# Patient Record
Sex: MALE | Race: BLACK OR AFRICAN AMERICAN | NOT HISPANIC OR LATINO | Employment: UNEMPLOYED | URBAN - METROPOLITAN AREA
[De-identification: names, ages, dates, MRNs, and addresses within clinical notes are randomized per-mention and may not be internally consistent; named-entity substitution may affect disease eponyms.]

---

## 2023-06-13 ENCOUNTER — APPOINTMENT (OUTPATIENT)
Dept: LAB | Facility: LAB | Age: 58
End: 2023-06-13
Payer: COMMERCIAL

## 2023-06-13 LAB
ALANINE AMINOTRANSFERASE (SGPT) (U/L) IN SER/PLAS: 5 U/L (ref 10–52)
ALBUMIN (G/DL) IN SER/PLAS: 3.7 G/DL (ref 3.4–5)
ALKALINE PHOSPHATASE (U/L) IN SER/PLAS: 57 U/L (ref 33–120)
ANION GAP IN SER/PLAS: 19 MMOL/L (ref 10–20)
ASPARTATE AMINOTRANSFERASE (SGOT) (U/L) IN SER/PLAS: 10 U/L (ref 9–39)
BILIRUBIN TOTAL (MG/DL) IN SER/PLAS: 0.4 MG/DL (ref 0–1.2)
CALCIUM (MG/DL) IN SER/PLAS: 8.9 MG/DL (ref 8.6–10.6)
CARBON DIOXIDE, TOTAL (MMOL/L) IN SER/PLAS: 31 MMOL/L (ref 21–32)
CHLORIDE (MMOL/L) IN SER/PLAS: 94 MMOL/L (ref 98–107)
CREATININE (MG/DL) IN SER/PLAS: 8.78 MG/DL (ref 0.5–1.3)
ERYTHROCYTE DISTRIBUTION WIDTH (RATIO) BY AUTOMATED COUNT: 16.1 % (ref 11.5–14.5)
ERYTHROCYTE MEAN CORPUSCULAR HEMOGLOBIN CONCENTRATION (G/DL) BY AUTOMATED: 29.7 G/DL (ref 32–36)
ERYTHROCYTE MEAN CORPUSCULAR VOLUME (FL) BY AUTOMATED COUNT: 91 FL (ref 80–100)
ERYTHROCYTES (10*6/UL) IN BLOOD BY AUTOMATED COUNT: 3.13 X10E12/L (ref 4.5–5.9)
ESTIMATED AVERAGE GLUCOSE FOR HBA1C: 131 MG/DL
GFR MALE: 6 ML/MIN/1.73M2
GLUCOSE (MG/DL) IN SER/PLAS: 201 MG/DL (ref 74–99)
HEMATOCRIT (%) IN BLOOD BY AUTOMATED COUNT: 28.6 % (ref 41–52)
HEMOGLOBIN (G/DL) IN BLOOD: 8.5 G/DL (ref 13.5–17.5)
HEMOGLOBIN A1C/HEMOGLOBIN TOTAL IN BLOOD: 6.2 %
HEPATITIS B VIRUS CORE AB (PRESENCE) IN SER/PLAS BY IMM: NONREACTIVE
HEPATITIS B VIRUS SURFACE AB (MIU/ML) IN SERUM: >1000 MIU/ML
HEPATITIS B VIRUS SURFACE AG PRESENCE IN SERUM: NONREACTIVE
HEPATITIS C VIRUS AB PRESENCE IN SERUM: NONREACTIVE
HIV 1/ 2 AG/AB SCREEN: NONREACTIVE
LEUKOCYTES (10*3/UL) IN BLOOD BY AUTOMATED COUNT: 6.6 X10E9/L (ref 4.4–11.3)
NRBC (PER 100 WBCS) BY AUTOMATED COUNT: 0 /100 WBC (ref 0–0)
PLATELETS (10*3/UL) IN BLOOD AUTOMATED COUNT: 247 X10E9/L (ref 150–450)
POTASSIUM (MMOL/L) IN SER/PLAS: 4 MMOL/L (ref 3.5–5.3)
PROSTATE SPECIFIC AG (NG/ML) IN SER/PLAS: 0.4 NG/ML (ref 0–4)
PROTEIN TOTAL: 8.4 G/DL (ref 6.4–8.2)
SODIUM (MMOL/L) IN SER/PLAS: 140 MMOL/L (ref 136–145)
SYPHILIS TOTAL AB: NONREACTIVE
UREA NITROGEN (MG/DL) IN SER/PLAS: 47 MG/DL (ref 6–23)

## 2023-06-15 LAB
NIL(NEG) CONTROL SPOT COUNT: NORMAL
PANEL A SPOT COUNT: 1
PANEL B SPOT COUNT: 0
POS CONTROL SPOT COUNT: NORMAL
T-SPOT. TB INTERPRETATION: NEGATIVE

## 2023-06-16 LAB
AMPHETAMINE SCREEN BLOOD: NEGATIVE NG/ML
BARBITURATE SCREEN BLOOD: NEGATIVE NG/ML
BENZODIAZEPINES SCREEN BLOOD: NEGATIVE NG/ML
BUPRENORPHINE SCREEN BLOOD: NEGATIVE NG/ML
CANNABINOIDS SCREEN BLOOD: NEGATIVE NG/ML
COCAINE SCREEN BLOOD: NEGATIVE NG/ML
DRUG SCREEN COMMENT BLOOD: NORMAL
METHADONE SCREEN BLOOD: NEGATIVE NG/ML
METHAMPHETAMINE, BLOOD, SCREEN: NEGATIVE NG/ML
OPIATE SCREEN BLOOD: NEGATIVE NG/ML
OXYCODONE SCREEN BLOOD: NEGATIVE NG/ML
PHENCYCLIDINE SCREEN BLOOD: NEGATIVE NG/ML

## 2023-06-19 LAB
COTININE BLOOD QUANTITATIVE: <5 NG/ML
NICOTINE BLOOD QUANTITATIVE: <5 NG/ML

## 2023-10-13 PROBLEM — E87.20 METABOLIC ACIDOSIS, NORMAL ANION GAP (NAG): Status: ACTIVE | Noted: 2018-12-11

## 2023-10-13 PROBLEM — E55.9 VITAMIN D DEFICIENCY: Status: ACTIVE | Noted: 2017-03-14

## 2023-10-13 PROBLEM — R77.0 ABNORMALITY OF ALBUMIN: Status: ACTIVE | Noted: 2020-10-20

## 2023-10-13 PROBLEM — H25.812 COMBINED FORM OF AGE-RELATED CATARACT, LEFT EYE: Status: ACTIVE | Noted: 2022-11-28

## 2023-10-13 PROBLEM — E87.70 FLUID OVERLOAD, UNSPECIFIED: Status: ACTIVE | Noted: 2020-10-20

## 2023-10-13 PROBLEM — R06.02 SHORTNESS OF BREATH: Status: ACTIVE | Noted: 2020-10-20

## 2023-10-13 PROBLEM — I10 ESSENTIAL HYPERTENSION: Status: ACTIVE | Noted: 2017-01-03

## 2023-10-13 PROBLEM — E11.3512 PROLIFERATIVE DIABETIC RETINOPATHY OF LEFT EYE WITH MACULAR EDEMA ASSOCIATED WITH TYPE 2 DIABETES MELLITUS (MULTI): Status: ACTIVE | Noted: 2021-04-21

## 2023-10-13 PROBLEM — E11.9 TYPE 2 DIABETES MELLITUS (MULTI): Status: ACTIVE | Noted: 2020-10-20

## 2023-10-13 PROBLEM — D50.9 IRON DEFICIENCY ANEMIA: Status: ACTIVE | Noted: 2017-03-15

## 2023-10-13 PROBLEM — E11.21 DIABETIC NEPHROPATHY ASSOCIATED WITH TYPE 2 DIABETES MELLITUS (MULTI): Status: ACTIVE | Noted: 2017-01-03

## 2023-10-13 PROBLEM — Z99.2 ESRD (END STAGE RENAL DISEASE) ON DIALYSIS (MULTI): Status: ACTIVE | Noted: 2019-04-07

## 2023-10-13 PROBLEM — I50.30 (HFPEF) HEART FAILURE WITH PRESERVED EJECTION FRACTION (MULTI): Status: ACTIVE | Noted: 2023-10-13

## 2023-10-13 PROBLEM — N25.89 OTHER DISORDERS RESULTING FROM IMPAIRED RENAL TUBULAR FUNCTION: Status: ACTIVE | Noted: 2020-10-20

## 2023-10-13 PROBLEM — I50.22 CHRONIC SYSTOLIC HEART FAILURE (MULTI): Status: ACTIVE | Noted: 2018-12-11

## 2023-10-13 PROBLEM — D63.1 ANEMIA IN CHRONIC KIDNEY DISEASE: Status: ACTIVE | Noted: 2020-10-20

## 2023-10-13 PROBLEM — H33.41 RETINAL DETACHMENT, TRACTIONAL, RIGHT EYE: Status: ACTIVE | Noted: 2019-01-18

## 2023-10-13 PROBLEM — R60.0 BILATERAL LOWER EXTREMITY EDEMA: Status: ACTIVE | Noted: 2018-12-11

## 2023-10-13 PROBLEM — Z99.2 DEPENDENCE ON RENAL DIALYSIS (CMS-HCC): Status: ACTIVE | Noted: 2020-10-20

## 2023-10-13 PROBLEM — G62.9 NEUROPATHY: Status: ACTIVE | Noted: 2023-10-13

## 2023-10-13 PROBLEM — H35.62 RETINAL HEMORRHAGE OF LEFT EYE: Status: ACTIVE | Noted: 2017-01-19

## 2023-10-13 PROBLEM — N25.81 SECONDARY HYPERPARATHYROIDISM OF RENAL ORIGIN (MULTI): Status: ACTIVE | Noted: 2020-10-20

## 2023-10-13 PROBLEM — H25.11 NUCLEAR SCLEROSIS, RIGHT: Status: ACTIVE | Noted: 2020-07-23

## 2023-10-13 PROBLEM — M86.9 OSTEOMYELITIS OF FIFTH TOE OF RIGHT FOOT (MULTI): Status: ACTIVE | Noted: 2023-05-10

## 2023-10-13 PROBLEM — I27.20 PULMONARY HYPERTENSION (MULTI): Status: ACTIVE | Noted: 2019-10-17

## 2023-10-13 PROBLEM — I50.9 CONGESTIVE HEART FAILURE (MULTI): Status: ACTIVE | Noted: 2019-10-17

## 2023-10-13 PROBLEM — N18.6 ESRD (END STAGE RENAL DISEASE) ON DIALYSIS (MULTI): Status: ACTIVE | Noted: 2019-04-07

## 2023-10-13 PROBLEM — L97.514 ULCER OF RIGHT FOOT WITH NECROSIS OF BONE (MULTI): Status: ACTIVE | Noted: 2023-05-10

## 2023-10-13 PROBLEM — E78.5 DYSLIPIDEMIA (HIGH LDL; LOW HDL): Status: ACTIVE | Noted: 2017-03-14

## 2023-10-13 PROBLEM — E87.70 HYPERVOLEMIA: Status: ACTIVE | Noted: 2019-04-07

## 2023-10-13 PROBLEM — E44.0 MODERATE PROTEIN-CALORIE MALNUTRITION (MULTI): Status: ACTIVE | Noted: 2022-01-19

## 2023-10-13 PROBLEM — I12.9 HYPERTENSIVE NEPHROPATHY: Status: ACTIVE | Noted: 2017-01-03

## 2023-10-13 PROBLEM — N18.9 ANEMIA IN CHRONIC KIDNEY DISEASE: Status: ACTIVE | Noted: 2020-10-20

## 2023-10-13 PROBLEM — N18.5 CKD (CHRONIC KIDNEY DISEASE) STAGE 5, GFR LESS THAN 15 ML/MIN (MULTI): Status: ACTIVE | Noted: 2018-11-20

## 2023-10-13 RX ORDER — INSULIN GLARGINE 100 [IU]/ML
5 INJECTION, SOLUTION SUBCUTANEOUS EVERY EVENING
COMMUNITY

## 2023-10-13 RX ORDER — CALCIUM ACETATE 668 MG
2 TABLET ORAL 3 TIMES DAILY
COMMUNITY

## 2023-10-13 RX ORDER — METHOXY POLYETHYLENE GLYCOL-EPOETIN BETA 30 UG/.3ML
INJECTION, SOLUTION INTRAVENOUS
COMMUNITY
Start: 2022-02-11

## 2023-10-13 RX ORDER — CARVEDILOL 25 MG/1
1 TABLET ORAL 2 TIMES DAILY
COMMUNITY

## 2023-10-13 RX ORDER — CALCIUM ACETATE 667 MG/1
4 TABLET ORAL 3 TIMES DAILY
COMMUNITY
Start: 2023-02-17

## 2023-10-13 RX ORDER — CEPHALEXIN 500 MG/1
500 CAPSULE ORAL
COMMUNITY
Start: 2023-08-24

## 2023-10-13 RX ORDER — ACETAMINOPHEN 500 MG
TABLET ORAL
COMMUNITY

## 2023-10-13 RX ORDER — IBUPROFEN 200 MG
1 CAPSULE ORAL 2 TIMES DAILY
COMMUNITY
Start: 2023-01-31

## 2023-10-13 RX ORDER — B COMPLEX, C NO.20/FOLIC ACID 1 MG
1 CAPSULE ORAL DAILY
COMMUNITY
Start: 2022-11-11

## 2023-10-13 RX ORDER — ASPIRIN 81 MG/1
TABLET ORAL
COMMUNITY
Start: 2013-01-15

## 2023-10-13 RX ORDER — ATORVASTATIN CALCIUM 10 MG/1
10 TABLET, FILM COATED ORAL DAILY
COMMUNITY
Start: 2015-11-10

## 2023-10-13 RX ORDER — HYDRALAZINE HYDROCHLORIDE 100 MG/1
100 TABLET, FILM COATED ORAL 3 TIMES DAILY
COMMUNITY
Start: 2020-02-26

## 2023-10-13 RX ORDER — CARVEDILOL 12.5 MG/1
2 TABLET ORAL 2 TIMES DAILY
COMMUNITY
Start: 2019-04-04

## 2023-10-13 RX ORDER — FOLIC ACID/VIT B COMPLEX AND C 0.8 MG
TABLET ORAL
COMMUNITY
Start: 2023-06-13

## 2023-10-13 RX ORDER — INSULIN LISPRO 100 [IU]/ML
5 INJECTION, SOLUTION INTRAVENOUS; SUBCUTANEOUS 3 TIMES DAILY
COMMUNITY

## 2023-10-13 RX ORDER — SYRINGE,SAFETY WITH NEEDLE,1ML 25GX1"
SYRINGE (EA) MISCELLANEOUS
COMMUNITY
Start: 2016-04-28

## 2023-10-13 RX ORDER — LOSARTAN POTASSIUM 25 MG/1
25 TABLET ORAL DAILY
COMMUNITY
Start: 2016-07-18

## 2023-10-13 RX ORDER — PEN NEEDLE, DIABETIC 30 GX3/16"
NEEDLE, DISPOSABLE MISCELLANEOUS
COMMUNITY
Start: 2023-10-09 | End: 2024-10-06

## 2023-10-13 RX ORDER — ONDANSETRON 4 MG/1
1 TABLET, ORALLY DISINTEGRATING ORAL EVERY 8 HOURS PRN
COMMUNITY
Start: 2023-08-05

## 2023-10-24 ENCOUNTER — OFFICE VISIT (OUTPATIENT)
Dept: TRANSPLANT | Facility: HOSPITAL | Age: 58
End: 2023-10-24
Payer: COMMERCIAL

## 2023-10-24 VITALS
WEIGHT: 155.5 LBS | HEART RATE: 83 BPM | OXYGEN SATURATION: 99 % | HEIGHT: 69 IN | SYSTOLIC BLOOD PRESSURE: 154 MMHG | BODY MASS INDEX: 23.03 KG/M2 | TEMPERATURE: 97.4 F | DIASTOLIC BLOOD PRESSURE: 98 MMHG

## 2023-10-24 DIAGNOSIS — Z01.818 ENCOUNTER FOR PRE-TRANSPLANT EVALUATION FOR LIVER TRANSPLANT: Primary | ICD-10-CM

## 2023-10-24 PROCEDURE — 99213 OFFICE O/P EST LOW 20 MIN: CPT

## 2023-10-24 PROCEDURE — 4010F ACE/ARB THERAPY RXD/TAKEN: CPT | Performed by: STUDENT IN AN ORGANIZED HEALTH CARE EDUCATION/TRAINING PROGRAM

## 2023-10-24 PROCEDURE — 3077F SYST BP >= 140 MM HG: CPT | Performed by: STUDENT IN AN ORGANIZED HEALTH CARE EDUCATION/TRAINING PROGRAM

## 2023-10-24 PROCEDURE — 3044F HG A1C LEVEL LT 7.0%: CPT | Performed by: STUDENT IN AN ORGANIZED HEALTH CARE EDUCATION/TRAINING PROGRAM

## 2023-10-24 PROCEDURE — 3080F DIAST BP >= 90 MM HG: CPT | Performed by: STUDENT IN AN ORGANIZED HEALTH CARE EDUCATION/TRAINING PROGRAM

## 2023-10-24 ASSESSMENT — PAIN SCALES - GENERAL: PAINLEVEL: 0-NO PAIN

## 2023-10-24 NOTE — PROGRESS NOTES
Patient seen in clinic for foot wound.  Patient coming to get reactivated on list after foot wound has healed.  Medications and allergies reviewed.  Patient testing UTD.

## 2023-10-25 NOTE — PROGRESS NOTES
"Chief complaint: Kidney transplant evaluation     HPI:  Mr. Dow is a 59 y/o M with a hx of DMII and ESRD. He is currently status 7 for foot wound and osteomyelitis. He is being followed for his osteomyelitis by podiatrist Dr. Leone at [facility]. Initial recommendations for his osteomyelitis was BKA. He has completed several round of IV antibiotics and has since had complete closure of the skin wound. He is returning to clinic for possible activation for kidney transplant.    He last completed antibiotics approximately 2 weeks ago and has an appointment with podiatry at the end of this month. Last note by podiatry 9/19/23 recommending suppressive antibiotics.     Renal:  HD since 04/2019. T,T,S.  PRA 81%. Oliguric    Cardiac:  Last evaluated by cardiology 06/2023 - acceptable risk  Hx CHF (EF 30-35%) which has since recovered.   Stress test: 06/22/23: 1. No definite evidence of ischemia.  2. In the setting of apparent diaphragmatic attenuation, a small distal inferior wall infarct cannot be excluded. This finding appears to be new/worse in comparison the prior study.  3. Left ventricular dilatation is noted that is new in comparison the prior study.  4. Left ventricular ejection fraction was estimated to be 45% which is slightly decreased from prior study of 50%.  ECHO: 6/13/23:   1. Left ventricular systolic function is normal with a 55-60% estimated ejection fraction.  2. Spectral Doppler shows a pseudonormal pattern of left ventricular diastolic filling.  3. Mild to moderate mitral valve regurgitation.  4. Mild to moderately elevated right ventricular systolic pressure.     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 Right foot s/p partial ray of 4 and 5th. No open wounds  Vasc: +2 fem, +1 DP BLE    Assessment/Plan:  Mr. Dow is a 59 y/o M w/ hx ESRD 2/2 DMII being evaluated for reactivation on the kidney transplant list.     Allo other work up is up to date. Wound on right foot is healed, however last podiatry note was concerning for continued osteomyelitis. He is scheduled for podiatry visit later this month. Will request clearance from podiatry with possible interval imaging to document resolution of infection prior to listing.

## 2023-11-17 ENCOUNTER — COMMITTEE REVIEW (OUTPATIENT)
Dept: TRANSPLANT | Facility: HOSPITAL | Age: 58
End: 2023-11-17
Payer: COMMERCIAL

## 2023-11-17 NOTE — COMMITTEE REVIEW
Patient Discussion Note     Organ being evaluated for: Kidney    Transplant Coordinator: Miranda Rossi    Committee Review Members:  Serg ZARAGOZA, DEBORA, LD   Finance Gregg Garibay   Lab Xena Contreras, MT   Pharmacy Dior Lundberg, YannaD    Tami Senior, [facility]   Transplant Miranda Rossi, HANSEL, Sheldon Anton, RN, Juli Zuleta, RN, Rhoda Catalan, RN, Yvette Carrillo RN   Transplant Nephrology Herrera Vogt MD, Laura Amaya MD, Dion Hawk MD   Transplant Surgery Sophie Ortiz MD       Additional Discussion Notes and Findings: Patient dicussed at selection committee.  Reactivate patient to status 1.

## 2023-11-17 NOTE — LETTER
November 17, 2023    Anibal Dow  [address] [address]      Dear Mr. Dow:    Our multi-disciplinary transplant team completed a review of your medical records on 11/16/2023.  I am pleased to inform you that you will be re-activated on the [facility] ([facility]) waiting list for a Kidney transplant.    Our transplant program consists of surgeons and medical doctors who provide coverage 365 days a year, 24 hours a day.     If you have any questions or concerns regarding your insurance coverage or billing issues, a  is available to speak with you.     It is important to keep us updated of any major changes in your medical condition, contact information and health insurance coverage.     Please don't hesitate to contact us at Dept: [phone] with any questions or concerns. We look forward to working with you through this process.      Sincerely,      Miranda Rossi RN          The UNOS Toll-free Patient Services Line:  Your Resource for Organ Transplant Information    If you have a question regarding your own medical care, you always should call your transplant hospital first. However, for general organ transplant-related information, you should call the [facility] [employer] [facility] (UNOS) toll-free patient services line at [phone].  Anyone, including potential transplant candidates, candidates, recipients, family members, friends, living donors, and donor family members, can call this number to:    Talk about organ donation, living donation, the transplant process, the donation process, and transplant policies.  Get a free patient information kit with helpful booklets, waiting list and transplant information, and a list of all transplant hospitals.  Ask questions about the Organ Procurement and Transplantation Network (OPTN) web site (http://optn.transplant.hrsa.gov/), the UNOS Web site (http://unos.org/), or the UNOS web site for living donors  and transplant recipients. (http://www.transplantliving.org/).  Learn how [facility] and the OPTN can help you.  Talk about any concerns that you may have with a transplant hospital.    [facility] is a not-for-profit organization that provides the administrative services for the national OPTN under federal contract to the Health Resources and Services Administration (HRSA), an agency under the U.S. Department of Health and Human Services (HHS).    [facility] and the OPTN are responsible for:    Providing educational material for patients, the public, and professionals.  Raising awareness of the need for donated organs and tissue.  Writing organ transplant policy with help from transplant professionals, transplant patients, transplant candidates, donor families, living donors, and the public.  Coordinating organ procurement, matching, and placement.  Collecting information about every organ transplant and donation that occurs in the [place].    Remember, you should contact your transplant hospital directly if you have questions or concerns about your own medical care including medical records, work-up progress, and test results.    [facility] is not your transplant hospital, and staff at [facility] will not be able to transfer you to your transplant hospital, so keep your transplant hospital’s phone number handy.    However, while you research your transplant needs and learn as much as you can about transplantation and donation, we welcome your call to our toll-free patient services line at [phone].      [facility] PIL Final Rev 1-

## 2023-11-28 PROCEDURE — 86832 HLA CLASS I HIGH DEFIN QUAL: CPT | Mod: OUT | Performed by: SURGERY

## 2023-11-29 ENCOUNTER — LAB REQUISITION (OUTPATIENT)
Dept: LAB | Facility: CLINIC | Age: 58
End: 2023-11-29
Payer: COMMERCIAL

## 2023-11-29 DIAGNOSIS — N18.6 END STAGE RENAL DISEASE (MULTI): ICD-10-CM

## 2023-12-01 LAB
HLA RESULTS: NORMAL
HLA-A+B+C AB NFR SER: NORMAL %
HLA-DP+DQ+DR AB NFR SER: NORMAL %

## 2024-01-05 ENCOUNTER — DOCUMENTATION (OUTPATIENT)
Dept: ADMINISTRATIVE | Facility: OTHER | Age: 59
End: 2024-01-05
Payer: COMMERCIAL

## 2024-01-05 NOTE — PROGRESS NOTES
[employer] is primary coverage and listing auth is required. Patient should be made status 7. [facility] [employer] and dental clearance is required. They also require social work and labs to be within 6 months of listing request.

## 2024-02-14 ENCOUNTER — TELEPHONE (OUTPATIENT)
Dept: TRANSPLANT | Facility: HOSPITAL | Age: 59
End: 2024-02-14
Payer: COMMERCIAL

## 2024-02-14 NOTE — TELEPHONE ENCOUNTER
Patient called in today wanting to know what his status is on the transplant list.  I informed the patient that he is currently status 7.  I explained to him that he would need to obtain dental clearance for his insurance company. Insurance will also require social work and labs to be within 6 months of listing for transplant. Patient informed to call his insurance provider to see what dentists he can go to near him for the clearance.  Patient was given my direct line to call me back once dental clearance is obtained.  Patient understands the plan.

## 2024-04-05 ENCOUNTER — DOCUMENTATION (OUTPATIENT)
Dept: TRANSPLANT | Facility: HOSPITAL | Age: 59
End: 2024-04-05
Payer: COMMERCIAL

## 2024-04-05 NOTE — PROGRESS NOTES
[employer] is primary coverage and listing auth is required. Patient should be made status 7. [facility] [employer] and dental clearance is required. They also require social work and labs to be within 6 months of listing request.

## 2024-05-13 ENCOUNTER — DOCUMENTATION (OUTPATIENT)
Dept: TRANSPLANT | Facility: HOSPITAL | Age: 59
End: 2024-05-13
Payer: COMMERCIAL

## 2024-05-13 NOTE — PROGRESS NOTES
Lab Results   Component Value Date    HEPBSAB >1000.0 06/13/2023       Immunization History   Administered Date(s) Administered Comments    Hepatitis B vaccine, adult (RECOMBIVAX, ENGERIX) 04/24/2019      08/12/2019      09/09/2019      01/13/2020      07/15/2020      08/12/2020

## 2024-06-19 DIAGNOSIS — Z01.818 PRE-TRANSPLANT EVALUATION FOR KIDNEY TRANSPLANT: ICD-10-CM

## 2024-06-20 ENCOUNTER — TELEPHONE (OUTPATIENT)
Dept: TRANSPLANT | Facility: HOSPITAL | Age: 59
End: 2024-06-20
Payer: COMMERCIAL

## 2024-06-20 DIAGNOSIS — Z01.818 PRE-TRANSPLANT EVALUATION FOR KIDNEY TRANSPLANT: Primary | ICD-10-CM

## 2024-06-26 DIAGNOSIS — Z12.5 ENCOUNTER FOR SCREENING FOR MALIGNANT NEOPLASM OF PROSTATE: ICD-10-CM

## 2024-06-26 DIAGNOSIS — Z51.81 ENCOUNTER FOR THERAPEUTIC DRUG LEVEL MONITORING: ICD-10-CM

## 2024-06-26 DIAGNOSIS — N18.6 END STAGE RENAL DISEASE (MULTI): ICD-10-CM

## 2024-06-26 DIAGNOSIS — Z01.818 PRE-TRANSPLANT EVALUATION FOR KIDNEY TRANSPLANT: Primary | ICD-10-CM

## 2024-06-26 DIAGNOSIS — Z13.6 ENCOUNTER FOR SCREENING FOR CARDIOVASCULAR DISORDERS: ICD-10-CM

## 2024-06-27 ENCOUNTER — LAB (OUTPATIENT)
Dept: LAB | Facility: HOSPITAL | Age: 59
End: 2024-06-27
Payer: COMMERCIAL

## 2024-06-27 ENCOUNTER — SOCIAL WORK (OUTPATIENT)
Dept: TRANSPLANT | Facility: HOSPITAL | Age: 59
End: 2024-06-27
Payer: COMMERCIAL

## 2024-06-27 ENCOUNTER — LAB REQUISITION (OUTPATIENT)
Dept: LAB | Facility: CLINIC | Age: 59
End: 2024-06-27
Payer: COMMERCIAL

## 2024-06-27 ENCOUNTER — HOSPITAL ENCOUNTER (OUTPATIENT)
Dept: RADIOLOGY | Facility: HOSPITAL | Age: 59
Discharge: HOME | End: 2024-06-27
Payer: COMMERCIAL

## 2024-06-27 DIAGNOSIS — N18.6 END STAGE RENAL DISEASE (MULTI): ICD-10-CM

## 2024-06-27 DIAGNOSIS — Z01.818 PRE-TRANSPLANT EVALUATION FOR KIDNEY TRANSPLANT: ICD-10-CM

## 2024-06-27 DIAGNOSIS — Z12.5 ENCOUNTER FOR SCREENING FOR MALIGNANT NEOPLASM OF PROSTATE: ICD-10-CM

## 2024-06-27 DIAGNOSIS — Z51.81 ENCOUNTER FOR THERAPEUTIC DRUG LEVEL MONITORING: ICD-10-CM

## 2024-06-27 DIAGNOSIS — Z13.6 ENCOUNTER FOR SCREENING FOR CARDIOVASCULAR DISORDERS: ICD-10-CM

## 2024-06-27 LAB
ALBUMIN SERPL BCP-MCNC: 3.9 G/DL (ref 3.4–5)
ALP SERPL-CCNC: 105 U/L (ref 33–120)
ALT SERPL W P-5'-P-CCNC: <3 U/L (ref 10–52)
AMYLASE SERPL-CCNC: 54 U/L (ref 29–103)
AST SERPL W P-5'-P-CCNC: 14 U/L (ref 9–39)
BILIRUB DIRECT SERPL-MCNC: 0.1 MG/DL (ref 0–0.3)
BILIRUB SERPL-MCNC: 0.5 MG/DL (ref 0–1.2)
BUN SERPL-MCNC: 23 MG/DL (ref 6–23)
C PEPTIDE SERPL-MCNC: 2.8 NG/ML (ref 0.7–3.9)
CHOLEST SERPL-MCNC: 203 MG/DL (ref 0–199)
CHOLESTEROL/HDL RATIO: 5.2
CREAT SERPL-MCNC: 7.41 MG/DL (ref 0.5–1.3)
EGFRCR SERPLBLD CKD-EPI 2021: 8 ML/MIN/1.73M*2
ERYTHROCYTE [DISTWIDTH] IN BLOOD BY AUTOMATED COUNT: 16.1 % (ref 11.5–14.5)
FLOW AUTOCROSSMATCH: NORMAL
HBV CORE AB SER QL: NONREACTIVE
HBV SURFACE AB SER-ACNC: >1000 MIU/ML
HBV SURFACE AG SERPL QL IA: NONREACTIVE
HCT VFR BLD AUTO: 32.1 % (ref 41–52)
HCV AB SER QL: NONREACTIVE
HCYS SERPL-SCNC: 19.84 UMOL/L (ref 5–13.9)
HDLC SERPL-MCNC: 39.4 MG/DL
HGB BLD-MCNC: 9.7 G/DL (ref 13.5–17.5)
HIV 1+2 AB+HIV1 P24 AG SERPL QL IA: NONREACTIVE
HLA RESULTS: NORMAL
HLA-A+B+C AB NFR SER: NORMAL %
HLA-DP+DQ+DR AB NFR SER: NORMAL %
INR PPP: 1.2 (ref 0.9–1.1)
MCH RBC QN AUTO: 28.6 PG (ref 26–34)
MCHC RBC AUTO-ENTMCNC: 30.2 G/DL (ref 32–36)
MCV RBC AUTO: 95 FL (ref 80–100)
NON-HDL CHOLESTEROL: 164 MG/DL (ref 0–149)
NRBC BLD-RTO: 0 /100 WBCS (ref 0–0)
PHOSPHATE SERPL-MCNC: 3.2 MG/DL (ref 2.5–4.9)
PLATELET # BLD AUTO: 222 X10*3/UL (ref 150–450)
PROT SERPL-MCNC: 8.1 G/DL (ref 6.4–8.2)
PROTHROMBIN TIME: 13.8 SECONDS (ref 9.8–12.8)
RBC # BLD AUTO: 3.39 X10*6/UL (ref 4.5–5.9)
TREPONEMA PALLIDUM IGG+IGM AB [PRESENCE] IN SERUM OR PLASMA BY IMMUNOASSAY: NONREACTIVE
WBC # BLD AUTO: 5.4 X10*3/UL (ref 4.4–11.3)

## 2024-06-27 PROCEDURE — 80307 DRUG TEST PRSMV CHEM ANLYZR: CPT

## 2024-06-27 PROCEDURE — 86803 HEPATITIS C AB TEST: CPT

## 2024-06-27 PROCEDURE — 85027 COMPLETE CBC AUTOMATED: CPT

## 2024-06-27 PROCEDURE — 84154 ASSAY OF PSA FREE: CPT

## 2024-06-27 PROCEDURE — 86704 HEP B CORE ANTIBODY TOTAL: CPT

## 2024-06-27 PROCEDURE — 86481 TB AG RESPONSE T-CELL SUSP: CPT

## 2024-06-27 PROCEDURE — 82565 ASSAY OF CREATININE: CPT

## 2024-06-27 PROCEDURE — 87340 HEPATITIS B SURFACE AG IA: CPT

## 2024-06-27 PROCEDURE — 86780 TREPONEMA PALLIDUM: CPT

## 2024-06-27 PROCEDURE — 80076 HEPATIC FUNCTION PANEL: CPT

## 2024-06-27 PROCEDURE — 83090 ASSAY OF HOMOCYSTEINE: CPT

## 2024-06-27 PROCEDURE — 85610 PROTHROMBIN TIME: CPT

## 2024-06-27 PROCEDURE — 71046 X-RAY EXAM CHEST 2 VIEWS: CPT

## 2024-06-27 PROCEDURE — 82150 ASSAY OF AMYLASE: CPT

## 2024-06-27 PROCEDURE — 80323 ALKALOIDS NOS: CPT

## 2024-06-27 PROCEDURE — 84100 ASSAY OF PHOSPHORUS: CPT

## 2024-06-27 PROCEDURE — 87389 HIV-1 AG W/HIV-1&-2 AB AG IA: CPT

## 2024-06-27 PROCEDURE — 86832 HLA CLASS I HIGH DEFIN QUAL: CPT | Mod: OUT | Performed by: SURGERY

## 2024-06-27 PROCEDURE — 83718 ASSAY OF LIPOPROTEIN: CPT

## 2024-06-27 PROCEDURE — 36415 COLL VENOUS BLD VENIPUNCTURE: CPT

## 2024-06-27 PROCEDURE — 71046 X-RAY EXAM CHEST 2 VIEWS: CPT | Performed by: RADIOLOGY

## 2024-06-27 PROCEDURE — 86825 HLA X-MATH NON-CYTOTOXIC: CPT | Mod: OUT | Performed by: SURGERY

## 2024-06-27 PROCEDURE — 84520 ASSAY OF UREA NITROGEN: CPT

## 2024-06-27 PROCEDURE — 86706 HEP B SURFACE ANTIBODY: CPT

## 2024-06-27 PROCEDURE — 84681 ASSAY OF C-PEPTIDE: CPT

## 2024-06-27 ASSESSMENT — ANXIETY QUESTIONNAIRES
IF YOU CHECKED OFF ANY PROBLEMS ON THIS QUESTIONNAIRE, HOW DIFFICULT HAVE THESE PROBLEMS MADE IT FOR YOU TO DO YOUR WORK, TAKE CARE OF THINGS AT HOME, OR GET ALONG WITH OTHER PEOPLE: NOT DIFFICULT AT ALL
4. TROUBLE RELAXING: NOT AT ALL
6. BECOMING EASILY ANNOYED OR IRRITABLE: NOT AT ALL
GAD7 TOTAL SCORE: 2
1. FEELING NERVOUS, ANXIOUS, OR ON EDGE: NOT AT ALL
3. WORRYING TOO MUCH ABOUT DIFFERENT THINGS: SEVERAL DAYS
7. FEELING AFRAID AS IF SOMETHING AWFUL MIGHT HAPPEN: NOT AT ALL
5. BEING SO RESTLESS THAT IT IS HARD TO SIT STILL: NOT AT ALL
2. NOT BEING ABLE TO STOP OR CONTROL WORRYING: SEVERAL DAYS

## 2024-06-27 ASSESSMENT — PATIENT HEALTH QUESTIONNAIRE - PHQ9
8. MOVING OR SPEAKING SO SLOWLY THAT OTHER PEOPLE COULD HAVE NOTICED. OR THE OPPOSITE, BEING SO FIGETY OR RESTLESS THAT YOU HAVE BEEN MOVING AROUND A LOT MORE THAN USUAL: NOT AT ALL
3. TROUBLE FALLING OR STAYING ASLEEP OR SLEEPING TOO MUCH: NOT AT ALL
SUM OF ALL RESPONSES TO PHQ QUESTIONS 1-9: 0
SUM OF ALL RESPONSES TO PHQ9 QUESTIONS 1 & 2: 0
9. THOUGHTS THAT YOU WOULD BE BETTER OFF DEAD, OR OF HURTING YOURSELF: NOT AT ALL
7. TROUBLE CONCENTRATING ON THINGS, SUCH AS READING THE NEWSPAPER OR WATCHING TELEVISION: NOT AT ALL
4. FEELING TIRED OR HAVING LITTLE ENERGY: NOT AT ALL
5. POOR APPETITE OR OVEREATING: NOT AT ALL
1. LITTLE INTEREST OR PLEASURE IN DOING THINGS: NOT AT ALL
6. FEELING BAD ABOUT YOURSELF - OR THAT YOU ARE A FAILURE OR HAVE LET YOURSELF OR YOUR FAMILY DOWN: NOT AT ALL
2. FEELING DOWN, DEPRESSED OR HOPELESS: NOT AT ALL

## 2024-06-28 LAB
FLOW AUTOCROSSMATCH: NORMAL
HLA RESULTS: NORMAL

## 2024-06-29 LAB
AMPHETAMINES SERPL QL SCN: NEGATIVE NG/ML
ANNOTATION COMMENT IMP: NORMAL
BARBITURATES SERPL QL SCN: NEGATIVE NG/ML
BENZODIAZ SERPL QL SCN: NEGATIVE NG/ML
BUPRENORPHINE SERPL-MCNC: NEGATIVE NG/ML
CANNABINOIDS SERPL QL SCN: NEGATIVE NG/ML
COCAINE SERPL QL SCN: NEGATIVE NG/ML
METHADONE SERPL QL SCN: NEGATIVE NG/ML
METHAMPHET SERPL QL: NEGATIVE NG/ML
NIL(NEG) CONTROL SPOT COUNT: NORMAL
OPIATES SERPL QL SCN: NEGATIVE NG/ML
OXYCODONE SERPL QL: NEGATIVE NG/ML
PANEL A SPOT COUNT: 1
PANEL B SPOT COUNT: 0
PCP SERPL QL SCN: NEGATIVE NG/ML
POS CONTROL SPOT COUNT: NORMAL
PSA FREE MFR SERPL: 67 %
PSA FREE SERPL-MCNC: 0.4 NG/ML
PSA SERPL IA-MCNC: 0.6 NG/ML (ref 0–4)
T-SPOT. TB INTERPRETATION: NEGATIVE

## 2024-06-30 LAB
COTININE SERPL-MCNC: <5 NG/ML
NICOTINE SERPL-MCNC: <5 NG/ML

## 2024-07-01 LAB
HLA RESULTS: NORMAL
HLA-A+B+C AB NFR SER: NORMAL %
HLA-DP+DQ+DR AB NFR SER: NORMAL %

## 2024-07-09 ENCOUNTER — HOSPITAL ENCOUNTER (OUTPATIENT)
Dept: RADIOLOGY | Facility: CLINIC | Age: 59
Discharge: HOME | End: 2024-07-09
Payer: COMMERCIAL

## 2024-07-09 ENCOUNTER — HOSPITAL ENCOUNTER (OUTPATIENT)
Dept: CARDIOLOGY | Facility: CLINIC | Age: 59
Discharge: HOME | End: 2024-07-09
Payer: COMMERCIAL

## 2024-07-09 DIAGNOSIS — Z01.818 PRE-TRANSPLANT EVALUATION FOR KIDNEY TRANSPLANT: ICD-10-CM

## 2024-07-09 LAB
AORTIC VALVE PEAK VELOCITY: 1.28 M/S
AV PEAK GRADIENT: 6.6 MMHG
AVA (PEAK VEL): 2.82 CM2
EJECTION FRACTION APICAL 4 CHAMBER: 49.7
EJECTION FRACTION: 53 %
LEFT ATRIUM VOLUME AREA LENGTH INDEX BSA: 19.3 ML/M2
LEFT VENTRICLE INTERNAL DIMENSION DIASTOLE: 5.21 CM (ref 3.5–6)
LEFT VENTRICULAR OUTFLOW TRACT DIAMETER: 2.03 CM
MITRAL VALVE E/A RATIO: 0.63
MITRAL VALVE E/E' RATIO: 23.48
RIGHT VENTRICLE FREE WALL PEAK S': 8 CM/S
RIGHT VENTRICLE PEAK SYSTOLIC PRESSURE: 47.2 MMHG
TRICUSPID ANNULAR PLANE SYSTOLIC EXCURSION: 2.1 CM

## 2024-07-09 PROCEDURE — 74176 CT ABD & PELVIS W/O CONTRAST: CPT

## 2024-07-09 PROCEDURE — 93306 TTE W/DOPPLER COMPLETE: CPT

## 2024-07-09 PROCEDURE — 93306 TTE W/DOPPLER COMPLETE: CPT | Performed by: INTERNAL MEDICINE

## 2024-07-19 ENCOUNTER — DOCUMENTATION (OUTPATIENT)
Dept: TRANSPLANT | Facility: HOSPITAL | Age: 59
End: 2024-07-19
Payer: COMMERCIAL

## 2024-07-19 NOTE — PROGRESS NOTES
Appears pt still needs cardiac clearance before clinical is submitted to [facility] for extension of listing auth.

## 2024-09-03 ENCOUNTER — HOSPITAL ENCOUNTER (OUTPATIENT)
Dept: RADIOLOGY | Facility: HOSPITAL | Age: 59
Discharge: HOME | End: 2024-09-03
Payer: COMMERCIAL

## 2024-09-03 VITALS — HEIGHT: 69 IN | BODY MASS INDEX: 22.86 KG/M2 | WEIGHT: 154.32 LBS

## 2024-09-03 DIAGNOSIS — Z01.818 PRE-TRANSPLANT EVALUATION FOR KIDNEY TRANSPLANT: ICD-10-CM

## 2024-09-03 PROCEDURE — A9575 INJ GADOTERATE MEGLUMI 0.1ML: HCPCS | Performed by: STUDENT IN AN ORGANIZED HEALTH CARE EDUCATION/TRAINING PROGRAM

## 2024-09-03 PROCEDURE — 75563 CARD MRI W/STRESS IMG & DYE: CPT

## 2024-09-03 PROCEDURE — 2550000001 HC RX 255 CONTRASTS: Performed by: STUDENT IN AN ORGANIZED HEALTH CARE EDUCATION/TRAINING PROGRAM

## 2024-09-03 RX ORDER — GADOTERATE MEGLUMINE 376.9 MG/ML
28 INJECTION INTRAVENOUS
Status: COMPLETED | OUTPATIENT
Start: 2024-09-03 | End: 2024-09-03

## 2024-09-06 ENCOUNTER — HOSPITAL ENCOUNTER (OUTPATIENT)
Dept: RADIOLOGY | Facility: HOSPITAL | Age: 59
Discharge: HOME | End: 2024-09-06
Payer: COMMERCIAL

## 2024-09-06 DIAGNOSIS — Z01.818 PRE-TRANSPLANT EVALUATION FOR KIDNEY TRANSPLANT: ICD-10-CM

## 2024-09-06 PROCEDURE — 75571 CT HRT W/O DYE W/CA TEST: CPT

## 2024-09-10 ENCOUNTER — APPOINTMENT (OUTPATIENT)
Dept: CARDIOLOGY | Facility: HOSPITAL | Age: 59
End: 2024-09-10
Payer: COMMERCIAL

## 2024-09-17 ENCOUNTER — CONSULT (OUTPATIENT)
Dept: CARDIOLOGY | Facility: CLINIC | Age: 59
End: 2024-09-17
Payer: COMMERCIAL

## 2024-09-17 VITALS
HEART RATE: 90 BPM | DIASTOLIC BLOOD PRESSURE: 77 MMHG | BODY MASS INDEX: 23.55 KG/M2 | SYSTOLIC BLOOD PRESSURE: 145 MMHG | HEIGHT: 70 IN | WEIGHT: 164.5 LBS | OXYGEN SATURATION: 93 %

## 2024-09-17 DIAGNOSIS — Z01.818 PRE-TRANSPLANT EVALUATION FOR KIDNEY TRANSPLANT: Primary | ICD-10-CM

## 2024-09-17 PROCEDURE — 93005 ELECTROCARDIOGRAM TRACING: CPT | Performed by: INTERNAL MEDICINE

## 2024-09-17 PROCEDURE — 99214 OFFICE O/P EST MOD 30 MIN: CPT | Performed by: INTERNAL MEDICINE

## 2024-09-17 PROCEDURE — 99214 OFFICE O/P EST MOD 30 MIN: CPT | Mod: 25 | Performed by: INTERNAL MEDICINE

## 2024-09-17 PROCEDURE — 93010 ELECTROCARDIOGRAM REPORT: CPT | Performed by: INTERNAL MEDICINE

## 2024-09-17 RX ORDER — B,C/FOLIC/ZINC/SELENOMETH/D3/E 0.8-12.5MG
1 TABLET ORAL
COMMUNITY
Start: 2024-08-09

## 2024-09-17 RX ORDER — ATORVASTATIN CALCIUM 40 MG/1
40 TABLET, FILM COATED ORAL DAILY
Qty: 30 TABLET | Refills: 3 | Status: SHIPPED | OUTPATIENT
Start: 2024-09-17 | End: 2025-09-17

## 2024-09-17 ASSESSMENT — ENCOUNTER SYMPTOMS
OCCASIONAL FEELINGS OF UNSTEADINESS: 0
LOSS OF SENSATION IN FEET: 0
DEPRESSION: 0

## 2024-09-17 ASSESSMENT — COLUMBIA-SUICIDE SEVERITY RATING SCALE - C-SSRS
1. IN THE PAST MONTH, HAVE YOU WISHED YOU WERE DEAD OR WISHED YOU COULD GO TO SLEEP AND NOT WAKE UP?: NO
6. HAVE YOU EVER DONE ANYTHING, STARTED TO DO ANYTHING, OR PREPARED TO DO ANYTHING TO END YOUR LIFE?: NO
2. HAVE YOU ACTUALLY HAD ANY THOUGHTS OF KILLING YOURSELF?: NO

## 2024-09-17 ASSESSMENT — PAIN SCALES - GENERAL: PAINLEVEL: 0-NO PAIN

## 2024-09-17 NOTE — PROGRESS NOTES
Primary Care Physician: Desiree Quiros, APRN-CNP  Date of Visit: 09/17/2024  3:00 PM EDT  [place] [facility] [place] [address]     Chief Complaint:   No chief complaint on file.       HPI / Summary:   Anibal Dow is a 59 y.o. male presents for followup.   Neuropathy hypertension ESRD, pulmonary hypertension, history of right foot osteomyelitis with partial amputation.  Per last cardiology note dating to June 2023 history of reduced EF to 35% since recovered previous invasive evaluation showing mild nonobstructive CAD.    June 2023 nuclear stress test normal scintigraphy with some attenuation artifact echo around that time showing normal EF    Specialty Problems          Cardiology Problems    Essential hypertension    Dyslipidemia (high LDL; low HDL)    Chronic systolic heart failure (Multi)    Congestive heart failure (Multi)     Formatting of this note might be different from the original. Added automatically from request for surgery [id number]         (HFpEF) heart failure with preserved ejection fraction (Multi)        Past Medical History:   Diagnosis Date    Personal history of other diseases of urinary system 05/21/2020    History of chronic kidney disease          Past Surgical History:   Procedure Laterality Date    CT ANGIO CORONARY ART WITH HEARTFLOW IF SCORE >30%  1/20/2020    CT HEART CORONARY ANGIOGRAM 1/20/[place] [facility] [place] [place]    OTHER SURGICAL HISTORY  05/21/2020    Toe amputation          Social History:         Allergies:  No Known Allergies    Outpatient Medications:  Current Outpatient Medications   Medication Instructions    aspirin 81 mg EC tablet TAKE 1 TABLET BY MOUTH DAILY TO PREVENT HEART ATTACK AND STROKE<BR>    atorvastatin (LIPITOR) 10 mg, oral, Daily, (PLEASE REORDER 2 BUSINESS DAYS IN ADVANCE)<BR>    B complex-vitamin C-folic acid (Nephro-Zenia) 0.8 mg tablet oral, Daily RT, as directed    blood sugar diagnostic (Blood Glucose Test) strip 1 strip, 2 times daily    blood sugar  "diagnostic strip 1 each, subcutaneous, 3 times daily    calcium acetate (Phoslo) 667 mg tablet 4 tablets, oral, 3 times daily    calcium acetate 668 mg (169 mg calcium) tablet 2 tablets, oral, 3 times daily    carvedilol (Coreg) 12.5 mg tablet 2 tablets, oral, 2 times daily    carvedilol (Coreg) 25 mg tablet 1 tablet, oral, 2 times daily    cephalexin (KEFLEX) 500 mg, oral, Daily RT, Start taking on September 2.    cholecalciferol (Vitamin D3) 50 mcg (2,000 unit) capsule Vitamin D CAPS   Refills: 0       Active    collagenase 250 unit/gram ointment Apply thin layer to affected area daily<BR>    epoetin beta, methoxy peg (Mircera) 30 mcg/0.3 mL syringe Titrated and administered at outpatient dialysis unit.    hydrALAZINE (APRESOLINE) 100 mg, oral, 3 times daily    insulin glargine (LANTUS) 5 Units, subcutaneous, Every evening    insulin lispro (HUMALOG) 5 Units, subcutaneous, 3 times daily, Before meals.    insulin NPH, Isophane, (HumuLIN N,NovoLIN N) 100 unit/mL injection Inject 5 units every morning & 12 units every evening    insulin syringe-needle U-100 31G X 5/16\" 1 mL syringe  Use as directed by physician<BR>    losartan (COZAAR) 25 mg, oral, Daily    ondansetron ODT (Zofran-ODT) 4 mg disintegrating tablet 1 tablet, oral, Every 8 hours PRN    pen needle, diabetic (Pen Needle) 31 gauge x 3/16\" needle USE ONE TIME DAILY<BR>    sodium chlor/hypochlorous acid (VASHE WOUND THERAPY IR) 5 mL, Topical, Daily RT    sodium hypochlorite (Dakin's Full-Strength) 0.5 % external solution Use Dakins solution to cleanse the wound with sterile gauze then rinse with sterile saline and cover as instructed.<BR><BR>    Triphrocaps 1 mg capsule 1 capsule, oral, Daily    ULTRA FINE LANCETS MISC Use to test blood sugar up to 3 times daily<BR>       ROS     Physical Exam:  There were no vitals filed for this visit.  Wt Readings from Last 5 Encounters:   09/03/24 70 kg (154 lb 5.2 oz)   10/24/23 70.5 kg (155 lb 8 oz)   06/27/23 71.8 kg " "(158 lb 6 oz)   06/13/23 72.6 kg (160 lb)   06/14/22 74.4 kg (164 lb)     There is no height or weight on file to calculate BMI.     Ill-appearing male no acute distress.  Mildly elevated JVP.  Systolic murmur S4.  Clear lungs.  Soft abdomen.  No dependent edema diminished pedal pulses  Last Labs:  CMP:  Recent Labs     06/27/24  0854 08/05/23  1833 06/13/23  1118 05/26/22  1243 06/29/21  1119 06/06/21  0129   NA  --  136 140 134* 137 139   K  --  4.3 4.0 4.4 5.8* 4.8   CL  --  88* 94* 90* 95* 97*   CO2  --  28 31 32 30 27   ANIONGAP  --  24* 19 16 18 20   BUN 23 66* 47* 46* 80* 30*   CREATININE 7.41* 13.74* 8.78* 9.85* 12.78* 7.32*   EGFR 8*  --   --   --   --   --    GLUCOSE  --  337* 201* 295* 210* 259*     Recent Labs     06/27/24  0854 08/05/23  1833 06/13/23  1118 05/26/22  1243 06/29/21  1119   ALBUMIN 3.9 4.2 3.7 4.3 4.3   ALKPHOS 105 85 57 75 76   ALT <3* 7* 5* 7* 6*   AST 14 13 10 12 13   BILITOT 0.5 0.6 0.4 0.6 0.6   LIPASE  --  167*  --   --   --      CBC:  Recent Labs     06/27/24  0854 08/05/23  1832 06/13/23  1118 05/26/22  1243 06/06/21  0129   WBC 5.4 7.1 6.6 6.6 5.4   HGB 9.7* 13.9 8.5* 11.9* 11.1*   HCT 32.1* 45.5 28.6* 38.8* 35.6*    247 247 169 147*   MCV 95 87 91 93 95     COAG:   Recent Labs     06/27/24  0854 06/06/21  0129 06/11/19  0834   INR 1.2* 1.1 1.2*     HEME/ENDO:  Recent Labs     07/08/24  1539 01/05/24  1530 06/13/23  1118 05/16/23  1420   HGBA1C 6.8* 8.0* 6.2* 6.7*      CARDIAC: No results for input(s): \"LDH\", \"CKMB\", \"TROPHS\", \"BNP\" in the last 21250 hours.    No lab exists for component: \"CK\", \"CKMBP\"  Recent Labs     06/27/24  0854   CHOL 203*   HDL 39.4       Last Cardiology Tests:  ECG:      Echo:  Echo Results:  Transthoracic Echo (TTE) Complete 07/09/2024    [facility], [address] [place] [address]  Tel [phone] and Fax [phone]    TRANSTHORACIC ECHOCARDIOGRAM REPORT      Patient Name:      MATHEUS " BRIE Mariscal Physician:    [id number] Gilmar Nogueira MD  Study Date:        7/9/2024             Ordering Provider:    [id number] MARSHA CASTILLO  MRN/PID:           [id number]             Fellow:  Accession#:        [id number]         Nurse:  Date of Birth/Age: 1965 / 59 years Sonographer:          Laina Adler RDCS  Gender:            M                    Additional Staff:  Height:            175.26 cm            Admit Date:  Weight:            70.31 kg             Admission Status:     Outpatient  BSA / BMI:         1.85 m2 / 22.89      Encounter#:           [id number]  kg/m2  Blood Pressure:    154/98 mmHg          Department Location:  [facility]  Echo Lab    Study Type:    TRANSTHORACIC ECHO (TTE) COMPLETE  Diagnosis/ICD: Encounter for other preprocedural examination-Z01.818  Indication:    Pre kidney transplant eval  CPT Code:      Echo Complete w Full Doppler-83339    Patient History:  Pertinent History: CHF, HTN and CAD. PHTN, ESRD.    Study Detail: The following Echo studies were performed: 2D, M-Mode, Doppler and  color flow.      PHYSICIAN INTERPRETATION:  Left Ventricle: The left ventricular systolic function is low normal, with a visually estimated ejection fraction of 50-55%. There is global hypokinesis of the left ventricle with minor regional variations. The left ventricular cavity size is normal. Spectral Doppler shows a pseudonormal pattern of left ventricular diastolic filling.  Left Atrium: The left atrium is normal in size.  Right Ventricle: The right ventricle is normal in size. There is normal right ventricular global systolic function.  Right Atrium: The right atrium is normal in size.  Aortic Valve: The aortic valve is trileaflet. There is no evidence of aortic valve regurgitation. The peak instantaneous gradient of the aortic valve is 6.6 mmHg.  Mitral Valve: The mitral valve is mildly thickened. There is tethering of the anterior mitral leaflet. There is mild to moderate mitral valve  regurgitation.  Tricuspid Valve: The tricuspid valve is structurally normal. There is mild tricuspid regurgitation. The Doppler estimated RVSP is mild to moderately elevated at 47.2 mmHg.  Pulmonic Valve: The pulmonic valve is structurally normal. There is physiologic pulmonic valve regurgitation.  Pericardium: There is a trivial pericardial effusion.  Aorta: The aortic root is normal.  Systemic Veins: The inferior vena cava appears to be of normal size. There is IVC inspiratory collapse greater than 50%.  In comparison to the previous echocardiogram(s): Compared with study dated 6/13/2023, no significant change.      CONCLUSIONS:  1. The left ventricular systolic function is low normal, with a visually estimated ejection fraction of 50-55%.  2. There is global hypokinesis of the left ventricle with minor regional variations.  3. Spectral Doppler shows a pseudonormal pattern of left ventricular diastolic filling.  4. There is normal right ventricular global systolic function.  5. Mild to moderate mitral valve regurgitation.  6. Mild to moderately elevated right ventricular systolic pressure.    QUANTITATIVE DATA SUMMARY:  2D MEASUREMENTS:  Normal Ranges:  Ao Root d:     2.80 cm   (2.0-3.7cm)  LAs:           4.57 cm   (2.7-4.0cm)  RVIDd:         2.27 cm   (0.9-3.6cm)  IVSd:          0.86 cm   (0.6-1.1cm)  LVPWd:         0.81 cm   (0.6-1.1cm)  LVIDd:         5.21 cm   (3.9-5.9cm)  LVIDs:         4.05 cm  LV Mass Index: 82.6 g/m2  LV % FS        22.2 %    LA VOLUME:  Normal Ranges:  LA Vol A4C:       43.2 ml    (22+/-6mL/m2)  LA Vol A2C:       29.3 ml  LA Vol BP:        35.8 ml  LA Vol Index A4C: 23.3ml/m2  LA Vol Index A2C: 15.8 ml/m2  LA Vol Index BP:  19.3 ml/m2  LA Volume Index:  19.2 ml/m2  LA Vol A4C:       41.6 ml  LA Vol A2C:       28.0 ml    RA VOLUME BY A/L METHOD:  Normal Ranges:  RA Area A4C: 11.7 cm2    AORTA MEASUREMENTS:  Normal Ranges:  Asc Ao, d: 3.20 cm (2.1-3.4cm)    LV SYSTOLIC FUNCTION BY 2D  PLANIMETRY (MOD):  Normal Ranges:  EF-A4C View:    50 % (>=55%)  EF-A2C View:    52 %  EF-Biplane:     49 %  EF-Visual:      53 %  LV EF Reported: 53 %    LV DIASTOLIC FUNCTION:  Normal Ranges:  MV Peak E: 0.94 m/s    (0.7-1.2 m/s)  MV Peak A: 1.49 m/s    (0.42-0.7 m/s)  E/A Ratio: 0.63        (1.0-2.2)  MV A Dur:  101.50 msec  a'         0.09 m/s    MITRAL VALVE:  Normal Ranges:  MV DT: 209 msec (150-240msec)    MITRAL INSUFFICIENCY:  Normal Ranges:  MR VTI:     232.20 cm  MR Vmax:    648.83 cm/s  MR Volume:  11.32 ml  MR Flow Rt: 31.63 ml/s  MR EROA:    0.05 cm2    AORTIC VALVE:  Normal Ranges:  AoV Vmax:      1.28 m/s (<=1.7m/s)  AoV Peak P.6 mmHg (<20mmHg)  LVOT Max Pj:  1.12 m/s (<=1.1m/s)  LVOT VTI:      22.60 cm  LVOT Diameter: 2.03 cm  (1.8-2.4cm)  AoV Area,Vmax: 2.82 cm2 (2.5-4.5cm2)      RIGHT VENTRICLE:  RV Basal 3.50 cm  RV Mid   2.30 cm  RV Major 7.6 cm  TAPSE:   21.0 mm  RV s'    0.08 m/s    TRICUSPID VALVE/RVSP:  Normal Ranges:  Peak TR Velocity: 3.32 m/s  RV Syst Pressure: 47.2 mmHg (< 30mmHg)    PULMONIC VALVE:  Normal Ranges:  PV Max Pj: 0.7 m/s  (0.6-0.9m/s)  PV Max P.9 mmHg    AORTA:  Asc Ao Diam 3.24 cm      [id number] Gilmar Nogueira MD  Electronically signed on 2024 at 10:19:38 AM        ** Final **       Cath:      Stress Test:  Stress Results:  No results found for this or any previous visit from the past 365 days.         Cardiac Imaging:  CT cardiac scoring wo IV contrast  Narrative: Interpreted By:  Hitesh Whitman,   STUDY:  CT CARDIAC SCORING WO IV CONTRAST;  2024 1:02 pm      INDICATION:  Signs/Symptoms:Pre kidney transplant eval.      ,Z01.818 Encounter for other preprocedural examination      COMPARISON:  CT dated 2024      ACCESSION NUMBER(S):  [id number]      ORDERING CLINICIAN:  MARSHA CASTILLO      TECHNIQUE:  Using prospective ECG gating, CT scan of the coronary arteries was  performed without intravenous contrast. Coronary calcium scoring  was  performed  "according to the method of Agatston.      FINDINGS:  The score and distribution of calcium in the coronary arteries is as  follows:      LM 0,  .93,  LCx 0,  RCA 20.43,      Total 136.36      The visualized mid/lower ascending thoracic aorta measures 3.3 cm in  diameter. The heart is overall normal in size, however there is a  suggestion of left atrial enlargement. No pericardial effusion is  present. Scattered hyperdensities within the esophageal lumen may  represent ingested material. Small hiatal hernia.      No gross evidence of mediastinal or hilar lymphadenopathy or masses  is identified. The visualized segments of the lungs are normally  expanded. Scattered calcified granulomas.      The visualized subdiaphragmatic structures appear intact.      Impression: 1. Coronary artery calcium score of 136.36*.      *Coronary artery calcium scoring may be helpful in predicting the  risk for future coronary heart disease events.  According to the  American College of Cardiology Foundation Clinical Expert Consensus  Task Force, such testing provides important prognostic information in  patients with more than one coronary heart disease risk factor. The  coronary artery calcium score correlates with the annual risk of a  non-fatal myocardial infarction or coronary heart disease death.      Coronary artery score            Annual Risk      0-99                             0.4%  100-399                        1.3%  >400                            2.4%      These three \"breakpoints\" correspond to lower, intermediate and high  risk states for future coronary events.  Such information should be  used, along with appropriate clinical judgment, to make decisions  regarding the intensity of risk factor management strategies to treat  blood lipids and to modify other non-lipid coronary risk factors.      Reference: Andres P et al. Circulation.  2007; 115:402-426      MACRO:  None      Signed by: Hitesh Whitman 9/6/2024 " 2:47 PM  Dictation workstation:   [id number]        Assessment/Plan     59-year-old male on renal replacement therapy for about 5 years ESRD probably on the basis of diabetes who presents in continuing cardiac care as part of his renal transplant evaluation he is active on the list.  Stress CMR was noted to show no evidence of ischemia EF of about 40%.  He has no concerning symptoms of angina arrhythmia or heart failure.  No decline in functional capacity.  I would estimate his cardiovascular risk for transplant is acceptable.  He however is high future risk for ASCVD I think high intensity statin should be reinitiated for some reason this is labs.  I suggested a prescription for atorvastatin 40 mg sent to his pharmacy with a repeat lipid profile to be obtained in 2 months and an office follow-up in 6 months thank you for allowing me to participate in his care      Orders:  No orders of the defined types were placed in this encounter.     Followup Appts:  No future appointments.        ____________________________________________________________  Ridge Noonan MD    Senior Attending Physician  [facility]  [facility]

## 2024-09-20 LAB
ATRIAL RATE: 90 BPM
P AXIS: 65 DEGREES
P OFFSET: 195 MS
P ONSET: 140 MS
PR INTERVAL: 160 MS
Q ONSET: 220 MS
QRS COUNT: 15 BEATS
QRS DURATION: 94 MS
QT INTERVAL: 406 MS
QTC CALCULATION(BAZETT): 496 MS
QTC FREDERICIA: 464 MS
R AXIS: -14 DEGREES
T AXIS: 9 DEGREES
T OFFSET: 423 MS
VENTRICULAR RATE: 90 BPM

## 2024-09-26 ENCOUNTER — TELEPHONE (OUTPATIENT)
Dept: TRANSPLANT | Facility: HOSPITAL | Age: 59
End: 2024-09-26
Payer: COMMERCIAL

## 2024-09-26 NOTE — TELEPHONE ENCOUNTER
Spoke with patient regarding his evaluation testing. Testing looks to be finished although he had a wound vac left foot and needs follow up in two weeks by podiatrist. In the meantime, an appointment was made for [facility] for a visit with transplant surgeon.

## 2024-09-30 ENCOUNTER — OFFICE VISIT (OUTPATIENT)
Dept: TRANSPLANT | Facility: HOSPITAL | Age: 59
End: 2024-09-30
Payer: COMMERCIAL

## 2024-09-30 ENCOUNTER — DOCUMENTATION (OUTPATIENT)
Dept: TRANSPLANT | Facility: HOSPITAL | Age: 59
End: 2024-09-30
Payer: COMMERCIAL

## 2024-09-30 ENCOUNTER — DOCUMENTATION (OUTPATIENT)
Dept: TRANSPLANT | Facility: HOSPITAL | Age: 59
End: 2024-09-30

## 2024-09-30 VITALS
DIASTOLIC BLOOD PRESSURE: 85 MMHG | BODY MASS INDEX: 23.41 KG/M2 | TEMPERATURE: 96.3 F | SYSTOLIC BLOOD PRESSURE: 137 MMHG | HEART RATE: 90 BPM | WEIGHT: 162 LBS | OXYGEN SATURATION: 96 %

## 2024-09-30 DIAGNOSIS — Z01.818 PRE-TRANSPLANT EVALUATION FOR KIDNEY TRANSPLANT: Primary | ICD-10-CM

## 2024-09-30 DIAGNOSIS — N18.6 ESRD (END STAGE RENAL DISEASE) (MULTI): Primary | ICD-10-CM

## 2024-09-30 PROCEDURE — 99215 OFFICE O/P EST HI 40 MIN: CPT | Mod: 24 | Performed by: SURGERY

## 2024-09-30 ASSESSMENT — PAIN SCALES - GENERAL: PAINLEVEL: 0-NO PAIN

## 2024-09-30 NOTE — H&P (VIEW-ONLY)
Chief Complaint: Patient presents for kidney transplant wait list update    History of Present Illness:  Anibal Dow  is a 59 y.o. male presents with ESRD from DM II. Blood type O; no living donor. Status 7 since 2019.    Hemodialysis:  Monday, Wednesday, Friday.    ROS: oliguric, no urinary retention/hematuria/recurrent UTIs/nephrolithiasis.   Disease Etiology:  diabetic nephropathy and hypertensive nephropathy.   Disease Complications: dyslipidemia and hypertension.   PVD:  YES  Prior Malignancy:  NO     Past Medical History:  DM II  HTN    Past Surgical History:  Left foot ray amp  LUE AV graft    Review of Systems:  Cardiac: Denies chest pain, palpitations  : Normal urine output. Denies history of gross hematuria, nephrolithiasis, urinary retention, or recurrent UTIs.  Vascular: Denies personal or familial history of DVT/PE. No active claudication or non-healing LE wounds; prior left food wound healed.  Functional Status: Can walk up 1 flight of stairs    Transfusions: not sure   Pregnancy:  Not applicable  Prior transplant: Not applicable    Family History:  Mother: DM II  Other: prostate cancer    Social History:  Marital Status:   Tobacco: never smoker  EtOH: rare  Illicits: none    Physical Exam:  Gen: A+OX3; NAD  HEENT: PERRL, sclera anicteric, MMM  Cardiac: RRR  Chest: Normal inspiratory effort  Abdomen: S/NT/ND.  Ext: No LE edema  Vascular: 2+ palpable femoral pulses  Psychiatric: Normal mood, affect    Assessment/Plan:  - The patient is a very good candidate for kidney transplantation.  - MRI abdomen renal protocol to evaluate bilateral cysts  - Will d/w Dr. Noonan options to evaluate elevated RVSP (RHC vs more aggressive HD and repeat ECHO)    Transplant Education:    I had a discussion with this patient regarding 1 year graft and patient survival statistics following renal transplantation for both living and  donor allograft recipients. This data included [facility] data compared to National data readily available for review on https://www.SRTR.org. The patient also had attended the kidney transplant education class provided by the transplant institute.     The difference between allograft function was discussed comparing living donor, KDPI 0-85%, and >85% kidneys.     Further discussion included:  -The transplant selection committee process.  -The need for lifelong immunosuppressive therapy, and the side effects of these medications including the risk of infections, cancer, and lymphoma.  -The wait list time approximately is 5 years or more for  donor transplants and the statistical superiority of a living donor.     -Using identified donors with risk criteria for transmission of infection  -The possibility of utilizing  donors with known HCV antibody and/or SOFÍA positivity and post-transplant treatment/surveillance protocol  -Potential transmission of infectious disease from any  donors, as well as living donors.   -The possibility of transmission of tumors and infections via the transplanted organ.  -The inability to completely test for all potential harmful tumors or infectious agents.  -The possibility of listing at multiple locations.     Surgical complications including need for reoperation(s) including but not limited to:  -Bleeding.  -Repair of leaks.  -Control of infection.  -Blood clots in the transplant vessels.  -Possible kidney transplant removal.     The medical complications including but not limited to:  -Death.  -Cardiac.  -Pulmonary.  -Infectious.  -Neurologic.  -Other Complications.     We also discussed how the kidney transplant could function:  -Non-function and possible kidney transplant removal within the first 3 to 6 months.  -Delayed graft function (dialysis needed after transplant).  -The potential of recurrence of kidney disease leading to kidney transplant graft loss.    Time Attestation:  I spent 60 minutes with the  patient, over 50 minutes in counseling and education as outlined above.    Yamile Quick MD, PHD, MPH, FACS  Chief of Transplant and Hepatobiliary Surgery

## 2024-09-30 NOTE — PROGRESS NOTES
Chief Complaint: Patient presents for kidney transplant wait list update    History of Present Illness:  Anibal Dow  is a 59 y.o. male presents with ESRD from DM II. Blood type O; no living donor. Status 7 since 2019.    Hemodialysis:  Monday, Wednesday, Friday.    ROS: oliguric, no urinary retention/hematuria/recurrent UTIs/nephrolithiasis.   Disease Etiology:  diabetic nephropathy and hypertensive nephropathy.   Disease Complications: dyslipidemia and hypertension.   PVD:  YES  Prior Malignancy:  NO     Past Medical History:  DM II  HTN    Past Surgical History:  Left foot ray amp  LUE AV graft    Review of Systems:  Cardiac: Denies chest pain, palpitations  : Normal urine output. Denies history of gross hematuria, nephrolithiasis, urinary retention, or recurrent UTIs.  Vascular: Denies personal or familial history of DVT/PE. No active claudication or non-healing LE wounds; prior left food wound healed.  Functional Status: Can walk up 1 flight of stairs    Transfusions: not sure   Pregnancy:  Not applicable  Prior transplant: Not applicable    Family History:  Mother: DM II  Other: prostate cancer    Social History:  Marital Status:   Tobacco: never smoker  EtOH: rare  Illicits: none    Physical Exam:  Gen: A+OX3; NAD  HEENT: PERRL, sclera anicteric, MMM  Cardiac: RRR  Chest: Normal inspiratory effort  Abdomen: S/NT/ND.  Ext: No LE edema  Vascular: 2+ palpable femoral pulses  Psychiatric: Normal mood, affect    Assessment/Plan:  - The patient is a very good candidate for kidney transplantation.  - MRI abdomen renal protocol to evaluate bilateral cysts  - Will d/w Dr. Noonan options to evaluate elevated RVSP (RHC vs more aggressive HD and repeat ECHO)    Transplant Education:    I had a discussion with this patient regarding 1 year graft and patient survival statistics following renal transplantation for both living and  donor allograft recipients. This data included [facility] data compared to National data readily available for review on https://www.SRTR.org. The patient also had attended the kidney transplant education class provided by the transplant institute.     The difference between allograft function was discussed comparing living donor, KDPI 0-85%, and >85% kidneys.     Further discussion included:  -The transplant selection committee process.  -The need for lifelong immunosuppressive therapy, and the side effects of these medications including the risk of infections, cancer, and lymphoma.  -The wait list time approximately is 5 years or more for  donor transplants and the statistical superiority of a living donor.     -Using identified donors with risk criteria for transmission of infection  -The possibility of utilizing  donors with known HCV antibody and/or SOFÍA positivity and post-transplant treatment/surveillance protocol  -Potential transmission of infectious disease from any  donors, as well as living donors.   -The possibility of transmission of tumors and infections via the transplanted organ.  -The inability to completely test for all potential harmful tumors or infectious agents.  -The possibility of listing at multiple locations.     Surgical complications including need for reoperation(s) including but not limited to:  -Bleeding.  -Repair of leaks.  -Control of infection.  -Blood clots in the transplant vessels.  -Possible kidney transplant removal.     The medical complications including but not limited to:  -Death.  -Cardiac.  -Pulmonary.  -Infectious.  -Neurologic.  -Other Complications.     We also discussed how the kidney transplant could function:  -Non-function and possible kidney transplant removal within the first 3 to 6 months.  -Delayed graft function (dialysis needed after transplant).  -The potential of recurrence of kidney disease leading to kidney transplant graft loss.    Time Attestation:  I spent 60 minutes with the  patient, over 50 minutes in counseling and education as outlined above.    Yamile Quick MD, PHD, MPH, FACS  Chief of Transplant and Hepatobiliary Surgery

## 2024-09-30 NOTE — PROGRESS NOTES
Kidney Patient Summary    Date of appointment: 2024    Name: Anibal Dow    : 1965    MRN: [id number]    Diagnosis: Diabetes Mellitus - Type II    ABO: O     Phase: Waitlist  Status: Inactive    Center Waitlist Date: 4/10/2019     Last Seen:   Referring Nephrologist:   Amina Iniguez     HD Unit: [facility]   Dialysis Start: 4/10/2019  Access:       Days:      PCP: LIN Madsen-CNP       Endocrinologist:     BMI Readings from Last 1 Encounters:   24 23.77 kg/m²     Blood Transfusion:    Medical History/Hospitalizations:   Past Medical History:   Diagnosis Date    Personal history of other diseases of urinary system 2020    History of chronic kidney disease       Surgical History:   Past Surgical History:   Procedure Laterality Date    CT ANGIO CORONARY ART WITH HEARTFLOW IF SCORE >30%  2020    CT HEART CORONARY ANGIOGRAM 2020 [facility]CILL[facility] LEGACY    OTHER SURGICAL HISTORY  2020    Toe amputation     Donors:     Staff:  Nephrologist: Jose Luis    Surgeon: Suresh    : Pablo     Testing Date:     Serologies:    CMV:     No results found for requested labs within last 365 days.  EBV Panel:  LETICIA-HARRIS VCA IGG:   No results found for requested labs within last 365 days.   LETICIA-HARRIS VCA IGM: No results found for requested labs within last 365 days.   EBV EARLY ANTIGEN ANTIBODY, IGG: No results found for requested labs within last 365 days.   EPSTIEN-BARR NUCLEAR ANTIGEN AB: No results found for requested labs within last 365 days.   Tox:    AMPHETAMINE SCREEN BLOOD: Negative   METHAMPHETAMINE, BLOOD, SCREEN: Negative   BENZODIAZEPINES SCREEN BLOOD: Negative   BUPRENORPHINE SCREEN BLOOD: Negative   CANNABINOIDS SCREEN BLOOD: Negative   COCAINE SCREEN BLOOD: Negative   METHADONE SCREEN BLOOD: Negative   OXYCODONE SCREEN BLOOD: Negative   PHENCYCLIDINE SCREEN BLOOD: Negative   OPIATE SCREEN BLOOD: Negative   DRUG SCREEN  COMMENT BLOOD: See Note   BARBITURATE SCREEN BLOOD: Negative   Cotinine: <5; <5  HBV ag:   Nonreactive  HBV ab:   >1,000.0  HBV c:     Nonreactive  HCV:        Nonreactive  HIV:    Nonreactive  RPR:    Nonreactive  TSpot:   Negative   VZV:   VARICELLA ZOSTER IGG: No results found for requested labs within last 365 days.   VARICELLA ZOSTER IGG INDEX: No results found for requested labs within last 365 days.   A1C:       HEMOGLOBIN A1C/HEMOGLOBIN TOTAL IN BLOOD: 6.8   ESTIMATED AVERAGE GLUCOSE FOR HBA1C: 148   CPep:  2.8  PSA:    0.6  Other:     Test/Consult Impression Next Scheduled Date   CXR XR chest 2 views    Result Date: 6/27/2024  1.  No evidence of acute cardiopulmonary process.       MACRO: None   Signed by: Yvrose Avalos 6/27/2024 10:37 AM Dictation workstation:   [id number]       EKG ECG 12 lead (Clinic Performed) 9/17/2024    Narrative  Normal sinus rhythm  Possible Left atrial enlargement  Prolonged QT  Abnormal ECG  When compared with ECG of 27-JUN-2023 13:43,  Questionable change in QRS axis  Confirmed by Ridge Noonan ([id number]) on 9/20/2024 4:06:35 PM      Echo 6/19/24  CONCLUSIONS:   1. The left ventricular systolic function is low normal, with a visually estimated ejection fraction of 50-55%.   2. There is global hypokinesis of the left ventricle with minor regional variations.   3. Spectral Doppler shows a pseudonormal pattern of left ventricular diastolic filling.   4. There is normal right ventricular global systolic function.   5. Mild to moderate mitral valve regurgitation.   6. Mild to moderately elevated right ventricular systolic pressure.       CT Cardiac Score CT cardiac scoring wo IV contrast    Result Date: 9/6/2024  1. Coronary artery calcium score of 136.36*.   *Coronary artery calcium scoring may be helpful in predicting the risk for future coronary heart disease events.  According to the American College of Cardiology Foundation Clinical Expert Consensus Task Force, such testing  "provides important prognostic information in patients with more than one coronary heart disease risk factor. The coronary artery calcium score correlates with the annual risk of a non-fatal myocardial infarction or coronary heart disease death.   Coronary artery score            Annual Risk   0-99                             0.4% 100-399                        1.3% >400                            2.4%   These three \"breakpoints\" correspond to lower, intermediate and high risk states for future coronary events.  Such information should be used, along with appropriate clinical judgment, to make decisions regarding the intensity of risk factor management strategies to treat blood lipids and to modify other non-lipid coronary risk factors.   Reference: Beverly P et al. Circulation.  2007; 115:402-426   MACRO: None   Signed by: Hitesh Whitman 9/6/2024 2:47 PM Dictation workstation:   [id number]       NM Stress Test Nuclear Stress Test    Result Date: 6/22/2023  1. No definite evidence of ischemia. 2. In the setting of apparent diaphragmatic attenuation, a small distal inferior wall infarct cannot be excluded. This finding appears to be new/worse in comparison the prior study. 3. Left ventricular dilatation is noted that is new in comparison the prior study. 4. Left ventricular ejection fraction was estimated to be 45% which is slightly decreased from prior study of 50%.   I personally reviewed the images/study and I agree with the findings as stated. This study was interpreted at [facility], [place], [place].       Cardiac MRI MR cardiac w and wo IV contrast w regadenoson stress for MORPH FUNCT and valve DZ    Result Date: 9/3/2024  1. Left ventricle is dilated (LVEDVi = 101 mL/m2) with mildly to moderately reduced systolic function (LVEF = 40%). Quantitative values above. 2. No evidence of stress-induced ischemia. 3. There are no findings to suggest prior ischemic damage or an " infiltrative process. 4. Concentric left ventricular remodeling with asymmetric hypertrophy involving the basal septum measuring up to 1.4 cm. 5. Trace aortic regurgitation (regurgitant fraction = 8%) and mitral regurgitation (regurgitant fraction = 11%).     MACRO: None   Signed by: Hitesh Whitman 9/3/2024 2:04 PM Dictation workstation:   [id number]       Left Heart Catheterization No results found.     Cardiology last visit impression  9/17/24  Assessment/Plan      59-year-old male on renal replacement therapy for about 5 years ESRD probably on the basis of diabetes who presents in continuing cardiac care as part of his renal transplant evaluation he is active on the list.  Stress CMR was noted to show no evidence of ischemia EF of about 40%.  He has no concerning symptoms of angina arrhythmia or heart failure.  No decline in functional capacity.  I would estimate his cardiovascular risk for transplant is acceptable.  He however is high future risk for ASCVD I think high intensity statin should be reinitiated for some reason this is labs.  I suggested a prescription for atorvastatin 40 mg sent to his pharmacy with a repeat lipid profile to be obtained in 2 months and an office follow-up in 6 months thank you for allowing me to participate in his care    Dr. Ridge Noonan    Pulmonary Function Test No results found for this or any previous visit.    CT Abd/Pelvis CT abdomen pelvis wo IV contrast    Result Date: 7/10/2024  1.  Examination performed in anticipation of upcoming renal transplant. Atrophic kidneys compatible with the patient's history of renal disease. Developing peripheral renal cysts detected not particularly worrisome but not well evaluated on CT without contrast. It may be reasonable to evaluate with renal ultrasound to ensure simple characteristics. No hydronephrosis. 2. Hepatic steatosis 3. Constipation. 4. Mildly enlarged prostate gland. Robust vascular calcification including the small vessels      MACRO: None   Signed by: Dmitriy Goodman 7/10/2024 6:57 PM Dictation workstation:   [id number]       Colonoscopy  12/6/19  2 polyps (largest 4 mm), internal hemorrhoids .  A. Ascending colon polyp - tubular adenoma  B. Rectal polyp - Hyperplastic polyp   Repeat in 5 years  Dec 2024   Other:

## 2024-09-30 NOTE — PROGRESS NOTES
"Order for MRI of the abdomen with and w/o contrast placed.  Patient asked screening questions - answered \"No\" to all questions   "

## 2024-09-30 NOTE — PROGRESS NOTES
Patient attended appointment on 9/30/24 with Dr. De La O. Patient was alone in clinic, but wife was on the phone.  Medications and allergies reviewed with the patient. Patient ambulates independently.  Screening completed. Patient is on hemo dialysis MWF.   Listing consent updated.  The patient consented for DCD KDPI, Hep B and HCV. Patient denies any recent falls or blood transfusions within this past year.   Patient will need an MRI of the abdomen to assess cyst on native kidneys.  Dr. De La O reached out to Dr. Noonan to see if patient will need to complete a right heart cath   LISTING EDUCATION    Patient educated regarding the following prior to placement on the transplant waiting list:   The patient?s medical condition, prognosis, and treatment plan.   The expectations and patient responsibilities while on the waiting list, including:   Keeping the transplant center informed of any changes in contact information or insurance coverage   Notifying the transplant center of any changes in medical status   Required testing and/or re-evaluation appointments while awaiting transplant   An overview of the surgical procedure, including potential risks and alternatives.   Information regarding what to expect during the inpatient admission and recovery period.   A discussion regarding organ offers and types of potential donors, including potential risks that may be associated with specific types of donors that could affect the success of the transplant or the health of the patient.  The right to refuse transplantation.     Patient was given the opportunity to have questions answered. Patient was provided a copy of the informed consent for transplant listing.    Education provided by:  Transplant Coordinator: Yvette Carrillo RN   Transplant Physician: smiley Quick     Signed listing informed consent received? YES  Patient agrees to be listed for the following:  KDPI > 85% [X]  Donors After Circulatory Death (DCD)  [X]  Donors with a Positive Core Antibody for Hepatitis B [X]  Donors with Hepatitis C Virus to recipients with hepatitis C []  Donors with Hepatitis C Virus to Negative hepatitis C recipients [X]    Patient will be discussed at an upcoming selection committee to determine eligibility to be placed on the [facility] waiting list.

## 2024-10-01 ENCOUNTER — TELEPHONE (OUTPATIENT)
Dept: CARDIOLOGY | Facility: CLINIC | Age: 59
End: 2024-10-01
Payer: COMMERCIAL

## 2024-10-01 DIAGNOSIS — Z01.818 PRE-TRANSPLANT EVALUATION FOR CHRONIC KIDNEY DISEASE: Primary | ICD-10-CM

## 2024-10-01 DIAGNOSIS — R93.1 ABNORMAL FINDINGS ON DIAGNOSTIC IMAGING OF HEART AND CORONARY CIRCULATION: ICD-10-CM

## 2024-10-02 PROBLEM — R93.1 ABNORMAL FINDINGS ON DIAGNOSTIC IMAGING OF HEART AND CORONARY CIRCULATION: Status: ACTIVE | Noted: 2024-10-01

## 2024-10-02 PROBLEM — Z01.818 PRE-TRANSPLANT EVALUATION FOR CHRONIC KIDNEY DISEASE: Status: ACTIVE | Noted: 2024-10-01

## 2024-10-15 ENCOUNTER — DOCUMENTATION (OUTPATIENT)
Dept: TRANSPLANT | Facility: HOSPITAL | Age: 59
End: 2024-10-15
Payer: COMMERCIAL

## 2024-10-15 NOTE — PROGRESS NOTES
Patient will need to complete right heart cath (10/21) and MRI (10/24), then present to committee.

## 2024-10-16 ENCOUNTER — TELEPHONE (OUTPATIENT)
Dept: CARDIOLOGY | Facility: CLINIC | Age: 59
End: 2024-10-16
Payer: COMMERCIAL

## 2024-10-16 NOTE — TELEPHONE ENCOUNTER
Patient for RHC on Monday, Oct 21. Meds reviewed. TCT patient and instructed to take half long acting insulin dose the evening before procedure and no insulin on morning of procedure. Otherwise, can take cardiac meds with small sip of water the day of procedure. States understanding and agreement

## 2024-10-21 ENCOUNTER — HOSPITAL ENCOUNTER (OUTPATIENT)
Facility: HOSPITAL | Age: 59
Setting detail: OUTPATIENT SURGERY
Discharge: HOME | End: 2024-10-21
Attending: HOSPITALIST | Admitting: HOSPITALIST
Payer: COMMERCIAL

## 2024-10-21 VITALS
HEIGHT: 69 IN | BODY MASS INDEX: 23.7 KG/M2 | WEIGHT: 160 LBS | HEART RATE: 87 BPM | RESPIRATION RATE: 16 BRPM | OXYGEN SATURATION: 95 % | SYSTOLIC BLOOD PRESSURE: 218 MMHG | DIASTOLIC BLOOD PRESSURE: 118 MMHG

## 2024-10-21 DIAGNOSIS — Z99.2 DEPENDENCE ON RENAL DIALYSIS (CMS-HCC): ICD-10-CM

## 2024-10-21 DIAGNOSIS — Z01.818 PRE-TRANSPLANT EVALUATION FOR CHRONIC KIDNEY DISEASE: Primary | ICD-10-CM

## 2024-10-21 DIAGNOSIS — R93.1 ABNORMAL FINDINGS ON DIAGNOSTIC IMAGING OF HEART AND CORONARY CIRCULATION: ICD-10-CM

## 2024-10-21 LAB
ANION GAP SERPL CALC-SCNC: 23 MMOL/L (ref 10–20)
APTT PPP: 33 SECONDS (ref 27–38)
BUN SERPL-MCNC: 91 MG/DL (ref 6–23)
CALCIUM SERPL-MCNC: 9.5 MG/DL (ref 8.6–10.6)
CHLORIDE SERPL-SCNC: 89 MMOL/L (ref 98–107)
CO2 SERPL-SCNC: 33 MMOL/L (ref 21–32)
CREAT SERPL-MCNC: 13.88 MG/DL (ref 0.5–1.3)
EGFRCR SERPLBLD CKD-EPI 2021: 4 ML/MIN/1.73M*2
ERYTHROCYTE [DISTWIDTH] IN BLOOD BY AUTOMATED COUNT: 14.8 % (ref 11.5–14.5)
GLUCOSE SERPL-MCNC: 259 MG/DL (ref 74–99)
HCT VFR BLD AUTO: 39 % (ref 41–52)
HGB BLD-MCNC: 11.8 G/DL (ref 13.5–17.5)
INR PPP: 1.1 (ref 0.9–1.1)
MCH RBC QN AUTO: 28.3 PG (ref 26–34)
MCHC RBC AUTO-ENTMCNC: 30.3 G/DL (ref 32–36)
MCV RBC AUTO: 94 FL (ref 80–100)
NRBC BLD-RTO: 0 /100 WBCS (ref 0–0)
PLATELET # BLD AUTO: 210 X10*3/UL (ref 150–450)
POTASSIUM SERPL-SCNC: 5.9 MMOL/L (ref 3.5–5.3)
PROTHROMBIN TIME: 12 SECONDS (ref 9.8–12.8)
RBC # BLD AUTO: 4.17 X10*6/UL (ref 4.5–5.9)
SODIUM SERPL-SCNC: 139 MMOL/L (ref 136–145)
WBC # BLD AUTO: 6.3 X10*3/UL (ref 4.4–11.3)

## 2024-10-21 PROCEDURE — 99153 MOD SED SAME PHYS/QHP EA: CPT | Performed by: HOSPITALIST

## 2024-10-21 PROCEDURE — C1894 INTRO/SHEATH, NON-LASER: HCPCS | Performed by: HOSPITALIST

## 2024-10-21 PROCEDURE — 2720000007 HC OR 272 NO HCPCS: Performed by: HOSPITALIST

## 2024-10-21 PROCEDURE — 7100000010 HC PHASE TWO TIME - EACH INCREMENTAL 1 MINUTE: Performed by: HOSPITALIST

## 2024-10-21 PROCEDURE — 99153 MOD SED SAME PHYS/QHP EA: CPT

## 2024-10-21 PROCEDURE — 93451 RIGHT HEART CATH: CPT | Performed by: HOSPITALIST

## 2024-10-21 PROCEDURE — 7100000009 HC PHASE TWO TIME - INITIAL BASE CHARGE: Performed by: HOSPITALIST

## 2024-10-21 PROCEDURE — 99152 MOD SED SAME PHYS/QHP 5/>YRS: CPT

## 2024-10-21 PROCEDURE — C1887 CATHETER, GUIDING: HCPCS | Performed by: HOSPITALIST

## 2024-10-21 PROCEDURE — 2500000004 HC RX 250 GENERAL PHARMACY W/ HCPCS (ALT 636 FOR OP/ED): Performed by: HOSPITALIST

## 2024-10-21 PROCEDURE — 76937 US GUIDE VASCULAR ACCESS: CPT | Performed by: HOSPITALIST

## 2024-10-21 PROCEDURE — 36415 COLL VENOUS BLD VENIPUNCTURE: CPT | Performed by: NURSE PRACTITIONER

## 2024-10-21 PROCEDURE — 82374 ASSAY BLOOD CARBON DIOXIDE: CPT | Performed by: NURSE PRACTITIONER

## 2024-10-21 PROCEDURE — 85027 COMPLETE CBC AUTOMATED: CPT | Performed by: NURSE PRACTITIONER

## 2024-10-21 PROCEDURE — 85610 PROTHROMBIN TIME: CPT | Performed by: NURSE PRACTITIONER

## 2024-10-21 PROCEDURE — 99152 MOD SED SAME PHYS/QHP 5/>YRS: CPT | Performed by: HOSPITALIST

## 2024-10-21 RX ORDER — MIDAZOLAM HYDROCHLORIDE 1 MG/ML
INJECTION, SOLUTION INTRAMUSCULAR; INTRAVENOUS AS NEEDED
Status: DISCONTINUED | OUTPATIENT
Start: 2024-10-21 | End: 2024-10-21 | Stop reason: HOSPADM

## 2024-10-21 RX ORDER — FENTANYL CITRATE 50 UG/ML
INJECTION, SOLUTION INTRAMUSCULAR; INTRAVENOUS AS NEEDED
Status: DISCONTINUED | OUTPATIENT
Start: 2024-10-21 | End: 2024-10-21 | Stop reason: HOSPADM

## 2024-10-21 RX ORDER — LIDOCAINE HYDROCHLORIDE 10 MG/ML
INJECTION, SOLUTION EPIDURAL; INFILTRATION; INTRACAUDAL; PERINEURAL AS NEEDED
Status: DISCONTINUED | OUTPATIENT
Start: 2024-10-21 | End: 2024-10-21 | Stop reason: HOSPADM

## 2024-10-21 ASSESSMENT — COLUMBIA-SUICIDE SEVERITY RATING SCALE - C-SSRS
6. HAVE YOU EVER DONE ANYTHING, STARTED TO DO ANYTHING, OR PREPARED TO DO ANYTHING TO END YOUR LIFE?: NO
1. IN THE PAST MONTH, HAVE YOU WISHED YOU WERE DEAD OR WISHED YOU COULD GO TO SLEEP AND NOT WAKE UP?: NO
2. HAVE YOU ACTUALLY HAD ANY THOUGHTS OF KILLING YOURSELF?: NO

## 2024-10-21 NOTE — Clinical Note
Sheath was inserted in the right basilic vein. Exchanged with PIV that was in place prior to arrival

## 2024-10-21 NOTE — DISCHARGE INSTRUCTIONS
CARDIAC CATHETERIZATION DISCHARGE INSTRUCTIONS (procedure done on 10/21/24)     FOR SUDDEN AND SEVERE CHEST PAIN, SHORTNESS OF BREATH, EXCESSIVE BLEEDING, SIGNS OF STROKE, OR CHANGES IN MENTAL STATUS YOU SHOULD CALL 911 IMMEDIATELY.     If your provider has prescribed aspirin and/or clopidogrel (Plavix), or prasugrel (Effient), or ticagrelor (Brilinta), DO NOT STOP THESE MEDICATIONS for any reason without talking to your cardiologist first. If any of these were prescribed, you must take them every day without missing a single dose. If you are getting low on these medications, contact your provider immediately for a refill.     FOR NEXT 24 HOURS  - Upon discharge, you should return home and rest for the remainder of the day and evening. You do not have to stay on bed rest but should not be very active.  It is recommended a responsible adult be with you for the first 24 hours after the procedure.    - No driving for 24 hours after procedure. Please arrange for someone to drive you home from the hospital today.     - Do not drive, operate machinery, or use power tools for 24 hours after your procedure.     - Do not make any legal decisions for 24 hours after your procedure.     - Do not drink alcoholic beverages for 24 hours after your procedure.    WOUND CARE   *FOR FEMORAL (LEG) ACCESS*  ·      Avoid heavy lifting (over 10 pounds) for 7 days, squatting or excessive bending for 2 days, and strenuous exercise for 7 days.  ·      No submerged bathing, swimming, or hot tubs for the next 7 days, or until fully healed.  ·      Avoid sexual activity for 3-4 days until any groin discomfort has ceased.     *FOR RADIAL (WRIST) ACCESS*  ·      No lifting more than 5 pounds or excessive use of the wrist for 24 hours - for example, treat your wrist as if it is sprained.  ·      Do not engage in vigorous activities (tennis, golf, bowling, weights) for at least 48 hours after the procedure.  ·      Do not submerge the wrist  for 7 days after the procedure.  ·      You should expect mild tingling in your hand and tenderness at the puncture site for up to 3 days.    - The transparent dressing should be removed from the site 24 hours after the procedure.  Wash the site gently with soap and water. Rinse well and pat dry. Keep the area clean and dry. You may apply a Band-Aid to the site. Avoid lotions, ointments, or powders until fully healed.     - You may shower the day after your procedure.      - It is normal to notice a small bruise around the puncture site and/or a small grape sized or smaller lump. Any large bruising or large lump warrants a call to the office.     - If bleeding should occur, lay down and apply pressure to the affected area for 10 minutes.  If the bleeding stops notify your physician.  If there is a large amount of bleeding or spurting of blood CALL 911 immediately.  DO NOT drive yourself to the hospital.    - You may experience some tenderness, bruising or minimal inflammation.  If you have any concerns, you may contact the Cath Lab or if any of these symptoms become excessive, contact your cardiologist or go to the emergency room.     OTHER INSTRUCTIONS  - You may take acetaminophen (Tylenol) as directed for discomfort.  If pain is not relieved with acetaminophen (Tylenol), contact your doctor.    - If you notice or experience any of the following, you should notify your doctor or seek medical attention  Chest pain or discomfort  Change in mental status or weakness in extremities.  Dizziness, light headedness, or feeling faint.  Change in the site where the procedure was performed, such as bleeding or an increased area of bruising or swelling.  Tingling, numbness, pain, or coolness in the leg/arm beyond the site where the procedure was performed.  Signs of infection (i.e. shaking chills, temperature > 100 degrees Fahrenheit, warmth, redness) in the leg/arm area where the procedure was performed.  Changes in  urination   Bloody or black stools  Vomiting blood  Severe nose bleeds  Any excessive bleeding    - If you DO NOT have an appointment with your cardiologist within 2-4 weeks following your procedure, please contact their office.

## 2024-10-21 NOTE — INTERVAL H&P NOTE
H&P reviewed. The patient was examined and there are no changes to the H&P. Patient for elective RHC today for kidney transplant eval. Denies ever any CP, SOB, dizziness, lightheadedness, orthopnea. HD 3x week via LUE AVGraft +/+.

## 2024-10-21 NOTE — PRE-SEDATION DOCUMENTATION
Sedation Plan    ASA 3     Mallampati class: III.    Risks, benefits, and alternatives discussed with patient and spouse.

## 2024-10-21 NOTE — POST-PROCEDURE NOTE
Physician Transition of Care Summary  Invasive Cardiovascular Lab    Procedure Date: 10/21/2024  Attending:    Lb Anthony - Primary  Resident/Fellow/Other Assistant: Surgeons and Role:     * David Gamboa MD - Fellow    Indications:   Pre-op Diagnosis      * Pre-transplant evaluation for chronic kidney disease [Z01.818]     * Abnormal findings on diagnostic imaging of heart and coronary circulation [R93.1]    Post-procedure diagnosis:   Post-op Diagnosis     * Pre-transplant evaluation for chronic kidney disease [Z01.818]     * Abnormal findings on diagnostic imaging of heart and coronary circulation [R93.1]    Procedure(s):   Right Heart Cath  48827 - AR RIGHT HEART CATH O2 SATURATION & CARDIAC OUTPUT    Procedure Findings:   -Precapillary pulmonary hypertension [PA 55/22 with a mean PA of 38 mmHg, PCWP 12 mmHg, PVR 3.25 Woods units] with mildly elevated right-sided pressures [RA 5, RVEDP 9 mmHg], and high assumed True cardiac output at 8 L/min, and cardiac index at 4.2 L/min/m².  -Post 2 minutes of left AV fistula occlusion, PA sat went down from 79% to 75%, and cardiac output and index went down to 6.6 L/min and 3.5 L/min/m², respectively.  -There was no stepup between SVC and PA sats.    Access of the Procedure:   5 [place]inese right brachial vein, closed with manual pressure.    Complications:   None.    Stents/Implants:   None.    Anticoagulation/Antiplatelet Plan:   None.    Estimated Blood Loss:   5 ml.    Anesthesia: Moderate Sedation Anesthesia Staff: No anesthesia staff entered.    Any Specimen(s) Removed:   Order Name Source Comment Collection Info Order Time   COAGULATION SCREEN Blood, Venous  Collected By: Lorenzo Alvarado RN 10/21/2024  8:42 AM     Release result to [employer]   Immediate        BASIC METABOLIC PANEL Blood, Venous  Collected By: Lorenzo Alvarado RN 10/21/2024  8:42 AM     Release result to [employer]   Immediate        CBC Blood, Venous  Collected By: Lorenzo Alvarado RN 10/21/2024  8:42 AM     Release  result to MyChart   Immediate            Disposition:   Further management as per renal transplant committee and Dr. Noonan.      Electronically signed by: Maegan Anthony MD, 10/21/2024 11:23 AM

## 2024-10-24 ENCOUNTER — HOSPITAL ENCOUNTER (OUTPATIENT)
Dept: RADIOLOGY | Facility: HOSPITAL | Age: 59
Discharge: HOME | End: 2024-10-24
Payer: COMMERCIAL

## 2024-10-24 DIAGNOSIS — Z01.818 PRE-TRANSPLANT EVALUATION FOR KIDNEY TRANSPLANT: ICD-10-CM

## 2024-10-24 PROCEDURE — A9575 INJ GADOTERATE MEGLUMI 0.1ML: HCPCS | Performed by: SURGERY

## 2024-10-24 PROCEDURE — 2550000001 HC RX 255 CONTRASTS: Performed by: SURGERY

## 2024-10-24 PROCEDURE — 74183 MRI ABD W/O CNTR FLWD CNTR: CPT

## 2024-10-24 RX ORDER — GADOTERATE MEGLUMINE 376.9 MG/ML
15 INJECTION INTRAVENOUS
Status: COMPLETED | OUTPATIENT
Start: 2024-10-24 | End: 2024-10-24

## 2024-11-07 ENCOUNTER — DOCUMENTATION (OUTPATIENT)
Dept: TRANSPLANT | Facility: HOSPITAL | Age: 59
End: 2024-11-07
Payer: COMMERCIAL

## 2024-11-07 NOTE — PROGRESS NOTES
Spoke to pt today.    Stated his wound is healed and I got verbal confirmation from the podiatrist as well.    Asked her to place her findings in a note.    Let Raymond Paloma know I could present him at committee next week.

## 2024-11-07 NOTE — PROGRESS NOTES
On chart review, patient noted to have a full-thickness ulcer of the left foot.    LM for patient requesting a call back so we can discuss his waitlist status.

## 2024-11-11 ENCOUNTER — APPOINTMENT (OUTPATIENT)
Dept: RADIOLOGY | Facility: HOSPITAL | Age: 59
End: 2024-11-11
Payer: COMMERCIAL

## 2024-11-22 ENCOUNTER — TELEPHONE (OUTPATIENT)
Dept: TRANSPLANT | Facility: HOSPITAL | Age: 59
End: 2024-11-22
Payer: COMMERCIAL

## 2024-11-22 DIAGNOSIS — Z51.81 ENCOUNTER FOR THERAPEUTIC DRUG LEVEL MONITORING: ICD-10-CM

## 2024-11-22 DIAGNOSIS — Z01.818 PRE-TRANSPLANT EVALUATION FOR KIDNEY TRANSPLANT: ICD-10-CM

## 2024-11-22 NOTE — TELEPHONE ENCOUNTER
Talked to pt - let him know he was approved to be active on the list; he just needs to go to the lab on Monday for an HLA.  Noted he needs an updated drug screen for insurance so I added that as well.

## 2024-11-25 ENCOUNTER — LAB (OUTPATIENT)
Dept: LAB | Facility: LAB | Age: 59
End: 2024-11-25
Payer: COMMERCIAL

## 2024-11-25 DIAGNOSIS — Z01.818 PRE-TRANSPLANT EVALUATION FOR KIDNEY TRANSPLANT: ICD-10-CM

## 2024-11-25 DIAGNOSIS — Z51.81 ENCOUNTER FOR THERAPEUTIC DRUG LEVEL MONITORING: ICD-10-CM

## 2024-11-25 PROCEDURE — 36415 COLL VENOUS BLD VENIPUNCTURE: CPT

## 2024-11-25 PROCEDURE — 86833 HLA CLASS II HIGH DEFIN QUAL: CPT

## 2024-11-25 PROCEDURE — 86832 HLA CLASS I HIGH DEFIN QUAL: CPT

## 2024-11-25 PROCEDURE — 80307 DRUG TEST PRSMV CHEM ANLYZR: CPT

## 2024-11-27 ENCOUNTER — COMMITTEE REVIEW (OUTPATIENT)
Dept: TRANSPLANT | Facility: HOSPITAL | Age: 59
End: 2024-11-27
Payer: COMMERCIAL

## 2024-11-27 ENCOUNTER — DOCUMENTATION (OUTPATIENT)
Dept: TRANSPLANT | Facility: HOSPITAL | Age: 59
End: 2024-11-27
Payer: COMMERCIAL

## 2024-11-27 LAB
AMPHETAMINES SERPL QL SCN: NEGATIVE NG/ML
ANNOTATION COMMENT IMP: NORMAL
BARBITURATES SERPL QL SCN: NEGATIVE NG/ML
BENZODIAZ SERPL QL SCN: NEGATIVE NG/ML
BUPRENORPHINE SERPL-MCNC: NEGATIVE NG/ML
CANNABINOIDS SERPL QL SCN: NEGATIVE NG/ML
COCAINE SERPL QL SCN: NEGATIVE NG/ML
METHADONE SERPL QL SCN: NEGATIVE NG/ML
METHAMPHET SERPL QL: NEGATIVE NG/ML
OPIATES SERPL QL SCN: NEGATIVE NG/ML
OXYCODONE SERPL QL: NEGATIVE NG/ML
PCP SERPL QL SCN: NEGATIVE NG/ML

## 2024-11-27 NOTE — COMMITTEE REVIEW
Evaluation Date:     Committee Review Date: 11/21/2024   Organ being evaluated for: Kidney     Transplant Phase:  Waitlist   Transplant Status: Inactive     Referring Physician:     Transplant Physician:       Primary Diagnosis: Diabetes Mellitus - Type II     Committee Members:   Gregg Austin; Tamera Villanueva Peggy   Pharmacy Dior Lundberg   Psychiatry Bharti Jo   Psychology Sandro, Bharti    [facility]   Transplant Charlotte Reddy; Ender Musa; Yvette Carrillo; Juli Zuleta; Rupinder Keller; Landy Sanchez; Rhoda Catalan   Transplant Nephrology Dion Hawk; Deyanira Sheppard; Laura Amaya   Transplant Surgery Vanesa Atkinson; Yamile Forte; Javed Prince       Committee Review Decision:    The candidate's evaluation was presented and discussed at the Transplant Multidisciplinary Selection Conference. After review of the candidate's diagnosis and the evaluations of the multidisciplinary team members, the committee made the following recommendations:     List the patient status 1 - ACTIVE    Resolution:  Activate to status 1.  Patient needs listing auth.  Already has dental clearance.  Get a tox screen and an updated HLA sample.       Lab Results   Component Value Date    HEPBSAB >1,000.0 (H) 06/27/2024       Immunization History   Administered Date(s) Administered Comments    Hepatitis B vaccine, adult *Check Product/Dose* 04/24/2019      08/12/2019      09/09/2019      01/13/2020      07/15/2020      08/12/2020

## 2024-11-27 NOTE — LETTER
November 27, 2024    Anibal Dow  [address] [address]      Dear Mr. Dow:    Our multi-disciplinary transplant team completed a review of your medical records on 11/21/2024.  I am pleased to inform you that you will be re-activated on the [employer] (UNOS) waiting list for a Kidney transplant.    Our transplant program consists of surgeons and medical doctors who provide coverage 365 days a year, 24 hours a day.     If you have any questions or concerns regarding your insurance coverage or billing issues, a  is available to speak with you.     It is important to keep us updated of any major changes in your medical condition, contact information and health insurance coverage.     Please don't hesitate to contact us at Dept: [phone] with any questions or concerns. We look forward to working with you through this process.      Sincerely,      Juli Zuleta RN          The UNOS Toll-free Patient Services Line:  Your Resource for Organ Transplant Information    If you have a question regarding your own medical care, you always should call your transplant hospital first. However, for general organ transplant-related information, you should call the [employer] Organ [employer]aring (UNOS) toll-free patient services line at [phone].  Anyone, including potential transplant candidates, candidates, recipients, family members, friends, living donors, and donor family members, can call this number to:    Talk about organ donation, living donation, the transplant process, the donation process, and transplant policies.  Get a free patient information kit with helpful booklets, waiting list and transplant information, and a list of all transplant hospitals.  Ask questions about the Organ Procurement and Transplantation Network (OPTN) web site (http://optn.transplant.hrsa.gov/), the UNOS Web site (http://unos.org/), or the UNOS web site for living  donors and transplant recipients. (http://www.transplantliving.org/).  Learn how [facility] and the OPTN can help you.  Talk about any concerns that you may have with a transplant hospital.    [employer] is a not-for-profit organization that provides the administrative services for the national OPTN under federal contract to the Health Resources and Services Administration (HRSA), an agency under the U.S. Department of Health and Human Services (HHS).    [facility] and the OPTN are responsible for:    Providing educational material for patients, the public, and professionals.  Raising awareness of the need for donated organs and tissue.  Writing organ transplant policy with help from transplant professionals, transplant patients, transplant candidates, donor families, living donors, and the public.  Coordinating organ procurement, matching, and placement.  Collecting information about every organ transplant and donation that occurs in the [place].    Remember, you should contact your transplant hospital directly if you have questions or concerns about your own medical care including medical records, work-up progress, and test results.    [facility] is not your transplant hospital, and staff at [facility] will not be able to transfer you to your transplant hospital, so keep your transplant hospital’s phone number handy.    However, while you research your transplant needs and learn as much as you can about transplantation and donation, we welcome your call to our toll-free patient services line at [phone].      [facility] PIL Final Rev 1-

## 2024-11-29 LAB
HLA RESULTS: NORMAL
HLA-A+B+C AB NFR SER: NORMAL %
HLA-DP+DQ+DR AB NFR SER: NORMAL %

## 2024-12-02 ENCOUNTER — TELEPHONE (OUTPATIENT)
Dept: TRANSPLANT | Facility: HOSPITAL | Age: 59
End: 2024-12-02
Payer: COMMERCIAL

## 2024-12-02 NOTE — TELEPHONE ENCOUNTER
Called pt to let him know he was active on the transplant list.    Updated in [facility]ET and [facility].

## 2024-12-12 ENCOUNTER — DOCUMENTATION (OUTPATIENT)
Dept: TRANSPLANT | Facility: HOSPITAL | Age: 59
End: 2024-12-12
Payer: COMMERCIAL

## 2024-12-12 NOTE — PROGRESS NOTES
Fax sent to Miley Whitman re:  needing updated labs - CMV, VZV, and EBV - that these always stay positive once they initially result positive.

## 2024-12-17 PROCEDURE — 80505 PATH CLIN CONSLTJ HIGH 41-60: CPT | Performed by: PATHOLOGY

## 2024-12-24 ENCOUNTER — LAB REQUISITION (OUTPATIENT)
Dept: LAB | Facility: CLINIC | Age: 59
End: 2024-12-24
Payer: COMMERCIAL

## 2024-12-24 DIAGNOSIS — N18.6 END STAGE RENAL DISEASE (MULTI): ICD-10-CM

## 2024-12-24 LAB
DIAGNOSIS-VIRTUAL CROSSMATCH: NORMAL
DIAGNOSIS-VIRTUAL CROSSMATCH: NORMAL
PATH REVIEW-VIRTUAL CROSSMATCH: NORMAL
PATH REVIEW-VIRTUAL CROSSMATCH: NORMAL
PATHOLOGIST ID-VIRTUAL CROSSMATCH: NORMAL
PATHOLOGIST ID-VIRTUAL CROSSMATCH: NORMAL

## 2024-12-27 PROCEDURE — 80505 PATH CLIN CONSLTJ HIGH 41-60: CPT | Performed by: PATHOLOGY

## 2024-12-30 ENCOUNTER — TELEPHONE (OUTPATIENT)
Facility: HOSPITAL | Age: 59
End: 2024-12-30
Payer: COMMERCIAL

## 2024-12-31 ENCOUNTER — LAB REQUISITION (OUTPATIENT)
Dept: LAB | Facility: CLINIC | Age: 59
End: 2024-12-31
Payer: COMMERCIAL

## 2024-12-31 DIAGNOSIS — N18.6 END STAGE RENAL DISEASE (MULTI): ICD-10-CM

## 2024-12-31 LAB
DIAGNOSIS-VIRTUAL CROSSMATCH: NORMAL
PATH REVIEW-VIRTUAL CROSSMATCH: NORMAL
PATHOLOGIST ID-VIRTUAL CROSSMATCH: NORMAL

## 2025-01-02 ENCOUNTER — LAB (OUTPATIENT)
Dept: LAB | Facility: LAB | Age: 60
End: 2025-01-02
Payer: COMMERCIAL

## 2025-01-02 DIAGNOSIS — Z01.818 PRE-TRANSPLANT EVALUATION FOR CHRONIC KIDNEY DISEASE: ICD-10-CM

## 2025-01-02 DIAGNOSIS — Z01.818 PRE-TRANSPLANT EVALUATION FOR KIDNEY TRANSPLANT: ICD-10-CM

## 2025-01-02 LAB
ANION GAP SERPL CALC-SCNC: 19 MMOL/L (ref 10–20)
BUN SERPL-MCNC: 74 MG/DL (ref 6–23)
CALCIUM SERPL-MCNC: 10.5 MG/DL (ref 8.6–10.6)
CHLORIDE SERPL-SCNC: 89 MMOL/L (ref 98–107)
CHOLEST SERPL-MCNC: 169 MG/DL (ref 0–199)
CHOLESTEROL/HDL RATIO: 4.2
CO2 SERPL-SCNC: 35 MMOL/L (ref 21–32)
CREAT SERPL-MCNC: 12.73 MG/DL (ref 0.5–1.3)
EGFRCR SERPLBLD CKD-EPI 2021: 4 ML/MIN/1.73M*2
ERYTHROCYTE [DISTWIDTH] IN BLOOD BY AUTOMATED COUNT: 12.5 % (ref 11.5–14.5)
GLUCOSE SERPL-MCNC: 182 MG/DL (ref 74–99)
HCT VFR BLD AUTO: 39.4 % (ref 41–52)
HDLC SERPL-MCNC: 40 MG/DL
HGB BLD-MCNC: 12.5 G/DL (ref 13.5–17.5)
LDLC SERPL CALC-MCNC: 104 MG/DL
MCH RBC QN AUTO: 28.9 PG (ref 26–34)
MCHC RBC AUTO-ENTMCNC: 31.7 G/DL (ref 32–36)
MCV RBC AUTO: 91 FL (ref 80–100)
NON HDL CHOLESTEROL: 129 MG/DL (ref 0–149)
NRBC BLD-RTO: 0 /100 WBCS (ref 0–0)
PLATELET # BLD AUTO: 198 X10*3/UL (ref 150–450)
POTASSIUM SERPL-SCNC: 5.2 MMOL/L (ref 3.5–5.3)
RBC # BLD AUTO: 4.33 X10*6/UL (ref 4.5–5.9)
SODIUM SERPL-SCNC: 138 MMOL/L (ref 136–145)
TRIGL SERPL-MCNC: 123 MG/DL (ref 0–149)
VLDL: 25 MG/DL (ref 0–40)
WBC # BLD AUTO: 6.6 X10*3/UL (ref 4.4–11.3)

## 2025-01-02 PROCEDURE — 80061 LIPID PANEL: CPT

## 2025-01-02 PROCEDURE — 80048 BASIC METABOLIC PNL TOTAL CA: CPT

## 2025-01-02 PROCEDURE — 85027 COMPLETE CBC AUTOMATED: CPT

## 2025-01-05 PROCEDURE — 81382 HLA II TYPING 1 LOC HR: CPT | Mod: OUT,59 | Performed by: SURGERY

## 2025-01-06 ENCOUNTER — APPOINTMENT (OUTPATIENT)
Dept: RADIOLOGY | Facility: HOSPITAL | Age: 60
End: 2025-01-06
Payer: MEDICARE

## 2025-01-06 ENCOUNTER — ANESTHESIA (OUTPATIENT)
Dept: OPERATING ROOM | Facility: HOSPITAL | Age: 60
End: 2025-01-06
Payer: MEDICARE

## 2025-01-06 ENCOUNTER — TELEPHONE (OUTPATIENT)
Dept: TRANSPLANT | Facility: HOSPITAL | Age: 60
End: 2025-01-06
Payer: MEDICARE

## 2025-01-06 ENCOUNTER — HOSPITAL ENCOUNTER (INPATIENT)
Facility: HOSPITAL | Age: 60
LOS: 5 days | Discharge: HOME | End: 2025-01-11
Attending: TRANSPLANT SURGERY | Admitting: TRANSPLANT SURGERY
Payer: MEDICARE

## 2025-01-06 ENCOUNTER — DOCUMENTATION (OUTPATIENT)
Dept: TRANSPLANT | Facility: HOSPITAL | Age: 60
End: 2025-01-06

## 2025-01-06 ENCOUNTER — TELEPHONE (OUTPATIENT)
Dept: CARDIOLOGY | Facility: CLINIC | Age: 60
End: 2025-01-06
Payer: MEDICARE

## 2025-01-06 ENCOUNTER — DOCUMENTATION (OUTPATIENT)
Dept: TRANSPLANT | Facility: HOSPITAL | Age: 60
End: 2025-01-06
Payer: MEDICARE

## 2025-01-06 ENCOUNTER — ANESTHESIA EVENT (OUTPATIENT)
Dept: OPERATING ROOM | Facility: HOSPITAL | Age: 60
End: 2025-01-06
Payer: MEDICARE

## 2025-01-06 ENCOUNTER — PREP FOR PROCEDURE (OUTPATIENT)
Dept: TRANSPLANT | Facility: HOSPITAL | Age: 60
End: 2025-01-06
Payer: MEDICARE

## 2025-01-06 DIAGNOSIS — N18.6 ESRD (END STAGE RENAL DISEASE) (MULTI): Primary | ICD-10-CM

## 2025-01-06 DIAGNOSIS — Z94.0 KIDNEY REPLACED BY TRANSPLANT (HHS-HCC): ICD-10-CM

## 2025-01-06 DIAGNOSIS — N18.9 CKD (CHRONIC KIDNEY DISEASE): ICD-10-CM

## 2025-01-06 DIAGNOSIS — Z79.4 TYPE 2 DIABETES MELLITUS WITH HYPERGLYCEMIA, WITH LONG-TERM CURRENT USE OF INSULIN: Primary | ICD-10-CM

## 2025-01-06 DIAGNOSIS — N18.6 ESRD (END STAGE RENAL DISEASE) (MULTI): ICD-10-CM

## 2025-01-06 DIAGNOSIS — E11.65 TYPE 2 DIABETES MELLITUS WITH HYPERGLYCEMIA, WITH LONG-TERM CURRENT USE OF INSULIN: Primary | ICD-10-CM

## 2025-01-06 LAB
ABO GROUP (TYPE) IN BLOOD: NORMAL
ALBUMIN SERPL BCP-MCNC: 3.5 G/DL (ref 3.4–5)
ALBUMIN SERPL BCP-MCNC: 4.3 G/DL (ref 3.4–5)
ALP SERPL-CCNC: 71 U/L (ref 33–120)
ALT SERPL W P-5'-P-CCNC: 9 U/L (ref 10–52)
ANION GAP BLDA CALCULATED.4IONS-SCNC: 13 MMO/L (ref 10–25)
ANION GAP BLDA CALCULATED.4IONS-SCNC: 14 MMO/L (ref 10–25)
ANION GAP BLDV CALCULATED.4IONS-SCNC: 12 MMOL/L (ref 10–25)
ANION GAP SERPL CALC-SCNC: 20 MMOL/L (ref 10–20)
ANION GAP SERPL CALC-SCNC: 21 MMOL/L (ref 10–20)
ANTIBODY SCREEN: NORMAL
APTT PPP: 33 SECONDS (ref 27–38)
AST SERPL W P-5'-P-CCNC: 16 U/L (ref 9–39)
BASE EXCESS BLDA CALC-SCNC: 0.5 MMOL/L (ref -2–3)
BASE EXCESS BLDA CALC-SCNC: 4.8 MMOL/L (ref -2–3)
BASE EXCESS BLDV CALC-SCNC: 8.7 MMOL/L (ref -2–3)
BILIRUB DIRECT SERPL-MCNC: 0.1 MG/DL (ref 0–0.3)
BILIRUB SERPL-MCNC: 0.5 MG/DL (ref 0–1.2)
BODY TEMPERATURE: 37 DEGREES CELSIUS
BUN SERPL-MCNC: 83 MG/DL (ref 6–23)
BUN SERPL-MCNC: 86 MG/DL (ref 6–23)
CA-I BLDA-SCNC: 1.04 MMOL/L (ref 1.1–1.33)
CA-I BLDA-SCNC: 1.2 MMOL/L (ref 1.1–1.33)
CA-I BLDV-SCNC: 1.19 MMOL/L (ref 1.1–1.33)
CALCIUM SERPL-MCNC: 10.5 MG/DL (ref 8.6–10.6)
CALCIUM SERPL-MCNC: 10.5 MG/DL (ref 8.6–10.6)
CHLORIDE BLDA-SCNC: 97 MMOL/L (ref 98–107)
CHLORIDE BLDA-SCNC: 98 MMOL/L (ref 98–107)
CHLORIDE BLDV-SCNC: 93 MMOL/L (ref 98–107)
CHLORIDE SERPL-SCNC: 89 MMOL/L (ref 98–107)
CHLORIDE SERPL-SCNC: 91 MMOL/L (ref 98–107)
CMV IGG AVIDITY SERPL IA-RTO: REACTIVE %
CO2 SERPL-SCNC: 27 MMOL/L (ref 21–32)
CO2 SERPL-SCNC: 32 MMOL/L (ref 21–32)
COHGB MFR BLDA: 0 %
CREAT SERPL-MCNC: 13.96 MG/DL (ref 0.5–1.3)
CREAT SERPL-MCNC: 14.35 MG/DL (ref 0.5–1.3)
DO-HGB MFR BLDA: 2.6 % (ref 0–5)
EBV NA AB SER QL: POSITIVE
EGFRCR SERPLBLD CKD-EPI 2021: 4 ML/MIN/1.73M*2
EGFRCR SERPLBLD CKD-EPI 2021: 4 ML/MIN/1.73M*2
ERYTHROCYTE [DISTWIDTH] IN BLOOD BY AUTOMATED COUNT: 13 % (ref 11.5–14.5)
ERYTHROCYTE [DISTWIDTH] IN BLOOD BY AUTOMATED COUNT: 13 % (ref 11.5–14.5)
GLUCOSE BLD MANUAL STRIP-MCNC: 156 MG/DL (ref 74–99)
GLUCOSE BLD MANUAL STRIP-MCNC: 174 MG/DL (ref 74–99)
GLUCOSE BLD MANUAL STRIP-MCNC: 183 MG/DL (ref 74–99)
GLUCOSE BLD MANUAL STRIP-MCNC: 187 MG/DL (ref 74–99)
GLUCOSE BLD MANUAL STRIP-MCNC: 192 MG/DL (ref 74–99)
GLUCOSE BLD MANUAL STRIP-MCNC: 316 MG/DL (ref 74–99)
GLUCOSE BLDA-MCNC: 138 MG/DL (ref 74–99)
GLUCOSE BLDA-MCNC: 168 MG/DL (ref 74–99)
GLUCOSE BLDV-MCNC: 210 MG/DL (ref 74–99)
GLUCOSE SERPL-MCNC: 186 MG/DL (ref 74–99)
GLUCOSE SERPL-MCNC: 201 MG/DL (ref 74–99)
HBV CORE AB SER QL: NONREACTIVE
HBV SURFACE AB SER-ACNC: >1000 MIU/ML
HBV SURFACE AG SERPL QL IA: NONREACTIVE
HCO3 BLDA-SCNC: 26.5 MMOL/L (ref 22–26)
HCO3 BLDA-SCNC: 27.4 MMOL/L (ref 22–26)
HCO3 BLDV-SCNC: 34.5 MMOL/L (ref 22–26)
HCT VFR BLD AUTO: 30.6 % (ref 41–52)
HCT VFR BLD AUTO: 36.2 % (ref 41–52)
HCT VFR BLD EST: 27 % (ref 41–52)
HCT VFR BLD EST: 29 % (ref 41–52)
HCT VFR BLD EST: 36 % (ref 41–52)
HCV AB SER QL: NONREACTIVE
HGB BLD-MCNC: 12.2 G/DL (ref 13.5–17.5)
HGB BLD-MCNC: 9.8 G/DL (ref 13.5–17.5)
HGB BLDA-MCNC: 8.9 G/DL (ref 13.5–17.5)
HGB BLDA-MCNC: 8.9 G/DL (ref 13.5–17.5)
HGB BLDA-MCNC: 9.7 G/DL (ref 13.5–17.5)
HGB BLDV-MCNC: 12 G/DL (ref 13.5–17.5)
HIV 1+2 AB+HIV1 P24 AG SERPL QL IA: NONREACTIVE
INHALED O2 CONCENTRATION: 21 %
INHALED O2 CONCENTRATION: 35 %
INHALED O2 CONCENTRATION: 50 %
INR PPP: 1.1 (ref 0.9–1.1)
LACTATE BLDA-SCNC: 1.1 MMOL/L (ref 0.4–2)
LACTATE BLDA-SCNC: 2.8 MMOL/L (ref 0.4–2)
LACTATE BLDV-SCNC: 1.1 MMOL/L (ref 0.4–2)
LACTATE BLDV-SCNC: 2.8 MMOL/L (ref 0.4–2)
MCH RBC QN AUTO: 28.3 PG (ref 26–34)
MCH RBC QN AUTO: 29.2 PG (ref 26–34)
MCHC RBC AUTO-ENTMCNC: 32 G/DL (ref 32–36)
MCHC RBC AUTO-ENTMCNC: 33.7 G/DL (ref 32–36)
MCV RBC AUTO: 87 FL (ref 80–100)
MCV RBC AUTO: 88 FL (ref 80–100)
METHGB MFR BLDA: 0 % (ref 0–1.5)
NRBC BLD-RTO: 0 /100 WBCS (ref 0–0)
NRBC BLD-RTO: 0 /100 WBCS (ref 0–0)
OXYHGB MFR BLDA: 96 % (ref 94–98)
OXYHGB MFR BLDA: 97.4 % (ref 94–98)
OXYHGB MFR BLDA: 97.4 % (ref 94–98)
OXYHGB MFR BLDV: 67.9 % (ref 45–75)
PCO2 BLDA: 32 MM HG (ref 38–42)
PCO2 BLDA: 48 MM HG (ref 38–42)
PCO2 BLDV: 52 MM HG (ref 41–51)
PH BLDA: 7.35 PH (ref 7.38–7.42)
PH BLDA: 7.54 PH (ref 7.38–7.42)
PH BLDV: 7.43 PH (ref 7.33–7.43)
PHOSPHATE SERPL-MCNC: 5.1 MG/DL (ref 2.5–4.9)
PHOSPHATE SERPL-MCNC: 6 MG/DL (ref 2.5–4.9)
PLATELET # BLD AUTO: 128 X10*3/UL (ref 150–450)
PLATELET # BLD AUTO: 182 X10*3/UL (ref 150–450)
PO2 BLDA: 101 MM HG (ref 85–95)
PO2 BLDA: 181 MM HG (ref 85–95)
PO2 BLDV: 41 MM HG (ref 35–45)
POTASSIUM BLDA-SCNC: 4.4 MMOL/L (ref 3.5–5.3)
POTASSIUM BLDA-SCNC: 5.2 MMOL/L (ref 3.5–5.3)
POTASSIUM BLDV-SCNC: 5.5 MMOL/L (ref 3.5–5.3)
POTASSIUM SERPL-SCNC: 5.5 MMOL/L (ref 3.5–5.3)
POTASSIUM SERPL-SCNC: 6.1 MMOL/L (ref 3.5–5.3)
PROT SERPL-MCNC: 7.7 G/DL (ref 6.4–8.2)
PROTHROMBIN TIME: 12.7 SECONDS (ref 9.8–12.8)
RBC # BLD AUTO: 3.46 X10*6/UL (ref 4.5–5.9)
RBC # BLD AUTO: 4.18 X10*6/UL (ref 4.5–5.9)
RH FACTOR (ANTIGEN D): NORMAL
SAO2 % BLDA: 96 % (ref 94–100)
SAO2 % BLDA: 97 % (ref 94–100)
SAO2 % BLDV: 69 % (ref 45–75)
SODIUM BLDA-SCNC: 133 MMOL/L (ref 136–145)
SODIUM BLDA-SCNC: 133 MMOL/L (ref 136–145)
SODIUM BLDV-SCNC: 134 MMOL/L (ref 136–145)
SODIUM SERPL-SCNC: 132 MMOL/L (ref 136–145)
SODIUM SERPL-SCNC: 136 MMOL/L (ref 136–145)
WBC # BLD AUTO: 4.7 X10*3/UL (ref 4.4–11.3)
WBC # BLD AUTO: 7.6 X10*3/UL (ref 4.4–11.3)

## 2025-01-06 PROCEDURE — 99223 1ST HOSP IP/OBS HIGH 75: CPT | Performed by: STUDENT IN AN ORGANIZED HEALTH CARE EDUCATION/TRAINING PROGRAM

## 2025-01-06 PROCEDURE — 82435 ASSAY OF BLOOD CHLORIDE: CPT | Performed by: STUDENT IN AN ORGANIZED HEALTH CARE EDUCATION/TRAINING PROGRAM

## 2025-01-06 PROCEDURE — 87522 HEPATITIS C REVRS TRNSCRPJ: CPT

## 2025-01-06 PROCEDURE — 36620 INSERTION CATHETER ARTERY: CPT | Performed by: STUDENT IN AN ORGANIZED HEALTH CARE EDUCATION/TRAINING PROGRAM

## 2025-01-06 PROCEDURE — 2500000005 HC RX 250 GENERAL PHARMACY W/O HCPCS: Performed by: STUDENT IN AN ORGANIZED HEALTH CARE EDUCATION/TRAINING PROGRAM

## 2025-01-06 PROCEDURE — P9045 ALBUMIN (HUMAN), 5%, 250 ML: HCPCS | Mod: JZ,TB | Performed by: ANESTHESIOLOGIST ASSISTANT

## 2025-01-06 PROCEDURE — 2780000003 HC OR 278 NO HCPCS: Performed by: TRANSPLANT SURGERY

## 2025-01-06 PROCEDURE — 37799 UNLISTED PX VASCULAR SURGERY: CPT | Performed by: STUDENT IN AN ORGANIZED HEALTH CARE EDUCATION/TRAINING PROGRAM

## 2025-01-06 PROCEDURE — 1200000002 HC GENERAL ROOM WITH TELEMETRY DAILY

## 2025-01-06 PROCEDURE — 2500000005 HC RX 250 GENERAL PHARMACY W/O HCPCS: Performed by: ANESTHESIOLOGIST ASSISTANT

## 2025-01-06 PROCEDURE — 76776 US EXAM K TRANSPL W/DOPPLER: CPT | Performed by: RADIOLOGY

## 2025-01-06 PROCEDURE — 82947 ASSAY GLUCOSE BLOOD QUANT: CPT

## 2025-01-06 PROCEDURE — 50360 RNL ALTRNSPLJ W/O RCP NFRCT: CPT | Performed by: TRANSPLANT SURGERY

## 2025-01-06 PROCEDURE — 2500000004 HC RX 250 GENERAL PHARMACY W/ HCPCS (ALT 636 FOR OP/ED): Performed by: ANESTHESIOLOGIST ASSISTANT

## 2025-01-06 PROCEDURE — 2720000007 HC OR 272 NO HCPCS: Performed by: TRANSPLANT SURGERY

## 2025-01-06 PROCEDURE — 86825 HLA X-MATH NON-CYTOTOXIC: CPT | Mod: OUT | Performed by: SURGERY

## 2025-01-06 PROCEDURE — 85027 COMPLETE CBC AUTOMATED: CPT | Performed by: ANESTHESIOLOGIST ASSISTANT

## 2025-01-06 PROCEDURE — 71045 X-RAY EXAM CHEST 1 VIEW: CPT

## 2025-01-06 PROCEDURE — 3600000004 HC OR TIME - INITIAL BASE CHARGE - PROCEDURE LEVEL FOUR: Performed by: TRANSPLANT SURGERY

## 2025-01-06 PROCEDURE — 71045 X-RAY EXAM CHEST 1 VIEW: CPT | Performed by: RADIOLOGY

## 2025-01-06 PROCEDURE — 86706 HEP B SURFACE ANTIBODY: CPT

## 2025-01-06 PROCEDURE — 2500000001 HC RX 250 WO HCPCS SELF ADMINISTERED DRUGS (ALT 637 FOR MEDICARE OP): Performed by: STUDENT IN AN ORGANIZED HEALTH CARE EDUCATION/TRAINING PROGRAM

## 2025-01-06 PROCEDURE — 82248 BILIRUBIN DIRECT: CPT

## 2025-01-06 PROCEDURE — C2617 STENT, NON-COR, TEM W/O DEL: HCPCS | Performed by: TRANSPLANT SURGERY

## 2025-01-06 PROCEDURE — 86664 EPSTEIN-BARR NUCLEAR ANTIGEN: CPT

## 2025-01-06 PROCEDURE — A50360 PR TRANSPLANTATION OF KIDNEY: Performed by: ANESTHESIOLOGIST ASSISTANT

## 2025-01-06 PROCEDURE — 2500000005 HC RX 250 GENERAL PHARMACY W/O HCPCS: Performed by: TRANSPLANT SURGERY

## 2025-01-06 PROCEDURE — 83605 ASSAY OF LACTIC ACID: CPT | Performed by: STUDENT IN AN ORGANIZED HEALTH CARE EDUCATION/TRAINING PROGRAM

## 2025-01-06 PROCEDURE — 2500000001 HC RX 250 WO HCPCS SELF ADMINISTERED DRUGS (ALT 637 FOR MEDICARE OP)

## 2025-01-06 PROCEDURE — 2500000004 HC RX 250 GENERAL PHARMACY W/ HCPCS (ALT 636 FOR OP/ED): Performed by: STUDENT IN AN ORGANIZED HEALTH CARE EDUCATION/TRAINING PROGRAM

## 2025-01-06 PROCEDURE — 76776 US EXAM K TRANSPL W/DOPPLER: CPT

## 2025-01-06 PROCEDURE — 3600000009 HC OR TIME - EACH INCREMENTAL 1 MINUTE - PROCEDURE LEVEL FOUR: Performed by: TRANSPLANT SURGERY

## 2025-01-06 PROCEDURE — 3700000002 HC GENERAL ANESTHESIA TIME - EACH INCREMENTAL 1 MINUTE: Performed by: TRANSPLANT SURGERY

## 2025-01-06 PROCEDURE — 2500000002 HC RX 250 W HCPCS SELF ADMINISTERED DRUGS (ALT 637 FOR MEDICARE OP, ALT 636 FOR OP/ED): Performed by: STUDENT IN AN ORGANIZED HEALTH CARE EDUCATION/TRAINING PROGRAM

## 2025-01-06 PROCEDURE — 87389 HIV-1 AG W/HIV-1&-2 AB AG IA: CPT

## 2025-01-06 PROCEDURE — 99222 1ST HOSP IP/OBS MODERATE 55: CPT | Performed by: TRANSPLANT SURGERY

## 2025-01-06 PROCEDURE — 80505 PATH CLIN CONSLTJ HIGH 41-60: CPT | Performed by: TRANSPLANT SURGERY

## 2025-01-06 PROCEDURE — 86704 HEP B CORE ANTIBODY TOTAL: CPT

## 2025-01-06 PROCEDURE — 86901 BLOOD TYPING SEROLOGIC RH(D): CPT

## 2025-01-06 PROCEDURE — 87340 HEPATITIS B SURFACE AG IA: CPT

## 2025-01-06 PROCEDURE — 7100000002 HC RECOVERY ROOM TIME - EACH INCREMENTAL 1 MINUTE: Performed by: TRANSPLANT SURGERY

## 2025-01-06 PROCEDURE — 82435 ASSAY OF BLOOD CHLORIDE: CPT | Performed by: ANESTHESIOLOGIST ASSISTANT

## 2025-01-06 PROCEDURE — 84100 ASSAY OF PHOSPHORUS: CPT

## 2025-01-06 PROCEDURE — 82435 ASSAY OF BLOOD CHLORIDE: CPT

## 2025-01-06 PROCEDURE — 86826 HLA X-MATCH NONCYTOTOXC ADDL: CPT | Mod: OUT | Performed by: SURGERY

## 2025-01-06 PROCEDURE — 86803 HEPATITIS C AB TEST: CPT

## 2025-01-06 PROCEDURE — 85027 COMPLETE CBC AUTOMATED: CPT

## 2025-01-06 PROCEDURE — 3700000001 HC GENERAL ANESTHESIA TIME - INITIAL BASE CHARGE: Performed by: TRANSPLANT SURGERY

## 2025-01-06 PROCEDURE — 36415 COLL VENOUS BLD VENIPUNCTURE: CPT

## 2025-01-06 PROCEDURE — 85730 THROMBOPLASTIN TIME PARTIAL: CPT

## 2025-01-06 PROCEDURE — 2500000004 HC RX 250 GENERAL PHARMACY W/ HCPCS (ALT 636 FOR OP/ED): Mod: TB

## 2025-01-06 PROCEDURE — 82330 ASSAY OF CALCIUM: CPT

## 2025-01-06 PROCEDURE — 0TY00Z0 TRANSPLANTATION OF RIGHT KIDNEY, ALLOGENEIC, OPEN APPROACH: ICD-10-PCS | Performed by: TRANSPLANT SURGERY

## 2025-01-06 PROCEDURE — C1757 CATH, THROMBECTOMY/EMBOLECT: HCPCS | Performed by: TRANSPLANT SURGERY

## 2025-01-06 PROCEDURE — 82375 ASSAY CARBOXYHB QUANT: CPT

## 2025-01-06 PROCEDURE — 2500000004 HC RX 250 GENERAL PHARMACY W/ HCPCS (ALT 636 FOR OP/ED)

## 2025-01-06 PROCEDURE — A50360 PR TRANSPLANTATION OF KIDNEY: Performed by: STUDENT IN AN ORGANIZED HEALTH CARE EDUCATION/TRAINING PROGRAM

## 2025-01-06 PROCEDURE — 2500000004 HC RX 250 GENERAL PHARMACY W/ HCPCS (ALT 636 FOR OP/ED): Performed by: TRANSPLANT SURGERY

## 2025-01-06 PROCEDURE — 7100000001 HC RECOVERY ROOM TIME - INITIAL BASE CHARGE: Performed by: TRANSPLANT SURGERY

## 2025-01-06 PROCEDURE — 8120000002 HC CADAVER DONOR - KIDNEY: Performed by: TRANSPLANT SURGERY

## 2025-01-06 PROCEDURE — 86644 CMV ANTIBODY: CPT

## 2025-01-06 DEVICE — STENT, POLARIS ULTRA 5FR X 12CM, W/O WIRE: Type: IMPLANTABLE DEVICE | Site: URETER | Status: FUNCTIONAL

## 2025-01-06 DEVICE — PATCH, TACHOSIL, 9.5CM X 4.8CM, ABSORBABLE FIBRIN SEALANT: Type: IMPLANTABLE DEVICE | Site: ABDOMEN | Status: FUNCTIONAL

## 2025-01-06 RX ORDER — HYDROMORPHONE HYDROCHLORIDE 1 MG/ML
INJECTION, SOLUTION INTRAMUSCULAR; INTRAVENOUS; SUBCUTANEOUS AS NEEDED
Status: DISCONTINUED | OUTPATIENT
Start: 2025-01-06 | End: 2025-01-06

## 2025-01-06 RX ORDER — PREDNISONE 10 MG/1
20 TABLET ORAL DAILY
Status: DISCONTINUED | OUTPATIENT
Start: 2025-01-10 | End: 2025-01-11 | Stop reason: HOSPADM

## 2025-01-06 RX ORDER — ROCURONIUM BROMIDE 10 MG/ML
INJECTION, SOLUTION INTRAVENOUS AS NEEDED
Status: DISCONTINUED | OUTPATIENT
Start: 2025-01-06 | End: 2025-01-06

## 2025-01-06 RX ORDER — DIPHENHYDRAMINE HYDROCHLORIDE 50 MG/ML
INJECTION INTRAMUSCULAR; INTRAVENOUS AS NEEDED
Status: DISCONTINUED | OUTPATIENT
Start: 2025-01-06 | End: 2025-01-06

## 2025-01-06 RX ORDER — SODIUM CHLORIDE 0.9 G/100ML
INJECTION, SOLUTION IRRIGATION AS NEEDED
Status: DISCONTINUED | OUTPATIENT
Start: 2025-01-06 | End: 2025-01-06 | Stop reason: HOSPADM

## 2025-01-06 RX ORDER — CEFAZOLIN 1 G/1
INJECTION, POWDER, FOR SOLUTION INTRAVENOUS AS NEEDED
Status: DISCONTINUED | OUTPATIENT
Start: 2025-01-06 | End: 2025-01-06

## 2025-01-06 RX ORDER — FUROSEMIDE 10 MG/ML
80 INJECTION INTRAMUSCULAR; INTRAVENOUS EVERY 8 HOURS SCHEDULED
Status: DISCONTINUED | OUTPATIENT
Start: 2025-01-06 | End: 2025-01-11 | Stop reason: HOSPADM

## 2025-01-06 RX ORDER — CALCIUM CHLORIDE INJECTION 100 MG/ML
INJECTION, SOLUTION INTRAVENOUS AS NEEDED
Status: DISCONTINUED | OUTPATIENT
Start: 2025-01-06 | End: 2025-01-06

## 2025-01-06 RX ORDER — LIDOCAINE HYDROCHLORIDE 10 MG/ML
0.1 INJECTION, SOLUTION INFILTRATION; PERINEURAL ONCE
Status: DISCONTINUED | OUTPATIENT
Start: 2025-01-06 | End: 2025-01-06 | Stop reason: HOSPADM

## 2025-01-06 RX ORDER — CARVEDILOL 12.5 MG/1
12.5 TABLET ORAL 2 TIMES DAILY
Status: DISCONTINUED | OUTPATIENT
Start: 2025-01-06 | End: 2025-01-11 | Stop reason: HOSPADM

## 2025-01-06 RX ORDER — ALBUTEROL SULFATE 0.83 MG/ML
2.5 SOLUTION RESPIRATORY (INHALATION) ONCE
Status: COMPLETED | OUTPATIENT
Start: 2025-01-06 | End: 2025-01-06

## 2025-01-06 RX ORDER — CEFAZOLIN SODIUM 2 G/100ML
2 INJECTION, SOLUTION INTRAVENOUS EVERY 8 HOURS
Status: COMPLETED | OUTPATIENT
Start: 2025-01-06 | End: 2025-01-07

## 2025-01-06 RX ORDER — CARVEDILOL 25 MG/1
25 TABLET ORAL 2 TIMES DAILY
Status: DISCONTINUED | OUTPATIENT
Start: 2025-01-06 | End: 2025-01-06

## 2025-01-06 RX ORDER — LABETALOL HYDROCHLORIDE 5 MG/ML
20 INJECTION, SOLUTION INTRAVENOUS ONCE
Status: COMPLETED | OUTPATIENT
Start: 2025-01-06 | End: 2025-01-06

## 2025-01-06 RX ORDER — DEXTROSE 50 % IN WATER (D50W) INTRAVENOUS SYRINGE
25 ONCE
Status: COMPLETED | OUTPATIENT
Start: 2025-01-06 | End: 2025-01-06

## 2025-01-06 RX ORDER — MIDAZOLAM HYDROCHLORIDE 1 MG/ML
INJECTION INTRAMUSCULAR; INTRAVENOUS AS NEEDED
Status: DISCONTINUED | OUTPATIENT
Start: 2025-01-06 | End: 2025-01-06

## 2025-01-06 RX ORDER — HYDRALAZINE HYDROCHLORIDE 25 MG/1
100 TABLET, FILM COATED ORAL 3 TIMES DAILY
Status: DISCONTINUED | OUTPATIENT
Start: 2025-01-06 | End: 2025-01-06

## 2025-01-06 RX ORDER — DEXTROSE 50 % IN WATER (D50W) INTRAVENOUS SYRINGE
25
Status: DISCONTINUED | OUTPATIENT
Start: 2025-01-06 | End: 2025-01-09

## 2025-01-06 RX ORDER — FUROSEMIDE 10 MG/ML
INJECTION INTRAMUSCULAR; INTRAVENOUS AS NEEDED
Status: DISCONTINUED | OUTPATIENT
Start: 2025-01-06 | End: 2025-01-06

## 2025-01-06 RX ORDER — DEXTROSE 50 % IN WATER (D50W) INTRAVENOUS SYRINGE
12.5
Status: DISCONTINUED | OUTPATIENT
Start: 2025-01-06 | End: 2025-01-09

## 2025-01-06 RX ORDER — HYDROMORPHONE HYDROCHLORIDE 0.2 MG/ML
0.2 INJECTION INTRAMUSCULAR; INTRAVENOUS; SUBCUTANEOUS
Status: DISCONTINUED | OUTPATIENT
Start: 2025-01-06 | End: 2025-01-09

## 2025-01-06 RX ORDER — SENNOSIDES 8.6 MG/1
1 TABLET ORAL 2 TIMES DAILY
Status: DISCONTINUED | OUTPATIENT
Start: 2025-01-06 | End: 2025-01-11 | Stop reason: HOSPADM

## 2025-01-06 RX ORDER — CLOTRIMAZOLE 10 MG/1
10 LOZENGE ORAL
Status: DISCONTINUED | OUTPATIENT
Start: 2025-01-06 | End: 2025-01-11 | Stop reason: HOSPADM

## 2025-01-06 RX ORDER — NORETHINDRONE AND ETHINYL ESTRADIOL 0.5-0.035
KIT ORAL AS NEEDED
Status: DISCONTINUED | OUTPATIENT
Start: 2025-01-06 | End: 2025-01-06

## 2025-01-06 RX ORDER — INSULIN LISPRO 100 [IU]/ML
0-5 INJECTION, SOLUTION INTRAVENOUS; SUBCUTANEOUS
Status: DISCONTINUED | OUTPATIENT
Start: 2025-01-06 | End: 2025-01-07

## 2025-01-06 RX ORDER — ONDANSETRON HYDROCHLORIDE 2 MG/ML
INJECTION, SOLUTION INTRAVENOUS AS NEEDED
Status: DISCONTINUED | OUTPATIENT
Start: 2025-01-06 | End: 2025-01-06

## 2025-01-06 RX ORDER — LOSARTAN POTASSIUM 25 MG/1
25 TABLET ORAL DAILY
Status: DISCONTINUED | OUTPATIENT
Start: 2025-01-06 | End: 2025-01-06

## 2025-01-06 RX ORDER — HYDROMORPHONE HYDROCHLORIDE 0.2 MG/ML
0.2 INJECTION INTRAMUSCULAR; INTRAVENOUS; SUBCUTANEOUS EVERY 5 MIN PRN
Status: DISCONTINUED | OUTPATIENT
Start: 2025-01-06 | End: 2025-01-06 | Stop reason: HOSPADM

## 2025-01-06 RX ORDER — PHENYLEPHRINE HCL IN 0.9% NACL 0.4MG/10ML
SYRINGE (ML) INTRAVENOUS AS NEEDED
Status: DISCONTINUED | OUTPATIENT
Start: 2025-01-06 | End: 2025-01-06

## 2025-01-06 RX ORDER — NALOXONE HYDROCHLORIDE 0.4 MG/ML
0.2 INJECTION, SOLUTION INTRAMUSCULAR; INTRAVENOUS; SUBCUTANEOUS EVERY 5 MIN PRN
Status: DISCONTINUED | OUTPATIENT
Start: 2025-01-06 | End: 2025-01-11 | Stop reason: HOSPADM

## 2025-01-06 RX ORDER — GABAPENTIN 300 MG/1
300 CAPSULE ORAL ONCE
Status: COMPLETED | OUTPATIENT
Start: 2025-01-06 | End: 2025-01-06

## 2025-01-06 RX ORDER — INSULIN GLARGINE 100 [IU]/ML
5 INJECTION, SOLUTION SUBCUTANEOUS EVERY EVENING
Status: DISCONTINUED | OUTPATIENT
Start: 2025-01-06 | End: 2025-01-07

## 2025-01-06 RX ORDER — OXYCODONE HYDROCHLORIDE 5 MG/1
5 TABLET ORAL EVERY 4 HOURS PRN
Status: DISCONTINUED | OUTPATIENT
Start: 2025-01-06 | End: 2025-01-09

## 2025-01-06 RX ORDER — ONDANSETRON 4 MG/1
4 TABLET, ORALLY DISINTEGRATING ORAL EVERY 8 HOURS PRN
Status: DISCONTINUED | OUTPATIENT
Start: 2025-01-06 | End: 2025-01-11 | Stop reason: HOSPADM

## 2025-01-06 RX ORDER — SULFAMETHOXAZOLE AND TRIMETHOPRIM 400; 80 MG/1; MG/1
1 TABLET ORAL DAILY
Status: DISCONTINUED | OUTPATIENT
Start: 2025-01-07 | End: 2025-01-11 | Stop reason: HOSPADM

## 2025-01-06 RX ORDER — ALBUMIN HUMAN 50 G/1000ML
SOLUTION INTRAVENOUS AS NEEDED
Status: DISCONTINUED | OUTPATIENT
Start: 2025-01-06 | End: 2025-01-06

## 2025-01-06 RX ORDER — MANNITOL 20 G/100ML
INJECTION, SOLUTION INTRAVENOUS AS NEEDED
Status: DISCONTINUED | OUTPATIENT
Start: 2025-01-06 | End: 2025-01-06

## 2025-01-06 RX ORDER — OXYCODONE HYDROCHLORIDE 5 MG/1
5 TABLET ORAL EVERY 4 HOURS PRN
Status: DISCONTINUED | OUTPATIENT
Start: 2025-01-06 | End: 2025-01-06 | Stop reason: HOSPADM

## 2025-01-06 RX ORDER — OXYCODONE HYDROCHLORIDE 5 MG/1
10 TABLET ORAL EVERY 4 HOURS PRN
Status: DISCONTINUED | OUTPATIENT
Start: 2025-01-06 | End: 2025-01-09

## 2025-01-06 RX ORDER — SODIUM CHLORIDE 450 MG/100ML
60 INJECTION, SOLUTION INTRAVENOUS CONTINUOUS
Status: DISCONTINUED | OUTPATIENT
Start: 2025-01-06 | End: 2025-01-07

## 2025-01-06 RX ORDER — MYCOPHENOLATE MOFETIL 250 MG/1
1000 CAPSULE ORAL
Status: DISCONTINUED | OUTPATIENT
Start: 2025-01-06 | End: 2025-01-11 | Stop reason: HOSPADM

## 2025-01-06 RX ORDER — FENTANYL CITRATE 50 UG/ML
INJECTION, SOLUTION INTRAMUSCULAR; INTRAVENOUS AS NEEDED
Status: DISCONTINUED | OUTPATIENT
Start: 2025-01-06 | End: 2025-01-06

## 2025-01-06 RX ORDER — VALGANCICLOVIR 450 MG/1
450 TABLET, FILM COATED ORAL
Status: DISCONTINUED | OUTPATIENT
Start: 2025-01-07 | End: 2025-01-11 | Stop reason: HOSPADM

## 2025-01-06 RX ORDER — PROPOFOL 10 MG/ML
INJECTION, EMULSION INTRAVENOUS AS NEEDED
Status: DISCONTINUED | OUTPATIENT
Start: 2025-01-06 | End: 2025-01-06

## 2025-01-06 RX ORDER — SODIUM CHLORIDE, SODIUM GLUCONATE, SODIUM ACETATE, POTASSIUM CHLORIDE AND MAGNESIUM CHLORIDE 30; 37; 368; 526; 502 MG/100ML; MG/100ML; MG/100ML; MG/100ML; MG/100ML
INJECTION, SOLUTION INTRAVENOUS CONTINUOUS PRN
Status: DISCONTINUED | OUTPATIENT
Start: 2025-01-06 | End: 2025-01-06

## 2025-01-06 RX ORDER — ONDANSETRON HYDROCHLORIDE 2 MG/ML
4 INJECTION, SOLUTION INTRAVENOUS EVERY 8 HOURS PRN
Status: DISCONTINUED | OUTPATIENT
Start: 2025-01-06 | End: 2025-01-11 | Stop reason: HOSPADM

## 2025-01-06 RX ORDER — ACETAMINOPHEN 325 MG/1
975 TABLET ORAL ONCE
Status: COMPLETED | OUTPATIENT
Start: 2025-01-06 | End: 2025-01-06

## 2025-01-06 RX ORDER — ATORVASTATIN CALCIUM 40 MG/1
40 TABLET, FILM COATED ORAL DAILY
Status: DISCONTINUED | OUTPATIENT
Start: 2025-01-06 | End: 2025-01-11 | Stop reason: HOSPADM

## 2025-01-06 RX ORDER — METHYLPREDNISOLONE SODIUM SUCCINATE 500 MG/8ML
INJECTION INTRAMUSCULAR; INTRAVENOUS AS NEEDED
Status: DISCONTINUED | OUTPATIENT
Start: 2025-01-06 | End: 2025-01-06

## 2025-01-06 RX ORDER — SODIUM CHLORIDE 9 MG/ML
999 INJECTION, SOLUTION INTRAVENOUS CONTINUOUS
Status: DISCONTINUED | OUTPATIENT
Start: 2025-01-06 | End: 2025-01-07

## 2025-01-06 RX ORDER — ACETAMINOPHEN 325 MG/1
650 TABLET ORAL EVERY 6 HOURS
Status: DISCONTINUED | OUTPATIENT
Start: 2025-01-06 | End: 2025-01-11 | Stop reason: HOSPADM

## 2025-01-06 RX ORDER — DOCUSATE SODIUM 100 MG/1
100 CAPSULE, LIQUID FILLED ORAL 2 TIMES DAILY
Status: DISCONTINUED | OUTPATIENT
Start: 2025-01-06 | End: 2025-01-11 | Stop reason: HOSPADM

## 2025-01-06 RX ORDER — CALCIUM GLUCONATE 20 MG/ML
2 INJECTION, SOLUTION INTRAVENOUS ONCE
Status: COMPLETED | OUTPATIENT
Start: 2025-01-06 | End: 2025-01-06

## 2025-01-06 RX ORDER — TACROLIMUS 1 MG/1
2 CAPSULE ORAL
Status: DISCONTINUED | OUTPATIENT
Start: 2025-01-06 | End: 2025-01-09

## 2025-01-06 RX ORDER — LIDOCAINE HCL/PF 100 MG/5ML
SYRINGE (ML) INTRAVENOUS AS NEEDED
Status: DISCONTINUED | OUTPATIENT
Start: 2025-01-06 | End: 2025-01-06

## 2025-01-06 RX ORDER — DEXTROSE MONOHYDRATE 100 MG/ML
50 INJECTION, SOLUTION INTRAVENOUS CONTINUOUS
Status: DISCONTINUED | OUTPATIENT
Start: 2025-01-06 | End: 2025-01-06

## 2025-01-06 RX ADMIN — Medication 4 L/MIN: at 19:08

## 2025-01-06 RX ADMIN — CEFAZOLIN 2 G: 1 INJECTION, POWDER, FOR SOLUTION INTRAMUSCULAR; INTRAVENOUS at 08:26

## 2025-01-06 RX ADMIN — EPHEDRINE SULFATE 5 MG: 50 INJECTION, SOLUTION INTRAVENOUS at 10:21

## 2025-01-06 RX ADMIN — FUROSEMIDE 200 MG: 10 INJECTION, SOLUTION INTRAVENOUS at 10:07

## 2025-01-06 RX ADMIN — SODIUM CHLORIDE 150 ML/HR: 9 INJECTION, SOLUTION INTRAVENOUS at 12:01

## 2025-01-06 RX ADMIN — Medication 120 MCG: at 09:31

## 2025-01-06 RX ADMIN — ALBUMIN HUMAN 250 ML: 0.05 INJECTION, SOLUTION INTRAVENOUS at 09:45

## 2025-01-06 RX ADMIN — FENTANYL CITRATE 50 MCG: 50 INJECTION, SOLUTION INTRAMUSCULAR; INTRAVENOUS at 10:16

## 2025-01-06 RX ADMIN — INSULIN HUMAN 10 UNITS: 100 INJECTION, SOLUTION PARENTERAL at 12:09

## 2025-01-06 RX ADMIN — MIDAZOLAM HYDROCHLORIDE 1 MG: 1 INJECTION, SOLUTION INTRAMUSCULAR; INTRAVENOUS at 08:09

## 2025-01-06 RX ADMIN — CALCIUM CHLORIDE 0.5 G: 100 INJECTION INTRAVENOUS; INTRAVENTRICULAR at 09:24

## 2025-01-06 RX ADMIN — ROCURONIUM BROMIDE 20 MG: 10 INJECTION INTRAVENOUS at 09:16

## 2025-01-06 RX ADMIN — FUROSEMIDE 80 MG: 10 INJECTION, SOLUTION INTRAVENOUS at 21:10

## 2025-01-06 RX ADMIN — CARVEDILOL 25 MG: 25 TABLET, FILM COATED ORAL at 05:07

## 2025-01-06 RX ADMIN — SODIUM CHLORIDE 60 ML/HR: 4.5 INJECTION, SOLUTION INTRAVENOUS at 11:30

## 2025-01-06 RX ADMIN — CALCIUM GLUCONATE 2 G: 20 INJECTION, SOLUTION INTRAVENOUS at 12:14

## 2025-01-06 RX ADMIN — SODIUM CHLORIDE, SODIUM GLUCONATE, SODIUM ACETATE, POTASSIUM CHLORIDE AND MAGNESIUM CHLORIDE: 526; 502; 368; 37; 30 INJECTION, SOLUTION INTRAVENOUS at 08:09

## 2025-01-06 RX ADMIN — GABAPENTIN 300 MG: 300 CAPSULE ORAL at 03:23

## 2025-01-06 RX ADMIN — SODIUM CHLORIDE, SODIUM GLUCONATE, SODIUM ACETATE, POTASSIUM CHLORIDE AND MAGNESIUM CHLORIDE: 526; 502; 368; 37; 30 INJECTION, SOLUTION INTRAVENOUS at 08:56

## 2025-01-06 RX ADMIN — Medication 4 L/MIN: at 16:41

## 2025-01-06 RX ADMIN — LABETALOL HYDROCHLORIDE 20 MG: 5 INJECTION, SOLUTION INTRAVENOUS at 03:23

## 2025-01-06 RX ADMIN — HEPARIN SODIUM 125 MG: 1000 INJECTION INTRAVENOUS; SUBCUTANEOUS at 09:12

## 2025-01-06 RX ADMIN — ACETAMINOPHEN 650 MG: 325 TABLET ORAL at 17:54

## 2025-01-06 RX ADMIN — ONDANSETRON 4 MG: 2 INJECTION INTRAMUSCULAR; INTRAVENOUS at 10:52

## 2025-01-06 RX ADMIN — DEXTROSE MONOHYDRATE 25 G: 25 INJECTION, SOLUTION INTRAVENOUS at 12:11

## 2025-01-06 RX ADMIN — ROCURONIUM BROMIDE 20 MG: 10 INJECTION INTRAVENOUS at 09:46

## 2025-01-06 RX ADMIN — ROCURONIUM BROMIDE 10 MG: 10 INJECTION INTRAVENOUS at 10:19

## 2025-01-06 RX ADMIN — ACETAMINOPHEN 975 MG: 325 TABLET ORAL at 03:22

## 2025-01-06 RX ADMIN — ALBUTEROL SULFATE 2.5 MG: 2.5 SOLUTION RESPIRATORY (INHALATION) at 12:09

## 2025-01-06 RX ADMIN — MANNITOL 25 G: 20 INJECTION, SOLUTION INTRAVENOUS at 10:07

## 2025-01-06 RX ADMIN — CARVEDILOL 12.5 MG: 12.5 TABLET, FILM COATED ORAL at 21:10

## 2025-01-06 RX ADMIN — INSULIN LISPRO 1 UNITS: 100 INJECTION, SOLUTION INTRAVENOUS; SUBCUTANEOUS at 18:13

## 2025-01-06 RX ADMIN — Medication 80 MCG: at 09:45

## 2025-01-06 RX ADMIN — MYCOPHENOLATE MOFETIL 1000 MG: 250 CAPSULE ORAL at 17:54

## 2025-01-06 RX ADMIN — METHYLPREDNISOLONE SODIUM SUCCINATE 500 MG: 500 INJECTION, POWDER, FOR SOLUTION INTRAMUSCULAR; INTRAVENOUS at 08:30

## 2025-01-06 RX ADMIN — CEFAZOLIN SODIUM 2 G: 2 INJECTION, SOLUTION INTRAVENOUS at 17:54

## 2025-01-06 RX ADMIN — PROPOFOL 120 MG: 10 INJECTION, EMULSION INTRAVENOUS at 08:09

## 2025-01-06 RX ADMIN — ROCURONIUM BROMIDE 100 MG: 10 INJECTION INTRAVENOUS at 08:09

## 2025-01-06 RX ADMIN — HYDROMORPHONE HYDROCHLORIDE 0.2 MG: 1 INJECTION, SOLUTION INTRAMUSCULAR; INTRAVENOUS; SUBCUTANEOUS at 11:11

## 2025-01-06 RX ADMIN — SUGAMMADEX 200 MG: 100 INJECTION, SOLUTION INTRAVENOUS at 11:16

## 2025-01-06 RX ADMIN — DOCUSATE SODIUM 100 MG: 100 CAPSULE, LIQUID FILLED ORAL at 21:10

## 2025-01-06 RX ADMIN — HYDRALAZINE HYDROCHLORIDE 100 MG: 25 TABLET ORAL at 05:07

## 2025-01-06 RX ADMIN — LIDOCAINE HYDROCHLORIDE 100 MG: 20 INJECTION INTRAVENOUS at 08:09

## 2025-01-06 RX ADMIN — Medication 6 L/MIN: at 11:49

## 2025-01-06 RX ADMIN — EPHEDRINE SULFATE 10 MG: 50 INJECTION, SOLUTION INTRAVENOUS at 09:43

## 2025-01-06 RX ADMIN — FENTANYL CITRATE 50 MCG: 50 INJECTION, SOLUTION INTRAMUSCULAR; INTRAVENOUS at 08:09

## 2025-01-06 RX ADMIN — EPHEDRINE SULFATE 5 MG: 50 INJECTION, SOLUTION INTRAVENOUS at 10:39

## 2025-01-06 RX ADMIN — Medication 80 MCG: at 09:39

## 2025-01-06 RX ADMIN — TACROLIMUS 2 MG: 1 CAPSULE ORAL at 17:54

## 2025-01-06 RX ADMIN — CLOTRIMAZOLE 10 MG: 10 LOZENGE ORAL at 17:54

## 2025-01-06 RX ADMIN — SENNOSIDES 8.6 MG: 8.6 TABLET, FILM COATED ORAL at 21:10

## 2025-01-06 RX ADMIN — DIPHENHYDRAMINE HYDROCHLORIDE 50 MG: 50 INJECTION INTRAMUSCULAR; INTRAVENOUS at 08:25

## 2025-01-06 RX ADMIN — Medication 120 MCG: at 08:36

## 2025-01-06 RX ADMIN — HYDROMORPHONE HYDROCHLORIDE 0.4 MG: 1 INJECTION, SOLUTION INTRAMUSCULAR; INTRAVENOUS; SUBCUTANEOUS at 10:54

## 2025-01-06 RX ADMIN — FUROSEMIDE 80 MG: 10 INJECTION, SOLUTION INTRAVENOUS at 13:59

## 2025-01-06 RX ADMIN — Medication 40 MCG: at 08:09

## 2025-01-06 SDOH — SOCIAL STABILITY: SOCIAL INSECURITY: HAS ANYONE EVER THREATENED TO HURT YOUR FAMILY OR YOUR PETS?: NO

## 2025-01-06 SDOH — SOCIAL STABILITY: SOCIAL INSECURITY: HAVE YOU HAD ANY THOUGHTS OF HARMING ANYONE ELSE?: NO

## 2025-01-06 SDOH — SOCIAL STABILITY: SOCIAL INSECURITY: WERE YOU ABLE TO COMPLETE ALL THE BEHAVIORAL HEALTH SCREENINGS?: YES

## 2025-01-06 SDOH — HEALTH STABILITY: MENTAL HEALTH: CURRENT SMOKER: 0

## 2025-01-06 SDOH — SOCIAL STABILITY: SOCIAL INSECURITY: DO YOU FEEL ANYONE HAS EXPLOITED OR TAKEN ADVANTAGE OF YOU FINANCIALLY OR OF YOUR PERSONAL PROPERTY?: NO

## 2025-01-06 SDOH — SOCIAL STABILITY: SOCIAL INSECURITY: ARE THERE ANY APPARENT SIGNS OF INJURIES/BEHAVIORS THAT COULD BE RELATED TO ABUSE/NEGLECT?: NO

## 2025-01-06 SDOH — SOCIAL STABILITY: SOCIAL INSECURITY: HAVE YOU HAD THOUGHTS OF HARMING ANYONE ELSE?: NO

## 2025-01-06 SDOH — SOCIAL STABILITY: SOCIAL INSECURITY: DO YOU FEEL UNSAFE GOING BACK TO THE PLACE WHERE YOU ARE LIVING?: NO

## 2025-01-06 SDOH — SOCIAL STABILITY: SOCIAL INSECURITY: ARE YOU OR HAVE YOU BEEN THREATENED OR ABUSED PHYSICALLY, EMOTIONALLY, OR SEXUALLY BY ANYONE?: NO

## 2025-01-06 SDOH — SOCIAL STABILITY: SOCIAL INSECURITY: DOES ANYONE TRY TO KEEP YOU FROM HAVING/CONTACTING OTHER FRIENDS OR DOING THINGS OUTSIDE YOUR HOME?: NO

## 2025-01-06 SDOH — SOCIAL STABILITY: SOCIAL INSECURITY: ABUSE: ADULT

## 2025-01-06 ASSESSMENT — COGNITIVE AND FUNCTIONAL STATUS - GENERAL
DAILY ACTIVITIY SCORE: 24
MOBILITY SCORE: 24
MOBILITY SCORE: 24
PATIENT BASELINE BEDBOUND: NO
DAILY ACTIVITIY SCORE: 24

## 2025-01-06 ASSESSMENT — LIFESTYLE VARIABLES
HOW OFTEN DO YOU HAVE A DRINK CONTAINING ALCOHOL: NEVER
HOW MANY STANDARD DRINKS CONTAINING ALCOHOL DO YOU HAVE ON A TYPICAL DAY: PATIENT DOES NOT DRINK
AUDIT-C TOTAL SCORE: 0
HOW OFTEN DO YOU HAVE 6 OR MORE DRINKS ON ONE OCCASION: NEVER
SKIP TO QUESTIONS 9-10: 1
AUDIT-C TOTAL SCORE: 0

## 2025-01-06 ASSESSMENT — ACTIVITIES OF DAILY LIVING (ADL)
ADEQUATE_TO_COMPLETE_ADL: YES
HEARING - LEFT EAR: FUNCTIONAL
LACK_OF_TRANSPORTATION: NO
WALKS IN HOME: INDEPENDENT
JUDGMENT_ADEQUATE_SAFELY_COMPLETE_DAILY_ACTIVITIES: YES
HEARING - RIGHT EAR: FUNCTIONAL
TOILETING: INDEPENDENT
GROOMING: INDEPENDENT
BATHING: INDEPENDENT
PATIENT'S MEMORY ADEQUATE TO SAFELY COMPLETE DAILY ACTIVITIES?: YES
DRESSING YOURSELF: INDEPENDENT
LACK_OF_TRANSPORTATION: NO
FEEDING YOURSELF: INDEPENDENT

## 2025-01-06 ASSESSMENT — PAIN SCALES - GENERAL
PAINLEVEL_OUTOF10: 0 - NO PAIN
PAINLEVEL_OUTOF10: 4
PAINLEVEL_OUTOF10: 0 - NO PAIN

## 2025-01-06 ASSESSMENT — PAIN - FUNCTIONAL ASSESSMENT
PAIN_FUNCTIONAL_ASSESSMENT: UNABLE TO SELF-REPORT
PAIN_FUNCTIONAL_ASSESSMENT: 0-10
PAIN_FUNCTIONAL_ASSESSMENT: 0-10
PAIN_FUNCTIONAL_ASSESSMENT: UNABLE TO SELF-REPORT
PAIN_FUNCTIONAL_ASSESSMENT: 0-10
PAIN_FUNCTIONAL_ASSESSMENT: UNABLE TO SELF-REPORT
PAIN_FUNCTIONAL_ASSESSMENT: UNABLE TO SELF-REPORT
PAIN_FUNCTIONAL_ASSESSMENT: 0-10
PAIN_FUNCTIONAL_ASSESSMENT: UNABLE TO SELF-REPORT
PAIN_FUNCTIONAL_ASSESSMENT: 0-10
PAIN_FUNCTIONAL_ASSESSMENT: 0-10
PAIN_FUNCTIONAL_ASSESSMENT: UNABLE TO SELF-REPORT
PAIN_FUNCTIONAL_ASSESSMENT: 0-10

## 2025-01-06 ASSESSMENT — COLUMBIA-SUICIDE SEVERITY RATING SCALE - C-SSRS
1. IN THE PAST MONTH, HAVE YOU WISHED YOU WERE DEAD OR WISHED YOU COULD GO TO SLEEP AND NOT WAKE UP?: NO
2. HAVE YOU ACTUALLY HAD ANY THOUGHTS OF KILLING YOURSELF?: NO
6. HAVE YOU EVER DONE ANYTHING, STARTED TO DO ANYTHING, OR PREPARED TO DO ANYTHING TO END YOUR LIFE?: NO

## 2025-01-06 ASSESSMENT — PATIENT HEALTH QUESTIONNAIRE - PHQ9
1. LITTLE INTEREST OR PLEASURE IN DOING THINGS: NOT AT ALL
2. FEELING DOWN, DEPRESSED OR HOPELESS: NOT AT ALL
SUM OF ALL RESPONSES TO PHQ9 QUESTIONS 1 & 2: 0

## 2025-01-06 NOTE — ANESTHESIA POSTPROCEDURE EVALUATION
Patient: Anibal Dow    Procedure Summary       Date: 01/06/25 Room / Location: [facility] OR 15 / [facility] OR    Anesthesia Start: 0758 Anesthesia Stop:     Procedure: TRANSPLANT, KIDNEY (Abdomen) Diagnosis:       ESRD (end stage renal disease) (Multi)      (ESRD (end stage renal disease) (Multi) [N18.6])    Surgeons: Javed Prince MD Responsible Provider: Annalisa Herzog MD    Anesthesia Type: general ASA Status: 4            Anesthesia Type: general    Vitals Value Taken Time   /68 01/06/25 1129   Temp 36.3 01/06/25 1129   Pulse 83 01/06/25 1129   Resp 16 01/06/25 1129   SpO2 95 01/06/25 1129       Anesthesia Post Evaluation    Patient location during evaluation: PACU  Patient participation: complete - patient participated  Level of consciousness: awake and alert  Pain management: adequate  Airway patency: patent  Cardiovascular status: acceptable  Respiratory status: acceptable and face mask  Hydration status: acceptable  Postoperative Nausea and Vomiting: none    32 F nasal trumpet inserted atraumatically right nare    No notable events documented.

## 2025-01-06 NOTE — H&P
Transplant Surgery History and Physical    Subjective   Chief Complaint/Reason for Admission: kidney transplant    HPI:  Anibal Dow is a 59 y.o. male with PMH of HLD, pulmonary hypertension, ESRD 2/2 HTN and DM2, on on dialysis MWF through L arm AVF. He presents today in good health for possible kidney transplant.    He was last dialyzed on Friday where they removed 3L.  His dry weight is 76 kg. At baseline he only makes about 2 tsp of urine 2-3x a day  He had no prior abdominal surgeries - he had previously only had R toe amp for osteomyelitis and cataracts.    He denies signs and symptoms of recent illness including fever, chills, congestion, cough, SOB, N/V, abdominal pain, diarrhea. He denies sick contacts.    Upon admission BP was 200/100, per pt he normally is in systolics 160-190      PMH:  Past Medical History:   Diagnosis Date    Diabetes mellitus (Multi)     ESRD (end stage renal disease) (Multi)     Hypertension     Personal history of other diseases of urinary system 05/21/2020    History of chronic kidney disease     PSH:  Past Surgical History:   Procedure Laterality Date    CARDIAC CATHETERIZATION N/A 10/21/2024    Procedure: Right Heart Cath;  Surgeon: Maegan Anthony MD;  Location: [place] [address] Cardiac Cath Lab;  Service: Cardiovascular;  Laterality: N/A;    CT ANGIO CORONARY ART WITH HEARTFLOW IF SCORE >30%  01/20/2020    CT HEART CORONARY ANGIOGRAM 1/20/2020 [facility]CILL[facility]GAC[facility]    EYE SURGERY      OTHER SURGICAL HISTORY  05/21/2020    Toe amputation     Soc Hx:  Social History     Socioeconomic History    Marital status:      Spouse name: Not on file    Number of children: Not on file    Years of education: Not on file    Highest education level: Not on file   Occupational History    Not on file   Tobacco Use    Smoking status: Never    Smokeless tobacco: Never   Substance and Sexual Activity    Alcohol use: Not Currently    Drug use: Never    Sexual activity: Not on file   Other  Topics Concern    Not on file   Social History Narrative    Not on file     Social Drivers of Health     Financial Resource Strain: High Risk (6/9/2024)    Received from [facility]    Overall Financial Resource Strain (CARDIA)     Difficulty of Paying Living Expenses: Hard   Food Insecurity: Food Insecurity Present (6/9/2024)    Received from [facility]    Hunger Vital Sign     Worried About Running Out of Food in the Last Year: Often true     Ran Out of Food in the Last Year: Often true   Transportation Needs: No Transportation Needs (6/9/2024)    Received from [facility]    PRAPARE - Transportation     Lack of Transportation (Medical): No     Lack of Transportation (Non-Medical): No   Physical Activity: Insufficiently Active (6/9/2024)    Received from [facility]    Exercise Vital Sign     Days of Exercise per Week: 4 days     Minutes of Exercise per Session: 20 min   Stress: Stress Concern Present (6/9/2024)    Received from [facility]    [employer] [place] [employer] - Occupational Stress Questionnaire     Feeling of Stress : To some extent   Social Connections: Moderately Integrated (6/9/2024)    Received from [facility]    Social Connection and Isolation Panel [NHANES]     Frequency of Communication with Friends and Family: Once a week     Frequency of Social Gatherings with Friends and Family: Once a week     Attends Yazdanism Services: More than 4 times per year     Active Member of Clubs or Organizations: Yes     Attends Club or Organization Meetings: More than 4 times per year     Marital Status:    Intimate Partner Violence: Not At Risk (6/9/2024)    Received from [facility]    Humiliation, Afraid, Rape, and Kick questionnaire     Fear of Current or Ex-Partner: No     Emotionally Abused: No     Physically Abused: No     Sexually Abused: No   Housing Stability: High Risk (6/9/2024)     [facility]    Housing Stability Vital Sign     Unable to Pay for Housing in the Last Year: Yes     Number of Times Moved in the Last Year: Not on file     Homeless in the Last Year: No     Fam Hx:  No family history on file.   Allergies:  No Known Allergies  Current Medications:  No current facility-administered medications on file prior to encounter.     Current Outpatient Medications on File Prior to Encounter   Medication Sig Dispense Refill    atorvastatin (Lipitor) 40 mg tablet Take 1 tablet (40 mg) by mouth once daily. 30 tablet 3    calcium acetate (Phoslo) 667 mg tablet Take 4 tablets (2,668 mg) by mouth 3 times a day.      carvedilol (Coreg) 25 mg tablet Take 1 tablet (25 mg) by mouth 2 times a day.      hydrALAZINE (Apresoline) 100 mg tablet Take 1 tablet (100 mg) by mouth 3 times a day.      insulin glargine (Lantus) 100 unit/mL injection Inject 5 Units under the skin once daily in the evening.      losartan (Cozaar) 25 mg tablet Take 1 tablet (25 mg) by mouth once daily.      RenaPlex-D 800 mcg-12.5 mg -2,000 unit tablet Take 1 tablet by mouth early in the morning..      blood sugar diagnostic (Blood Glucose Test) strip 1 strip twice a day.      blood sugar diagnostic strip Inject 1 each under the skin 3 times a day.      insulin syringe-needle U-100 31G X 5/16\" 1 mL syringe Use as directed by physician      ULTRA FINE LANCETS MISC Use to test blood sugar up to 3 times daily      [DISCONTINUED] B complex-vitamin C-folic acid (Nephro-Zenia) 0.8 mg tablet Take by mouth once daily. as directed (Patient not taking: Reported on 10/21/2024)      [DISCONTINUED] calcium acetate 668 mg (169 mg calcium) tablet Take 2 tablets by mouth 3 times a day.      [DISCONTINUED] cephalexin (Keflex) 500 mg capsule Take 1 capsule (500 mg) by mouth once daily. Start taking on September 2. (Patient not taking: Reported on 10/21/2024)      [DISCONTINUED] insulin lispro (HumaLOG) 100 unit/mL injection " Inject 5 Units under the skin 3 times a day. Before meals.      [DISCONTINUED] insulin NPH, Isophane, (HumuLIN N,NovoLIN N) 100 unit/mL injection Inject 5 units every morning & 12 units every evening      [DISCONTINUED] ondansetron ODT (Zofran-ODT) 4 mg disintegrating tablet Take 1 tablet (4 mg) by mouth every 8 hours if needed for nausea. (Patient not taking: Reported on 10/21/2024)      [DISCONTINUED] sodium chlor/hypochlorous acid (VASHE WOUND THERAPY IR) Apply 5 mL topically once daily.      [DISCONTINUED] Triphrocaps 1 mg capsule Take 1 capsule by mouth once daily.           Objective   Vitals:  There were no vitals taken for this visit.    Physical Exam:  GEN: No acute distress. Alert, awake and conversive.  HEENT: Sclera anicteric. Moist mucous membranes.  RESP: Breathing non-labored, equal chest rise. On RA.  CV: Regular rate, hypertensive to   GI: Abdomen soft, nondistended, nontender.   : Deferred.  MSK: No gross deformities. Moves all extremities spontaneously.  NEURO: Alert and oriented x3. No focal deficits.  PSYCH: Appropriate mood and affect.  SKIN: No rashes or lesions.    Labs within past 24h:  No results found for this or any previous visit (from the past 24 hours).    Imaging within past 24h:  No results found.    TTE 2024 shows EF 50-55% and global hypokinesis of LV.    No pertinent imaging to review.     ASSESSMENT  Anibal Dow is a 59 y.o. male with PMH of HLD, pulmonary hypertension, ESRD 2/2 HTN and DM2, on on dialysis MWF through L arm AVF. He presents today in good health for possible kidney transplant.    PLAN:  - Admit to transplant surgery  - Tentative plan for 7 am OR time   - Pt last dialyzed on Friday, may potentially need dialysis today prior to surgery, pending labs   - Case request in, [facility] OR aware   - Preop labs ordered  - Thymo induction  - Home medications   - Will continue morning coreg and hydralazine, holding losartan   - Holding insulin while  NPO    Patient's exam, labs, and findings discussed with Dr. Prince, who agrees with plan as above.    Rebekah Sullivan MD  PGY-1 General Surgery  Transplant Surgery [id number]

## 2025-01-06 NOTE — ANESTHESIA PROCEDURE NOTES
Airway  Date/Time: 1/6/2025 8:13 AM  Urgency: elective    Airway not difficult    Staffing  Performed: attending   Authorized by: Annalisa Herzog MD    Performed by: ALISIA Ramirez  Patient location during procedure: OR    Indications and Patient Condition  Indications for airway management: anesthesia  Spontaneous Ventilation: absent  Sedation level: deep  Preoxygenated: yes  Patient position: sniffing  MILS not maintained throughout  Mask difficulty assessment: 2 - vent by mask + OA or adjuvant +/- NMBA  Planned trial extubation    Final Airway Details  Final airway type: endotracheal airway      Successful airway: ETT  Cuffed: yes   Successful intubation technique: direct laryngoscopy  Facilitating devices/methods: intubating stylet  Endotracheal tube insertion site: oral  Blade: Joe  Blade size: #4  ETT size (mm): 7.5  Cormack-Lehane Classification: grade IIb - view of arytenoids or posterior of glottis only  Placement verified by: capnometry   Measured from: gums  ETT to gums (cm): 22  Number of attempts at approach: 2  Ventilation between attempts: none  Number of other approaches attempted: 0    Additional Comments  Lips and teeth in preanesthetic condition. Silk tape. 90 oral airway

## 2025-01-06 NOTE — CARE PLAN
The clinical goals for the shift include pt will be comfortable, safe and HDS throughout shift      Problem: Diabetes  Goal: Achieve decreasing blood glucose levels by end of shift  Outcome: Progressing  Goal: Increase stability of blood glucose readings by end of shift  Outcome: Progressing  Goal: Decrease in ketones present in urine by end of shift  Outcome: Progressing  Goal: Maintain electrolyte levels within acceptable range throughout shift  Outcome: Progressing  Goal: Maintain glucose levels >70mg/dl to <250mg/dl throughout shift  Outcome: Progressing  Goal: No changes in neurological exam by end of shift  Outcome: Progressing  Goal: Learn about and adhere to nutrition recommendations by end of shift  Outcome: Progressing  Goal: Vital signs within normal range for age by end of shift  Outcome: Progressing  Goal: Increase self care and/or family involovement by end of shift  Outcome: Progressing  Goal: Receive DSME education by end of shift  Outcome: Progressing     Problem: Pain - Adult  Goal: Verbalizes/displays adequate comfort level or baseline comfort level  Outcome: Progressing     Problem: Safety - Adult  Goal: Free from fall injury  Outcome: Progressing     Problem: Discharge Planning  Goal: Discharge to home or other facility with appropriate resources  Outcome: Progressing     Problem: Chronic Conditions and Co-morbidities  Goal: Patient's chronic conditions and co-morbidity symptoms are monitored and maintained or improved  Outcome: Progressing

## 2025-01-06 NOTE — PROGRESS NOTES
"Pharmacy Medication History Review    Anibal Dow is a 59 y.o. male admitted for ESRD (end stage renal disease) (Multi). Pharmacy reviewed the patient's [id number] medications and allergies for accuracy.    Medications ADDED:  Tums  Medications CHANGED:  Losartan- Pt takes on dialysis days   Medications REMOVED/NO LONGER TAKING:   RenaPlex      The list below reflects the updated PTA list.   Prior to Admission Medications   Prescriptions Last Dose Informant   RenaPlex-D 800 mcg-12.5 mg -2,000 unit tablet Not Taking Spouse/Significant Other   Sig: Take 1 tablet by mouth early in the morning..   Patient not taking: Reported on 2025   ULTRA FINE LANCETS MISC  Spouse/Significant Other   Sig: Use to test blood sugar up to 3 times daily   atorvastatin (Lipitor) 40 mg tablet 2025 Spouse/Significant Other   Sig: Take 1 tablet (40 mg) by mouth once daily.   blood sugar diagnostic (Blood Glucose Test) strip  Spouse/Significant Other   Si strip twice a day.   blood sugar diagnostic strip  Spouse/Significant Other   Sig: Inject 1 each under the skin 3 times a day.   calcium acetate (Phoslo) 667 mg tablet 2025 Morning Spouse/Significant Other   Sig: Take 4 tablets (2,668 mg) by mouth 3 times a day.   calcium carbonate (TUMS ORAL) Past Week Spouse/Significant Other   Sig: Take 1 tablet by mouth once daily as needed.   carvedilol (Coreg) 25 mg tablet 2025 Spouse/Significant Other   Sig: Take 1 tablet (25 mg) by mouth 2 times a day.   hydrALAZINE (Apresoline) 100 mg tablet 2025 Morning Spouse/Significant Other   Sig: Take 1 tablet (100 mg) by mouth 3 times a day.   insulin glargine (Lantus) 100 unit/mL injection 2025 Morning Spouse/Significant Other   Sig: Inject 5 Units under the skin once daily in the evening.   insulin syringe-needle U-100 31G X \" 1 mL syringe  Spouse/Significant Other   Sig: Use as directed by physician   losartan (Cozaar) 25 mg tablet 2025 Spouse/Significant " "Other   Sig: Take 1 tablet (25 mg) by mouth once daily. Takes on dialysis days (Mondays, Wednesdays, Fridays)      Facility-Administered Medications: None        The list below reflects the updated allergy list. Please review each documented allergy for additional clarification and justification.  Allergies  Reviewed by Breanna Phoenix on 1/6/2025   No Known Allergies         Patient accepts M2B at discharge.     [facility]   [facility]  [facility] history  Patient interview Unable to provide any details  Spouse  Chart Review     Additional Comments:  Pts wife was at bedside, she was able to tell me the meds he is currently taking at home.       YOLANDE WINKLER [place]  Pharmacy Technician  01/06/25     Secure Chat preferred   If no response call [phone] or [employer] \"Med Rec\"   "

## 2025-01-06 NOTE — CONSULTS
Reason For Consult  Potential DDKT    History Of Present Illness  Anibal Dow is a 59 y.o. male presenting for DDKT.  He has ESRD attributed to diabetes and hypertension on HD MWF, primary pulmonary hypertension and anemia.  His last dialysis was on Friday.  Dry weight 76 kg. Typical UF 3 L.  He has been on dialysis for aobut 5 years.  Residual urine output is minimal.  No recent illness or infection.   In late Dec he was seen for right leg wound which was not infected - following with podiatry.     Past Medical History  He has a past medical history of Diabetes mellitus (Multi), ESRD (end stage renal disease) (Multi), Hypertension, and Personal history of other diseases of urinary system (05/21/2020).    Surgical History  He has a past surgical history that includes Other surgical history (05/21/2020); CT angio coronary art with heartflow if score >30% (01/20/2020); Cardiac catheterization (N/A, 10/21/2024); and Eye surgery.     Social History  He reports that he has never smoked. He has never used smokeless tobacco. He reports that he does not currently use alcohol. He reports that he does not use drugs.    Family History  No family history on file.     Allergies  Patient has no known allergies.    Review of Systems  Review of  14 systems was performed system by system. See HPI. Otherwise, the symptoms were negative.     Physical Exam  Vital signs - reviewed.   General Appearance - NAD, Good speech, oriented and alert  HEENT - Supple. Not pale. No jaundice.   CVS - RRR. Normal S1/S2. No murmur, click , rub or gallop  Lungs- clear to auscultation bilaterally  Abdomen - soft , not tender, no guarding, no rigidity. No hepatosplenomegaly. Normal bowel sounds. No masses and ascites.  Musculoskeletal /Extremities - no edema. Full ROM. No joint tenderness. Right foot s/p 4th toe amputation. Left foot s/p 3rd-5th toe amputation.  Neuro/Psych - appropriate mood and affect. Motor power V/V all extremities. CN I -XII  "were grossly intact.  Skin - No visible rash  Access - LUE AVg +thrill    Last Recorded Vitals  Blood pressure 166/81, pulse 82, temperature 36.6 °C (97.9 °F), temperature source Temporal, resp. rate 16, height 1.753 m (5' 9\"), weight 77 kg (169 lb 13.8 oz), SpO2 94%.    Relevant Results  Results for orders placed or performed during the hospital encounter of 01/06/25 (from the past 24 hours)   Blood Gas Venous Full Panel   Result Value Ref Range    POCT pH, Venous 7.43 7.33 - 7.43 pH    POCT pCO2, Venous 52 (H) 41 - 51 mm Hg    POCT pO2, Venous 41 35 - 45 mm Hg    POCT SO2, Venous 69 45 - 75 %    POCT Oxy Hemoglobin, Venous 67.9 45.0 - 75.0 %    POCT Hematocrit Calculated, Venous 36.0 (L) 41.0 - 52.0 %    POCT Sodium, Venous 134 (L) 136 - 145 mmol/L    POCT Potassium, Venous 5.5 (H) 3.5 - 5.3 mmol/L    POCT Chloride, Venous 93 (L) 98 - 107 mmol/L    POCT Ionized Calicum, Venous 1.19 1.10 - 1.33 mmol/L    POCT Glucose, Venous 210 (H) 74 - 99 mg/dL    POCT Lactate, Venous 1.1 0.4 - 2.0 mmol/L    POCT Base Excess, Venous 8.7 (H) -2.0 - 3.0 mmol/L    POCT HCO3 Calculated, Venous 34.5 (H) 22.0 - 26.0 mmol/L    POCT Hemoglobin, Venous 12.0 (L) 13.5 - 17.5 g/dL    POCT Anion Gap, Venous 12.0 10.0 - 25.0 mmol/L    Patient Temperature 37.0 degrees Celsius    FiO2 21 %   Raudel-Barr Virus Nuclear Antigen Antibody, IgG   Result Value Ref Range    EBV Nuclear Antigen Antibody, IgG Positive (A) Negative   Coagulation Screen   Result Value Ref Range    Protime 12.7 9.8 - 12.8 seconds    INR 1.1 0.9 - 1.1    aPTT 33 27 - 38 seconds   Cytomegalovirus IgG   Result Value Ref Range    Cytomegalovirus IgG Reactive (A) Nonreactive   Hepatic Function Panel   Result Value Ref Range    Albumin 4.3 3.4 - 5.0 g/dL    Bilirubin, Total 0.5 0.0 - 1.2 mg/dL    Bilirubin, Direct 0.1 0.0 - 0.3 mg/dL    Alkaline Phosphatase 71 33 - 120 U/L    ALT 9 (L) 10 - 52 U/L    AST 16 9 - 39 U/L    Total Protein 7.7 6.4 - 8.2 g/dL   Hepatitis C Antibody "   Result Value Ref Range    Hepatitis C AB Nonreactive Nonreactive   Hepatitis B Core Antibody, Total   Result Value Ref Range    Hepatitis B Core AB- Total Nonreactive Nonreactive   Hepatitis B Surface Antibody   Result Value Ref Range    Hepatitis B Surface AB >1,000.0 (H) <10.0 mIU/mL   Hepatitis B Surface Antigen   Result Value Ref Range    Hepatitis B Surface AG Nonreactive Nonreactive   HIV 1/2 Antigen/Antibody Screen with Reflex to Confirmation   Result Value Ref Range    HIV 1/2 Antigen/Antibody Screen with Reflex to Confirmation Nonreactive Nonreactive   Phosphorus   Result Value Ref Range    Phosphorus 6.0 (H) 2.5 - 4.9 mg/dL   Basic Metabolic Panel   Result Value Ref Range    Glucose 201 (H) 74 - 99 mg/dL    Sodium 136 136 - 145 mmol/L    Potassium 5.5 (H) 3.5 - 5.3 mmol/L    Chloride 89 (L) 98 - 107 mmol/L    Bicarbonate 32 21 - 32 mmol/L    Anion Gap 21 (H) 10 - 20 mmol/L    Urea Nitrogen 83 (H) 6 - 23 mg/dL    Creatinine 14.35 (H) 0.50 - 1.30 mg/dL    eGFR 4 (L) >60 mL/min/1.73m*2    Calcium 10.5 8.6 - 10.6 mg/dL   Type And Screen   Result Value Ref Range    ABO TYPE O     Rh TYPE POS     ANTIBODY SCREEN NEG    CBC   Result Value Ref Range    WBC 7.6 4.4 - 11.3 x10*3/uL    nRBC 0.0 0.0 - 0.0 /100 WBCs    RBC 4.18 (L) 4.50 - 5.90 x10*6/uL    Hemoglobin 12.2 (L) 13.5 - 17.5 g/dL    Hematocrit 36.2 (L) 41.0 - 52.0 %    MCV 87 80 - 100 fL    MCH 29.2 26.0 - 34.0 pg    MCHC 33.7 32.0 - 36.0 g/dL    RDW 13.0 11.5 - 14.5 %    Platelets 182 150 - 450 x10*3/uL   POCT GLUCOSE   Result Value Ref Range    POCT Glucose 192 (H) 74 - 99 mg/dL   POCT GLUCOSE   Result Value Ref Range    POCT Glucose 183 (H) 74 - 99 mg/dL   Blood Gas Arterial Full Panel Unsolicited   Result Value Ref Range    POCT pH, Arterial 7.54 (H) 7.38 - 7.42 pH    POCT pCO2, Arterial 32 (L) 38 - 42 mm Hg    POCT pO2, Arterial 181 (H) 85 - 95 mm Hg    POCT SO2, Arterial 97 94 - 100 %    POCT Oxy Hemoglobin, Arterial 97.4 94.0 - 98.0 %    POCT  Hematocrit Calculated, Arterial 27.0 (L) 41.0 - 52.0 %    POCT Sodium, Arterial 133 (L) 136 - 145 mmol/L    POCT Potassium, Arterial 5.2 3.5 - 5.3 mmol/L    POCT Chloride, Arterial 98 98 - 107 mmol/L    POCT Ionized Calcium, Arterial 1.04 (L) 1.10 - 1.33 mmol/L    POCT Glucose, Arterial 168 (H) 74 - 99 mg/dL    POCT Lactate, Arterial 1.1 0.4 - 2.0 mmol/L    POCT Base Excess, Arterial 4.8 (H) -2.0 - 3.0 mmol/L    POCT HCO3 Calculated, Arterial 27.4 (H) 22.0 - 26.0 mmol/L    POCT Hemoglobin, Arterial 8.9 (L) 13.5 - 17.5 g/dL    POCT Anion Gap, Arterial 13 10 - 25 mmo/L    Patient Temperature 37.0 degrees Celsius    FiO2 50 %   Coox Panel, Arterial Unsolicited   Result Value Ref Range    POCT Hemoglobin, Arterial 8.9 (L) 13.5 - 17.5 g/dL    POCT Oxy Hemoglobin, Arterial 97.4 94.0 - 98.0 %    POCT Carboxyhemoglobin, Arterial 0.0 %    POCT Methemoglobin, Arterial 0.0 0.0 - 1.5 %    POCT Deoxy Hemoglobin, Arterial 2.6 0.0 - 5.0 %   CBC   Result Value Ref Range    WBC 4.7 4.4 - 11.3 x10*3/uL    nRBC 0.0 0.0 - 0.0 /100 WBCs    RBC 3.46 (L) 4.50 - 5.90 x10*6/uL    Hemoglobin 9.8 (L) 13.5 - 17.5 g/dL    Hematocrit 30.6 (L) 41.0 - 52.0 %    MCV 88 80 - 100 fL    MCH 28.3 26.0 - 34.0 pg    MCHC 32.0 32.0 - 36.0 g/dL    RDW 13.0 11.5 - 14.5 %    Platelets 128 (L) 150 - 450 x10*3/uL   Renal Function Panel   Result Value Ref Range    Glucose 186 (H) 74 - 99 mg/dL    Sodium 132 (L) 136 - 145 mmol/L    Potassium 6.1 (HH) 3.5 - 5.3 mmol/L    Chloride 91 (L) 98 - 107 mmol/L    Bicarbonate 27 21 - 32 mmol/L    Anion Gap 20 10 - 20 mmol/L    Urea Nitrogen 86 (H) 6 - 23 mg/dL    Creatinine 13.96 (H) 0.50 - 1.30 mg/dL    eGFR 4 (L) >60 mL/min/1.73m*2    Calcium 10.5 8.6 - 10.6 mg/dL    Phosphorus 5.1 (H) 2.5 - 4.9 mg/dL    Albumin 3.5 3.4 - 5.0 g/dL   POCT GLUCOSE   Result Value Ref Range    POCT Glucose 187 (H) 74 - 99 mg/dL     Cath 10/21/24  Right Heart Catheterization:  Cardiac output was calculated via the True method. -Precapillary  pulmonary hypertension [PA 55/22 with a mean PA of 38 mmHg, PCWP 12 mmHg, PVR 3.25 Woods units] with mildly elevated right_sided pressures [RA 5, RVEDP 9 mmHg], and high assumed True cardiac output at 8 L/min, and cardiac index at 4.2 L/min/mï¿½.  -Post 2 minutes of left AV fistula occlusion, PA sat went down from 79% to 75%, and cardiac output and index went down to 6.6 L/min and 3.5 L/min/mï¿½, respectively.  -There was no stepup between SVC and PA sats.        __________________________________________________________________________________  CONCLUSIONS:   1. Precapillary pulmonary hypertension [PA 55/22 with a mean PA of 38 mmHg, PCWP 12 mmHg, PVR 3.25 Woods units] with mildly elevated right_sided pressures [RA 5, RVEDP 9 mmHg], and high normal assumed True cardiac output at 8 L/min, and cardiac index at 4.2 L/min/mï¿½.   2. Post 2 minutes of left AV fistula occlusion, PA sat went down from 79% to 75%, and cardiac output and index went down to 6.6 L/min and 3.5 L/min/mï¿½, respectively. There was a strong AVF thrill post release of manual pressure.   3. There was no stepup between SVC and PA sats.    Echo 7/9/24  CONCLUSIONS:   1. The left ventricular systolic function is low normal, with a visually estimated ejection fraction of 50-55%.   2. There is global hypokinesis of the left ventricle with minor regional variations.   3. Spectral Doppler shows a pseudonormal pattern of left ventricular diastolic filling.   4. There is normal right ventricular global systolic function.   5. Mild to moderate mitral valve regurgitation.   6. Mild to moderately elevated right ventricular systolic pressure.    Nuclear stress test 6/22/23  IMPRESSION:  1. No definite evidence of ischemia.  2. In the setting of apparent diaphragmatic attenuation, a small distal inferior wall infarct cannot be excluded. This finding appears  to be new/worse in comparison the prior study.  3. Left ventricular dilatation is noted that is new in  comparison the prior study.  4. Left ventricular ejection fraction was estimated to be 45% which is slightly decreased from prior study of 50%.       Assessment/Plan     This is a 58 yo M with ESRD due to long-standing diabetes and hypertension, primary hypertension admitted for potential DDKT.    Kidney Info: DCD, KDPI 83%, CIT 14 hrs  SGF/DGF expected.  CMV D-/R+, EBV D+/R+, Toxo D-, HBV D-/R immune, HCV D-/R-  Virtual XM neg, cPRA 90%  Induction: ATG 4.5 mg/kg  Prophylaxis: clotrimazole x 3 mo, TMP-SMX x 6 mo, Valcyte x 3 mo  Ok to proceed with DDKT.  Mild primary pulmonary hypertension.  Monitor for dialysis needs post-op.      I spent 60 minutes in the professional and overall care of this patient.      Deyanira Sheppard MD

## 2025-01-06 NOTE — SIGNIFICANT EVENT
S:    POD 0 from DDKT  Patient still sleeping but easily arousable and answers questions. Denies significant pain, chest pain, shortness of breath.    O:   Vital signs are stable in PACU. Requiring nasal cannula  Visit Vitals  /69 (BP Location: Right arm, Patient Position: Lying)   Pulse 88   Temp 36.6 °C (97.9 °F) (Temporal)   Resp 14      UOP 75-200cc/hr in PACU  Receiving 1:1 repletions    Constitutional: no acute distress  Skin: warm and dry overall   HEENT: Atraumatic, no scleral icterus  Cardiac: RR on tele, normotensive  Pulmonary: Unlabored respirations on NC  Abdomen: Non distended, appropriately tender, surgical dressing with shadowing, drain with thin sanguinous drainage  GI: Aragon with pink urine    A/P:  Labs reviewed, acceptable for floor  Potassium responded to treatment, no indication for dialysis at this time  Advance to CLD when more awake  Continue IVF at this time  Starting IS this evening - tac 2/2, MMF 1000, solumedrol taper continues tomorrow  Ppx ordered    Vincent Dillon MD  General Surgery PGY-3  Transplant Surgery [id number]

## 2025-01-06 NOTE — TELEPHONE ENCOUNTER
UNOS ID: [id number]  MATCH ID: [id number]  ORGAN: RIGHT KIDNEY  KDPI (if applicable): 83%  KNOWN RISK STATUS: NO  LOCAL VS IMPORT: IMPORT  HCV (if applicable): NEGATIVE  HepBcAb (if applicable): NEGATIVE    Confirm the following:  Any COVID symptoms (nausea, vomiting, diarrhea, fever, chills, cough, sore throat, headache): YES-DESCRIBE/NO: No  Any contact with anyone positive for or suspected to have COVID: YES-DESCRIBE/NO: No  Any recent hospitalizations: YES-DESCRIBE/NO: No  Any recent illnesses: YES-DESCRIBE/NO: No  Any recent injuries (cracked teeth, broken bones, wounds that won’t heal): YES-DESCRIBE/NO: No  Any antibiotics: YES-DESCRIBE/NO: No  Any blood thinners: YES-DESCRIBE/NO: No  Any blood transfusions: YES-DESCRIBE/NO: No  When was last dialysis/type: YES/NA/NO: Yes FRIDAY, DUE IN AM 1/6/24    The last time they ate or drank anything: NPO 0000, LAST ATE AT 1900  Ask the surgeon whether or not the patient should be made NPO at this time.  It is not necessary for the patient to bring anything with them other than a current medication list and insurance information  Determine ETA to hospital: 2 hour.  Patient aware of the possibility of a dry-run.  YES

## 2025-01-06 NOTE — ANESTHESIA PROCEDURE NOTES
Arterial Line:    Date/Time: 1/6/2025 8:21 AM    Staffing  Performed: ALISAI   Authorized by: Annalisa Herzog MD    Performed by: ALISIA Ramirez    An arterial line was placed. Procedure performed using surface landmarks.in the OR for the following indication(s): continuous blood pressure monitoring.    A 20 gauge (size) (length), Angiocath (type) catheter was placed into the Right radial artery, secured by tape and and tegaderm,   Seldinger technique not used.  Events:  patient tolerated procedure well with no complications.      Additional notes:  Sterile. One attempt. Good blood return and waveform.

## 2025-01-06 NOTE — PROGRESS NOTES
Traditional Medicare part A/B active. No part D indicated. [facility] Medicare termed 12/31/2024.

## 2025-01-06 NOTE — TELEPHONE ENCOUNTER
Phoned patient and no answer.  Left voicemail with contact number for cardiology nursing office ([phone]) and requested patient return call.

## 2025-01-06 NOTE — TELEPHONE ENCOUNTER
----- Message from Ridge Noonan sent at 1/6/2025  1:18 PM EST -----  Let him know lipids look okay continue current meds

## 2025-01-06 NOTE — ANESTHESIA PROCEDURE NOTES
Peripheral IV  Date/Time: 1/6/2025 8:25 AM      Placement  Needle size: 18 G  Laterality: right  Location: hand  Site prep: alcohol  Technique: anatomical landmarks  Attempts: 1         9.13

## 2025-01-06 NOTE — ANESTHESIA PREPROCEDURE EVALUATION
Patient: Anibal Dow    Procedure Information       Date/Time: 01/06/25 0700    Procedure: TRANSPLANT, KIDNEY - 0730 start, will bump my hernia case    Location: [facility] OR 15 / [facility] OR    Surgeons: Javed Prince MD            Relevant Problems   Cardiac   (+) Congestive heart failure   (+) Essential hypertension      Pulmonary   (+) Pulmonary hypertension (Multi)      /Renal   (+) ESRD (end stage renal disease) (Multi)   (+) ESRD (end stage renal disease) on dialysis (Multi)      Endocrine   (+) Diabetic nephropathy associated with type 2 diabetes mellitus (Multi)   (+) Nonproliferative diabetic retinopathy with macular edema associated with type 2 diabetes mellitus   (+) 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 associated with type 2 diabetes mellitus   (+) Secondary hyperparathyroidism of renal origin (Multi)   (+) Type 2 diabetes mellitus      Hematology   (+) Anemia   (+) Anemia in chronic kidney disease   (+) Iron deficiency anemia      ID   (+) Osteomyelitis of fifth toe of right foot (Multi)       Clinical information reviewed:   Tobacco  Allergies  Meds   Med Hx  Surg Hx   Fam Hx  Soc Hx        NPO Detail:  No data recorded     Physical Exam    Airway  Mallampati: II  TM distance: >3 FB  Neck ROM: full     Cardiovascular   Rhythm: regular  Rate: normal     Dental - normal exam     Pulmonary   Breath sounds clear to auscultation     Abdominal      Other findings: Macular rash along trunk and neck with excoriations. Per patient present for 2 weeks.          Anesthesia Plan    History of general anesthesia?: yes  History of complications of general anesthesia?: no    ASA 4     general   (Discussed arterial line, possible central line. Discussed risks of prolonged intubation and ICU admission. )  The patient is not a current smoker.  Patient was not previously instructed to abstain from smoking on day of procedure.  Patient did not smoke on day of  procedure.    intravenous induction   Postoperative administration of opioids is intended.  Trial extubation is planned.  Anesthetic plan and risks discussed with patient.  Use of blood products discussed with patient who consented to blood products.

## 2025-01-06 NOTE — BRIEF OP NOTE
Date: 2025  OR Location: [facility] OR    Name: Anibal Dow, : 1965, Age: 59 y.o., MRN: [id number], Sex: male    Diagnosis  Pre-op Diagnosis      * ESRD (end stage renal disease) (Multi) [N18.6] Post-op Diagnosis     * ESRD (end stage renal disease) (Multi) [N18.6]     Procedures  TRANSPLANT, KIDNEY  41953 - MS RENAL ALTRNSPLJ IMPLTJ GRF W/O RCP NEPHRECTOMY      Surgeons      * Javed Prince - Primary    Resident/Fellow/Other Assistant:  Surgeons and Role:     * Abisai Joiner MD - Resident - Assisting    Staff:   Circulator: Tam Espinosa Person: Soheila    Anesthesia Staff: Anesthesiologist: Annalisa Herzog MD  C-AA: ALISIA Ramirez; ALISIA Flores    Procedure Summary  Anesthesia: General  ASA: IV  Estimated Blood Loss: 250mL  Intra-op Medications: * Intraprocedure medication information is unavailable because the case start and end events have not been set *           Anesthesia Record               Intraprocedure I/O Totals          Intake    electrolyte-A 2000.00 mL    anti-thymocyte globulin rabbit (Thymoglobulin) 125 mg, hydrocortisone sodium succinate (PF) (Solu-CORTEF) 20 mg, heparin 1,000 Units in sodium chloride 0.9% 500 mL .40 mL    Total Intake 2526.4 mL       Output    Urine 100 mL    Est. Blood Loss 250 mL    Total Output 350 mL       Net    Net Volume 2176.4 mL          Specimen: No specimens collected               Findings: normal donor and recipient anatomy. Right kidney anastomosed to right EIA and EIV. Small bladder    Organ arrival 0725; in room 0758; ABO verification 0801; huddle 0804; induction 0809; time out 0841; incision 0842; donor cross clamp  1841; out of ice 0931; reperfusion 1002    Complications:  None; patient tolerated the procedure well.     Disposition: PACU - hemodynamically stable.  Condition: stable  Specimens Collected: No specimens collected  Attending Attestation:     Javed Prince  Phone Number: [phone]

## 2025-01-06 NOTE — PROGRESS NOTES
Pharmacist Post-Transplant Note    The Clinical Transplant Pharmacist is aware that Anibal Dow  has been admitted and has undergone kidney transplantation. A transplant pharmacist will be rounding with the multidisciplinary transplant team and making recommendations on his medication regimen.     The patient's medications have been reviewed in conjunction with the multidisciplinary transplant team. The pharmacist is in agreement with the plan of care for medications at this time.    Patient and donor factors were screened to determine the appropriate post-transplant protocol and current immunosuppression plan includes:    Induction Regimen:  Antithymocyte globulin     Maintenance Regimen:  Standard maintenance immunosuppression with tacrolimus, mycophenolate, and a steroid taper.     Maintenance immunosuppression and anti-infective prophylaxis will begin according to protocol. Home medications will begin when the patient is clinically stable.     Of note, CMV D-/R+ so patient will require 3 months of CMV prophylaxis with valganciclovir per protocol.    Gely Johnson, PharmD, BCTXP  Clinical Pharmacy Specialist - Solid Organ Transplant

## 2025-01-07 ENCOUNTER — SPECIALTY PHARMACY (OUTPATIENT)
Dept: PHARMACY | Facility: CLINIC | Age: 60
End: 2025-01-07

## 2025-01-07 ENCOUNTER — DOCUMENTATION (OUTPATIENT)
Dept: TRANSPLANT | Facility: HOSPITAL | Age: 60
End: 2025-01-07
Payer: MEDICARE

## 2025-01-07 PROBLEM — Z79.4 TYPE 2 DIABETES MELLITUS WITH HYPERGLYCEMIA, WITH LONG-TERM CURRENT USE OF INSULIN: Status: ACTIVE | Noted: 2025-01-07

## 2025-01-07 PROBLEM — E11.65 TYPE 2 DIABETES MELLITUS WITH HYPERGLYCEMIA, WITH LONG-TERM CURRENT USE OF INSULIN: Status: ACTIVE | Noted: 2025-01-07

## 2025-01-07 PROBLEM — Z94.0 KIDNEY REPLACED BY TRANSPLANT (HHS-HCC): Status: ACTIVE | Noted: 2025-01-07

## 2025-01-07 LAB
ALBUMIN SERPL BCP-MCNC: 3.3 G/DL (ref 3.4–5)
ALBUMIN SERPL BCP-MCNC: 3.6 G/DL (ref 3.4–5)
ANION GAP SERPL CALC-SCNC: 20 MMOL/L (ref 10–20)
ANION GAP SERPL CALC-SCNC: 24 MMOL/L (ref 10–20)
BASOPHILS # BLD AUTO: 0.01 X10*3/UL (ref 0–0.1)
BASOPHILS NFR BLD AUTO: 0.1 %
BUN SERPL-MCNC: 83 MG/DL (ref 6–23)
BUN SERPL-MCNC: 86 MG/DL (ref 6–23)
CALCIUM SERPL-MCNC: 8.3 MG/DL (ref 8.6–10.6)
CALCIUM SERPL-MCNC: 8.4 MG/DL (ref 8.6–10.6)
CHLORIDE SERPL-SCNC: 92 MMOL/L (ref 98–107)
CHLORIDE SERPL-SCNC: 96 MMOL/L (ref 98–107)
CO2 SERPL-SCNC: 20 MMOL/L (ref 21–32)
CO2 SERPL-SCNC: 26 MMOL/L (ref 21–32)
CREAT SERPL-MCNC: 11.81 MG/DL (ref 0.5–1.3)
CREAT SERPL-MCNC: 11.89 MG/DL (ref 0.5–1.3)
EGFRCR SERPLBLD CKD-EPI 2021: 4 ML/MIN/1.73M*2
EGFRCR SERPLBLD CKD-EPI 2021: 4 ML/MIN/1.73M*2
EOSINOPHIL # BLD AUTO: 0 X10*3/UL (ref 0–0.7)
EOSINOPHIL NFR BLD AUTO: 0 %
ERYTHROCYTE [DISTWIDTH] IN BLOOD BY AUTOMATED COUNT: 13.5 % (ref 11.5–14.5)
GLUCOSE BLD MANUAL STRIP-MCNC: 238 MG/DL (ref 74–99)
GLUCOSE BLD MANUAL STRIP-MCNC: 247 MG/DL (ref 74–99)
GLUCOSE BLD MANUAL STRIP-MCNC: 283 MG/DL (ref 74–99)
GLUCOSE BLD MANUAL STRIP-MCNC: 292 MG/DL (ref 74–99)
GLUCOSE SERPL-MCNC: 204 MG/DL (ref 74–99)
GLUCOSE SERPL-MCNC: 281 MG/DL (ref 74–99)
HCT VFR BLD AUTO: 28 % (ref 41–52)
HCV RNA SERPL NAA+PROBE-ACNC: NOT DETECTED K[IU]/ML
HCV RNA SERPL NAA+PROBE-LOG IU: NORMAL {LOG_IU}/ML
HGB BLD-MCNC: 8.4 G/DL (ref 13.5–17.5)
IMM GRANULOCYTES # BLD AUTO: 0.11 X10*3/UL (ref 0–0.7)
IMM GRANULOCYTES NFR BLD AUTO: 1 % (ref 0–0.9)
LYMPHOCYTES # BLD AUTO: 0.06 X10*3/UL (ref 1.2–4.8)
LYMPHOCYTES NFR BLD AUTO: 0.6 %
MAGNESIUM SERPL-MCNC: 2.22 MG/DL (ref 1.6–2.4)
MCH RBC QN AUTO: 28.1 PG (ref 26–34)
MCHC RBC AUTO-ENTMCNC: 30 G/DL (ref 32–36)
MCV RBC AUTO: 94 FL (ref 80–100)
MONOCYTES # BLD AUTO: 0.41 X10*3/UL (ref 0.1–1)
MONOCYTES NFR BLD AUTO: 3.8 %
NEUTROPHILS # BLD AUTO: 10.23 X10*3/UL (ref 1.2–7.7)
NEUTROPHILS NFR BLD AUTO: 94.5 %
NRBC BLD-RTO: 0 /100 WBCS (ref 0–0)
PHOSPHATE SERPL-MCNC: 5.6 MG/DL (ref 2.5–4.9)
PHOSPHATE SERPL-MCNC: 6.8 MG/DL (ref 2.5–4.9)
PLATELET # BLD AUTO: 84 X10*3/UL (ref 150–450)
POTASSIUM SERPL-SCNC: 5 MMOL/L (ref 3.5–5.3)
POTASSIUM SERPL-SCNC: 5.6 MMOL/L (ref 3.5–5.3)
RBC # BLD AUTO: 2.99 X10*6/UL (ref 4.5–5.9)
SODIUM SERPL-SCNC: 133 MMOL/L (ref 136–145)
SODIUM SERPL-SCNC: 134 MMOL/L (ref 136–145)
WBC # BLD AUTO: 10.8 X10*3/UL (ref 4.4–11.3)

## 2025-01-07 PROCEDURE — 36415 COLL VENOUS BLD VENIPUNCTURE: CPT | Performed by: STUDENT IN AN ORGANIZED HEALTH CARE EDUCATION/TRAINING PROGRAM

## 2025-01-07 PROCEDURE — 2500000005 HC RX 250 GENERAL PHARMACY W/O HCPCS: Performed by: STUDENT IN AN ORGANIZED HEALTH CARE EDUCATION/TRAINING PROGRAM

## 2025-01-07 PROCEDURE — 99233 SBSQ HOSP IP/OBS HIGH 50: CPT | Performed by: STUDENT IN AN ORGANIZED HEALTH CARE EDUCATION/TRAINING PROGRAM

## 2025-01-07 PROCEDURE — 99232 SBSQ HOSP IP/OBS MODERATE 35: CPT | Performed by: TRANSPLANT SURGERY

## 2025-01-07 PROCEDURE — 80069 RENAL FUNCTION PANEL: CPT | Performed by: STUDENT IN AN ORGANIZED HEALTH CARE EDUCATION/TRAINING PROGRAM

## 2025-01-07 PROCEDURE — 99223 1ST HOSP IP/OBS HIGH 75: CPT | Performed by: NURSE PRACTITIONER

## 2025-01-07 PROCEDURE — 2500000002 HC RX 250 W HCPCS SELF ADMINISTERED DRUGS (ALT 637 FOR MEDICARE OP, ALT 636 FOR OP/ED): Performed by: STUDENT IN AN ORGANIZED HEALTH CARE EDUCATION/TRAINING PROGRAM

## 2025-01-07 PROCEDURE — 5A1D70Z PERFORMANCE OF URINARY FILTRATION, INTERMITTENT, LESS THAN 6 HOURS PER DAY: ICD-10-PCS | Performed by: TRANSPLANT SURGERY

## 2025-01-07 PROCEDURE — 2500000001 HC RX 250 WO HCPCS SELF ADMINISTERED DRUGS (ALT 637 FOR MEDICARE OP): Performed by: STUDENT IN AN ORGANIZED HEALTH CARE EDUCATION/TRAINING PROGRAM

## 2025-01-07 PROCEDURE — 83735 ASSAY OF MAGNESIUM: CPT | Performed by: STUDENT IN AN ORGANIZED HEALTH CARE EDUCATION/TRAINING PROGRAM

## 2025-01-07 PROCEDURE — 2500000002 HC RX 250 W HCPCS SELF ADMINISTERED DRUGS (ALT 637 FOR MEDICARE OP, ALT 636 FOR OP/ED)

## 2025-01-07 PROCEDURE — 84100 ASSAY OF PHOSPHORUS: CPT | Performed by: STUDENT IN AN ORGANIZED HEALTH CARE EDUCATION/TRAINING PROGRAM

## 2025-01-07 PROCEDURE — 1200000002 HC GENERAL ROOM WITH TELEMETRY DAILY

## 2025-01-07 PROCEDURE — 82947 ASSAY GLUCOSE BLOOD QUANT: CPT

## 2025-01-07 PROCEDURE — 97161 PT EVAL LOW COMPLEX 20 MIN: CPT | Mod: GP | Performed by: PHYSICAL THERAPIST

## 2025-01-07 PROCEDURE — 2500000004 HC RX 250 GENERAL PHARMACY W/ HCPCS (ALT 636 FOR OP/ED): Performed by: STUDENT IN AN ORGANIZED HEALTH CARE EDUCATION/TRAINING PROGRAM

## 2025-01-07 PROCEDURE — 97165 OT EVAL LOW COMPLEX 30 MIN: CPT | Mod: GO

## 2025-01-07 PROCEDURE — 85025 COMPLETE CBC W/AUTO DIFF WBC: CPT | Performed by: STUDENT IN AN ORGANIZED HEALTH CARE EDUCATION/TRAINING PROGRAM

## 2025-01-07 RX ORDER — CLOTRIMAZOLE 10 MG/1
10 LOZENGE ORAL
Qty: 100 TABLET | Refills: 0 | Status: SHIPPED | OUTPATIENT
Start: 2025-01-07 | End: 2025-01-14 | Stop reason: SDUPTHER

## 2025-01-07 RX ORDER — SULFAMETHOXAZOLE AND TRIMETHOPRIM 400; 80 MG/1; MG/1
1 TABLET ORAL DAILY
Qty: 30 TABLET | Refills: 0 | Status: SHIPPED | OUTPATIENT
Start: 2025-01-08 | End: 2025-01-14 | Stop reason: SDUPTHER

## 2025-01-07 RX ORDER — DEXTROSE 50 % IN WATER (D50W) INTRAVENOUS SYRINGE
25 ONCE
Status: DISCONTINUED | OUTPATIENT
Start: 2025-01-07 | End: 2025-01-07

## 2025-01-07 RX ORDER — INSULIN LISPRO 100 [IU]/ML
0-10 INJECTION, SOLUTION INTRAVENOUS; SUBCUTANEOUS
Status: DISCONTINUED | OUTPATIENT
Start: 2025-01-07 | End: 2025-01-11 | Stop reason: HOSPADM

## 2025-01-07 RX ORDER — TACROLIMUS 0.5 MG/1
0.5 CAPSULE ORAL 2 TIMES DAILY
Qty: 60 CAPSULE | Refills: 0 | Status: SHIPPED | OUTPATIENT
Start: 2025-01-07 | End: 2025-01-14 | Stop reason: SDUPTHER

## 2025-01-07 RX ORDER — CARVEDILOL 12.5 MG/1
12.5 TABLET ORAL 2 TIMES DAILY
Qty: 60 TABLET | Refills: 0 | Status: SHIPPED | OUTPATIENT
Start: 2025-01-07 | End: 2025-01-14 | Stop reason: SDUPTHER

## 2025-01-07 RX ORDER — DOCUSATE SODIUM 100 MG/1
100 CAPSULE, LIQUID FILLED ORAL 2 TIMES DAILY
Qty: 60 CAPSULE | Refills: 0 | Status: SHIPPED | OUTPATIENT
Start: 2025-01-07 | End: 2025-02-04 | Stop reason: ALTCHOICE

## 2025-01-07 RX ORDER — PANTOPRAZOLE SODIUM 40 MG/1
40 TABLET, DELAYED RELEASE ORAL
Qty: 60 TABLET | Refills: 0 | Status: SHIPPED | OUTPATIENT
Start: 2025-01-07 | End: 2025-01-14 | Stop reason: SDUPTHER

## 2025-01-07 RX ORDER — TRAMADOL HYDROCHLORIDE 50 MG/1
50 TABLET ORAL EVERY 12 HOURS PRN
Qty: 14 TABLET | Refills: 0 | Status: SHIPPED | OUTPATIENT
Start: 2025-01-07 | End: 2025-01-17

## 2025-01-07 RX ORDER — DEXTROSE 50 % IN WATER (D50W) INTRAVENOUS SYRINGE
25
Status: DISCONTINUED | OUTPATIENT
Start: 2025-01-07 | End: 2025-01-07 | Stop reason: SDUPTHER

## 2025-01-07 RX ORDER — ACETAMINOPHEN 325 MG/1
650 TABLET ORAL EVERY 6 HOURS
Qty: 240 TABLET | Refills: 0 | Status: SHIPPED | OUTPATIENT
Start: 2025-01-07 | End: 2025-02-09

## 2025-01-07 RX ORDER — PANTOPRAZOLE SODIUM 40 MG/1
40 TABLET, DELAYED RELEASE ORAL
Status: DISCONTINUED | OUTPATIENT
Start: 2025-01-07 | End: 2025-01-11 | Stop reason: HOSPADM

## 2025-01-07 RX ORDER — TACROLIMUS 1 MG/1
2 CAPSULE ORAL
Qty: 120 CAPSULE | Refills: 0 | Status: SHIPPED | OUTPATIENT
Start: 2025-01-07 | End: 2025-01-14 | Stop reason: ALTCHOICE

## 2025-01-07 RX ORDER — PREDNISONE 5 MG/1
20 TABLET ORAL DAILY
Qty: 40 TABLET | Refills: 0 | Status: SHIPPED | OUTPATIENT
Start: 2025-01-10 | End: 2025-01-14 | Stop reason: SDUPTHER

## 2025-01-07 RX ORDER — DEXTROSE 50 % IN WATER (D50W) INTRAVENOUS SYRINGE
12.5
Status: DISCONTINUED | OUTPATIENT
Start: 2025-01-07 | End: 2025-01-07

## 2025-01-07 RX ORDER — MYCOPHENOLATE MOFETIL 250 MG/1
1000 CAPSULE ORAL
Qty: 240 CAPSULE | Refills: 0 | Status: SHIPPED | OUTPATIENT
Start: 2025-01-07 | End: 2025-01-14 | Stop reason: SDUPTHER

## 2025-01-07 RX ORDER — VALGANCICLOVIR 450 MG/1
450 TABLET, FILM COATED ORAL
Qty: 15 TABLET | Refills: 0 | Status: SHIPPED | OUTPATIENT
Start: 2025-01-09 | End: 2025-01-14 | Stop reason: SDUPTHER

## 2025-01-07 RX ADMIN — OXYCODONE 10 MG: 5 TABLET ORAL at 00:38

## 2025-01-07 RX ADMIN — ACETAMINOPHEN 650 MG: 325 TABLET ORAL at 14:29

## 2025-01-07 RX ADMIN — TACROLIMUS 2 MG: 1 CAPSULE ORAL at 06:09

## 2025-01-07 RX ADMIN — CLOTRIMAZOLE 10 MG: 10 LOZENGE ORAL at 08:55

## 2025-01-07 RX ADMIN — CLOTRIMAZOLE 10 MG: 10 LOZENGE ORAL at 18:05

## 2025-01-07 RX ADMIN — METHYLPREDNISOLONE SODIUM SUCCINATE 250 MG: 1 INJECTION INTRAMUSCULAR; INTRAVENOUS at 08:56

## 2025-01-07 RX ADMIN — INSULIN LISPRO 3 UNITS: 100 INJECTION, SOLUTION INTRAVENOUS; SUBCUTANEOUS at 10:02

## 2025-01-07 RX ADMIN — CARVEDILOL 12.5 MG: 12.5 TABLET, FILM COATED ORAL at 09:15

## 2025-01-07 RX ADMIN — SULFAMETHOXAZOLE AND TRIMETHOPRIM 1 TABLET: 400; 80 TABLET ORAL at 08:55

## 2025-01-07 RX ADMIN — SODIUM CHLORIDE 60 ML/HR: 4.5 INJECTION, SOLUTION INTRAVENOUS at 06:08

## 2025-01-07 RX ADMIN — FUROSEMIDE 80 MG: 10 INJECTION, SOLUTION INTRAVENOUS at 06:09

## 2025-01-07 RX ADMIN — PANTOPRAZOLE SODIUM 40 MG: 40 TABLET, DELAYED RELEASE ORAL at 18:05

## 2025-01-07 RX ADMIN — CARVEDILOL 12.5 MG: 12.5 TABLET, FILM COATED ORAL at 20:32

## 2025-01-07 RX ADMIN — ATORVASTATIN CALCIUM 40 MG: 40 TABLET, FILM COATED ORAL at 08:55

## 2025-01-07 RX ADMIN — DOCUSATE SODIUM 100 MG: 100 CAPSULE, LIQUID FILLED ORAL at 20:32

## 2025-01-07 RX ADMIN — INSULIN HUMAN 10 UNITS: 100 INJECTION, SUSPENSION SUBCUTANEOUS at 10:01

## 2025-01-07 RX ADMIN — MYCOPHENOLATE MOFETIL 1000 MG: 250 CAPSULE ORAL at 06:09

## 2025-01-07 RX ADMIN — VALGANCICLOVIR 450 MG: 450 TABLET, FILM COATED ORAL at 08:55

## 2025-01-07 RX ADMIN — TACROLIMUS 2 MG: 1 CAPSULE ORAL at 18:05

## 2025-01-07 RX ADMIN — Medication 2 L/MIN: at 08:14

## 2025-01-07 RX ADMIN — MYCOPHENOLATE MOFETIL 1000 MG: 250 CAPSULE ORAL at 18:05

## 2025-01-07 RX ADMIN — FUROSEMIDE 80 MG: 10 INJECTION, SOLUTION INTRAVENOUS at 14:29

## 2025-01-07 RX ADMIN — SENNOSIDES 8.6 MG: 8.6 TABLET, FILM COATED ORAL at 20:32

## 2025-01-07 RX ADMIN — SENNOSIDES 8.6 MG: 8.6 TABLET, FILM COATED ORAL at 08:55

## 2025-01-07 RX ADMIN — ACETAMINOPHEN 650 MG: 325 TABLET ORAL at 00:32

## 2025-01-07 RX ADMIN — CEFAZOLIN SODIUM 2 G: 2 INJECTION, SOLUTION INTRAVENOUS at 00:33

## 2025-01-07 RX ADMIN — CLOTRIMAZOLE 10 MG: 10 LOZENGE ORAL at 14:29

## 2025-01-07 RX ADMIN — FUROSEMIDE 80 MG: 10 INJECTION, SOLUTION INTRAVENOUS at 22:58

## 2025-01-07 RX ADMIN — Medication 1 L/MIN: at 20:35

## 2025-01-07 RX ADMIN — ACETAMINOPHEN 650 MG: 325 TABLET ORAL at 20:32

## 2025-01-07 RX ADMIN — INSULIN LISPRO 6 UNITS: 100 INJECTION, SOLUTION INTRAVENOUS; SUBCUTANEOUS at 12:59

## 2025-01-07 RX ADMIN — CEFAZOLIN SODIUM 2 G: 2 INJECTION, SOLUTION INTRAVENOUS at 08:56

## 2025-01-07 RX ADMIN — INSULIN LISPRO 4 UNITS: 100 INJECTION, SOLUTION INTRAVENOUS; SUBCUTANEOUS at 18:05

## 2025-01-07 RX ADMIN — DOCUSATE SODIUM 100 MG: 100 CAPSULE, LIQUID FILLED ORAL at 09:15

## 2025-01-07 RX ADMIN — ACETAMINOPHEN 650 MG: 325 TABLET ORAL at 06:09

## 2025-01-07 ASSESSMENT — COGNITIVE AND FUNCTIONAL STATUS - GENERAL
MOVING FROM LYING ON BACK TO SITTING ON SIDE OF FLAT BED WITH BEDRAILS: A LITTLE
CLIMB 3 TO 5 STEPS WITH RAILING: A LITTLE
MOVING FROM LYING ON BACK TO SITTING ON SIDE OF FLAT BED WITH BEDRAILS: A LITTLE
DAILY ACTIVITIY SCORE: 22
DRESSING REGULAR LOWER BODY CLOTHING: A LITTLE
MOVING FROM LYING ON BACK TO SITTING ON SIDE OF FLAT BED WITH BEDRAILS: A LITTLE
DRESSING REGULAR LOWER BODY CLOTHING: A LITTLE
MOBILITY SCORE: 19
CLIMB 3 TO 5 STEPS WITH RAILING: A LITTLE
HELP NEEDED FOR BATHING: A LITTLE
MOVING TO AND FROM BED TO CHAIR: A LITTLE
WALKING IN HOSPITAL ROOM: A LITTLE
MOBILITY SCORE: 18
WALKING IN HOSPITAL ROOM: A LITTLE
TURNING FROM BACK TO SIDE WHILE IN FLAT BAD: A LITTLE
DAILY ACTIVITIY SCORE: 22
DAILY ACTIVITIY SCORE: 20
CLIMB 3 TO 5 STEPS WITH RAILING: A LITTLE
DRESSING REGULAR UPPER BODY CLOTHING: A LITTLE
MOVING TO AND FROM BED TO CHAIR: A LITTLE
DAILY ACTIVITIY SCORE: 22
MOBILITY SCORE: 19
MOBILITY SCORE: 19
TURNING FROM BACK TO SIDE WHILE IN FLAT BAD: A LITTLE
HELP NEEDED FOR BATHING: A LITTLE
DAILY ACTIVITIY SCORE: 22
HELP NEEDED FOR BATHING: A LITTLE
DRESSING REGULAR LOWER BODY CLOTHING: A LITTLE
TURNING FROM BACK TO SIDE WHILE IN FLAT BAD: A LITTLE
MOVING TO AND FROM BED TO CHAIR: A LITTLE
DRESSING REGULAR LOWER BODY CLOTHING: A LITTLE
DRESSING REGULAR LOWER BODY CLOTHING: A LITTLE
MOVING TO AND FROM BED TO CHAIR: A LITTLE
MOBILITY SCORE: 19
MOVING FROM LYING ON BACK TO SITTING ON SIDE OF FLAT BED WITH BEDRAILS: A LITTLE
WALKING IN HOSPITAL ROOM: A LITTLE
MOVING FROM LYING ON BACK TO SITTING ON SIDE OF FLAT BED WITH BEDRAILS: A LITTLE
HELP NEEDED FOR BATHING: A LITTLE
CLIMB 3 TO 5 STEPS WITH RAILING: A LITTLE
WALKING IN HOSPITAL ROOM: A LITTLE
HELP NEEDED FOR BATHING: A LITTLE
TURNING FROM BACK TO SIDE WHILE IN FLAT BAD: A LITTLE
CLIMB 3 TO 5 STEPS WITH RAILING: A LITTLE
WALKING IN HOSPITAL ROOM: A LITTLE
TOILETING: A LITTLE
MOVING TO AND FROM BED TO CHAIR: A LITTLE
TURNING FROM BACK TO SIDE WHILE IN FLAT BAD: A LITTLE
STANDING UP FROM CHAIR USING ARMS: A LITTLE

## 2025-01-07 ASSESSMENT — ENCOUNTER SYMPTOMS
DYSURIA: 0
FREQUENCY: 0
DIARRHEA: 0
POLYDIPSIA: 0
APPETITE CHANGE: 1
CHEST TIGHTNESS: 0
NUMBNESS: 0
EYES NEGATIVE: 1
SPEECH DIFFICULTY: 0
COUGH: 0
POLYPHAGIA: 0
DIAPHORESIS: 0
AGITATION: 0
SORE THROAT: 0
FATIGUE: 1
SHORTNESS OF BREATH: 0
NAUSEA: 0
CONFUSION: 0
UNEXPECTED WEIGHT CHANGE: 0
HEADACHES: 0
JOINT SWELLING: 0
ABDOMINAL PAIN: 0
ACTIVITY CHANGE: 1

## 2025-01-07 ASSESSMENT — PAIN DESCRIPTION - LOCATION
LOCATION: ABDOMEN

## 2025-01-07 ASSESSMENT — PAIN SCALES - GENERAL
PAINLEVEL_OUTOF10: 3
PAINLEVEL_OUTOF10: 7
PAINLEVEL_OUTOF10: 7
PAINLEVEL_OUTOF10: 2
PAINLEVEL_OUTOF10: 0 - NO PAIN
PAINLEVEL_OUTOF10: 7
PAINLEVEL_OUTOF10: 2

## 2025-01-07 ASSESSMENT — PAIN DESCRIPTION - ORIENTATION: ORIENTATION: RIGHT;LOWER

## 2025-01-07 ASSESSMENT — ACTIVITIES OF DAILY LIVING (ADL)
ADL_ASSISTANCE: INDEPENDENT
ADL_ASSISTANCE: INDEPENDENT

## 2025-01-07 ASSESSMENT — PAIN - FUNCTIONAL ASSESSMENT
PAIN_FUNCTIONAL_ASSESSMENT: 0-10
PAIN_FUNCTIONAL_ASSESSMENT: 0-10

## 2025-01-07 NOTE — CARE PLAN
Problem: Diabetes  Goal: Achieve decreasing blood glucose levels by end of shift  Outcome: Progressing  Goal: Increase stability of blood glucose readings by end of shift  Outcome: Progressing  Goal: Decrease in ketones present in urine by end of shift  Outcome: Progressing  Goal: Maintain electrolyte levels within acceptable range throughout shift  Outcome: Progressing  Goal: Maintain glucose levels >70mg/dl to <250mg/dl throughout shift  Outcome: Progressing  Goal: No changes in neurological exam by end of shift  Outcome: Progressing  Goal: Learn about and adhere to nutrition recommendations by end of shift  Outcome: Progressing  Goal: Vital signs within normal range for age by end of shift  Outcome: Progressing  Goal: Increase self care and/or family involovement by end of shift  Outcome: Progressing  Goal: Receive DSME education by end of shift  Outcome: Progressing     Problem: Pain - Adult  Goal: Verbalizes/displays adequate comfort level or baseline comfort level  Outcome: Progressing     Problem: Safety - Adult  Goal: Free from fall injury  Outcome: Progressing     Problem: Discharge Planning  Goal: Discharge to home or other facility with appropriate resources  Outcome: Progressing     Problem: Chronic Conditions and Co-morbidities  Goal: Patient's chronic conditions and co-morbidity symptoms are monitored and maintained or improved  Outcome: Progressing     Problem: Skin  Goal: Decreased wound size/increased tissue granulation at next dressing change  Outcome: Progressing  Goal: Participates in plan/prevention/treatment measures  Outcome: Progressing  Goal: Prevent/manage excess moisture  Outcome: Progressing  Goal: Prevent/minimize sheer/friction injuries  Outcome: Progressing  Goal: Promote/optimize nutrition  Outcome: Progressing  Goal: Promote skin healing  Outcome: Progressing     Problem: Fall/Injury  Goal: Not fall by end of shift  Outcome: Progressing  Goal: Be free from injury by end of the  shift  Outcome: Progressing  Goal: Verbalize understanding of personal risk factors for fall in the hospital  Outcome: Progressing  Goal: Verbalize understanding of risk factor reduction measures to prevent injury from fall in the home  Outcome: Progressing  Goal: Use assistive devices by end of the shift  Outcome: Progressing  Goal: Pace activities to prevent fatigue by end of the shift  Outcome: Progressing     Problem: Pain  Goal: Takes deep breaths with improved pain control throughout the shift  Outcome: Progressing  Goal: Turns in bed with improved pain control throughout the shift  Outcome: Progressing  Goal: Walks with improved pain control throughout the shift  Outcome: Progressing  Goal: Performs ADL's with improved pain control throughout shift  Outcome: Progressing  Goal: Participates in PT with improved pain control throughout the shift  Outcome: Progressing  Goal: Free from opioid side effects throughout the shift  Outcome: Progressing  Goal: Free from acute confusion related to pain meds throughout the shift  Outcome: Progressing   The patient's goals for the shift include  adequate pain control.    The clinical goals for the shift include pt will have urine output >30ml/hr through the shift.

## 2025-01-07 NOTE — PROGRESS NOTES
Physical Therapy    Physical Therapy Evaluation    Patient Name: Anibal Dow  MRN: [id number]  Department: [facility]  Room: [address] [place] [address]  Today's Date: 1/7/2025   Time Calculation  Start Time: 1201  Stop Time: 1223  Time Calculation (min): 22 min    Assessment/Plan   PT Assessment  PT Assessment Results: Decreased strength, Decreased endurance, Impaired balance, Decreased mobility, Pain  Rehab Prognosis: Good  Barriers to Discharge Home: No anticipated barriers  End of Session Communication: Bedside nurse  End of Session Patient Position: Up in chair, Alarm off, not on at start of session  IP OR SWING BED PT PLAN  Inpatient or Swing Bed: Inpatient  PT Plan  Treatment/Interventions: Bed mobility, Transfer training, Gait training, Stair training, Balance training, Strengthening, Endurance training, Home exercise program, Therapeutic activity, Therapeutic exercise  PT Plan: Ongoing PT  PT Frequency: 4 times per week  PT Discharge Recommendations: Low intensity level of continued care  PT Recommended Transfer Status: Assist x1, Assistive device  PT - OK to Discharge: Yes    Subjective   General Visit Information:  General  Reason for Referral: DDKT 1/6/2024  Past Medical History Relevant to Rehab: PMH of HLD, pulmonary hypertension, ESRD 2/2 HTN and DM2, on on dialysis MWF through L arm AVF.  Family/Caregiver Present: Yes (spouse and other familiy members present)  Co-Treatment: OT  Co-Treatment Reason: Cotx with Ot for pt's safety with mobility  Prior to Session Communication: Bedside nurse  Patient Position Received: Up in chair, Alarm off, not on at start of session  Preferred Learning Style: verbal  General Comment: Pt supine in bed upon arrival and willing to participate in PT session  Home Living:  Home Living  Type of Home: House  Lives With: Spouse  Home Adaptive Equipment: Walker rolling or standard  Home Layout: One level, Able to live on main level with bedroom/bathroom  Home Access: Stairs to enter with  rails  Entrance Stairs-Number of Steps: 4  Bathroom Shower/Tub: Tub/shower unit  Prior Level of Function:  Prior Function Per Pt/Caregiver Report  Level of [place]: Independent with ADLs and functional transfers  Receives Help From: Family  ADL Assistance: Independent  Ambulatory Assistance: Independent  Vocational: Retired  Prior Function Comments: +drives, denies falls  Precautions:  Precautions  Medical Precautions: Abdominal precautions, Fall precautions  Post-Surgical Precautions: Abdominal surgery precautions     Vital Signs (Past 2hrs)         BP:106/62, HR: 78 bpm and SpO2 :92%        Objective   Pain:  Pain Assessment  Pain Assessment: 0-10  0-10 (Numeric) Pain Score: 2  Pain Type: Surgical pain  Pain Location: Abdomen  Pain Interventions: Repositioned  Cognition:  Cognition  Overall Cognitive Status: Within Functional Limits  Orientation Level: Oriented X4    General Assessments:        Sensation  Light Touch: No apparent deficits    Strength  Strength Comments: BLE's 4/5 based on moiblity      Static Sitting Balance  Static Sitting-Level of Assistance: Distant supervision  Dynamic Sitting Balance  Dynamic Sitting-Level of Assistance: Close supervision    Static Standing Balance  Static Standing-Level of Assistance: Contact guard (RW)  Dynamic Standing Balance  Dynamic Standing-Level of Assistance: Contact guard (RW)  Functional Assessments:  Bed Mobility  Bed Mobility: No (seated in chair pre and post session)    Transfers  Transfer: Yes  Transfer 1  Transfer From 1: Sit to, Stand to  Transfer to 1: Sit, Stand  Technique 1: Sit to stand, Stand to sit  Transfer Device 1: Walker  Transfer Level of Assistance 1: Contact guard, Minimal verbal cues  Trials/Comments 1: cues for safe hand placement    Ambulation/Gait Training  Ambulation/Gait Training Performed: Yes  Ambulation/Gait Training 1  Surface 1: Level tile  Device 1: Rolling walker  Assistance 1: Contact guard, Minimal verbal cues  Quality of Gait  1: Decreased step length  Comments/Distance (ft) 1: ~150 ft (cues for walker management and taking rest breaks as needed, no c/o fatigue)    Stairs  Stairs: No      Outcome Measures:  [facility] Basic Mobility  Turning from your back to your side while in a flat bed without using bedrails: A little  Moving from lying on your back to sitting on the side of a flat bed without using bedrails: A little  Moving to and from bed to chair (including a wheelchair): A little  Standing up from a chair using your arms (e.g. wheelchair or bedside chair): A little  To walk in hospital room: A little  Climbing 3-5 steps with railing: A little  Basic Mobility - Total Score: 18    Encounter Problems       Encounter Problems (Active)       Mobility       Pt will be Kenneth ambulation >200 ft with LRAD (Progressing)       Start:  01/07/25    Expected End:  01/21/25            Pt will be Kenneth with ascend/descend 4 steps with 1 handrail (Progressing)       Start:  01/07/25    Expected End:  01/21/25               PT Transfers       Pt will be Kenneth with sit to stand and bed to chair transfers with LRAD (Progressing)       Start:  01/07/25    Expected End:  01/21/25            Pt will be Kenneth with bed mobility (Progressing)       Start:  01/07/25    Expected End:  01/21/25               Pain - Adult              Education Documentation  Precautions, taught by Lea Pinto PT at 1/7/2025  1:55 PM.  Learner: Patient  Readiness: Acceptance  Method: Explanation  Response: Verbalizes Understanding, Needs Reinforcement    Mobility Training, taught by Lea Pinto PT at 1/7/2025  1:55 PM.  Learner: Patient  Readiness: Acceptance  Method: Explanation  Response: Verbalizes Understanding, Needs Reinforcement    Education Comments  No comments found.

## 2025-01-07 NOTE — CONSULTS
Inpatient consult to Endocrinology  Consult performed by: Rocio Duong, APRN-CNP  Consult ordered by: Javed Prince MD      Reason For Consult  s/p kidney transplant with h/o type 2 diabetes     History Of Present Illness  Anibal Dow is a 59 y.o. male with PMH of HLD, pulmonary hypertension, ESRD 2/2 HTN and DM2, on on dialysis MWF through L arm AVF. He is now s/p kidney transplant 1/6/25.    Diabetes History  Type of diabetes: Type 2  Year diagnosed or age: at least 15 years  Hospitalizations for DKA or HHS: no  Complications: retinopathy, peripheral neuropathy, nephropathy, and hyperglycemia  Seen by PCP or Endocrinology: PCP  Frequency of glucose checks: via shad 2  Glucose review: 14 day average 175, glucoses in the 150's fasting, rising to the 200's during the day  Frequency of Hypoglycemia: occasional  Hypoglycemia unawareness: no  Severe hypoglycemia requiring assistance from others:  no    Home Medications  Basal: glargine 5 units  CGM: shad 2    Results from Most Recent A1C  Hemoglobin A1C   Date/Time Value Ref Range Status   07/08/2024 03:39 PM 6.8 (H) 4.0 - 5.6 % Final      Diabetes Problem List Entries with Dates  Problem List:  2021-04: Proliferative diabetic retinopathy of left eye with macular   edema associated with type 2 diabetes mellitus  2020-10: Type 2 diabetes mellitus  2017-01: Diabetic nephropathy associated with type 2 diabetes   mellitus (Multi)  2010-05: Nonproliferative diabetic retinopathy with macular edema   associated with type 2 diabetes mellitus    Past Medical History  He has a past medical history of Diabetes mellitus (Multi), ESRD (end stage renal disease) (Multi), Hypertension, and Personal history of other diseases of urinary system (05/21/2020).    Surgical History  He has a past surgical history that includes Other surgical history (05/21/2020); CT angio coronary art with heartflow if score >30% (01/20/2020); Cardiac catheterization (N/A, 10/21/2024); and Eye  surgery.     Social History  He reports that he has never smoked. He has never used smokeless tobacco. He reports that he does not currently use alcohol. He reports that he does not use drugs.    Family History  No family history on file.     Allergies  Patient has no known allergies.    Review of Systems   Constitutional:  Positive for activity change, appetite change and fatigue. Negative for diaphoresis and unexpected weight change.   HENT: Negative.  Negative for sore throat.    Eyes: Negative.    Respiratory:  Negative for cough, chest tightness and shortness of breath.    Cardiovascular:  Negative for chest pain.   Gastrointestinal:  Negative for abdominal pain, diarrhea and nausea.   Endocrine: Negative for cold intolerance, heat intolerance, polydipsia, polyphagia and polyuria.   Genitourinary:  Negative for dysuria and frequency.   Musculoskeletal:  Negative for gait problem and joint swelling.   Neurological:  Negative for speech difficulty, numbness and headaches.   Psychiatric/Behavioral:  Negative for agitation, behavioral problems and confusion.       Physical Exam  Constitutional:       General: He is not in acute distress.     Appearance: Normal appearance.   HENT:      Nose: Nose normal.      Mouth/Throat:      Mouth: Mucous membranes are moist.      Pharynx: Oropharynx is clear.   Cardiovascular:      Rate and Rhythm: Normal rate and regular rhythm.   Pulmonary:      Effort: Pulmonary effort is normal. No respiratory distress.   Abdominal:      General: There is no distension.      Palpations: Abdomen is soft.   Musculoskeletal:         General: Normal range of motion.   Skin:     General: Skin is warm and dry.   Neurological:      Mental Status: He is alert and oriented to person, place, and time.   Psychiatric:         Mood and Affect: Mood normal.         Behavior: Behavior normal.     ROS, PMH, FH/SH, surgical history and allergies have been reviewed.    Last Recorded Vitals  Blood pressure  "156/86, pulse 85, temperature 36.1 °C (97 °F), temperature source Temporal, resp. rate 17, height 1.753 m (5' 9\"), weight 80.4 kg (177 lb 4 oz), SpO2 96%.    Relevant Results  Results from last 7 days   Lab Units 01/07/25  0511 01/06/25  1648 01/06/25  1408 01/06/25  1300 01/06/25  1136 01/06/25  1048 01/06/25  0638 01/06/25  0255 01/06/25  0241 01/02/25  1212   POCT GLUCOSE mg/dL  --  156* 174* 316* 187*  --  183*   < >  --   --    GLUCOSE mg/dL 204*  --   --   --   --  186*  --   --  201* 182*    < > = values in this interval not displayed.   Lab Review  Lab Results   Component Value Date    BILITOT 0.5 01/06/2025    CALCIUM 8.3 (L) 01/07/2025    CO2 20 (L) 01/07/2025    CL 96 (L) 01/07/2025    CREATININE 11.89 (H) 01/07/2025    GLUCOSE 204 (H) 01/07/2025    ALKPHOS 71 01/06/2025    K 5.6 (H) 01/07/2025    PROT 7.7 01/06/2025     (L) 01/07/2025    AST 16 01/06/2025    ALT 9 (L) 01/06/2025    BUN 83 (H) 01/07/2025    ANIONGAP 24 (H) 01/07/2025    MG 2.22 01/07/2025    PHOS 5.6 (H) 01/07/2025    ALBUMIN 3.3 (L) 01/07/2025    AMYLASE 54 06/27/2024    LIPASE 167 (H) 08/05/2023    GFRMALE 4 (A) 08/05/2023     Lab Results   Component Value Date    TRIG 123 01/02/2025    CHOL 169 01/02/2025    LDLCALC 104 (H) 01/02/2025    HDL 40.0 01/02/2025     Lab Results   Component Value Date    HGBA1C 6.8 (H) 07/08/2024    HGBA1C 8.0 (H) 01/05/2024    HGBA1C 6.2 (A) 06/13/2023     The 10-year ASCVD risk score (Pascual MONAE, et al., 2019) is: 10.7%    Values used to calculate the score:      Age: 59 years      Sex: Male      Is Non- : Yes      Diabetic: Yes      Tobacco smoker: No      Systolic Blood Pressure: 106 mmHg      Is BP treated: No      HDL Cholesterol: 40 mg/dL      Total Cholesterol: 169 mg/dL  Scheduled medications  acetaminophen, 650 mg, oral, q6h  atorvastatin, 40 mg, oral, Daily  carvedilol, 12.5 mg, oral, BID  ceFAZolin, 2 g, intravenous, q8h  clotrimazole, 10 mg, oral, TID after " meals  docusate sodium, 100 mg, oral, BID  furosemide, 80 mg, intravenous, q8h JOEY  insulin glargine, 5 Units, subcutaneous, q PM  insulin lispro, 0-5 Units, subcutaneous, TID AC  [START ON 1/8/2025] methylPREDNISolone sodium succinate (PF), 125 mg, intravenous, Once  [START ON 1/9/2025] methylPREDNISolone sodium succinate (PF), 60 mg, intravenous, Once  methylPREDNISolone sodium succinate (PF), 250 mg, intravenous, Once  mycophenolate, 1,000 mg, oral, q12h JOEY  oxygen, , inhalation, Continuous - Inhalation  [START ON 1/10/2025] predniSONE, 20 mg, oral, Daily  sennosides, 1 tablet, oral, BID  sodium zirconium cyclosilicate, 10 g, oral, Once  sulfamethoxazole-trimethoprim, 1 tablet, oral, Daily  tacrolimus, 2 mg, oral, q12h JOEY  valGANciclovir, 450 mg, oral, q48h    Continuous medications  sodium chloride, 60 mL/hr, Last Rate: 60 mL/hr (01/07/25 0608)  sodium chloride 0.9%, 999 mL/hr, Last Rate: 75 mL/hr (01/06/25 1600)    PRN medications  PRN medications: dextrose, dextrose, glucagon, glucagon, HYDROmorphone, naloxone, ondansetron ODT **OR** ondansetron, oxyCODONE, oxyCODONE     Assessment/Plan   Assessment & Plan  Kidney replaced by transplant ([facility])    CKD (chronic kidney disease)    ESRD (end stage renal disease) (Multi)    Type 2 diabetes mellitus with hyperglycemia, with long-term current use of insulin    PLAN  Steroids:  steroid regimen:   methylpred 500mg intra-op  methylpred 250 mg x1d  methylpred 125mg x1d  methylpred 60mg x1d  pred 20mg ongoing    Nutrition: Clear liquid - advanced to 60 gram carb consistent afternoon of 1/7    - glargine 5 units ordered but it was not given - recommend discontinuing dose    - Start NPH 10 units dosed QAM with steroid       If NPO: continue as long as steroid is given  - increase lispro corrective scale #2 with meals, adjust to scale to Q4 if persistently hyperglycemic   = 0u  151-200 = 2u  201-250 = 4u  251-300 = 6u  301-350 = 8u  351-400 = 10u    Start 2 units  of lispro with meals once diet is advanced; hold if NPO or eating less than 50% of his meal    -Accuchecks (not BMP) TIDAC and QHS- kindly ensure QHS Accucheck is drawn; it is often missed   - Goal -180  -Hypoglycemia protocol  -Will continue to follow and titrate insulin accordingly    Discharge planning:   [] patient may expect to discharge home on glargine and lispro, final doses TBD by titration  []will provide Anisa 3 sample prior to discharge   []will enroll pt in  pharmacy platinum plan program  []follow up with Nerissa SAM in PTDM clinic    I spent 80 minutes in the professional and overall care of this patient.    Rocio Duong, LIN-CNP

## 2025-01-07 NOTE — HOSPITAL COURSE
Pt is a 58 y/o male with a hx of ESRD secondary to HTN and DM2 whom received a  donor kidney transplant on 25 by Dr. Prince with a KDPI of 83% and PRA of 90%.  HCV -/- and donor has not met risk factors. EBV +/+. Right donor kidney transplanted to patient right pelvis. Dry weight is 76 kg (discharge weight is 80.6 kg ).  Pt received a total of 4.5 mg/kg total of thymoglobulin induction therapy in conjunction with 500mg IV solumedrol.  Pt was initiated on Tacrolimus & Cellcept immunosuppression in conjunction with IV solumedrol taper.  Pt was started on valcyte (CMV D - / R + ) and bactrim for CMV & PCP prophylaxis per protocol. He was started on clotrimazole as antifungal coverage per protocol. Operative course was uncomplicated.  Hospital course was uncomplicated. Aragon catheter  was removed on POD #5 and the patient is voiding spontaneously.  Pt did not require dialysis and electrolytes remain stable. Dialysis access via LUE AVF and was patent on last use. BP & glucose under good control.     Pt is tolerating a diabetic diet, maintaining adequate hydration with oral intake, ambulating independently and having BMs. Immunosuppression was managed by the transplant team. Patient is in stable condition for discharge home with renal telehealth today. RX delivered to bedside prior to discharge. The patient will f/u in the transplant institute and have labs drawn in the walk-in clinic.

## 2025-01-07 NOTE — PROGRESS NOTES
Occupational Therapy    Evaluation    Patient Name: Anibal Dow  MRN: [id number]  Today's Date: 1/7/2025  Time Calculation  Start Time: 1202  Stop Time: 1223  Time Calculation (min): 21 min    Assessment  IP OT Assessment  OT Assessment: pt required CGA with functional transfers and ambulation using a wheeled walker. Anticipate min assist with lower body dressing due to current abdominal precautions.  Prognosis: Good  Barriers to Discharge Home: No anticipated barriers  Evaluation/Treatment Tolerance: Patient tolerated treatment well  Medical Staff Made Aware: Yes  End of Session Communication: Bedside nurse  End of Session Patient Position: Up in chair, Alarm off, not on at start of session  Plan:  Treatment Interventions: ADL retraining, Functional transfer training, Endurance training  OT Frequency: 3 times per week  OT Discharge Recommendations: Low intensity level of continued care  OT Recommended Transfer Status:  (CGA)  OT - OK to Discharge: Yes    Subjective   Current Problem:  1. CKD (chronic kidney disease)        2. ESRD (end stage renal disease) (Multi)        3. Kidney replaced by transplant ([facility])  acetaminophen (Tylenol) 325 mg tablet    traMADol (Ultram) 50 mg tablet    sulfamethoxazole-trimethoprim (Bactrim) 400-80 mg tablet    clotrimazole (Mycelex) 10 mg davin    valGANciclovir (Valcyte) 450 mg tablet    carvedilol (Coreg) 12.5 mg tablet    sodium zirconium cyclosilicate (Lokelma) 10 gram packet    docusate sodium (Colace) 100 mg capsule    pantoprazole (ProtoNix) 40 mg EC tablet    predniSONE (Deltasone) 5 mg tablet    mycophenolate (Cellcept) 250 mg capsule    tacrolimus (Prograf) 1 mg capsule    tacrolimus (Prograf) 0.5 mg capsule        General:  Reason for Referral: DDKT 1/6/2024  Past Medical History Relevant to Rehab: PMH of HLD, pulmonary hypertension, ESRD 2/2 HTN and DM2, on on dialysis MWF through L arm AVF.  Co-Treatment: PT  Co-Treatment Reason: co-session for pt's safety with  mobility  Prior to Session Communication: Bedside nurse  Patient Position Received: Up in chair, Alarm off, not on at start of session  Family/Caregiver Present: Yes   Precautions:  Medical Precautions: Abdominal precautions, Fall precautions  Post-Surgical Precautions: Abdominal surgery precautions    Pain:  Pain Assessment  Pain Assessment: 0-10  0-10 (Numeric) Pain Score: 2  Pain Type: Surgical pain  Pain Location: Abdomen  Pain Interventions: Repositioned        Objective   Cognition:  Overall Cognitive Status: Within Functional Limits  Orientation Level: Oriented X4           Home Living:  Type of Home: House  Lives With: Spouse  Home Adaptive Equipment: Walker rolling or standard  Home Layout: One level, Able to live on main level with bedroom/bathroom  Home Access: Stairs to enter with rails  Entrance Stairs-Number of Steps: 4  Bathroom Shower/Tub: Tub/shower unit   Prior Function:  Level of [place]: Independent with ADLs and functional transfers  Receives Help From: Family  ADL Assistance: Independent  Homemaking Assistance: Independent  Ambulatory Assistance: Independent  Vocational: Retired  Prior Function Comments: +drives, denies falls  IADL History:     ADL:  Eating Assistance: Independent  Grooming Assistance: Stand by  Grooming Deficit:  (anticipate)  UE Dressing Assistance: Minimal  UE Dressing Deficit: Setup (donning and doffing a gown in seated position)  LE Dressing Assistance: Minimal  LE Dressing Deficit:  (anticipate. Pt was educated on adaptive ways to complete lower body ADLs while ahdering to abd precautions.)  Activity Tolerance:  Endurance: Endurance does not limit participation in activity  Balance:  Dynamic Standing Balance  Dynamic Standing-Balance Support: Bilateral upper extremity supported  Dynamic Standing-Level of Assistance: Contact guard  Dynamic Standing-Balance:  (using a wheeled walker)  Bed Mobility/Transfers: Bed Mobility  Bed Mobility: No   and Transfers  Transfer:  Yes  Transfer 1  Transfer From 1: Chair with arms to  Transfer to 1: Stand  Technique 1: Sit to stand, Stand to sit  Transfer Device 1: Walker  Transfer Level of Assistance 1: Contact guard, Minimal verbal cues       Vision:     and Vision - Complex Assessment  Ocular Range of Motion: Within Functional Limits  Sensation:  Light Touch: No apparent deficits       Perception:  Inattention/Neglect: Appears intact  Coordination:  Movements are Fluid and Coordinated: Yes   Hand Function:  Hand Function  Gross Grasp: Functional  Coordination: Functional  Extremities: RUE   RUE : Within Functional Limits, LUE   LUE: Within Functional Limits,      Outcome Measures: [facility] Daily Activity  Putting on and taking off regular lower body clothing: A little  Bathing (including washing, rinsing, drying): A little  Putting on and taking off regular upper body clothing: A little  Toileting, which includes using toilet, bedpan or urinal: A little  Taking care of personal grooming such as brushing teeth: None  Eating Meals: None  Daily Activity - Total Score: 20         ,     OT Adult Other Outcome Measures  4AT: negative    Education Documentation  Handouts, taught by Beth Bassett OT at 1/7/2025  3:59 PM.  Learner: Patient  Readiness: Acceptance  Method: Explanation  Response: Verbalizes Understanding    Body Mechanics, taught by Beth Bassett OT at 1/7/2025  3:59 PM.  Learner: Patient  Readiness: Acceptance  Method: Explanation  Response: Verbalizes Understanding    Precautions, taught by Beth Bassett OT at 1/7/2025  3:59 PM.  Learner: Patient  Readiness: Acceptance  Method: Explanation  Response: Verbalizes Understanding    Home Exercise Program, taught by Beth Bassett OT at 1/7/2025  3:59 PM.  Learner: Patient  Readiness: Acceptance  Method: Explanation  Response: Verbalizes Understanding    ADL Training, taught by Beth Bassett OT at 1/7/2025  3:59 PM.  Learner: Patient  Readiness: Acceptance  Method:  Explanation  Response: Verbalizes Understanding    Education Comments  No comments found.        Goals:   Encounter Problems       Encounter Problems (Active)       ADLs       Patient will perform UB and LB bathing  with modified independent level of assistance and grab bars. (Progressing)       Start:  01/07/25    Expected End:  01/28/25            Patient with complete lower body dressing with modified independent level of assistance donning and doffing all LE clothes  with PRN adaptive equipment while edge of bed  (Progressing)       Start:  01/07/25    Expected End:  01/28/25            Patient will complete toileting including hygiene clothing management/hygiene with modified independent level of assistance and grab bars. (Progressing)       Start:  01/07/25    Expected End:  01/28/25               BALANCE       Pt will maintain dynamic standing balance during ADL task with modified independent level of assistance in order to demonstrate decreased risk of falling and improved postural control. (Progressing)       Start:  01/07/25    Expected End:  01/28/25               MOBILITY       Patient will perform Functional mobility min Household distances/Community Distances with modified independent level of assistance and least restrictive device in order to improve safety and functional mobility. (Progressing)       Start:  01/07/25    Expected End:  01/28/25               TRANSFERS       Patient will perform bed mobility modified independent level of assistance and bed rails in order to improve safety and independence with mobility (Progressing)       Start:  01/07/25    Expected End:  01/28/25            Patient will complete sit to stand transfer with modified independent level of assistance and least restrictive device in order to improve safety and prepare for out of bed mobility. (Progressing)       Start:  01/07/25    Expected End:  01/28/25 01/07/25 at 4:00 PM   Beth Bassett  OTR/OTD  Rehab Office: [phone]

## 2025-01-07 NOTE — PROGRESS NOTES
I spoke to his spouse and they had no idea [employer] termed. I believe they spoke to socialwork regarding part D coverage. I discussed [facility] Medicaid with them. I sent an email with Medicare contact info and [facility] Medicaid information.

## 2025-01-07 NOTE — PROGRESS NOTES
"Anibal Dow is a 59 y.o. male on day 1 of admission presenting with ESRD (end stage renal disease) (Multi).  S/p DDKT 1/6/25    Subjective   POD1 DDKT. No acute event. Pain is well controlled. Good UOP with diuretics. No N/V.       Objective   Vital signs - reviewed.   General Appearance - NAD, Good speech, oriented and alert  HEENT - Supple. Not pale. No jaundice.   CVS - RRR. Normal S1/S2. No murmur, click , rub or gallop  Lungs- clear to auscultation bilaterally  Abdomen - soft , not tender, no guarding, no rigidity. No hepatosplenomegaly. Normal bowel sounds. No masses and ascites. S/P Kidney transplant. Incision C/D/I  Extremities - trace edema.   Neuro/Psych - appropriate mood and affect. Motor power V/V all extremities. CN I -XII were grossly intact.  Skin - No visible rash  Access - LUE AVG +thrill      Last Recorded Vitals  Blood pressure 156/86, pulse 85, temperature 36.1 °C (97 °F), temperature source Temporal, resp. rate 17, height 1.753 m (5' 9\"), weight 80.4 kg (177 lb 4 oz), SpO2 96%.  Intake/Output last 3 Shifts:  I/O last 3 completed shifts:  In: 6781.4 (84.3 mL/kg) [I.V.:3955 (49.2 mL/kg); IV Piggyback:2826.4]  Out: 3430 (42.7 mL/kg) [Urine:2960 (1 mL/kg/hr); Drains:220; Blood:250]  Weight: 80.4 kg     Relevant Results  Results for orders placed or performed during the hospital encounter of 01/06/25 (from the past 96 hours)   Blood Gas Venous Full Panel   Result Value Ref Range    POCT pH, Venous 7.43 7.33 - 7.43 pH    POCT pCO2, Venous 52 (H) 41 - 51 mm Hg    POCT pO2, Venous 41 35 - 45 mm Hg    POCT SO2, Venous 69 45 - 75 %    POCT Oxy Hemoglobin, Venous 67.9 45.0 - 75.0 %    POCT Hematocrit Calculated, Venous 36.0 (L) 41.0 - 52.0 %    POCT Sodium, Venous 134 (L) 136 - 145 mmol/L    POCT Potassium, Venous 5.5 (H) 3.5 - 5.3 mmol/L    POCT Chloride, Venous 93 (L) 98 - 107 mmol/L    POCT Ionized Calicum, Venous 1.19 1.10 - 1.33 mmol/L    POCT Glucose, Venous 210 (H) 74 - 99 mg/dL    POCT " Lactate, Venous 1.1 0.4 - 2.0 mmol/L    POCT Base Excess, Venous 8.7 (H) -2.0 - 3.0 mmol/L    POCT HCO3 Calculated, Venous 34.5 (H) 22.0 - 26.0 mmol/L    POCT Hemoglobin, Venous 12.0 (L) 13.5 - 17.5 g/dL    POCT Anion Gap, Venous 12.0 10.0 - 25.0 mmol/L    Patient Temperature 37.0 degrees Celsius    FiO2 21 %   Raudel-Barr Virus Nuclear Antigen Antibody, IgG   Result Value Ref Range    EBV Nuclear Antigen Antibody, IgG Positive (A) Negative   Coagulation Screen   Result Value Ref Range    Protime 12.7 9.8 - 12.8 seconds    INR 1.1 0.9 - 1.1    aPTT 33 27 - 38 seconds   Cytomegalovirus IgG   Result Value Ref Range    Cytomegalovirus IgG Reactive (A) Nonreactive   Hepatic Function Panel   Result Value Ref Range    Albumin 4.3 3.4 - 5.0 g/dL    Bilirubin, Total 0.5 0.0 - 1.2 mg/dL    Bilirubin, Direct 0.1 0.0 - 0.3 mg/dL    Alkaline Phosphatase 71 33 - 120 U/L    ALT 9 (L) 10 - 52 U/L    AST 16 9 - 39 U/L    Total Protein 7.7 6.4 - 8.2 g/dL   Hepatitis C Antibody   Result Value Ref Range    Hepatitis C AB Nonreactive Nonreactive   Hepatitis B Core Antibody, Total   Result Value Ref Range    Hepatitis B Core AB- Total Nonreactive Nonreactive   Hepatitis B Surface Antibody   Result Value Ref Range    Hepatitis B Surface AB >1,000.0 (H) <10.0 mIU/mL   Hepatitis B Surface Antigen   Result Value Ref Range    Hepatitis B Surface AG Nonreactive Nonreactive   HIV 1/2 Antigen/Antibody Screen with Reflex to Confirmation   Result Value Ref Range    HIV 1/2 Antigen/Antibody Screen with Reflex to Confirmation Nonreactive Nonreactive   Phosphorus   Result Value Ref Range    Phosphorus 6.0 (H) 2.5 - 4.9 mg/dL   Basic Metabolic Panel   Result Value Ref Range    Glucose 201 (H) 74 - 99 mg/dL    Sodium 136 136 - 145 mmol/L    Potassium 5.5 (H) 3.5 - 5.3 mmol/L    Chloride 89 (L) 98 - 107 mmol/L    Bicarbonate 32 21 - 32 mmol/L    Anion Gap 21 (H) 10 - 20 mmol/L    Urea Nitrogen 83 (H) 6 - 23 mg/dL    Creatinine 14.35 (H) 0.50 - 1.30  mg/dL    eGFR 4 (L) >60 mL/min/1.73m*2    Calcium 10.5 8.6 - 10.6 mg/dL   Type And Screen   Result Value Ref Range    ABO TYPE O     Rh TYPE POS     ANTIBODY SCREEN NEG    CBC   Result Value Ref Range    WBC 7.6 4.4 - 11.3 x10*3/uL    nRBC 0.0 0.0 - 0.0 /100 WBCs    RBC 4.18 (L) 4.50 - 5.90 x10*6/uL    Hemoglobin 12.2 (L) 13.5 - 17.5 g/dL    Hematocrit 36.2 (L) 41.0 - 52.0 %    MCV 87 80 - 100 fL    MCH 29.2 26.0 - 34.0 pg    MCHC 33.7 32.0 - 36.0 g/dL    RDW 13.0 11.5 - 14.5 %    Platelets 182 150 - 450 x10*3/uL   POCT GLUCOSE   Result Value Ref Range    POCT Glucose 192 (H) 74 - 99 mg/dL   POCT GLUCOSE   Result Value Ref Range    POCT Glucose 183 (H) 74 - 99 mg/dL   Blood Gas Arterial Full Panel Unsolicited   Result Value Ref Range    POCT pH, Arterial 7.54 (H) 7.38 - 7.42 pH    POCT pCO2, Arterial 32 (L) 38 - 42 mm Hg    POCT pO2, Arterial 181 (H) 85 - 95 mm Hg    POCT SO2, Arterial 97 94 - 100 %    POCT Oxy Hemoglobin, Arterial 97.4 94.0 - 98.0 %    POCT Hematocrit Calculated, Arterial 27.0 (L) 41.0 - 52.0 %    POCT Sodium, Arterial 133 (L) 136 - 145 mmol/L    POCT Potassium, Arterial 5.2 3.5 - 5.3 mmol/L    POCT Chloride, Arterial 98 98 - 107 mmol/L    POCT Ionized Calcium, Arterial 1.04 (L) 1.10 - 1.33 mmol/L    POCT Glucose, Arterial 168 (H) 74 - 99 mg/dL    POCT Lactate, Arterial 1.1 0.4 - 2.0 mmol/L    POCT Base Excess, Arterial 4.8 (H) -2.0 - 3.0 mmol/L    POCT HCO3 Calculated, Arterial 27.4 (H) 22.0 - 26.0 mmol/L    POCT Hemoglobin, Arterial 8.9 (L) 13.5 - 17.5 g/dL    POCT Anion Gap, Arterial 13 10 - 25 mmo/L    Patient Temperature 37.0 degrees Celsius    FiO2 50 %   Coox Panel, Arterial Unsolicited   Result Value Ref Range    POCT Hemoglobin, Arterial 8.9 (L) 13.5 - 17.5 g/dL    POCT Oxy Hemoglobin, Arterial 97.4 94.0 - 98.0 %    POCT Carboxyhemoglobin, Arterial 0.0 %    POCT Methemoglobin, Arterial 0.0 0.0 - 1.5 %    POCT Deoxy Hemoglobin, Arterial 2.6 0.0 - 5.0 %   CBC   Result Value Ref Range    WBC  4.7 4.4 - 11.3 x10*3/uL    nRBC 0.0 0.0 - 0.0 /100 WBCs    RBC 3.46 (L) 4.50 - 5.90 x10*6/uL    Hemoglobin 9.8 (L) 13.5 - 17.5 g/dL    Hematocrit 30.6 (L) 41.0 - 52.0 %    MCV 88 80 - 100 fL    MCH 28.3 26.0 - 34.0 pg    MCHC 32.0 32.0 - 36.0 g/dL    RDW 13.0 11.5 - 14.5 %    Platelets 128 (L) 150 - 450 x10*3/uL   Renal Function Panel   Result Value Ref Range    Glucose 186 (H) 74 - 99 mg/dL    Sodium 132 (L) 136 - 145 mmol/L    Potassium 6.1 (HH) 3.5 - 5.3 mmol/L    Chloride 91 (L) 98 - 107 mmol/L    Bicarbonate 27 21 - 32 mmol/L    Anion Gap 20 10 - 20 mmol/L    Urea Nitrogen 86 (H) 6 - 23 mg/dL    Creatinine 13.96 (H) 0.50 - 1.30 mg/dL    eGFR 4 (L) >60 mL/min/1.73m*2    Calcium 10.5 8.6 - 10.6 mg/dL    Phosphorus 5.1 (H) 2.5 - 4.9 mg/dL    Albumin 3.5 3.4 - 5.0 g/dL   POCT GLUCOSE   Result Value Ref Range    POCT Glucose 187 (H) 74 - 99 mg/dL   POCT GLUCOSE   Result Value Ref Range    POCT Glucose 316 (H) 74 - 99 mg/dL   POCT GLUCOSE   Result Value Ref Range    POCT Glucose 174 (H) 74 - 99 mg/dL   Blood Gas Arterial Full Panel   Result Value Ref Range    POCT pH, Arterial 7.35 (L) 7.38 - 7.42 pH    POCT pCO2, Arterial 48 (H) 38 - 42 mm Hg    POCT pO2, Arterial 101 (H) 85 - 95 mm Hg    POCT SO2, Arterial 96 94 - 100 %    POCT Oxy Hemoglobin, Arterial 96.0 94.0 - 98.0 %    POCT Hematocrit Calculated, Arterial 29.0 (L) 41.0 - 52.0 %    POCT Sodium, Arterial 133 (L) 136 - 145 mmol/L    POCT Potassium, Arterial 4.4 3.5 - 5.3 mmol/L    POCT Chloride, Arterial 97 (L) 98 - 107 mmol/L    POCT Ionized Calcium, Arterial 1.20 1.10 - 1.33 mmol/L    POCT Glucose, Arterial 138 (H) 74 - 99 mg/dL    POCT Lactate, Arterial 2.8 (H) 0.4 - 2.0 mmol/L    POCT Base Excess, Arterial 0.5 -2.0 - 3.0 mmol/L    POCT HCO3 Calculated, Arterial 26.5 (H) 22.0 - 26.0 mmol/L    POCT Hemoglobin, Arterial 9.7 (L) 13.5 - 17.5 g/dL    POCT Anion Gap, Arterial 14 10 - 25 mmo/L    Patient Temperature 37.0 degrees Celsius    FiO2 35 %   Blood Gas  Lactic Acid, Venous   Result Value Ref Range    POCT Lactate, Venous 2.8 (H) 0.4 - 2.0 mmol/L   POCT GLUCOSE   Result Value Ref Range    POCT Glucose 156 (H) 74 - 99 mg/dL   Magnesium   Result Value Ref Range    Magnesium 2.22 1.60 - 2.40 mg/dL   Renal Function Panel   Result Value Ref Range    Glucose 204 (H) 74 - 99 mg/dL    Sodium 134 (L) 136 - 145 mmol/L    Potassium 5.6 (H) 3.5 - 5.3 mmol/L    Chloride 96 (L) 98 - 107 mmol/L    Bicarbonate 20 (L) 21 - 32 mmol/L    Anion Gap 24 (H) 10 - 20 mmol/L    Urea Nitrogen 83 (H) 6 - 23 mg/dL    Creatinine 11.89 (H) 0.50 - 1.30 mg/dL    eGFR 4 (L) >60 mL/min/1.73m*2    Calcium 8.3 (L) 8.6 - 10.6 mg/dL    Phosphorus 5.6 (H) 2.5 - 4.9 mg/dL    Albumin 3.3 (L) 3.4 - 5.0 g/dL   CBC and Auto Differential   Result Value Ref Range    WBC 10.8 4.4 - 11.3 x10*3/uL    nRBC 0.0 0.0 - 0.0 /100 WBCs    RBC 2.99 (L) 4.50 - 5.90 x10*6/uL    Hemoglobin 8.4 (L) 13.5 - 17.5 g/dL    Hematocrit 28.0 (L) 41.0 - 52.0 %    MCV 94 80 - 100 fL    MCH 28.1 26.0 - 34.0 pg    MCHC 30.0 (L) 32.0 - 36.0 g/dL    RDW 13.5 11.5 - 14.5 %    Platelets 84 (L) 150 - 450 x10*3/uL    Neutrophils % 94.5 40.0 - 80.0 %    Immature Granulocytes %, Automated 1.0 (H) 0.0 - 0.9 %    Lymphocytes % 0.6 13.0 - 44.0 %    Monocytes % 3.8 2.0 - 10.0 %    Eosinophils % 0.0 0.0 - 6.0 %    Basophils % 0.1 0.0 - 2.0 %    Neutrophils Absolute 10.23 (H) 1.20 - 7.70 x10*3/uL    Immature Granulocytes Absolute, Automated 0.11 0.00 - 0.70 x10*3/uL    Lymphocytes Absolute 0.06 (L) 1.20 - 4.80 x10*3/uL    Monocytes Absolute 0.41 0.10 - 1.00 x10*3/uL    Eosinophils Absolute 0.00 0.00 - 0.70 x10*3/uL    Basophils Absolute 0.01 0.00 - 0.10 x10*3/uL       This patient has a urinary catheter   Reason for the urinary catheter remaining today? perioperative use for selected surgical procedures    Assessment/Plan   Assessment & Plan  ESRD (end stage renal disease) (Multi)    CKD (chronic kidney disease)    Kidney replaced by transplant  ([facility])    This is a 60 yo M with ESRD due to long-standing diabetes and hypertension, primary hypertension admitted for potential DDKT.  DDKT 1/6/25  Immunologic Risk: cPRA 90%, XM neg  Kidney Info: DCD, KDPI 83%, CIT 15 hrs, WIT 31 min  CMV D-/R+, EBV D+/R+, Toxo D-, HBV D-/R immune, HCV D-/R-      Allograft function  No evidence of clearance but UOP ok  Lasix 80 mg IV q8h  POD0 US ok    Immunosuppression  Induction ATG 4.5 mg/kg - #2 today  TAC 2 mg BID  MMF 1000 mg BID  Steroid taper    Prophylaxis:  PPI  clotrimazole 10 mg TID x 3 mo  TMP-SMX x 6 mo  Valcyte x 3 mo, renally dosed    Blood pressure/Volume - mild volume overload  Diuresis as above  Coreg 12.5 mg BID    Type 2 diabetes with steroid induced hyperglycemia  Basal bolus insulin per endo  Lipitor 40 mg daily    Secondary hyperparathyroidism  cCa ok, mild hyperPhos as expected given the degree of kidney function      Deyanira Sheppard MD

## 2025-01-07 NOTE — PROGRESS NOTES
01/07/25 1106   Discharge Planning   Living Arrangements Spouse/significant other   Support Systems Spouse/significant other   Assistance Needed None   Type of Residence Private residence   Home or Post Acute Services None   Expected Discharge Disposition Home   Does the patient need discharge transport arranged? No   Financial Resource Strain   How hard is it for you to pay for the very basics like food, housing, medical care, and heating? Not hard   Housing Stability   In the last 12 months, was there a time when you were not able to pay the mortgage or rent on time? N   Transportation Needs   In the past 12 months, has lack of transportation kept you from medical appointments or from getting medications? no       DC Planning:  Went in and met with the pt, confirmed demographics.     Transitional Care Coordination Progress Note:  Patient discussed during interdisciplinary rounds.     Plan per Medical/Surgical team:    Home Care choice for home going needs, [facility]  Pt Needs: TBD.      Payer: Medicare    Status: INP    Discharge disposition: TBD    Potential Barriers: None    ADOD:  1/10    This TCC will continue to follow for home going needs and safe DC plan.      Brigitte Pleitez. HANSEL. TCC.

## 2025-01-07 NOTE — CARE PLAN
The patient's goals for the shift include      The clinical goals for the shift include pt will be comfortable, safe and HDS throughout shift      Problem: Diabetes  Goal: Achieve decreasing blood glucose levels by end of shift  Outcome: Progressing  Goal: Increase stability of blood glucose readings by end of shift  Outcome: Progressing  Goal: Decrease in ketones present in urine by end of shift  Outcome: Progressing  Goal: Maintain electrolyte levels within acceptable range throughout shift  Outcome: Progressing  Goal: Maintain glucose levels >70mg/dl to <250mg/dl throughout shift  Outcome: Progressing  Goal: No changes in neurological exam by end of shift  Outcome: Progressing  Goal: Learn about and adhere to nutrition recommendations by end of shift  Outcome: Progressing  Goal: Vital signs within normal range for age by end of shift  Outcome: Progressing  Goal: Increase self care and/or family involovement by end of shift  Outcome: Progressing  Goal: Receive DSME education by end of shift  Outcome: Progressing     Problem: Pain - Adult  Goal: Verbalizes/displays adequate comfort level or baseline comfort level  Outcome: Progressing     Problem: Safety - Adult  Goal: Free from fall injury  Outcome: Progressing     Problem: Discharge Planning  Goal: Discharge to home or other facility with appropriate resources  Outcome: Progressing     Problem: Chronic Conditions and Co-morbidities  Goal: Patient's chronic conditions and co-morbidity symptoms are monitored and maintained or improved  Outcome: Progressing

## 2025-01-07 NOTE — SIGNIFICANT EVENT
01/07/25 0941   Patient Interaction   Organ Kidney   Type of Interaction Morning rounds   Interdisciplinary Rounds   Attendance Surgeon;Physician;JAIR;Coordinator;Pharmacist   Topics Discussed Diet;Medications;Blood test results;Imaging/test results

## 2025-01-07 NOTE — PROGRESS NOTES
"Anibal Dow is a 59 y.o. male on day 1 of admission presenting with Kidney replaced by transplant ([facility]).    Subjective   No acute events       Objective   Vitals:    01/07/25 0900   BP: 106/69   Pulse: 80   Resp: 16   Temp: 35.9 °C (96.6 °F)   SpO2: 97%       Physical Exam  Constitutional:       Appearance: Normal appearance.   Eyes:      Pupils: Pupils are equal, round, and reactive to light.   Cardiovascular:      Rate and Rhythm: Normal rate.   Pulmonary:      Effort: Pulmonary effort is normal. No respiratory distress.   Abdominal:      General: There is no distension.      Palpations: Abdomen is soft.      Comments: Incision clean, dry, intact   Musculoskeletal:         General: Normal range of motion.      Right lower leg: No edema.      Left lower leg: No edema.   Skin:     General: Skin is warm and dry.   Neurological:      General: No focal deficit present.      Mental Status: He is alert and oriented to person, place, and time.   Psychiatric:         Mood and Affect: Mood normal.         Behavior: Behavior normal.         Last Recorded Vitals  Blood pressure 106/69, pulse 80, temperature 35.9 °C (96.6 °F), temperature source Temporal, resp. rate 16, height 1.753 m (5' 9\"), weight 80.4 kg (177 lb 4 oz), SpO2 97%.  Intake/Output last 3 Shifts:  I/O last 3 completed shifts:  In: 6781.4 (84.3 mL/kg) [I.V.:3955 (49.2 mL/kg); IV Piggyback:2826.4]  Out: 3430 (42.7 mL/kg) [Urine:2960 (1 mL/kg/hr); Drains:220; Blood:250]  Weight: 80.4 kg     Relevant Results  Lab Results   Component Value Date    WBC 10.8 01/07/2025    HGB 8.4 (L) 01/07/2025    HCT 28.0 (L) 01/07/2025    MCV 94 01/07/2025    PLT 84 (L) 01/07/2025     Lab Results   Component Value Date    GLUCOSE 204 (H) 01/07/2025    CALCIUM 8.3 (L) 01/07/2025     (L) 01/07/2025    K 5.6 (H) 01/07/2025    CO2 20 (L) 01/07/2025    CL 96 (L) 01/07/2025    BUN 83 (H) 01/07/2025    CREATININE 11.89 (H) 01/07/2025       acetaminophen, 650 mg, oral, " q6h  atorvastatin, 40 mg, oral, Daily  carvedilol, 12.5 mg, oral, BID  clotrimazole, 10 mg, oral, TID after meals  docusate sodium, 100 mg, oral, BID  furosemide, 80 mg, intravenous, q8h JOEY  insulin lispro, 0-5 Units, subcutaneous, TID AC  insulin NPH (Isophane), 10 Units, subcutaneous, Daily  [START ON 1/8/2025] methylPREDNISolone sodium succinate (PF), 125 mg, intravenous, Once  [START ON 1/9/2025] methylPREDNISolone sodium succinate (PF), 60 mg, intravenous, Once  methylPREDNISolone sodium succinate (PF), 250 mg, intravenous, Once  mycophenolate, 1,000 mg, oral, q12h JOEY  oxygen, , inhalation, Continuous - Inhalation  [START ON 1/10/2025] predniSONE, 20 mg, oral, Daily  sennosides, 1 tablet, oral, BID  sodium zirconium cyclosilicate, 10 g, oral, Once  sulfamethoxazole-trimethoprim, 1 tablet, oral, Daily  tacrolimus, 2 mg, oral, q12h JOEY  valGANciclovir, 450 mg, oral, q48h          Assessment/Plan   Assessment & Plan    DDKT 1/6/2024    Kidney allograft function   UOP 2900   Good function, no DGF, creatinine slightly down    Immunosuppression   Thymo 2nd dose today to complete 3mg/kg   Tac 2/2   MMF 1000/1000   Pred taper    DM   Endocrine    I spent 35 minutes in the professional and overall care of this patient.      Javed Prince MD

## 2025-01-07 NOTE — OP NOTE
TRANSPLANT, KIDNEY Operative Note     Date: 2025  OR Location: [facility] OR    Name: Anibal Dow, : 1965, Age: 59 y.o., MRN: [id number], Sex: male    Diagnosis  Pre-op Diagnosis      * ESRD (end stage renal disease) (Multi) [N18.6] Post-op Diagnosis     * ESRD (end stage renal disease) (Multi) [N18.6]     Procedures  TRANSPLANT, KIDNEY  90932 - PA RENAL ALTRNSPLJ IMPLTJ GRF W/O RCP NEPHRECTOMY    Bench preparation of kidney allograft  Creation of cava extension graft  Kidney transplant from  donor (right kidney into right pelvis)  Insertion of ureteral stent    Surgeons      * Javed Prince - Primary    Resident/Fellow/Other Assistant:  Surgeons and Role:     * Abisai Joiner MD - Resident - Assisting    Staff:   Circulator: Tam Espinosa Person: Soheila    Anesthesia Staff: Anesthesiologist: Annalisa Herzog MD  C-AA: ALISIA Ramirez; ALISIA Flores    Procedure Summary  Anesthesia: General  ASA: IV  Estimated Blood Loss: 250 mL  Intra-op Medications: * Intraprocedure medication information is unavailable because the case start and end events have not been set *           Anesthesia Record               Intraprocedure I/O Totals          Intake    electrolyte-A 2000.00 mL    anti-thymocyte globulin rabbit (Thymoglobulin) 125 mg, hydrocortisone sodium succinate (PF) (Solu-CORTEF) 20 mg, heparin 1,000 Units in sodium chloride 0.9% 500 mL .40 mL    Total Intake 2526.4 mL       Output    Urine 100 mL    Est. Blood Loss 250 mL    Total Output 350 mL       Net    Net Volume 2176.4 mL          Specimen: No specimens collected              Drains and/or Catheters:   Closed/Suction Drain 1 RLQ Bulb 10 Fr. (Active)   Site Description Leaking at site 25 1900   Dressing Status New drainage 25 1900   Drainage Appearance Bloody 25 190   Status To bulb suction 25 190   Output (mL) 30 mL 25 0000       Urethral Catheter Straight-tip;Non-latex 16 Fr.  (Active)   Site Assessment Clean 01/07/25 0008   Collection Container Standard drainage bag 01/06/25 1900   Securement Method Securing device (Describe) 01/06/25 1900   Reason for Continuing Urinary Catheterization accurate hourly measurement of urine volume in a critically ill patient that cannot be assessed by other volumes and urine collection strategies 01/06/25 1900   Output (mL) 150 mL 01/07/25 0600       Tourniquet Times:         Implants:  Implants       Type Name Action Serial No.      Stent STENT, POLARIS ULTRA 5FR X 12CM, W/O WIRE - [id number] Implanted      Other Cardiac Implant PATCH, TACHOSIL, 9.5CM X 4.8CM, ABSORBABLE FIBRIN SEALANT - [id number] Implanted               Findings:   Final pump flow 177, resistance 0.1    Indications: Anibal Dow is an 59 y.o. male who is having surgery for kidney transplant    The patient was seen in the preoperative area. The risks, benefits, complications, treatment options, non-operative alternatives, expected recovery and outcomes were discussed with the patient. The possibilities of reaction to medication, pulmonary aspiration, injury to surrounding structures, bleeding, recurrent infection, the need for additional procedures, failure to diagnose a condition, and creating a complication requiring transfusion or operation were discussed with the patient. The patient concurred with the proposed plan, giving informed consent.  The site of surgery was properly noted/marked if necessary per policy. The patient has been actively warmed in preoperative area. Preoperative antibiotics have been ordered and given within 1 hours of incision. Venous thrombosis prophylaxis have been ordered including bilateral sequential compression devices    Procedure Details:       The organ was removed from storage using sterile technique and brought up into a basin with ice. The organ was the RIGHT kidney. UNOS donor ID: [id number]. The ureter had been tagged and protected. The renal  vein was identified with cava attached, and all branches and tributaries tied off. The artery had been procured with a full Carrel patch. The artery was skeletonized. The periarterial tissue was carefully dissected and tied. The aortic patch was then trimmed to size in preparation for implantation. Following this, the remainder of the perinephric fat were tied-off and removed. Cava extension graft was created using TA vascular stapler x 2 in standard fashion. Once this was done, the organ was replaced in a basin full of slushed ice in preparation for implantation.     The patient was brought to the operating room, placed in a supine position, a huddle was performed. Sequential compression devices were placed. At this point, ABO verification was performed confirming correct organ and compatibility with patient awake. After this, general endotracheal anesthesia was induced.  The patient was given IV antibiotics and a 3 way Aragon catheter. Appropriate lines were placed by Anesthesia service.  The abdomen was shaved, prepped, and draped in the usual sterile fashion. Methylprednisolone and Thymoglobulin_ were administered. A Armando incision was made in the __RLQ and carried down to the subcutaneous tissue and the abdominal wall fascia. The Retroperitoneal space was entered. The peritoneum was peeled medially. The epigastric vessels were double ligated and divided using silk tie and surgical clips. The external iliac artery and vein were exposed. The bookwalter retractor was placed and the lymphatics overlying the vessels were serially ligated and divided.     We applied atraumatic vascular clamps on the iliac vein and the donor kidney was brought to the operative field. We made an appropriate size venotomy and the donor  renal vein was anastomosed to the recipient RIGHT External Iliac vein in an end-to-side fashion with running 5-0 prolene suture. An arteriotomy was made and the donor renal artery was anastomosed to the  recipient RIGHT External iliac artery in an end to side fashion with running 6-0 prolene suture. The patient was simultaneously loaded with IV mannitol, Lasix and volume. The clamps were removed and kidney allograft reperfused. Anastomosis time was 31 mins. The total Cold ischemic time was 15 hours and 21 minutes. The kidney had an excellent reperfusion and was Pink and had Firm turgor. Hemostasis was meticulously achieved.    The bladder was identified by clamping the medrano and filled with irrigation fluids. The donor ureter was shortened to the appropriate length and spatulated. Ureteroneocystostomy was created using 5-0 PDS in running fashion over double J ureteral stent. Lich-Gregoir was performed to prevent reflux using 4-0 PDS. The kidney was making urine.    Hemostasis was checked, the anastomoses inspected, and the kidney placed in the iliac fossa. After placement, the vessel lay was inspected and found to be acceptable. The kidney position was Extra-peritoneal. The field was irrigated thoroughly. The retractor was removed and the abdominal wall fascia re-approximated with running #1 Prolene suture in 2 layers. Subcutaneous tissues were irrigated, and approximated with 3-0 Vicryl stitches.  The skin was closed with staples. All counts were correct. I was presented in the entire case.        Complications:  None; patient tolerated the procedure well.    Disposition: PACU - hemodynamically stable.  Condition: stable       Attending Attestation: I was present and scrubbed for the entire procedure.    Javed Prince  Phone Number: [phone]

## 2025-01-08 LAB
ALBUMIN SERPL BCP-MCNC: 3.6 G/DL (ref 3.4–5)
ANION GAP SERPL CALC-SCNC: 21 MMOL/L (ref 10–20)
BASOPHILS # BLD AUTO: 0 X10*3/UL (ref 0–0.1)
BASOPHILS NFR BLD AUTO: 0 %
BUN SERPL-MCNC: 92 MG/DL (ref 6–23)
CALCIUM SERPL-MCNC: 8.3 MG/DL (ref 8.6–10.6)
CHLORIDE SERPL-SCNC: 90 MMOL/L (ref 98–107)
CO2 SERPL-SCNC: 25 MMOL/L (ref 21–32)
CREAT SERPL-MCNC: 11.47 MG/DL (ref 0.5–1.3)
EGFRCR SERPLBLD CKD-EPI 2021: 5 ML/MIN/1.73M*2
EOSINOPHIL # BLD AUTO: 0 X10*3/UL (ref 0–0.7)
EOSINOPHIL NFR BLD AUTO: 0 %
ERYTHROCYTE [DISTWIDTH] IN BLOOD BY AUTOMATED COUNT: 12.8 % (ref 11.5–14.5)
GLUCOSE BLD MANUAL STRIP-MCNC: 177 MG/DL (ref 74–99)
GLUCOSE BLD MANUAL STRIP-MCNC: 261 MG/DL (ref 74–99)
GLUCOSE BLD MANUAL STRIP-MCNC: 303 MG/DL (ref 74–99)
GLUCOSE BLD MANUAL STRIP-MCNC: 330 MG/DL (ref 74–99)
GLUCOSE SERPL-MCNC: 359 MG/DL (ref 74–99)
HCT VFR BLD AUTO: 28.2 % (ref 41–52)
HGB BLD-MCNC: 9.2 G/DL (ref 13.5–17.5)
IMM GRANULOCYTES # BLD AUTO: 0.08 X10*3/UL (ref 0–0.7)
IMM GRANULOCYTES NFR BLD AUTO: 0.7 % (ref 0–0.9)
LYMPHOCYTES # BLD AUTO: 0.11 X10*3/UL (ref 1.2–4.8)
LYMPHOCYTES NFR BLD AUTO: 1 %
MAGNESIUM SERPL-MCNC: 2.2 MG/DL (ref 1.6–2.4)
MCH RBC QN AUTO: 28.8 PG (ref 26–34)
MCHC RBC AUTO-ENTMCNC: 32.6 G/DL (ref 32–36)
MCV RBC AUTO: 88 FL (ref 80–100)
MONOCYTES # BLD AUTO: 0.41 X10*3/UL (ref 0.1–1)
MONOCYTES NFR BLD AUTO: 3.6 %
NEUTROPHILS # BLD AUTO: 10.78 X10*3/UL (ref 1.2–7.7)
NEUTROPHILS NFR BLD AUTO: 94.7 %
NRBC BLD-RTO: 0 /100 WBCS (ref 0–0)
PHOSPHATE SERPL-MCNC: 8.4 MG/DL (ref 2.5–4.9)
PLATELET # BLD AUTO: 121 X10*3/UL (ref 150–450)
POTASSIUM SERPL-SCNC: 4.3 MMOL/L (ref 3.5–5.3)
RBC # BLD AUTO: 3.19 X10*6/UL (ref 4.5–5.9)
SODIUM SERPL-SCNC: 132 MMOL/L (ref 136–145)
TACROLIMUS BLD-MCNC: 26.8 NG/ML
WBC # BLD AUTO: 11.4 X10*3/UL (ref 4.4–11.3)

## 2025-01-08 PROCEDURE — 99233 SBSQ HOSP IP/OBS HIGH 50: CPT | Performed by: STUDENT IN AN ORGANIZED HEALTH CARE EDUCATION/TRAINING PROGRAM

## 2025-01-08 PROCEDURE — 85025 COMPLETE CBC W/AUTO DIFF WBC: CPT | Performed by: STUDENT IN AN ORGANIZED HEALTH CARE EDUCATION/TRAINING PROGRAM

## 2025-01-08 PROCEDURE — 97116 GAIT TRAINING THERAPY: CPT | Mod: GP | Performed by: PHYSICAL THERAPIST

## 2025-01-08 PROCEDURE — 2500000002 HC RX 250 W HCPCS SELF ADMINISTERED DRUGS (ALT 637 FOR MEDICARE OP, ALT 636 FOR OP/ED): Performed by: NURSE PRACTITIONER

## 2025-01-08 PROCEDURE — 80197 ASSAY OF TACROLIMUS: CPT | Performed by: STUDENT IN AN ORGANIZED HEALTH CARE EDUCATION/TRAINING PROGRAM

## 2025-01-08 PROCEDURE — 2500000001 HC RX 250 WO HCPCS SELF ADMINISTERED DRUGS (ALT 637 FOR MEDICARE OP): Performed by: STUDENT IN AN ORGANIZED HEALTH CARE EDUCATION/TRAINING PROGRAM

## 2025-01-08 PROCEDURE — 2500000001 HC RX 250 WO HCPCS SELF ADMINISTERED DRUGS (ALT 637 FOR MEDICARE OP)

## 2025-01-08 PROCEDURE — 83735 ASSAY OF MAGNESIUM: CPT | Performed by: STUDENT IN AN ORGANIZED HEALTH CARE EDUCATION/TRAINING PROGRAM

## 2025-01-08 PROCEDURE — 2500000005 HC RX 250 GENERAL PHARMACY W/O HCPCS: Performed by: STUDENT IN AN ORGANIZED HEALTH CARE EDUCATION/TRAINING PROGRAM

## 2025-01-08 PROCEDURE — 2500000002 HC RX 250 W HCPCS SELF ADMINISTERED DRUGS (ALT 637 FOR MEDICARE OP, ALT 636 FOR OP/ED)

## 2025-01-08 PROCEDURE — 80069 RENAL FUNCTION PANEL: CPT | Performed by: STUDENT IN AN ORGANIZED HEALTH CARE EDUCATION/TRAINING PROGRAM

## 2025-01-08 PROCEDURE — 99233 SBSQ HOSP IP/OBS HIGH 50: CPT | Performed by: NURSE PRACTITIONER

## 2025-01-08 PROCEDURE — 99232 SBSQ HOSP IP/OBS MODERATE 35: CPT | Performed by: TRANSPLANT SURGERY

## 2025-01-08 PROCEDURE — 2500000002 HC RX 250 W HCPCS SELF ADMINISTERED DRUGS (ALT 637 FOR MEDICARE OP, ALT 636 FOR OP/ED): Performed by: STUDENT IN AN ORGANIZED HEALTH CARE EDUCATION/TRAINING PROGRAM

## 2025-01-08 PROCEDURE — 2500000004 HC RX 250 GENERAL PHARMACY W/ HCPCS (ALT 636 FOR OP/ED): Mod: JZ,TB

## 2025-01-08 PROCEDURE — 82947 ASSAY GLUCOSE BLOOD QUANT: CPT

## 2025-01-08 PROCEDURE — 2500000004 HC RX 250 GENERAL PHARMACY W/ HCPCS (ALT 636 FOR OP/ED): Performed by: STUDENT IN AN ORGANIZED HEALTH CARE EDUCATION/TRAINING PROGRAM

## 2025-01-08 PROCEDURE — 1200000002 HC GENERAL ROOM WITH TELEMETRY DAILY

## 2025-01-08 PROCEDURE — 36415 COLL VENOUS BLD VENIPUNCTURE: CPT | Performed by: STUDENT IN AN ORGANIZED HEALTH CARE EDUCATION/TRAINING PROGRAM

## 2025-01-08 RX ORDER — ACETAMINOPHEN 325 MG/1
650 TABLET ORAL ONCE
Status: COMPLETED | OUTPATIENT
Start: 2025-01-08 | End: 2025-01-08

## 2025-01-08 RX ORDER — DIPHENHYDRAMINE HCL 25 MG
50 CAPSULE ORAL ONCE
Status: COMPLETED | OUTPATIENT
Start: 2025-01-08 | End: 2025-01-08

## 2025-01-08 RX ORDER — INSULIN LISPRO 100 [IU]/ML
5 INJECTION, SOLUTION INTRAVENOUS; SUBCUTANEOUS
Status: DISCONTINUED | OUTPATIENT
Start: 2025-01-08 | End: 2025-01-10

## 2025-01-08 RX ORDER — HEPARIN SODIUM 5000 [USP'U]/ML
5000 INJECTION, SOLUTION INTRAVENOUS; SUBCUTANEOUS EVERY 8 HOURS
Status: DISCONTINUED | OUTPATIENT
Start: 2025-01-08 | End: 2025-01-11 | Stop reason: HOSPADM

## 2025-01-08 RX ORDER — INSULIN LISPRO 100 [IU]/ML
2 INJECTION, SOLUTION INTRAVENOUS; SUBCUTANEOUS
Status: DISCONTINUED | OUTPATIENT
Start: 2025-01-08 | End: 2025-01-08

## 2025-01-08 RX ADMIN — CLOTRIMAZOLE 10 MG: 10 LOZENGE ORAL at 12:09

## 2025-01-08 RX ADMIN — ACETAMINOPHEN 650 MG: 325 TABLET ORAL at 23:32

## 2025-01-08 RX ADMIN — INSULIN LISPRO 8 UNITS: 100 INJECTION, SOLUTION INTRAVENOUS; SUBCUTANEOUS at 09:07

## 2025-01-08 RX ADMIN — HEPARIN SODIUM 5000 UNITS: 5000 INJECTION, SOLUTION INTRAVENOUS; SUBCUTANEOUS at 14:35

## 2025-01-08 RX ADMIN — INSULIN HUMAN 10 UNITS: 100 INJECTION, SUSPENSION SUBCUTANEOUS at 20:51

## 2025-01-08 RX ADMIN — PANTOPRAZOLE SODIUM 40 MG: 40 TABLET, DELAYED RELEASE ORAL at 06:22

## 2025-01-08 RX ADMIN — FUROSEMIDE 80 MG: 10 INJECTION, SOLUTION INTRAVENOUS at 14:35

## 2025-01-08 RX ADMIN — ACETAMINOPHEN 650 MG: 325 TABLET ORAL at 17:55

## 2025-01-08 RX ADMIN — ACETAMINOPHEN 650 MG: 325 TABLET ORAL at 01:50

## 2025-01-08 RX ADMIN — FUROSEMIDE 80 MG: 10 INJECTION, SOLUTION INTRAVENOUS at 06:21

## 2025-01-08 RX ADMIN — CARVEDILOL 12.5 MG: 12.5 TABLET, FILM COATED ORAL at 08:31

## 2025-01-08 RX ADMIN — FUROSEMIDE 80 MG: 10 INJECTION, SOLUTION INTRAVENOUS at 21:32

## 2025-01-08 RX ADMIN — INSULIN LISPRO 6 UNITS: 100 INJECTION, SOLUTION INTRAVENOUS; SUBCUTANEOUS at 17:24

## 2025-01-08 RX ADMIN — METHYLPREDNISOLONE SODIUM SUCCINATE 125 MG: 125 INJECTION, POWDER, FOR SOLUTION INTRAMUSCULAR; INTRAVENOUS at 08:31

## 2025-01-08 RX ADMIN — INSULIN LISPRO 8 UNITS: 100 INJECTION, SOLUTION INTRAVENOUS; SUBCUTANEOUS at 12:01

## 2025-01-08 RX ADMIN — MYCOPHENOLATE MOFETIL 1000 MG: 250 CAPSULE ORAL at 17:55

## 2025-01-08 RX ADMIN — INSULIN LISPRO 2 UNITS: 100 INJECTION, SOLUTION INTRAVENOUS; SUBCUTANEOUS at 08:32

## 2025-01-08 RX ADMIN — MYCOPHENOLATE MOFETIL 1000 MG: 250 CAPSULE ORAL at 06:22

## 2025-01-08 RX ADMIN — INSULIN HUMAN 10 UNITS: 100 INJECTION, SUSPENSION SUBCUTANEOUS at 08:43

## 2025-01-08 RX ADMIN — INSULIN LISPRO 2 UNITS: 100 INJECTION, SOLUTION INTRAVENOUS; SUBCUTANEOUS at 12:00

## 2025-01-08 RX ADMIN — ACETAMINOPHEN 650 MG: 325 TABLET ORAL at 12:09

## 2025-01-08 RX ADMIN — SULFAMETHOXAZOLE AND TRIMETHOPRIM 1 TABLET: 400; 80 TABLET ORAL at 08:31

## 2025-01-08 RX ADMIN — CLOTRIMAZOLE 10 MG: 10 LOZENGE ORAL at 17:55

## 2025-01-08 RX ADMIN — ATORVASTATIN CALCIUM 40 MG: 40 TABLET, FILM COATED ORAL at 08:31

## 2025-01-08 RX ADMIN — HEPARIN SODIUM 125 MG: 1000 INJECTION INTRAVENOUS; SUBCUTANEOUS at 12:34

## 2025-01-08 RX ADMIN — DIPHENHYDRAMINE HYDROCHLORIDE 50 MG: 25 CAPSULE ORAL at 12:09

## 2025-01-08 RX ADMIN — Medication 2 L/MIN: at 20:36

## 2025-01-08 RX ADMIN — DOCUSATE SODIUM 100 MG: 100 CAPSULE, LIQUID FILLED ORAL at 20:52

## 2025-01-08 RX ADMIN — INSULIN LISPRO 5 UNITS: 100 INJECTION, SOLUTION INTRAVENOUS; SUBCUTANEOUS at 17:24

## 2025-01-08 RX ADMIN — SODIUM ZIRCONIUM CYCLOSILICATE 10 G: 10 POWDER, FOR SUSPENSION ORAL at 08:31

## 2025-01-08 RX ADMIN — CLOTRIMAZOLE 10 MG: 10 LOZENGE ORAL at 08:31

## 2025-01-08 RX ADMIN — DOCUSATE SODIUM 100 MG: 100 CAPSULE, LIQUID FILLED ORAL at 08:32

## 2025-01-08 RX ADMIN — TACROLIMUS 2 MG: 1 CAPSULE ORAL at 06:22

## 2025-01-08 RX ADMIN — Medication 1 L/MIN: at 07:36

## 2025-01-08 RX ADMIN — SENNOSIDES 8.6 MG: 8.6 TABLET, FILM COATED ORAL at 20:52

## 2025-01-08 RX ADMIN — SENNOSIDES 8.6 MG: 8.6 TABLET, FILM COATED ORAL at 08:31

## 2025-01-08 RX ADMIN — PANTOPRAZOLE SODIUM 40 MG: 40 TABLET, DELAYED RELEASE ORAL at 17:24

## 2025-01-08 RX ADMIN — HEPARIN SODIUM 5000 UNITS: 5000 INJECTION, SOLUTION INTRAVENOUS; SUBCUTANEOUS at 20:52

## 2025-01-08 RX ADMIN — CARVEDILOL 12.5 MG: 12.5 TABLET, FILM COATED ORAL at 20:52

## 2025-01-08 ASSESSMENT — ENCOUNTER SYMPTOMS
SHORTNESS OF BREATH: 0
NUMBNESS: 0
AGITATION: 0
APPETITE CHANGE: 0
POLYPHAGIA: 0
DYSURIA: 0
CHEST TIGHTNESS: 0
FATIGUE: 1
EYES NEGATIVE: 1
DIARRHEA: 0
COUGH: 0
CONFUSION: 0
SPEECH DIFFICULTY: 0
UNEXPECTED WEIGHT CHANGE: 0
ABDOMINAL PAIN: 0
ACTIVITY CHANGE: 0
NAUSEA: 0
POLYDIPSIA: 0
JOINT SWELLING: 0
SORE THROAT: 0
HEADACHES: 0
DIAPHORESIS: 0
FREQUENCY: 0

## 2025-01-08 ASSESSMENT — PAIN SCALES - GENERAL
PAINLEVEL_OUTOF10: 0 - NO PAIN

## 2025-01-08 ASSESSMENT — PAIN - FUNCTIONAL ASSESSMENT
PAIN_FUNCTIONAL_ASSESSMENT: 0-10
PAIN_FUNCTIONAL_ASSESSMENT: 0-10

## 2025-01-08 ASSESSMENT — COGNITIVE AND FUNCTIONAL STATUS - GENERAL
CLIMB 3 TO 5 STEPS WITH RAILING: A LITTLE
MOVING TO AND FROM BED TO CHAIR: A LITTLE
WALKING IN HOSPITAL ROOM: A LITTLE
MOVING TO AND FROM BED TO CHAIR: A LITTLE
TURNING FROM BACK TO SIDE WHILE IN FLAT BAD: A LITTLE
CLIMB 3 TO 5 STEPS WITH RAILING: A LITTLE
MOVING FROM LYING ON BACK TO SITTING ON SIDE OF FLAT BED WITH BEDRAILS: A LITTLE
DRESSING REGULAR LOWER BODY CLOTHING: A LITTLE
DAILY ACTIVITIY SCORE: 22
TURNING FROM BACK TO SIDE WHILE IN FLAT BAD: A LITTLE
MOBILITY SCORE: 19
HELP NEEDED FOR BATHING: A LITTLE
MOBILITY SCORE: 18
WALKING IN HOSPITAL ROOM: A LITTLE
MOVING FROM LYING ON BACK TO SITTING ON SIDE OF FLAT BED WITH BEDRAILS: A LITTLE
STANDING UP FROM CHAIR USING ARMS: A LITTLE

## 2025-01-08 NOTE — NURSING NOTE
Report from Sending RN:    Report From: Mercedes  Recent Surgery of Procedure: Yes, Kidney transplant  Baseline Level of Consciousness (LOC): A+Ox4  Oxygen Use: No  Type: RA  Diabetic: Yes  Last BP Med Given Day of Dialysis: 0830  Last Pain Med Given: yesterday  Lab Tests to be Obtained with Dialysis: No  Blood Transfusion to be Given During Dialysis: No  Available IV Access: Yes  Medications to be Administered During Dialysis: No  Continuous IV Infusion Running: No  Restraints on Currently or in the Last 24 Hours: No  Hand-Off Communication: Am meds given, will give insulin before he comes to dialysis  Dialysis Catheter Dressing: n/a  Last Dressing Change: n/a

## 2025-01-08 NOTE — PROGRESS NOTES
Physical Therapy    Physical Therapy Treatment    Patient Name: Anibal Dow  MRN: [id number]  Department: [place] [address]  Room: [facility]  Today's Date: 1/8/2025  Time Calculation  Start Time: 0910  Stop Time: 0927  Time Calculation (min): 17 min         Assessment/Plan   PT Assessment  PT Assessment Results: Decreased strength, Decreased endurance, Impaired balance, Decreased mobility, Pain  Rehab Prognosis: Good  Barriers to Discharge Home: No anticipated barriers  Evaluation/Treatment Tolerance: Patient tolerated treatment well  Medical Staff Made Aware: Yes  End of Session Communication: Bedside nurse  Assessment Comment: Pt demonstrated progress in overall activity tolerance. He continues to demonstrate impaired dynamic balance and strength and would benefit from continued skilled physical therapy to improve overall functional mobility.  End of Session Patient Position: Up in chair, Alarm off, not on at start of session  PT Plan  Inpatient/Swing Bed or Outpatient: Inpatient  PT Plan  Treatment/Interventions: Bed mobility, Transfer training, Stair training, Gait training, Balance training, Neuromuscular re-education, Strengthening, Therapeutic exercise, Endurance training, Range of motion, Therapeutic activity, Home exercise program, Postural re-education, Positioning  PT Plan: Ongoing PT  PT Frequency: 4 times per week  PT Discharge Recommendations: Low intensity level of continued care  PT Recommended Transfer Status: Assist x1  PT - OK to Discharge: Yes (Once medically cleared)      General Visit Information:   PT  Visit  PT Received On: 01/08/25  Response to Previous Treatment: Patient with no complaints from previous session.  General  Reason for Referral: DDKT 1/6/2024  Past Medical History Relevant to Rehab: PMH of HLD, pulmonary hypertension, ESRD 2/2 HTN and DM2, on on dialysis MWF through [facility].  Family/Caregiver Present: Yes  Caregiver Feedback: wife present and agreeable to therapy  Prior to  Session Communication: Bedside nurse  Patient Position Received: Up in chair, Alarm off, not on at start of session  Preferred Learning Style: verbal  General Comment: Pt found in bathroom with wife present    Subjective   Precautions:  Precautions  Medical Precautions: Abdominal precautions, Fall precautions  Post-Surgical Precautions: Abdominal surgery precautions              Objective   Pain:  Pain Assessment  Pain Assessment: 0-10  0-10 (Numeric) Pain Score: 0 - No pain  Cognition:  Cognition  Overall Cognitive Status: Within Functional Limits  Coordination:     Postural Control:  Static Sitting Balance  Static Sitting-Level of Assistance: Distant supervision  Dynamic Sitting Balance  Dynamic Sitting-Level of Assistance: Close supervision  Static Standing Balance  Static Standing-Level of Assistance: Contact guard  Dynamic Standing Balance  Dynamic Standing-Level of Assistance: Contact guard    Activity Tolerance:  Activity Tolerance  Endurance: Endurance does not limit participation in activity  Treatments:  Ambulation/Gait Training  Ambulation/Gait Training Performed: Yes  Ambulation/Gait Training 1  Surface 1: Level tile  Device 1: Rolling walker  Assistance 1: Contact guard  Quality of Gait 1: Decreased step length  Comments/Distance (ft) 1: 350 feet  Transfers  Transfer: Yes  Transfer 1  Transfer From 1: Sit to, Stand to  Transfer to 1: Stand, Sit  Technique 1: Sit to stand, Stand to sit  Transfer Device 1: Walker  Transfer Level of Assistance 1: Contact guard    Outcome Measures:  [facility] Basic Mobility  Turning from your back to your side while in a flat bed without using bedrails: A little  Moving from lying on your back to sitting on the side of a flat bed without using bedrails: A little  Moving to and from bed to chair (including a wheelchair): A little  Standing up from a chair using your arms (e.g. wheelchair or bedside chair): A little  To walk in hospital room: A little  Climbing 3-5 steps with  martita: A chantelle  Basic Mobility - Total Score: 18    Education Documentation  Precautions, taught by Laurie Sheikh, PT at 1/8/2025 10:57 AM.  Learner: Patient  Readiness: Acceptance  Method: Explanation  Response: Verbalizes Understanding    Mobility Training, taught by Laurie Sheikh, PT at 1/8/2025 10:57 AM.  Learner: Patient  Readiness: Acceptance  Method: Explanation  Response: Verbalizes Understanding    Education Comments  No comments found.        OP EDUCATION:       Encounter Problems       Encounter Problems (Active)       Mobility       Pt will be Kenneth ambulation >200 ft with LRAD (Progressing)       Start:  01/07/25    Expected End:  01/21/25            Pt will be Kenneth with ascend/descend 4 steps with 1 handrail (Progressing)       Start:  01/07/25    Expected End:  01/21/25               PT Transfers       Pt will be Kenneth with sit to stand and bed to chair transfers with LRAD (Progressing)       Start:  01/07/25    Expected End:  01/21/25            Pt will be Kenneth with bed mobility (Progressing)       Start:  01/07/25    Expected End:  01/21/25               Pain - Adult

## 2025-01-08 NOTE — SIGNIFICANT EVENT
01/08/25 1108   Patient Interaction   Organ Kidney   Type of Interaction Morning rounds   Interdisciplinary Rounds   Attendance Surgeon;Physician;JAIR;Pharmacist;Dietitian   Topics Discussed Medications;Discharge preparation;Blood test results     Transplant Surgery Multidisciplinary Team Note    Anibal Dow is a 59 y.o. male   POD#2 from a kidney transplant from a DCD donor. His post operative complications: None    24 Hour Events  1. No acute events, remains on pathway    Last Recorded Vitals  Visit Vitals  /88 (BP Location: Right arm, Patient Position: Lying)   Pulse 89   Temp 36.2 °C (97.2 °F) (Temporal)   Resp 17      Intake/Output last 3 Shifts:    Intake/Output Summary (Last 24 hours) at 1/8/2025 1108  Last data filed at 1/8/2025 0855  Gross per 24 hour   Intake 240 ml   Output 2835 ml   Net -2595 ml      Vitals:    01/08/25 0600   Weight: 81.1 kg (178 lb 11.2 oz)        Assessment/Plan   Principal Problem:    Management after organ transplant  Active Problems:  Patient Active Problem List   Diagnosis    Abnormality of albumin    Fluid overload, unspecified    Hypervolemia    Anemia in chronic kidney disease    Anemia    Bilateral lower extremity edema    Chronic systolic heart failure    CKD (chronic kidney disease) stage 5, GFR less than 15 ml/min (Multi)    CKD stage G3b/A3, GFR 30-44 and albumin creatinine ratio >300 mg/g (Multi)    Combined form of age-related cataract, left eye    Dependence on renal dialysis (CMS-HCC)    Diabetic nephropathy associated with type 2 diabetes mellitus (Multi)    Dyslipidemia (high LDL; low HDL)    ESRD (end stage renal disease) on dialysis (Multi)    Essential hypertension    (HFpEF) heart failure with preserved ejection fraction    Congestive heart failure    History of panretinal photocoagulation    Hypertensive nephropathy    Iron deficiency anemia    Metabolic acidosis, normal anion gap (NAG)    Moderate protein-calorie malnutrition (Multi)    Neuropathy     Noncompliance with medication regimen    Nonproliferative diabetic retinopathy with macular edema associated with type 2 diabetes mellitus    Nuclear sclerosis, right    Osteomyelitis of fifth toe of right foot (Multi)    Other disorders resulting from impaired renal tubular function    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 associated with type 2 diabetes mellitus    Pulmonary hypertension (Multi)    Retinal detachment, tractional, right eye    Retinal hemorrhage of left eye    S/P amputation of lesser toe (Multi)    Secondary hyperparathyroidism of renal origin (Multi)    Shortness of breath    Type 2 diabetes mellitus    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 Current dose 2 mg BID         MMF 1000 mg po BID       Solumedrol taper  DVT prophylaxis SCDS and subcutaneous heparin 5000 TID  PT/OT  Diet: Diabetic 60gm, Potassium restricted 2gm  Anticipated discharge 1/11    Kadie Rogel, APRN-CNP

## 2025-01-08 NOTE — PROGRESS NOTES
Anibal Dow is a 59 y.o. male on day 2 of admission presenting with Kidney replaced by transplant ([facility]).    Subjective   Patient seen at the bedside. He is feeling well. Has a good appetite. Discussed increasing insulin doses. Patient agreeable.      I have reviewed histories, allergies and medications have been reviewed and there are no changes     Objective   Review of Systems   Constitutional:  Positive for fatigue. Negative for activity change, appetite change, diaphoresis and unexpected weight change.   HENT: Negative.  Negative for sore throat.    Eyes: Negative.    Respiratory:  Negative for cough, chest tightness and shortness of breath.    Cardiovascular:  Negative for chest pain.   Gastrointestinal:  Negative for abdominal pain, diarrhea and nausea.   Endocrine: Negative for cold intolerance, heat intolerance, polydipsia, polyphagia and polyuria.   Genitourinary:  Negative for dysuria and frequency.   Musculoskeletal:  Negative for gait problem and joint swelling.   Neurological:  Negative for speech difficulty, numbness and headaches.   Psychiatric/Behavioral:  Negative for agitation, behavioral problems and confusion.      Physical Exam  Constitutional:       General: He is not in acute distress.     Appearance: Normal appearance.   HENT:      Nose: Nose normal.      Mouth/Throat:      Mouth: Mucous membranes are moist.      Pharynx: Oropharynx is clear.   Cardiovascular:      Rate and Rhythm: Normal rate and regular rhythm.   Pulmonary:      Effort: Pulmonary effort is normal. No respiratory distress.   Abdominal:      General: There is no distension.      Palpations: Abdomen is soft.   Musculoskeletal:         General: Normal range of motion.   Skin:     General: Skin is warm and dry.   Neurological:      Mental Status: He is alert and oriented to person, place, and time.   Psychiatric:         Mood and Affect: Mood normal.         Behavior: Behavior normal.       Last Recorded Vitals  Blood  "pressure 156/83, pulse 90, temperature 36 °C (96.8 °F), temperature source Temporal, resp. rate 17, height 1.753 m (5' 9\"), weight 81.1 kg (178 lb 11.2 oz), SpO2 95%.  Intake/Output last 3 Shifts:  I/O last 3 completed shifts:  In: 4970 (61.3 mL/kg) [P.O.:960; I.V.:3710 (45.8 mL/kg); IV Piggyback:300]  Out: 5120 (63.2 mL/kg) [Urine:4975 (1.7 mL/kg/hr); Drains:145]  Weight: 81.1 kg     Relevant Results  Results from last 7 days   Lab Units 01/08/25  1154 01/08/25  0902 01/08/25  0541 01/07/25  2204 01/07/25  1739 01/07/25  1142 01/07/25  1120 01/07/25  0928 01/07/25  0511 01/06/25  1136 01/06/25  1048 01/06/25  0255 01/06/25  0241   POCT GLUCOSE mg/dL 303* 330*  --  292* 238* 283*  --    < >  --    < >  --    < >  --    GLUCOSE mg/dL  --   --  359*  --   --   --  281*  --  204*  --  186*  --  201*    < > = values in this interval not displayed.   Lab Review  Lab Results   Component Value Date    BILITOT 0.5 01/06/2025    CALCIUM 8.3 (L) 01/08/2025    CO2 25 01/08/2025    CL 90 (L) 01/08/2025    CREATININE 11.47 (H) 01/08/2025    GLUCOSE 359 (H) 01/08/2025    ALKPHOS 71 01/06/2025    K 4.3 01/08/2025    PROT 7.7 01/06/2025     (L) 01/08/2025    AST 16 01/06/2025    ALT 9 (L) 01/06/2025    BUN 92 (HH) 01/08/2025    ANIONGAP 21 (H) 01/08/2025    MG 2.20 01/08/2025    PHOS 8.4 (H) 01/08/2025    ALBUMIN 3.6 01/08/2025    AMYLASE 54 06/27/2024    LIPASE 167 (H) 08/05/2023    GFRMALE 4 (A) 08/05/2023     Lab Results   Component Value Date    TRIG 123 01/02/2025    CHOL 169 01/02/2025    LDLCALC 104 (H) 01/02/2025    HDL 40.0 01/02/2025     Lab Results   Component Value Date    HGBA1C 6.8 (H) 07/08/2024    HGBA1C 8.0 (H) 01/05/2024    HGBA1C 6.2 (A) 06/13/2023     The 10-year ASCVD risk score (Pascual MONAE, et al., 2019) is: 20.4%    Values used to calculate the score:      Age: 59 years      Sex: Male      Is Non- : Yes      Diabetic: Yes      Tobacco smoker: No      Systolic Blood Pressure: 156 " mmHg      Is BP treated: No      HDL Cholesterol: 40 mg/dL      Total Cholesterol: 169 mg/dL  Scheduled medications  acetaminophen, 650 mg, oral, q6h  antithymocyte globulin (rabbit), 1.5 mg/kg, intravenous, Once  atorvastatin, 40 mg, oral, Daily  carvedilol, 12.5 mg, oral, BID  clotrimazole, 10 mg, oral, TID after meals  docusate sodium, 100 mg, oral, BID  furosemide, 80 mg, intravenous, q8h JOEY  heparin (porcine), 5,000 Units, subcutaneous, q8h  insulin lispro, 0-10 Units, subcutaneous, TID AC  insulin lispro, 5 Units, subcutaneous, TID AC  insulin NPH (Isophane), 10 Units, subcutaneous, Nightly  [START ON 1/9/2025] insulin NPH (Isophane), 15 Units, subcutaneous, Daily  [START ON 1/9/2025] methylPREDNISolone sodium succinate (PF), 60 mg, intravenous, Once  mycophenolate, 1,000 mg, oral, q12h JOEY  oxygen, , inhalation, Continuous - Inhalation  pantoprazole, 40 mg, oral, BID AC  [START ON 1/10/2025] predniSONE, 20 mg, oral, Daily  sennosides, 1 tablet, oral, BID  sulfamethoxazole-trimethoprim, 1 tablet, oral, Daily  [Held by provider] tacrolimus, 2 mg, oral, q12h JOEY  valGANciclovir, 450 mg, oral, q48h    Continuous medications     PRN medications  PRN medications: dextrose, dextrose, glucagon, glucagon, HYDROmorphone, naloxone, ondansetron ODT **OR** ondansetron, oxyCODONE, oxyCODONE    Assessment/Plan   Assessment & Plan  Kidney replaced by transplant ([facility])    CKD (chronic kidney disease)    ESRD (end stage renal disease) ([facility])    Type 2 diabetes mellitus with hyperglycemia, with long-term current use of insulin    Anibal Dow is a 59 y.o. male with PMH of HLD, pulmonary hypertension, ESRD 2/2 HTN and DM2, on on dialysis MWF through [facility]. He is now s/p kidney transplant 1/6/25.     Diabetes History  Type of diabetes: Type 2  Year diagnosed or age: at least 15 years  Hospitalizations for DKA or HHS: no  Complications: retinopathy, peripheral neuropathy, nephropathy, and hyperglycemia  Seen by PCP or  Endocrinology: PCP  Frequency of glucose checks: via anisa 2  Glucose review: 14 day average 175, glucoses in the 150's fasting, rising to the 200's during the day  Frequency of Hypoglycemia: occasional  Hypoglycemia unawareness: no  Severe hypoglycemia requiring assistance from others:  no     Home Medications  Basal: glargine 5 units  CGM: anisa 2    PLAN  Steroids:  steroid regimen:   methylpred 500mg intra-op  methylpred 250 mg x1d  methylpred 125mg x1d  methylpred 60mg x1d  pred 20mg ongoing     Nutrition:  60 gram carb consistent      - Increase NPH to 15 units dosed QAM with steroid       If NPO: continue as long as steroid is given  - continue lispro corrective scale #2 with meals, adjust to scale to Q4 if persistently hyperglycemic   = 0u  151-200 = 2u  201-250 = 4u  251-300 = 6u  301-350 = 8u  351-400 = 10u     - increase lispro to 5 units with meals; hold if NPO or eating less than 50% of his meal     -Accuchecks (not BMP) TIDAC and QHS- kindly ensure QHS Accucheck is drawn; it is often missed   - Goal -180  -Hypoglycemia protocol  -Will continue to follow and titrate insulin accordingly     Discharge planning:   [] patient may expect to discharge home on glargine and lispro, final doses TBD by titration  []will provide Anisa 3 sample prior to discharge   []will enroll pt in  pharmacy platinum plan program  []follow up with Nerissa SAM in PTDM clinic      I spent 50 minutes in the professional and overall care of this patient.    Rocio Duong, APRN-CNP

## 2025-01-08 NOTE — PROGRESS NOTES
"Anibal Dow is a 59 y.o. male on day 2 of admission presenting with Kidney replaced by transplant ([facility]).    Subjective   No acute events       Objective   Vitals:    01/08/25 1100   BP: 156/83   Pulse: 90   Resp: 17   Temp: 36 °C (96.8 °F)   SpO2: 95%       Physical Exam  Constitutional:       Appearance: Normal appearance.   Eyes:      Pupils: Pupils are equal, round, and reactive to light.   Cardiovascular:      Rate and Rhythm: Normal rate.   Pulmonary:      Effort: Pulmonary effort is normal. No respiratory distress.   Abdominal:      General: There is no distension.      Palpations: Abdomen is soft.      Comments: Incision clean, dry, intact   Musculoskeletal:         General: Normal range of motion.      Right lower leg: No edema.      Left lower leg: No edema.   Skin:     General: Skin is warm and dry.   Neurological:      General: No focal deficit present.      Mental Status: He is alert and oriented to person, place, and time.   Psychiatric:         Mood and Affect: Mood normal.         Behavior: Behavior normal.         Last Recorded Vitals  Blood pressure 156/83, pulse 90, temperature 36 °C (96.8 °F), temperature source Temporal, resp. rate 17, height 1.753 m (5' 9\"), weight 81.1 kg (178 lb 11.2 oz), SpO2 95%.  Intake/Output last 3 Shifts:  I/O last 3 completed shifts:  In: 4970 (61.3 mL/kg) [P.O.:960; I.V.:3710 (45.8 mL/kg); IV Piggyback:300]  Out: 5120 (63.2 mL/kg) [Urine:4975 (1.7 mL/kg/hr); Drains:145]  Weight: 81.1 kg     Relevant Results  Lab Results   Component Value Date    WBC 11.4 (H) 01/08/2025    HGB 9.2 (L) 01/08/2025    HCT 28.2 (L) 01/08/2025    MCV 88 01/08/2025     (L) 01/08/2025     Lab Results   Component Value Date    GLUCOSE 359 (H) 01/08/2025    CALCIUM 8.3 (L) 01/08/2025     (L) 01/08/2025    K 4.3 01/08/2025    CO2 25 01/08/2025    CL 90 (L) 01/08/2025    BUN 92 (HH) 01/08/2025    CREATININE 11.47 (H) 01/08/2025       acetaminophen, 650 mg, oral, " q6h  antithymocyte globulin (rabbit), 1.5 mg/kg, intravenous, Once  atorvastatin, 40 mg, oral, Daily  carvedilol, 12.5 mg, oral, BID  clotrimazole, 10 mg, oral, TID after meals  docusate sodium, 100 mg, oral, BID  furosemide, 80 mg, intravenous, q8h JOEY  insulin lispro, 0-10 Units, subcutaneous, TID AC  insulin lispro, 2 Units, subcutaneous, TID AC  insulin NPH (Isophane), 10 Units, subcutaneous, Daily  [START ON 1/9/2025] methylPREDNISolone sodium succinate (PF), 60 mg, intravenous, Once  mycophenolate, 1,000 mg, oral, q12h JOEY  oxygen, , inhalation, Continuous - Inhalation  pantoprazole, 40 mg, oral, BID AC  [START ON 1/10/2025] predniSONE, 20 mg, oral, Daily  sennosides, 1 tablet, oral, BID  sulfamethoxazole-trimethoprim, 1 tablet, oral, Daily  tacrolimus, 2 mg, oral, q12h JOEY  valGANciclovir, 450 mg, oral, q48h          Assessment/Plan   Assessment & Plan    DDKT 1/6/2024    Kidney allograft function   UOP 3000   Good function, no DGF, creatinine same    Immunosuppression   Thymo completed 3mg/kg   Tac 2/2, hold tac check level tomorrow    Level 26 after 2mg x 3, seem it was drawn at the right time   MMF 1000/1000   Pred taper    DM   Endocrine    I spent 35 minutes in the professional and overall care of this patient.      Javed Prince MD

## 2025-01-08 NOTE — CARE PLAN
Problem: Pain - Adult  Goal: Verbalizes/displays adequate comfort level or baseline comfort level  1/7/2025 1911 by Dionne David RN  Outcome: Progressing  1/7/2025 1910 by Dionne David RN  Outcome: Progressing     Problem: Safety - Adult  Goal: Free from fall injury  1/7/2025 1911 by Dionne David RN  Outcome: Progressing  1/7/2025 1910 by Dionne David RN  Outcome: Progressing     Problem: Discharge Planning  Goal: Discharge to home or other facility with appropriate resources  1/7/2025 1911 by Dionne David RN  Outcome: Progressing  1/7/2025 1910 by Dionne David RN  Outcome: Progressing     Problem: Chronic Conditions and Co-morbidities  Goal: Patient's chronic conditions and co-morbidity symptoms are monitored and maintained or improved  1/7/2025 1911 by Dionne David RN  Outcome: Progressing  1/7/2025 1910 by Dionne David RN  Outcome: Progressing

## 2025-01-08 NOTE — PROGRESS NOTES
Anibal Dow is a 59 y.o. male on day 2 of admission presenting with Kidney replaced by transplant ([facility]).      Transitional Care Coordination Progress Note:  Patient discussed during interdisciplinary rounds.      Plan per Medical/Surgical team:    Home Care choice for home going needs, [facility]  Pt Needs: PT/OT/RPM.  [facility] ordered for RPM.          Payer: Medicare     Status: INP     Discharge disposition: [facility], [facility].      Potential Barriers: None     ADOD:  1/11     This TCC will continue to follow for home going needs and safe DC plan.    WERNER BARAJAS

## 2025-01-08 NOTE — PROGRESS NOTES
Pharmacist Transplant Education Note    The medications for Anibal Dow were reviewed in conjunction with the multidisciplinary transplant team. The pharmacist is in agreement with the plan of care for medications at this time.     Approximately 40 minutes were spent with this patient providing medication education and reviewing new post-transplant medications. Also participating in the education session was/were the patient's spouse.     An outpatient dosing schedule was created for all oral medications. This schedule was discussed in detail with the patient, including drug name, use, dose, and the appropriate timing of self-administration (i.e. with or without meals, with or without other medications). Also discussed side effects, drug interactions, and the importance of adherence. Both verbal and written instructions were given to the patient outlining the appropriate use of all current oral medications.     The patient demonstrated an acceptable understanding of his current medication list. All questions were answered to the patient's satisfaction, and the patient and his spouse confirmed understanding.     Follow-up education will take place prior to discharge where a finalized list of discharge medications will be reviewed with the patient. Further educational reinforcement should be provided in the outpatient clinic.    Gely Johnson, PharmD, BCTXP  Clinical Pharmacy Specialist - Solid Organ Transplant

## 2025-01-08 NOTE — CARE PLAN
The patient's goals for the shift include      The clinical goals for the shift include pt will remain hds throughout shift      Problem: Diabetes  Goal: Achieve decreasing blood glucose levels by end of shift  Outcome: Progressing  Goal: Increase stability of blood glucose readings by end of shift  Outcome: Progressing  Goal: Decrease in ketones present in urine by end of shift  Outcome: Progressing  Goal: Maintain electrolyte levels within acceptable range throughout shift  Outcome: Progressing  Goal: Maintain glucose levels >70mg/dl to <250mg/dl throughout shift  Outcome: Progressing  Goal: No changes in neurological exam by end of shift  Outcome: Progressing  Goal: Learn about and adhere to nutrition recommendations by end of shift  Outcome: Progressing  Goal: Vital signs within normal range for age by end of shift  Outcome: Progressing  Goal: Increase self care and/or family involovement by end of shift  Outcome: Progressing  Goal: Receive DSME education by end of shift  Outcome: Progressing     Problem: Pain - Adult  Goal: Verbalizes/displays adequate comfort level or baseline comfort level  Outcome: Progressing     Problem: Safety - Adult  Goal: Free from fall injury  Outcome: Progressing     Problem: Discharge Planning  Goal: Discharge to home or other facility with appropriate resources  Outcome: Progressing     Problem: Chronic Conditions and Co-morbidities  Goal: Patient's chronic conditions and co-morbidity symptoms are monitored and maintained or improved  Outcome: Progressing     Problem: Skin  Goal: Decreased wound size/increased tissue granulation at next dressing change  Outcome: Progressing  Goal: Participates in plan/prevention/treatment measures  Outcome: Progressing  Goal: Prevent/manage excess moisture  Outcome: Progressing  Goal: Prevent/minimize sheer/friction injuries  Outcome: Progressing  Goal: Promote/optimize nutrition  Outcome: Progressing  Goal: Promote skin healing  Outcome:  Progressing     Problem: Fall/Injury  Goal: Not fall by end of shift  Outcome: Progressing  Goal: Be free from injury by end of the shift  Outcome: Progressing  Goal: Verbalize understanding of personal risk factors for fall in the hospital  Outcome: Progressing  Goal: Verbalize understanding of risk factor reduction measures to prevent injury from fall in the home  Outcome: Progressing  Goal: Use assistive devices by end of the shift  Outcome: Progressing  Goal: Pace activities to prevent fatigue by end of the shift  Outcome: Progressing     Problem: Pain  Goal: Takes deep breaths with improved pain control throughout the shift  Outcome: Progressing  Goal: Turns in bed with improved pain control throughout the shift  Outcome: Progressing  Goal: Walks with improved pain control throughout the shift  Outcome: Progressing  Goal: Performs ADL's with improved pain control throughout shift  Outcome: Progressing  Goal: Participates in PT with improved pain control throughout the shift  Outcome: Progressing  Goal: Free from opioid side effects throughout the shift  Outcome: Progressing  Goal: Free from acute confusion related to pain meds throughout the shift  Outcome: Progressing

## 2025-01-08 NOTE — PROGRESS NOTES
"Anibal Dow is a 59 y.o. male on day 2 of admission presenting with Kidney replaced by [facility] ([facility]).  S/p DDKT 1/6/25    Subjective   POD2 DDKT. No acute event. No N/V. Tolerates diet well.       Objective   Vital signs - reviewed.   General Appearance - NAD, Good speech, oriented and alert  HEENT - Supple. Not pale. No jaundice.   CVS - RRR. Normal S1/S2. No murmur, click , rub or gallop  Lungs- clear to auscultation bilaterally  Abdomen - soft , not tender, no guarding, no rigidity. No hepatosplenomegaly. Normal bowel sounds. No masses and ascites. S/P Kidney transplant. Incision C/D/I  Extremities - trace edema.   Neuro/Psych - appropriate mood and affect. Motor power V/V all extremities. CN I -XII were grossly intact.  Skin - No visible rash  Access - LUE AVG +thrill      Last Recorded Vitals  Blood pressure 153/84, pulse 83, temperature 36 °C (96.8 °F), resp. rate 17, height 1.753 m (5' 9\"), weight 81.1 kg (178 lb 11.2 oz), SpO2 96%.  Intake/Output last 3 Shifts:  I/O last 3 completed shifts:  In: 4970 (61.3 mL/kg) [P.O.:960; I.V.:3710 (45.8 mL/kg); IV Piggyback:300]  Out: 5120 (63.2 mL/kg) [Urine:4975 (1.7 mL/kg/hr); Drains:145]  Weight: 81.1 kg     Relevant Results  Results for orders placed or performed during the hospital encounter of 01/06/25 (from the past 96 hours)   Blood Gas Venous Full Panel   Result Value Ref Range    POCT pH, Venous 7.43 7.33 - 7.43 pH    POCT pCO2, Venous 52 (H) 41 - 51 mm Hg    POCT pO2, Venous 41 35 - 45 mm Hg    POCT SO2, Venous 69 45 - 75 %    POCT Oxy Hemoglobin, Venous 67.9 45.0 - 75.0 %    POCT Hematocrit Calculated, Venous 36.0 (L) 41.0 - 52.0 %    POCT Sodium, Venous 134 (L) 136 - 145 mmol/L    POCT Potassium, Venous 5.5 (H) 3.5 - 5.3 mmol/L    POCT Chloride, Venous 93 (L) 98 - 107 mmol/L    POCT Ionized Calicum, Venous 1.19 1.10 - 1.33 mmol/L    POCT Glucose, Venous 210 (H) 74 - 99 mg/dL    POCT Lactate, Venous 1.1 0.4 - 2.0 mmol/L    POCT Base Excess, Venous " 8.7 (H) -2.0 - 3.0 mmol/L    POCT HCO3 Calculated, Venous 34.5 (H) 22.0 - 26.0 mmol/L    POCT Hemoglobin, Venous 12.0 (L) 13.5 - 17.5 g/dL    POCT Anion Gap, Venous 12.0 10.0 - 25.0 mmol/L    Patient Temperature 37.0 degrees Celsius    FiO2 21 %   Raudel-Barr Virus Nuclear Antigen Antibody, IgG   Result Value Ref Range    EBV Nuclear Antigen Antibody, IgG Positive (A) Negative   Coagulation Screen   Result Value Ref Range    Protime 12.7 9.8 - 12.8 seconds    INR 1.1 0.9 - 1.1    aPTT 33 27 - 38 seconds   Cytomegalovirus IgG   Result Value Ref Range    Cytomegalovirus IgG Reactive (A) Nonreactive   Hepatic Function Panel   Result Value Ref Range    Albumin 4.3 3.4 - 5.0 g/dL    Bilirubin, Total 0.5 0.0 - 1.2 mg/dL    Bilirubin, Direct 0.1 0.0 - 0.3 mg/dL    Alkaline Phosphatase 71 33 - 120 U/L    ALT 9 (L) 10 - 52 U/L    AST 16 9 - 39 U/L    Total Protein 7.7 6.4 - 8.2 g/dL   Hepatitis C Antibody   Result Value Ref Range    Hepatitis C AB Nonreactive Nonreactive   Hepatitis B Core Antibody, Total   Result Value Ref Range    Hepatitis B Core AB- Total Nonreactive Nonreactive   Hepatitis B Surface Antibody   Result Value Ref Range    Hepatitis B Surface AB >1,000.0 (H) <10.0 mIU/mL   Hepatitis B Surface Antigen   Result Value Ref Range    Hepatitis B Surface AG Nonreactive Nonreactive   Hepatitis C RNA, Quantitative, PCR   Result Value Ref Range    Hepatitis C RNA PCR Log      HCV RNA Result Not Detected Not detected   HIV 1/2 Antigen/Antibody Screen with Reflex to Confirmation   Result Value Ref Range    HIV 1/2 Antigen/Antibody Screen with Reflex to Confirmation Nonreactive Nonreactive   Phosphorus   Result Value Ref Range    Phosphorus 6.0 (H) 2.5 - 4.9 mg/dL   Basic Metabolic Panel   Result Value Ref Range    Glucose 201 (H) 74 - 99 mg/dL    Sodium 136 136 - 145 mmol/L    Potassium 5.5 (H) 3.5 - 5.3 mmol/L    Chloride 89 (L) 98 - 107 mmol/L    Bicarbonate 32 21 - 32 mmol/L    Anion Gap 21 (H) 10 - 20 mmol/L     Urea Nitrogen 83 (H) 6 - 23 mg/dL    Creatinine 14.35 (H) 0.50 - 1.30 mg/dL    eGFR 4 (L) >60 mL/min/1.73m*2    Calcium 10.5 8.6 - 10.6 mg/dL   Type And Screen   Result Value Ref Range    ABO TYPE O     Rh TYPE POS     ANTIBODY SCREEN NEG    CBC   Result Value Ref Range    WBC 7.6 4.4 - 11.3 x10*3/uL    nRBC 0.0 0.0 - 0.0 /100 WBCs    RBC 4.18 (L) 4.50 - 5.90 x10*6/uL    Hemoglobin 12.2 (L) 13.5 - 17.5 g/dL    Hematocrit 36.2 (L) 41.0 - 52.0 %    MCV 87 80 - 100 fL    MCH 29.2 26.0 - 34.0 pg    MCHC 33.7 32.0 - 36.0 g/dL    RDW 13.0 11.5 - 14.5 %    Platelets 182 150 - 450 x10*3/uL   POCT GLUCOSE   Result Value Ref Range    POCT Glucose 192 (H) 74 - 99 mg/dL   POCT GLUCOSE   Result Value Ref Range    POCT Glucose 183 (H) 74 - 99 mg/dL   Blood Gas Arterial Full Panel Unsolicited   Result Value Ref Range    POCT pH, Arterial 7.54 (H) 7.38 - 7.42 pH    POCT pCO2, Arterial 32 (L) 38 - 42 mm Hg    POCT pO2, Arterial 181 (H) 85 - 95 mm Hg    POCT SO2, Arterial 97 94 - 100 %    POCT Oxy Hemoglobin, Arterial 97.4 94.0 - 98.0 %    POCT Hematocrit Calculated, Arterial 27.0 (L) 41.0 - 52.0 %    POCT Sodium, Arterial 133 (L) 136 - 145 mmol/L    POCT Potassium, Arterial 5.2 3.5 - 5.3 mmol/L    POCT Chloride, Arterial 98 98 - 107 mmol/L    POCT Ionized Calcium, Arterial 1.04 (L) 1.10 - 1.33 mmol/L    POCT Glucose, Arterial 168 (H) 74 - 99 mg/dL    POCT Lactate, Arterial 1.1 0.4 - 2.0 mmol/L    POCT Base Excess, Arterial 4.8 (H) -2.0 - 3.0 mmol/L    POCT HCO3 Calculated, Arterial 27.4 (H) 22.0 - 26.0 mmol/L    POCT Hemoglobin, Arterial 8.9 (L) 13.5 - 17.5 g/dL    POCT Anion Gap, Arterial 13 10 - 25 mmo/L    Patient Temperature 37.0 degrees Celsius    FiO2 50 %   Coox Panel, Arterial Unsolicited   Result Value Ref Range    POCT Hemoglobin, Arterial 8.9 (L) 13.5 - 17.5 g/dL    POCT Oxy Hemoglobin, Arterial 97.4 94.0 - 98.0 %    POCT Carboxyhemoglobin, Arterial 0.0 %    POCT Methemoglobin, Arterial 0.0 0.0 - 1.5 %    POCT Deoxy  Hemoglobin, Arterial 2.6 0.0 - 5.0 %   CBC   Result Value Ref Range    WBC 4.7 4.4 - 11.3 x10*3/uL    nRBC 0.0 0.0 - 0.0 /100 WBCs    RBC 3.46 (L) 4.50 - 5.90 x10*6/uL    Hemoglobin 9.8 (L) 13.5 - 17.5 g/dL    Hematocrit 30.6 (L) 41.0 - 52.0 %    MCV 88 80 - 100 fL    MCH 28.3 26.0 - 34.0 pg    MCHC 32.0 32.0 - 36.0 g/dL    RDW 13.0 11.5 - 14.5 %    Platelets 128 (L) 150 - 450 x10*3/uL   Renal Function Panel   Result Value Ref Range    Glucose 186 (H) 74 - 99 mg/dL    Sodium 132 (L) 136 - 145 mmol/L    Potassium 6.1 (HH) 3.5 - 5.3 mmol/L    Chloride 91 (L) 98 - 107 mmol/L    Bicarbonate 27 21 - 32 mmol/L    Anion Gap 20 10 - 20 mmol/L    Urea Nitrogen 86 (H) 6 - 23 mg/dL    Creatinine 13.96 (H) 0.50 - 1.30 mg/dL    eGFR 4 (L) >60 mL/min/1.73m*2    Calcium 10.5 8.6 - 10.6 mg/dL    Phosphorus 5.1 (H) 2.5 - 4.9 mg/dL    Albumin 3.5 3.4 - 5.0 g/dL   POCT GLUCOSE   Result Value Ref Range    POCT Glucose 187 (H) 74 - 99 mg/dL   POCT GLUCOSE   Result Value Ref Range    POCT Glucose 316 (H) 74 - 99 mg/dL   POCT GLUCOSE   Result Value Ref Range    POCT Glucose 174 (H) 74 - 99 mg/dL   Blood Gas Arterial Full Panel   Result Value Ref Range    POCT pH, Arterial 7.35 (L) 7.38 - 7.42 pH    POCT pCO2, Arterial 48 (H) 38 - 42 mm Hg    POCT pO2, Arterial 101 (H) 85 - 95 mm Hg    POCT SO2, Arterial 96 94 - 100 %    POCT Oxy Hemoglobin, Arterial 96.0 94.0 - 98.0 %    POCT Hematocrit Calculated, Arterial 29.0 (L) 41.0 - 52.0 %    POCT Sodium, Arterial 133 (L) 136 - 145 mmol/L    POCT Potassium, Arterial 4.4 3.5 - 5.3 mmol/L    POCT Chloride, Arterial 97 (L) 98 - 107 mmol/L    POCT Ionized Calcium, Arterial 1.20 1.10 - 1.33 mmol/L    POCT Glucose, Arterial 138 (H) 74 - 99 mg/dL    POCT Lactate, Arterial 2.8 (H) 0.4 - 2.0 mmol/L    POCT Base Excess, Arterial 0.5 -2.0 - 3.0 mmol/L    POCT HCO3 Calculated, Arterial 26.5 (H) 22.0 - 26.0 mmol/L    POCT Hemoglobin, Arterial 9.7 (L) 13.5 - 17.5 g/dL    POCT Anion Gap, Arterial 14 10 - 25 mmo/L     Patient Temperature 37.0 degrees Celsius    FiO2 35 %   Blood Gas Lactic Acid, Venous   Result Value Ref Range    POCT Lactate, Venous 2.8 (H) 0.4 - 2.0 mmol/L   POCT GLUCOSE   Result Value Ref Range    POCT Glucose 156 (H) 74 - 99 mg/dL   Magnesium   Result Value Ref Range    Magnesium 2.22 1.60 - 2.40 mg/dL   Renal Function Panel   Result Value Ref Range    Glucose 204 (H) 74 - 99 mg/dL    Sodium 134 (L) 136 - 145 mmol/L    Potassium 5.6 (H) 3.5 - 5.3 mmol/L    Chloride 96 (L) 98 - 107 mmol/L    Bicarbonate 20 (L) 21 - 32 mmol/L    Anion Gap 24 (H) 10 - 20 mmol/L    Urea Nitrogen 83 (H) 6 - 23 mg/dL    Creatinine 11.89 (H) 0.50 - 1.30 mg/dL    eGFR 4 (L) >60 mL/min/1.73m*2    Calcium 8.3 (L) 8.6 - 10.6 mg/dL    Phosphorus 5.6 (H) 2.5 - 4.9 mg/dL    Albumin 3.3 (L) 3.4 - 5.0 g/dL   CBC and Auto Differential   Result Value Ref Range    WBC 10.8 4.4 - 11.3 x10*3/uL    nRBC 0.0 0.0 - 0.0 /100 WBCs    RBC 2.99 (L) 4.50 - 5.90 x10*6/uL    Hemoglobin 8.4 (L) 13.5 - 17.5 g/dL    Hematocrit 28.0 (L) 41.0 - 52.0 %    MCV 94 80 - 100 fL    MCH 28.1 26.0 - 34.0 pg    MCHC 30.0 (L) 32.0 - 36.0 g/dL    RDW 13.5 11.5 - 14.5 %    Platelets 84 (L) 150 - 450 x10*3/uL    Neutrophils % 94.5 40.0 - 80.0 %    Immature Granulocytes %, Automated 1.0 (H) 0.0 - 0.9 %    Lymphocytes % 0.6 13.0 - 44.0 %    Monocytes % 3.8 2.0 - 10.0 %    Eosinophils % 0.0 0.0 - 6.0 %    Basophils % 0.1 0.0 - 2.0 %    Neutrophils Absolute 10.23 (H) 1.20 - 7.70 x10*3/uL    Immature Granulocytes Absolute, Automated 0.11 0.00 - 0.70 x10*3/uL    Lymphocytes Absolute 0.06 (L) 1.20 - 4.80 x10*3/uL    Monocytes Absolute 0.41 0.10 - 1.00 x10*3/uL    Eosinophils Absolute 0.00 0.00 - 0.70 x10*3/uL    Basophils Absolute 0.01 0.00 - 0.10 x10*3/uL   POCT GLUCOSE   Result Value Ref Range    POCT Glucose 247 (H) 74 - 99 mg/dL   Renal function panel   Result Value Ref Range    Glucose 281 (H) 74 - 99 mg/dL    Sodium 133 (L) 136 - 145 mmol/L    Potassium 5.0 3.5 - 5.3  mmol/L    Chloride 92 (L) 98 - 107 mmol/L    Bicarbonate 26 21 - 32 mmol/L    Anion Gap 20 10 - 20 mmol/L    Urea Nitrogen 86 (H) 6 - 23 mg/dL    Creatinine 11.81 (H) 0.50 - 1.30 mg/dL    eGFR 4 (L) >60 mL/min/1.73m*2    Calcium 8.4 (L) 8.6 - 10.6 mg/dL    Phosphorus 6.8 (H) 2.5 - 4.9 mg/dL    Albumin 3.6 3.4 - 5.0 g/dL   POCT GLUCOSE   Result Value Ref Range    POCT Glucose 283 (H) 74 - 99 mg/dL   POCT GLUCOSE   Result Value Ref Range    POCT Glucose 238 (H) 74 - 99 mg/dL   POCT GLUCOSE   Result Value Ref Range    POCT Glucose 292 (H) 74 - 99 mg/dL   Magnesium   Result Value Ref Range    Magnesium 2.20 1.60 - 2.40 mg/dL   Renal Function Panel   Result Value Ref Range    Glucose 359 (H) 74 - 99 mg/dL    Sodium 132 (L) 136 - 145 mmol/L    Potassium 4.3 3.5 - 5.3 mmol/L    Chloride 90 (L) 98 - 107 mmol/L    Bicarbonate 25 21 - 32 mmol/L    Anion Gap 21 (H) 10 - 20 mmol/L    Urea Nitrogen 92 (HH) 6 - 23 mg/dL    Creatinine 11.47 (H) 0.50 - 1.30 mg/dL    eGFR 5 (L) >60 mL/min/1.73m*2    Calcium 8.3 (L) 8.6 - 10.6 mg/dL    Phosphorus 8.4 (H) 2.5 - 4.9 mg/dL    Albumin 3.6 3.4 - 5.0 g/dL   CBC and Auto Differential   Result Value Ref Range    WBC 11.4 (H) 4.4 - 11.3 x10*3/uL    nRBC 0.0 0.0 - 0.0 /100 WBCs    RBC 3.19 (L) 4.50 - 5.90 x10*6/uL    Hemoglobin 9.2 (L) 13.5 - 17.5 g/dL    Hematocrit 28.2 (L) 41.0 - 52.0 %    MCV 88 80 - 100 fL    MCH 28.8 26.0 - 34.0 pg    MCHC 32.6 32.0 - 36.0 g/dL    RDW 12.8 11.5 - 14.5 %    Platelets 121 (L) 150 - 450 x10*3/uL    Neutrophils % 94.7 40.0 - 80.0 %    Immature Granulocytes %, Automated 0.7 0.0 - 0.9 %    Lymphocytes % 1.0 13.0 - 44.0 %    Monocytes % 3.6 2.0 - 10.0 %    Eosinophils % 0.0 0.0 - 6.0 %    Basophils % 0.0 0.0 - 2.0 %    Neutrophils Absolute 10.78 (H) 1.20 - 7.70 x10*3/uL    Immature Granulocytes Absolute, Automated 0.08 0.00 - 0.70 x10*3/uL    Lymphocytes Absolute 0.11 (L) 1.20 - 4.80 x10*3/uL    Monocytes Absolute 0.41 0.10 - 1.00 x10*3/uL    Eosinophils  Absolute 0.00 0.00 - 0.70 x10*3/uL    Basophils Absolute 0.00 0.00 - 0.10 x10*3/uL       This patient has a urinary catheter   Reason for the urinary catheter remaining today? perioperative use for selected surgical procedures    Assessment/Plan   Assessment & Plan    This is a 60 yo M with ESRD due to long-standing diabetes and hypertension, primary hypertension admitted for potential DDKT.  DDKT 1/6/25  Immunologic Risk: cPRA 90%, XM neg  Kidney Info: DCD, KDPI 83%, CIT 15 hrs, WIT 31 min  CMV D-/R+, EBV D+/R+, Toxo D-, HBV D-/R immune, HCV D-/R-      Allograft function  No evidence of clearance but UOP ok  Lasix 80 mg IV q8h  POD0 US ok    Immunosuppression  Induction ATG 4.5 mg/kg - #3 today  TAC on hold  MMF 1000 mg BID  Steroid taper    Prophylaxis:  PPI  clotrimazole 10 mg TID x 3 mo  TMP-SMX x 6 mo  Valcyte x 3 mo, renally dosed    Blood pressure/Volume - mild volume overload  Diuresis as above  Coreg 12.5 mg BID    Type 2 diabetes with steroid induced hyperglycemia  Basal bolus insulin per endo  Lipitor 40 mg daily    Secondary hyperparathyroidism  cCa ok, mild hyperPhos as expected given the degree of kidney function      Deyanira Sheppard MD

## 2025-01-08 NOTE — CONSULTS
Nutrition Diet Education Note:   --->Nutrition Consult for Diet Education    Pt with ESRD on HD and DM2, currently POD #2 from DDKT.    Diet education completed, see below.    Nutrition Education:   Education Documentation  Food Safety Guidelines s/p Solid Organ Transplant, taught by Olga Magana RDN, LD at 1/8/2025 10:52 AM.  Learner: Family, Patient  Readiness: Acceptance  Method: Explanation, Handout  Response: Verbalizes Understanding  Comment: Provided USDA's Food Safety Booklet to pt/pt's wife and reviewed diet guidelines s/p kidney transplant.            Time Spent (min): 15 minutes

## 2025-01-09 ENCOUNTER — DOCUMENTATION (OUTPATIENT)
Dept: GASTROENTEROLOGY | Facility: HOSPITAL | Age: 60
End: 2025-01-09
Payer: MEDICARE

## 2025-01-09 ENCOUNTER — TELEPHONE (OUTPATIENT)
Facility: HOSPITAL | Age: 60
End: 2025-01-09
Payer: MEDICARE

## 2025-01-09 ENCOUNTER — LAB REQUISITION (OUTPATIENT)
Dept: LAB | Facility: CLINIC | Age: 60
DRG: 650 | End: 2025-01-09
Payer: MEDICARE

## 2025-01-09 ENCOUNTER — TELEPHONE (OUTPATIENT)
Dept: CARDIOLOGY | Facility: CLINIC | Age: 60
End: 2025-01-09
Payer: MEDICARE

## 2025-01-09 DIAGNOSIS — Z94.0 KIDNEY TRANSPLANT STATUS: ICD-10-CM

## 2025-01-09 LAB
ALBUMIN SERPL BCP-MCNC: 3.6 G/DL (ref 3.4–5)
ANION GAP SERPL CALC-SCNC: 21 MMOL/L (ref 10–20)
BASOPHILS # BLD AUTO: 0.05 X10*3/UL (ref 0–0.1)
BASOPHILS NFR BLD AUTO: 0.4 %
BUN SERPL-MCNC: 100 MG/DL (ref 6–23)
CALCIUM SERPL-MCNC: 8.3 MG/DL (ref 8.6–10.6)
CHLORIDE SERPL-SCNC: 92 MMOL/L (ref 98–107)
CO2 SERPL-SCNC: 28 MMOL/L (ref 21–32)
CREAT SERPL-MCNC: 10.59 MG/DL (ref 0.5–1.3)
EGFRCR SERPLBLD CKD-EPI 2021: 5 ML/MIN/1.73M*2
EOSINOPHIL # BLD AUTO: 0 X10*3/UL (ref 0–0.7)
EOSINOPHIL NFR BLD AUTO: 0 %
ERYTHROCYTE [DISTWIDTH] IN BLOOD BY AUTOMATED COUNT: 13.5 % (ref 11.5–14.5)
FLOW ALLOCROSSMATCH PEAK: NORMAL
FLOW ALLOCROSSMATCH: NORMAL
GLUCOSE BLD MANUAL STRIP-MCNC: 113 MG/DL (ref 74–99)
GLUCOSE BLD MANUAL STRIP-MCNC: 142 MG/DL (ref 74–99)
GLUCOSE BLD MANUAL STRIP-MCNC: 148 MG/DL (ref 74–99)
GLUCOSE SERPL-MCNC: 124 MG/DL (ref 74–99)
HCT VFR BLD AUTO: 33.1 % (ref 41–52)
HGB BLD-MCNC: 11.6 G/DL (ref 13.5–17.5)
HLA RESULTS: NORMAL
HLA RESULTS: NORMAL
IMM GRANULOCYTES # BLD AUTO: 0.4 X10*3/UL (ref 0–0.7)
IMM GRANULOCYTES NFR BLD AUTO: 2.9 % (ref 0–0.9)
LYMPHOCYTES # BLD AUTO: 1 X10*3/UL (ref 1.2–4.8)
LYMPHOCYTES NFR BLD AUTO: 7.2 %
MAGNESIUM SERPL-MCNC: 2.21 MG/DL (ref 1.6–2.4)
MCH RBC QN AUTO: 30.1 PG (ref 26–34)
MCHC RBC AUTO-ENTMCNC: 35 G/DL (ref 32–36)
MCV RBC AUTO: 86 FL (ref 80–100)
MONOCYTES # BLD AUTO: 0.87 X10*3/UL (ref 0.1–1)
MONOCYTES NFR BLD AUTO: 6.3 %
NEUTROPHILS # BLD AUTO: 11.52 X10*3/UL (ref 1.2–7.7)
NEUTROPHILS NFR BLD AUTO: 83.2 %
NRBC BLD-RTO: 0 /100 WBCS (ref 0–0)
PHOSPHATE SERPL-MCNC: 7.5 MG/DL (ref 2.5–4.9)
PLATELET # BLD AUTO: 151 X10*3/UL (ref 150–450)
POTASSIUM SERPL-SCNC: 4.1 MMOL/L (ref 3.5–5.3)
RBC # BLD AUTO: 3.86 X10*6/UL (ref 4.5–5.9)
SODIUM SERPL-SCNC: 137 MMOL/L (ref 136–145)
TACROLIMUS BLD-MCNC: 15.8 NG/ML
WBC # BLD AUTO: 13.8 X10*3/UL (ref 4.4–11.3)

## 2025-01-09 PROCEDURE — 83735 ASSAY OF MAGNESIUM: CPT | Performed by: STUDENT IN AN ORGANIZED HEALTH CARE EDUCATION/TRAINING PROGRAM

## 2025-01-09 PROCEDURE — 99233 SBSQ HOSP IP/OBS HIGH 50: CPT | Performed by: STUDENT IN AN ORGANIZED HEALTH CARE EDUCATION/TRAINING PROGRAM

## 2025-01-09 PROCEDURE — 2500000002 HC RX 250 W HCPCS SELF ADMINISTERED DRUGS (ALT 637 FOR MEDICARE OP, ALT 636 FOR OP/ED): Performed by: NURSE PRACTITIONER

## 2025-01-09 PROCEDURE — 85025 COMPLETE CBC W/AUTO DIFF WBC: CPT | Performed by: STUDENT IN AN ORGANIZED HEALTH CARE EDUCATION/TRAINING PROGRAM

## 2025-01-09 PROCEDURE — 2500000002 HC RX 250 W HCPCS SELF ADMINISTERED DRUGS (ALT 637 FOR MEDICARE OP, ALT 636 FOR OP/ED): Performed by: STUDENT IN AN ORGANIZED HEALTH CARE EDUCATION/TRAINING PROGRAM

## 2025-01-09 PROCEDURE — 82947 ASSAY GLUCOSE BLOOD QUANT: CPT

## 2025-01-09 PROCEDURE — 2500000004 HC RX 250 GENERAL PHARMACY W/ HCPCS (ALT 636 FOR OP/ED): Performed by: STUDENT IN AN ORGANIZED HEALTH CARE EDUCATION/TRAINING PROGRAM

## 2025-01-09 PROCEDURE — 80197 ASSAY OF TACROLIMUS: CPT | Performed by: STUDENT IN AN ORGANIZED HEALTH CARE EDUCATION/TRAINING PROGRAM

## 2025-01-09 PROCEDURE — 2500000005 HC RX 250 GENERAL PHARMACY W/O HCPCS: Performed by: STUDENT IN AN ORGANIZED HEALTH CARE EDUCATION/TRAINING PROGRAM

## 2025-01-09 PROCEDURE — 99232 SBSQ HOSP IP/OBS MODERATE 35: CPT | Performed by: TRANSPLANT SURGERY

## 2025-01-09 PROCEDURE — 36415 COLL VENOUS BLD VENIPUNCTURE: CPT | Performed by: STUDENT IN AN ORGANIZED HEALTH CARE EDUCATION/TRAINING PROGRAM

## 2025-01-09 PROCEDURE — 2500000001 HC RX 250 WO HCPCS SELF ADMINISTERED DRUGS (ALT 637 FOR MEDICARE OP): Performed by: STUDENT IN AN ORGANIZED HEALTH CARE EDUCATION/TRAINING PROGRAM

## 2025-01-09 PROCEDURE — 1200000002 HC GENERAL ROOM WITH TELEMETRY DAILY

## 2025-01-09 PROCEDURE — 80069 RENAL FUNCTION PANEL: CPT | Performed by: STUDENT IN AN ORGANIZED HEALTH CARE EDUCATION/TRAINING PROGRAM

## 2025-01-09 PROCEDURE — 2500000004 HC RX 250 GENERAL PHARMACY W/ HCPCS (ALT 636 FOR OP/ED)

## 2025-01-09 RX ORDER — DEXTROSE 50 % IN WATER (D50W) INTRAVENOUS SYRINGE
12.5
Status: DISCONTINUED | OUTPATIENT
Start: 2025-01-09 | End: 2025-01-11 | Stop reason: HOSPADM

## 2025-01-09 RX ORDER — TRAMADOL HYDROCHLORIDE 50 MG/1
50 TABLET ORAL EVERY 8 HOURS PRN
Status: DISCONTINUED | OUTPATIENT
Start: 2025-01-09 | End: 2025-01-11 | Stop reason: HOSPADM

## 2025-01-09 RX ORDER — DEXTROSE 50 % IN WATER (D50W) INTRAVENOUS SYRINGE
25
Status: DISCONTINUED | OUTPATIENT
Start: 2025-01-09 | End: 2025-01-09

## 2025-01-09 RX ORDER — TRAMADOL HYDROCHLORIDE 50 MG/1
25 TABLET ORAL EVERY 8 HOURS PRN
Status: DISCONTINUED | OUTPATIENT
Start: 2025-01-09 | End: 2025-01-11 | Stop reason: HOSPADM

## 2025-01-09 RX ORDER — TACROLIMUS 0.5 MG/1
0.5 CAPSULE ORAL
Status: DISCONTINUED | OUTPATIENT
Start: 2025-01-10 | End: 2025-01-11 | Stop reason: HOSPADM

## 2025-01-09 RX ORDER — INSULIN GLARGINE 100 [IU]/ML
15 INJECTION, SOLUTION SUBCUTANEOUS EVERY MORNING
Status: DISCONTINUED | OUTPATIENT
Start: 2025-01-10 | End: 2025-01-10

## 2025-01-09 RX ADMIN — DOCUSATE SODIUM 100 MG: 100 CAPSULE, LIQUID FILLED ORAL at 08:20

## 2025-01-09 RX ADMIN — SENNOSIDES 8.6 MG: 8.6 TABLET, FILM COATED ORAL at 08:20

## 2025-01-09 RX ADMIN — ACETAMINOPHEN 650 MG: 325 TABLET ORAL at 12:12

## 2025-01-09 RX ADMIN — FUROSEMIDE 80 MG: 10 INJECTION, SOLUTION INTRAVENOUS at 22:02

## 2025-01-09 RX ADMIN — PANTOPRAZOLE SODIUM 40 MG: 40 TABLET, DELAYED RELEASE ORAL at 15:35

## 2025-01-09 RX ADMIN — INSULIN HUMAN 8 UNITS: 100 INJECTION, SUSPENSION SUBCUTANEOUS at 20:27

## 2025-01-09 RX ADMIN — FUROSEMIDE 80 MG: 10 INJECTION, SOLUTION INTRAVENOUS at 06:48

## 2025-01-09 RX ADMIN — Medication 1 L/MIN: at 08:26

## 2025-01-09 RX ADMIN — PANTOPRAZOLE SODIUM 40 MG: 40 TABLET, DELAYED RELEASE ORAL at 06:54

## 2025-01-09 RX ADMIN — ACETAMINOPHEN 650 MG: 325 TABLET ORAL at 23:11

## 2025-01-09 RX ADMIN — HEPARIN SODIUM 5000 UNITS: 5000 INJECTION, SOLUTION INTRAVENOUS; SUBCUTANEOUS at 12:12

## 2025-01-09 RX ADMIN — Medication 21 PERCENT: at 23:11

## 2025-01-09 RX ADMIN — SENNOSIDES 8.6 MG: 8.6 TABLET, FILM COATED ORAL at 20:27

## 2025-01-09 RX ADMIN — MYCOPHENOLATE MOFETIL 1000 MG: 250 CAPSULE ORAL at 06:49

## 2025-01-09 RX ADMIN — CARVEDILOL 12.5 MG: 12.5 TABLET, FILM COATED ORAL at 20:27

## 2025-01-09 RX ADMIN — HEPARIN SODIUM 5000 UNITS: 5000 INJECTION, SOLUTION INTRAVENOUS; SUBCUTANEOUS at 20:27

## 2025-01-09 RX ADMIN — INSULIN LISPRO 5 UNITS: 100 INJECTION, SOLUTION INTRAVENOUS; SUBCUTANEOUS at 18:16

## 2025-01-09 RX ADMIN — INSULIN LISPRO 5 UNITS: 100 INJECTION, SOLUTION INTRAVENOUS; SUBCUTANEOUS at 08:20

## 2025-01-09 RX ADMIN — ACETAMINOPHEN 650 MG: 325 TABLET ORAL at 06:49

## 2025-01-09 RX ADMIN — FUROSEMIDE 80 MG: 10 INJECTION, SOLUTION INTRAVENOUS at 13:52

## 2025-01-09 RX ADMIN — METHYLPREDNISOLONE SODIUM SUCCINATE 60 MG: 125 INJECTION, POWDER, FOR SOLUTION INTRAMUSCULAR; INTRAVENOUS at 08:20

## 2025-01-09 RX ADMIN — HEPARIN SODIUM 5000 UNITS: 5000 INJECTION, SOLUTION INTRAVENOUS; SUBCUTANEOUS at 04:47

## 2025-01-09 RX ADMIN — ATORVASTATIN CALCIUM 40 MG: 40 TABLET, FILM COATED ORAL at 08:19

## 2025-01-09 RX ADMIN — CLOTRIMAZOLE 10 MG: 10 LOZENGE ORAL at 08:19

## 2025-01-09 RX ADMIN — MYCOPHENOLATE MOFETIL 1000 MG: 250 CAPSULE ORAL at 18:15

## 2025-01-09 RX ADMIN — ACETAMINOPHEN 650 MG: 325 TABLET ORAL at 18:15

## 2025-01-09 RX ADMIN — SULFAMETHOXAZOLE AND TRIMETHOPRIM 1 TABLET: 400; 80 TABLET ORAL at 08:20

## 2025-01-09 RX ADMIN — VALGANCICLOVIR 450 MG: 450 TABLET, FILM COATED ORAL at 08:20

## 2025-01-09 RX ADMIN — CLOTRIMAZOLE 10 MG: 10 LOZENGE ORAL at 18:15

## 2025-01-09 RX ADMIN — DOCUSATE SODIUM 100 MG: 100 CAPSULE, LIQUID FILLED ORAL at 20:27

## 2025-01-09 RX ADMIN — CLOTRIMAZOLE 10 MG: 10 LOZENGE ORAL at 12:12

## 2025-01-09 RX ADMIN — CARVEDILOL 12.5 MG: 12.5 TABLET, FILM COATED ORAL at 08:20

## 2025-01-09 RX ADMIN — INSULIN LISPRO 5 UNITS: 100 INJECTION, SOLUTION INTRAVENOUS; SUBCUTANEOUS at 12:12

## 2025-01-09 ASSESSMENT — COGNITIVE AND FUNCTIONAL STATUS - GENERAL
HELP NEEDED FOR BATHING: A LITTLE
MOVING TO AND FROM BED TO CHAIR: A LITTLE
TURNING FROM BACK TO SIDE WHILE IN FLAT BAD: A LITTLE
MOBILITY SCORE: 21
MOVING FROM LYING ON BACK TO SITTING ON SIDE OF FLAT BED WITH BEDRAILS: A LITTLE
DAILY ACTIVITIY SCORE: 22
DRESSING REGULAR LOWER BODY CLOTHING: A LITTLE

## 2025-01-09 ASSESSMENT — ENCOUNTER SYMPTOMS
ABDOMINAL PAIN: 0
NAUSEA: 0
FREQUENCY: 0
JOINT SWELLING: 0
AGITATION: 0
SORE THROAT: 0
SPEECH DIFFICULTY: 0
FATIGUE: 1
APPETITE CHANGE: 0
HEADACHES: 0
CONFUSION: 0
DYSURIA: 0
NUMBNESS: 0
SHORTNESS OF BREATH: 0
POLYDIPSIA: 0
COUGH: 0
CHEST TIGHTNESS: 0
DIARRHEA: 0
EYES NEGATIVE: 1
POLYPHAGIA: 0
ACTIVITY CHANGE: 0
DIAPHORESIS: 0
UNEXPECTED WEIGHT CHANGE: 0

## 2025-01-09 ASSESSMENT — PAIN SCALES - GENERAL
PAINLEVEL_OUTOF10: 0 - NO PAIN

## 2025-01-09 ASSESSMENT — PAIN - FUNCTIONAL ASSESSMENT
PAIN_FUNCTIONAL_ASSESSMENT: 0-10
PAIN_FUNCTIONAL_ASSESSMENT: 0-10

## 2025-01-09 NOTE — CARE PLAN
The patient's goals for the shift include      The clinical goals for the shift include pt will remain HDS throughout this shift      Problem: Diabetes  Goal: Achieve decreasing blood glucose levels by end of shift  Outcome: Progressing  Goal: Increase stability of blood glucose readings by end of shift  Outcome: Progressing  Goal: Decrease in ketones present in urine by end of shift  Outcome: Progressing  Goal: Maintain electrolyte levels within acceptable range throughout shift  Outcome: Progressing  Goal: Maintain glucose levels >70mg/dl to <250mg/dl throughout shift  Outcome: Progressing  Goal: No changes in neurological exam by end of shift  Outcome: Progressing  Goal: Learn about and adhere to nutrition recommendations by end of shift  Outcome: Progressing  Goal: Vital signs within normal range for age by end of shift  Outcome: Progressing  Goal: Increase self care and/or family involovement by end of shift  Outcome: Progressing  Goal: Receive DSME education by end of shift  Outcome: Progressing     Problem: Pain - Adult  Goal: Verbalizes/displays adequate comfort level or baseline comfort level  Outcome: Progressing     Problem: Safety - Adult  Goal: Free from fall injury  Outcome: Progressing     Problem: Discharge Planning  Goal: Discharge to home or other facility with appropriate resources  Outcome: Progressing     Problem: Chronic Conditions and Co-morbidities  Goal: Patient's chronic conditions and co-morbidity symptoms are monitored and maintained or improved  Outcome: Progressing     Problem: Skin  Goal: Decreased wound size/increased tissue granulation at next dressing change  Outcome: Progressing  Goal: Participates in plan/prevention/treatment measures  Outcome: Progressing  Goal: Prevent/manage excess moisture  Outcome: Progressing  Goal: Prevent/minimize sheer/friction injuries  Outcome: Progressing  Goal: Promote/optimize nutrition  Outcome: Progressing  Goal: Promote skin healing  Outcome:  Progressing     Problem: Fall/Injury  Goal: Not fall by end of shift  Outcome: Progressing  Goal: Be free from injury by end of the shift  Outcome: Progressing  Goal: Verbalize understanding of personal risk factors for fall in the hospital  Outcome: Progressing  Goal: Verbalize understanding of risk factor reduction measures to prevent injury from fall in the home  Outcome: Progressing  Goal: Use assistive devices by end of the shift  Outcome: Progressing  Goal: Pace activities to prevent fatigue by end of the shift  Outcome: Progressing     Problem: Pain  Goal: Takes deep breaths with improved pain control throughout the shift  Outcome: Progressing  Goal: Turns in bed with improved pain control throughout the shift  Outcome: Progressing  Goal: Walks with improved pain control throughout the shift  Outcome: Progressing  Goal: Performs ADL's with improved pain control throughout shift  Outcome: Progressing  Goal: Participates in PT with improved pain control throughout the shift  Outcome: Progressing  Goal: Free from opioid side effects throughout the shift  Outcome: Progressing  Goal: Free from acute confusion related to pain meds throughout the shift  Outcome: Progressing

## 2025-01-09 NOTE — PROGRESS NOTES
"Anibal Dow is a 59 y.o. male on day 3 of admission presenting with Kidney replaced by transplant ([facility]-HCC).  S/p DDKT 1/6/25    Subjective   POD3 DDKT. No acute event. No N/V. Tolerates diet well.       Objective   Vital signs - reviewed.   General Appearance - NAD, Good speech, oriented and alert  HEENT - Supple. Not pale. No jaundice.   CVS - RRR. Normal S1/S2. No murmur, click , rub or gallop  Lungs- clear to auscultation bilaterally  Abdomen - soft , not tender, no guarding, no rigidity. No hepatosplenomegaly. Normal bowel sounds. No masses and ascites. S/P Kidney transplant. Incision C/D/I  Extremities - trace edema.   Neuro/Psych - appropriate mood and affect. Motor power V/V all extremities. CN I -XII were grossly intact.  Skin - No visible rash  Access - LUE AVG +thrill      Last Recorded Vitals  Blood pressure 133/78, pulse 80, temperature 36.5 °C (97.7 °F), temperature source Temporal, resp. rate 18, height 1.753 m (5' 9\"), weight 80.2 kg (176 lb 12.9 oz), SpO2 92%.  Intake/Output last 3 Shifts:  I/O last 3 completed shifts:  In: 1900 (23.7 mL/kg) [P.O.:1900]  Out: 2370 (29.6 mL/kg) [Urine:2350 (0.8 mL/kg/hr); Drains:20]  Weight: 80.2 kg     Relevant Results  Results for orders placed or performed during the hospital encounter of 01/06/25 (from the past 96 hours)   Blood Gas Venous Full Panel   Result Value Ref Range    POCT pH, Venous 7.43 7.33 - 7.43 pH    POCT pCO2, Venous 52 (H) 41 - 51 mm Hg    POCT pO2, Venous 41 35 - 45 mm Hg    POCT SO2, Venous 69 45 - 75 %    POCT Oxy Hemoglobin, Venous 67.9 45.0 - 75.0 %    POCT Hematocrit Calculated, Venous 36.0 (L) 41.0 - 52.0 %    POCT Sodium, Venous 134 (L) 136 - 145 mmol/L    POCT Potassium, Venous 5.5 (H) 3.5 - 5.3 mmol/L    POCT Chloride, Venous 93 (L) 98 - 107 mmol/L    POCT Ionized Calicum, Venous 1.19 1.10 - 1.33 mmol/L    POCT Glucose, Venous 210 (H) 74 - 99 mg/dL    POCT Lactate, Venous 1.1 0.4 - 2.0 mmol/L    POCT Base Excess, Venous 8.7 (H) " -2.0 - 3.0 mmol/L    POCT HCO3 Calculated, Venous 34.5 (H) 22.0 - 26.0 mmol/L    POCT Hemoglobin, Venous 12.0 (L) 13.5 - 17.5 g/dL    POCT Anion Gap, Venous 12.0 10.0 - 25.0 mmol/L    Patient Temperature 37.0 degrees Celsius    FiO2 21 %   Raudel-Barr Virus Nuclear Antigen Antibody, IgG   Result Value Ref Range    EBV Nuclear Antigen Antibody, IgG Positive (A) Negative   Coagulation Screen   Result Value Ref Range    Protime 12.7 9.8 - 12.8 seconds    INR 1.1 0.9 - 1.1    aPTT 33 27 - 38 seconds   Cytomegalovirus IgG   Result Value Ref Range    Cytomegalovirus IgG Reactive (A) Nonreactive   Hepatic Function Panel   Result Value Ref Range    Albumin 4.3 3.4 - 5.0 g/dL    Bilirubin, Total 0.5 0.0 - 1.2 mg/dL    Bilirubin, Direct 0.1 0.0 - 0.3 mg/dL    Alkaline Phosphatase 71 33 - 120 U/L    ALT 9 (L) 10 - 52 U/L    AST 16 9 - 39 U/L    Total Protein 7.7 6.4 - 8.2 g/dL   Hepatitis C Antibody   Result Value Ref Range    Hepatitis C AB Nonreactive Nonreactive   Hepatitis B Core Antibody, Total   Result Value Ref Range    Hepatitis B Core AB- Total Nonreactive Nonreactive   Hepatitis B Surface Antibody   Result Value Ref Range    Hepatitis B Surface AB >1,000.0 (H) <10.0 mIU/mL   Hepatitis B Surface Antigen   Result Value Ref Range    Hepatitis B Surface AG Nonreactive Nonreactive   Hepatitis C RNA, Quantitative, PCR   Result Value Ref Range    Hepatitis C RNA PCR Log      HCV RNA Result Not Detected Not detected   HIV 1/2 Antigen/Antibody Screen with Reflex to Confirmation   Result Value Ref Range    HIV 1/2 Antigen/Antibody Screen with Reflex to Confirmation Nonreactive Nonreactive   Phosphorus   Result Value Ref Range    Phosphorus 6.0 (H) 2.5 - 4.9 mg/dL   Basic Metabolic Panel   Result Value Ref Range    Glucose 201 (H) 74 - 99 mg/dL    Sodium 136 136 - 145 mmol/L    Potassium 5.5 (H) 3.5 - 5.3 mmol/L    Chloride 89 (L) 98 - 107 mmol/L    Bicarbonate 32 21 - 32 mmol/L    Anion Gap 21 (H) 10 - 20 mmol/L    Urea  Nitrogen 83 (H) 6 - 23 mg/dL    Creatinine 14.35 (H) 0.50 - 1.30 mg/dL    eGFR 4 (L) >60 mL/min/1.73m*2    Calcium 10.5 8.6 - 10.6 mg/dL   Type And Screen   Result Value Ref Range    ABO TYPE O     Rh TYPE POS     ANTIBODY SCREEN NEG    CBC   Result Value Ref Range    WBC 7.6 4.4 - 11.3 x10*3/uL    nRBC 0.0 0.0 - 0.0 /100 WBCs    RBC 4.18 (L) 4.50 - 5.90 x10*6/uL    Hemoglobin 12.2 (L) 13.5 - 17.5 g/dL    Hematocrit 36.2 (L) 41.0 - 52.0 %    MCV 87 80 - 100 fL    MCH 29.2 26.0 - 34.0 pg    MCHC 33.7 32.0 - 36.0 g/dL    RDW 13.0 11.5 - 14.5 %    Platelets 182 150 - 450 x10*3/uL   POCT GLUCOSE   Result Value Ref Range    POCT Glucose 192 (H) 74 - 99 mg/dL   POCT GLUCOSE   Result Value Ref Range    POCT Glucose 183 (H) 74 - 99 mg/dL   Blood Gas Arterial Full Panel Unsolicited   Result Value Ref Range    POCT pH, Arterial 7.54 (H) 7.38 - 7.42 pH    POCT pCO2, Arterial 32 (L) 38 - 42 mm Hg    POCT pO2, Arterial 181 (H) 85 - 95 mm Hg    POCT SO2, Arterial 97 94 - 100 %    POCT Oxy Hemoglobin, Arterial 97.4 94.0 - 98.0 %    POCT Hematocrit Calculated, Arterial 27.0 (L) 41.0 - 52.0 %    POCT Sodium, Arterial 133 (L) 136 - 145 mmol/L    POCT Potassium, Arterial 5.2 3.5 - 5.3 mmol/L    POCT Chloride, Arterial 98 98 - 107 mmol/L    POCT Ionized Calcium, Arterial 1.04 (L) 1.10 - 1.33 mmol/L    POCT Glucose, Arterial 168 (H) 74 - 99 mg/dL    POCT Lactate, Arterial 1.1 0.4 - 2.0 mmol/L    POCT Base Excess, Arterial 4.8 (H) -2.0 - 3.0 mmol/L    POCT HCO3 Calculated, Arterial 27.4 (H) 22.0 - 26.0 mmol/L    POCT Hemoglobin, Arterial 8.9 (L) 13.5 - 17.5 g/dL    POCT Anion Gap, Arterial 13 10 - 25 mmo/L    Patient Temperature 37.0 degrees Celsius    FiO2 50 %   Coox Panel, Arterial Unsolicited   Result Value Ref Range    POCT Hemoglobin, Arterial 8.9 (L) 13.5 - 17.5 g/dL    POCT Oxy Hemoglobin, Arterial 97.4 94.0 - 98.0 %    POCT Carboxyhemoglobin, Arterial 0.0 %    POCT Methemoglobin, Arterial 0.0 0.0 - 1.5 %    POCT Deoxy  Hemoglobin, Arterial 2.6 0.0 - 5.0 %   CBC   Result Value Ref Range    WBC 4.7 4.4 - 11.3 x10*3/uL    nRBC 0.0 0.0 - 0.0 /100 WBCs    RBC 3.46 (L) 4.50 - 5.90 x10*6/uL    Hemoglobin 9.8 (L) 13.5 - 17.5 g/dL    Hematocrit 30.6 (L) 41.0 - 52.0 %    MCV 88 80 - 100 fL    MCH 28.3 26.0 - 34.0 pg    MCHC 32.0 32.0 - 36.0 g/dL    RDW 13.0 11.5 - 14.5 %    Platelets 128 (L) 150 - 450 x10*3/uL   Renal Function Panel   Result Value Ref Range    Glucose 186 (H) 74 - 99 mg/dL    Sodium 132 (L) 136 - 145 mmol/L    Potassium 6.1 (HH) 3.5 - 5.3 mmol/L    Chloride 91 (L) 98 - 107 mmol/L    Bicarbonate 27 21 - 32 mmol/L    Anion Gap 20 10 - 20 mmol/L    Urea Nitrogen 86 (H) 6 - 23 mg/dL    Creatinine 13.96 (H) 0.50 - 1.30 mg/dL    eGFR 4 (L) >60 mL/min/1.73m*2    Calcium 10.5 8.6 - 10.6 mg/dL    Phosphorus 5.1 (H) 2.5 - 4.9 mg/dL    Albumin 3.5 3.4 - 5.0 g/dL   POCT GLUCOSE   Result Value Ref Range    POCT Glucose 187 (H) 74 - 99 mg/dL   POCT GLUCOSE   Result Value Ref Range    POCT Glucose 316 (H) 74 - 99 mg/dL   POCT GLUCOSE   Result Value Ref Range    POCT Glucose 174 (H) 74 - 99 mg/dL   Blood Gas Arterial Full Panel   Result Value Ref Range    POCT pH, Arterial 7.35 (L) 7.38 - 7.42 pH    POCT pCO2, Arterial 48 (H) 38 - 42 mm Hg    POCT pO2, Arterial 101 (H) 85 - 95 mm Hg    POCT SO2, Arterial 96 94 - 100 %    POCT Oxy Hemoglobin, Arterial 96.0 94.0 - 98.0 %    POCT Hematocrit Calculated, Arterial 29.0 (L) 41.0 - 52.0 %    POCT Sodium, Arterial 133 (L) 136 - 145 mmol/L    POCT Potassium, Arterial 4.4 3.5 - 5.3 mmol/L    POCT Chloride, Arterial 97 (L) 98 - 107 mmol/L    POCT Ionized Calcium, Arterial 1.20 1.10 - 1.33 mmol/L    POCT Glucose, Arterial 138 (H) 74 - 99 mg/dL    POCT Lactate, Arterial 2.8 (H) 0.4 - 2.0 mmol/L    POCT Base Excess, Arterial 0.5 -2.0 - 3.0 mmol/L    POCT HCO3 Calculated, Arterial 26.5 (H) 22.0 - 26.0 mmol/L    POCT Hemoglobin, Arterial 9.7 (L) 13.5 - 17.5 g/dL    POCT Anion Gap, Arterial 14 10 - 25 mmo/L     Patient Temperature 37.0 degrees Celsius    FiO2 35 %   Blood Gas Lactic Acid, Venous   Result Value Ref Range    POCT Lactate, Venous 2.8 (H) 0.4 - 2.0 mmol/L   POCT GLUCOSE   Result Value Ref Range    POCT Glucose 156 (H) 74 - 99 mg/dL   Magnesium   Result Value Ref Range    Magnesium 2.22 1.60 - 2.40 mg/dL   Renal Function Panel   Result Value Ref Range    Glucose 204 (H) 74 - 99 mg/dL    Sodium 134 (L) 136 - 145 mmol/L    Potassium 5.6 (H) 3.5 - 5.3 mmol/L    Chloride 96 (L) 98 - 107 mmol/L    Bicarbonate 20 (L) 21 - 32 mmol/L    Anion Gap 24 (H) 10 - 20 mmol/L    Urea Nitrogen 83 (H) 6 - 23 mg/dL    Creatinine 11.89 (H) 0.50 - 1.30 mg/dL    eGFR 4 (L) >60 mL/min/1.73m*2    Calcium 8.3 (L) 8.6 - 10.6 mg/dL    Phosphorus 5.6 (H) 2.5 - 4.9 mg/dL    Albumin 3.3 (L) 3.4 - 5.0 g/dL   CBC and Auto Differential   Result Value Ref Range    WBC 10.8 4.4 - 11.3 x10*3/uL    nRBC 0.0 0.0 - 0.0 /100 WBCs    RBC 2.99 (L) 4.50 - 5.90 x10*6/uL    Hemoglobin 8.4 (L) 13.5 - 17.5 g/dL    Hematocrit 28.0 (L) 41.0 - 52.0 %    MCV 94 80 - 100 fL    MCH 28.1 26.0 - 34.0 pg    MCHC 30.0 (L) 32.0 - 36.0 g/dL    RDW 13.5 11.5 - 14.5 %    Platelets 84 (L) 150 - 450 x10*3/uL    Neutrophils % 94.5 40.0 - 80.0 %    Immature Granulocytes %, Automated 1.0 (H) 0.0 - 0.9 %    Lymphocytes % 0.6 13.0 - 44.0 %    Monocytes % 3.8 2.0 - 10.0 %    Eosinophils % 0.0 0.0 - 6.0 %    Basophils % 0.1 0.0 - 2.0 %    Neutrophils Absolute 10.23 (H) 1.20 - 7.70 x10*3/uL    Immature Granulocytes Absolute, Automated 0.11 0.00 - 0.70 x10*3/uL    Lymphocytes Absolute 0.06 (L) 1.20 - 4.80 x10*3/uL    Monocytes Absolute 0.41 0.10 - 1.00 x10*3/uL    Eosinophils Absolute 0.00 0.00 - 0.70 x10*3/uL    Basophils Absolute 0.01 0.00 - 0.10 x10*3/uL   POCT GLUCOSE   Result Value Ref Range    POCT Glucose 247 (H) 74 - 99 mg/dL   Renal function panel   Result Value Ref Range    Glucose 281 (H) 74 - 99 mg/dL    Sodium 133 (L) 136 - 145 mmol/L    Potassium 5.0 3.5 - 5.3  mmol/L    Chloride 92 (L) 98 - 107 mmol/L    Bicarbonate 26 21 - 32 mmol/L    Anion Gap 20 10 - 20 mmol/L    Urea Nitrogen 86 (H) 6 - 23 mg/dL    Creatinine 11.81 (H) 0.50 - 1.30 mg/dL    eGFR 4 (L) >60 mL/min/1.73m*2    Calcium 8.4 (L) 8.6 - 10.6 mg/dL    Phosphorus 6.8 (H) 2.5 - 4.9 mg/dL    Albumin 3.6 3.4 - 5.0 g/dL   POCT GLUCOSE   Result Value Ref Range    POCT Glucose 283 (H) 74 - 99 mg/dL   POCT GLUCOSE   Result Value Ref Range    POCT Glucose 238 (H) 74 - 99 mg/dL   POCT GLUCOSE   Result Value Ref Range    POCT Glucose 292 (H) 74 - 99 mg/dL   Magnesium   Result Value Ref Range    Magnesium 2.20 1.60 - 2.40 mg/dL   Renal Function Panel   Result Value Ref Range    Glucose 359 (H) 74 - 99 mg/dL    Sodium 132 (L) 136 - 145 mmol/L    Potassium 4.3 3.5 - 5.3 mmol/L    Chloride 90 (L) 98 - 107 mmol/L    Bicarbonate 25 21 - 32 mmol/L    Anion Gap 21 (H) 10 - 20 mmol/L    Urea Nitrogen 92 (HH) 6 - 23 mg/dL    Creatinine 11.47 (H) 0.50 - 1.30 mg/dL    eGFR 5 (L) >60 mL/min/1.73m*2    Calcium 8.3 (L) 8.6 - 10.6 mg/dL    Phosphorus 8.4 (H) 2.5 - 4.9 mg/dL    Albumin 3.6 3.4 - 5.0 g/dL   CBC and Auto Differential   Result Value Ref Range    WBC 11.4 (H) 4.4 - 11.3 x10*3/uL    nRBC 0.0 0.0 - 0.0 /100 WBCs    RBC 3.19 (L) 4.50 - 5.90 x10*6/uL    Hemoglobin 9.2 (L) 13.5 - 17.5 g/dL    Hematocrit 28.2 (L) 41.0 - 52.0 %    MCV 88 80 - 100 fL    MCH 28.8 26.0 - 34.0 pg    MCHC 32.6 32.0 - 36.0 g/dL    RDW 12.8 11.5 - 14.5 %    Platelets 121 (L) 150 - 450 x10*3/uL    Neutrophils % 94.7 40.0 - 80.0 %    Immature Granulocytes %, Automated 0.7 0.0 - 0.9 %    Lymphocytes % 1.0 13.0 - 44.0 %    Monocytes % 3.6 2.0 - 10.0 %    Eosinophils % 0.0 0.0 - 6.0 %    Basophils % 0.0 0.0 - 2.0 %    Neutrophils Absolute 10.78 (H) 1.20 - 7.70 x10*3/uL    Immature Granulocytes Absolute, Automated 0.08 0.00 - 0.70 x10*3/uL    Lymphocytes Absolute 0.11 (L) 1.20 - 4.80 x10*3/uL    Monocytes Absolute 0.41 0.10 - 1.00 x10*3/uL    Eosinophils  Absolute 0.00 0.00 - 0.70 x10*3/uL    Basophils Absolute 0.00 0.00 - 0.10 x10*3/uL   Tacrolimus level   Result Value Ref Range    Tacrolimus  26.8 (HH) <=15.0 ng/mL   POCT GLUCOSE   Result Value Ref Range    POCT Glucose 330 (H) 74 - 99 mg/dL   POCT GLUCOSE   Result Value Ref Range    POCT Glucose 303 (H) 74 - 99 mg/dL   POCT GLUCOSE   Result Value Ref Range    POCT Glucose 261 (H) 74 - 99 mg/dL   POCT GLUCOSE   Result Value Ref Range    POCT Glucose 177 (H) 74 - 99 mg/dL   CBC and Auto Differential   Result Value Ref Range    WBC 13.8 (H) 4.4 - 11.3 x10*3/uL    nRBC 0.0 0.0 - 0.0 /100 WBCs    RBC 3.86 (L) 4.50 - 5.90 x10*6/uL    Hemoglobin 11.6 (L) 13.5 - 17.5 g/dL    Hematocrit 33.1 (L) 41.0 - 52.0 %    MCV 86 80 - 100 fL    MCH 30.1 26.0 - 34.0 pg    MCHC 35.0 32.0 - 36.0 g/dL    RDW 13.5 11.5 - 14.5 %    Platelets 151 150 - 450 x10*3/uL    Neutrophils % 83.2 40.0 - 80.0 %    Immature Granulocytes %, Automated 2.9 (H) 0.0 - 0.9 %    Lymphocytes % 7.2 13.0 - 44.0 %    Monocytes % 6.3 2.0 - 10.0 %    Eosinophils % 0.0 0.0 - 6.0 %    Basophils % 0.4 0.0 - 2.0 %    Neutrophils Absolute 11.52 (H) 1.20 - 7.70 x10*3/uL    Immature Granulocytes Absolute, Automated 0.40 0.00 - 0.70 x10*3/uL    Lymphocytes Absolute 1.00 (L) 1.20 - 4.80 x10*3/uL    Monocytes Absolute 0.87 0.10 - 1.00 x10*3/uL    Eosinophils Absolute 0.00 0.00 - 0.70 x10*3/uL    Basophils Absolute 0.05 0.00 - 0.10 x10*3/uL       This patient has a urinary catheter   Reason for the urinary catheter remaining today? perioperative use for selected surgical procedures    Assessment/Plan   Assessment & Plan    This is a 58 yo M with ESRD due to long-standing diabetes and hypertension, primary hypertension admitted for potential DDKT.  DDKT 1/6/25  Immunologic Risk: cPRA 90%, XM neg  Kidney Info: DCD, KDPI 83%, CIT 15 hrs, WIT 31 min  CMV D-/R+, EBV D+/R+, Toxo D-, HBV D-/R immune, HCV D-/R-    Allograft function  SGF  No evidence of clearance but UOP ok  Lasix 80  mg IV q8h  POD0 US ok  No urgent indication for RRT    Immunosuppression  Induction ATG 4.5 mg/kg - completed  TAC on hold - resume 0.5 mg BID 1/10 AM  MMF 1000 mg BID  Steroid taper    Prophylaxis:  PPI  clotrimazole 10 mg TID x 3 mo  TMP-SMX x 6 mo  Valcyte x 3 mo, renally dosed    Blood pressure/Volume - mild volume overload  Diuresis as above  Coreg 12.5 mg BID    Type 2 diabetes with steroid induced hyperglycemia  Basal bolus insulin per endo  Lipitor 40 mg daily    Secondary hyperparathyroidism  cCa ok, mild hyperPhos as expected given the degree of kidney function      Deyanira Sheppard MD

## 2025-01-09 NOTE — TELEPHONE ENCOUNTER
Called and spoke to patient to remind them to update monthly HLA sample at either an approved  lab or dialysis unit, if applicable.  Patient verbalized an understanding and will complete lab work.  Coordinator notified.     Pt received txp on 1/6

## 2025-01-09 NOTE — SIGNIFICANT EVENT
01/08/25 1108   Patient Interaction   Organ Kidney   Type of Interaction Morning rounds   Interdisciplinary Rounds   Attendance Surgeon;Physician;JAIR;Pharmacist;Dietitian   Topics Discussed Medications;Discharge preparation;Blood test results     Transplant Surgery Multidisciplinary Team Note    Anibal Dow is a 59 y.o. male   POD#3 from a kidney transplant from a DCD donor. His post operative complications: None    24 Hour Events  1. No acute events, 5 day emdrano    Last Recorded Vitals  Visit Vitals  /78 (BP Location: Right arm, Patient Position: Lying)   Pulse 88   Temp 36.8 °C (98.2 °F) (Temporal)   Resp 18      Intake/Output last 3 Shifts:    Intake/Output Summary (Last 24 hours) at 1/9/2025 1220  Last data filed at 1/9/2025 1217  Gross per 24 hour   Intake 2260 ml   Output 855 ml   Net 1405 ml      Vitals:    01/09/25 0612   Weight: 80.2 kg (176 lb 12.9 oz)        Assessment/Plan:     This is a 58 yo M with ESRD due to DM and primary HTN S/P DDKT 1/6/25  Immunologic Risk: cPRA 90%, XM neg  Kidney Info: DCD, KDPI 83%, CIT 15 hrs, WIT 31 min  CMV D-/R+, EBV D+/R+, Toxo D-, HBV D-/R immune, HCV D-/R-    Kidney allograft function  -slow function  -UOP 1L  -continue lasix 80mg IV TID  -5 day medrano    Immunosuppression  -Thymo 4.5mg/kg complete  -Tac held (26.8), MMF 1gm BID, steroid taper    HTN  -BP well controlled  -continue coreg 12.5mg BID    DM  --303  -appreciate endocrine recs    Principal Problem:    Management after organ transplant  Active Problems:  Patient Active Problem List   Diagnosis    Abnormality of albumin    Fluid overload, unspecified    Hypervolemia    Anemia in chronic kidney disease    Anemia    Bilateral lower extremity edema    Chronic systolic heart failure    CKD (chronic kidney disease) stage 5, GFR less than 15 ml/min (Multi)    CKD stage G3b/A3, GFR 30-44 and albumin creatinine ratio >300 mg/g (Multi)    Combined form of age-related cataract, left eye    Dependence  on renal dialysis (CMS-HCC)    Diabetic nephropathy associated with type 2 diabetes mellitus (Multi)    Dyslipidemia (high LDL; low HDL)    ESRD (end stage renal disease) on dialysis (Multi)    Essential hypertension    (HFpEF) heart failure with preserved ejection fraction    Congestive heart failure    History of panretinal photocoagulation    Hypertensive nephropathy    Iron deficiency anemia    Metabolic acidosis, normal anion gap (NAG)    Moderate protein-calorie malnutrition (Multi)    Neuropathy    Noncompliance with medication regimen    Nonproliferative diabetic retinopathy with macular edema associated with type 2 diabetes mellitus    Nuclear sclerosis, right    Osteomyelitis of fifth toe of right foot (Multi)    Other disorders resulting from impaired renal tubular function    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 associated with type 2 diabetes mellitus    Pulmonary hypertension (Multi)    Retinal detachment, tractional, right eye    Retinal hemorrhage of left eye    S/P amputation of lesser toe (Multi)    Secondary hyperparathyroidism of renal origin (Multi)    Shortness of breath    Type 2 diabetes mellitus    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 Current dose 2 mg BID         MMF 1000 mg po BID       Solumedrol taper  DVT prophylaxis SCDS and subcutaneous heparin 5000 TID  PT/OT  Diet: Diabetic 60gm, Potassium restriction removed  Anticipated discharge 1/13, pending prescription  drug coverage    Kadie Rogel, APRN-CNP

## 2025-01-09 NOTE — PROGRESS NOTES
Anibal Dow is a 59 y.o. male on day 3 of admission presenting with Kidney replaced by transplant ([facility]).    Subjective   Patient seen at the bedside. He is feeling well. Has a good appetite. Discussed switch to glargine tomorrow with 20 mg of prednsone.  Patient agreeable.      I have reviewed histories, allergies and medications have been reviewed and there are no changes     Objective   Review of Systems   Constitutional:  Positive for fatigue. Negative for activity change, appetite change, diaphoresis and unexpected weight change.   HENT: Negative.  Negative for sore throat.    Eyes: Negative.    Respiratory:  Negative for cough, chest tightness and shortness of breath.    Cardiovascular:  Negative for chest pain.   Gastrointestinal:  Negative for abdominal pain, diarrhea and nausea.   Endocrine: Negative for cold intolerance, heat intolerance, polydipsia, polyphagia and polyuria.   Genitourinary:  Negative for dysuria and frequency.   Musculoskeletal:  Negative for gait problem and joint swelling.   Neurological:  Negative for speech difficulty, numbness and headaches.   Psychiatric/Behavioral:  Negative for agitation, behavioral problems and confusion.      Physical Exam  Constitutional:       General: He is not in acute distress.     Appearance: Normal appearance.   HENT:      Nose: Nose normal.      Mouth/Throat:      Mouth: Mucous membranes are moist.      Pharynx: Oropharynx is clear.   Cardiovascular:      Rate and Rhythm: Normal rate and regular rhythm.   Pulmonary:      Effort: Pulmonary effort is normal. No respiratory distress.   Abdominal:      General: There is no distension.      Palpations: Abdomen is soft.   Musculoskeletal:         General: Normal range of motion.   Skin:     General: Skin is warm and dry.   Neurological:      Mental Status: He is alert and oriented to person, place, and time.   Psychiatric:         Mood and Affect: Mood normal.         Behavior: Behavior normal.  "      Last Recorded Vitals  Blood pressure 149/88, pulse 84, temperature 37.1 °C (98.8 °F), temperature source Temporal, resp. rate 18, height 1.753 m (5' 9\"), weight 80.2 kg (176 lb 12.9 oz), SpO2 95%.  Intake/Output last 3 Shifts:  I/O last 3 completed shifts:  In: 1900 (23.7 mL/kg) [P.O.:1900]  Out: 2370 (29.6 mL/kg) [Urine:2350 (0.8 mL/kg/hr); Drains:20]  Weight: 80.2 kg     Relevant Results  Results from last 7 days   Lab Units 01/09/25  0818 01/09/25  0647 01/08/25  1941 01/08/25  1720 01/08/25  1154 01/08/25  0902 01/08/25  0541 01/07/25  1142 01/07/25  1120 01/07/25  0928 01/07/25  0511 01/06/25  1136 01/06/25  1048   POCT GLUCOSE mg/dL 148*  --  177* 261* 303* 330*  --    < >  --    < >  --    < >  --    GLUCOSE mg/dL  --  124*  --   --   --   --  359*  --  281*  --  204*  --  186*    < > = values in this interval not displayed.   Lab Review  Lab Results   Component Value Date    BILITOT 0.5 01/06/2025    CALCIUM 8.3 (L) 01/09/2025    CO2 28 01/09/2025    CL 92 (L) 01/09/2025    CREATININE 10.59 (H) 01/09/2025    GLUCOSE 124 (H) 01/09/2025    ALKPHOS 71 01/06/2025    K 4.1 01/09/2025    PROT 7.7 01/06/2025     01/09/2025    AST 16 01/06/2025    ALT 9 (L) 01/06/2025     (HH) 01/09/2025    ANIONGAP 21 (H) 01/09/2025    MG 2.21 01/09/2025    PHOS 7.5 (H) 01/09/2025    ALBUMIN 3.6 01/09/2025    AMYLASE 54 06/27/2024    LIPASE 167 (H) 08/05/2023    GFRMALE 4 (A) 08/05/2023     Lab Results   Component Value Date    TRIG 123 01/02/2025    CHOL 169 01/02/2025    LDLCALC 104 (H) 01/02/2025    HDL 40.0 01/02/2025     Lab Results   Component Value Date    HGBA1C 6.8 (H) 07/08/2024    HGBA1C 8.0 (H) 01/05/2024    HGBA1C 6.2 (A) 06/13/2023     The 10-year ASCVD risk score (Pascual MONAE, et al., 2019) is: 18.9%    Values used to calculate the score:      Age: 59 years      Sex: Male      Is Non- : Yes      Diabetic: Yes      Tobacco smoker: No      Systolic Blood Pressure: 149 mmHg      " Is BP treated: No      HDL Cholesterol: 40 mg/dL      Total Cholesterol: 169 mg/dL  Scheduled medications  acetaminophen, 650 mg, oral, q6h  atorvastatin, 40 mg, oral, Daily  carvedilol, 12.5 mg, oral, BID  clotrimazole, 10 mg, oral, TID after meals  docusate sodium, 100 mg, oral, BID  furosemide, 80 mg, intravenous, q8h JOEY  heparin (porcine), 5,000 Units, subcutaneous, q8h  insulin lispro, 0-10 Units, subcutaneous, TID AC  insulin lispro, 5 Units, subcutaneous, TID AC  insulin NPH (Isophane), 15 Units, subcutaneous, Daily  mycophenolate, 1,000 mg, oral, q12h JOEY  oxygen, , inhalation, Continuous - Inhalation  pantoprazole, 40 mg, oral, BID AC  [START ON 1/10/2025] predniSONE, 20 mg, oral, Daily  sennosides, 1 tablet, oral, BID  sulfamethoxazole-trimethoprim, 1 tablet, oral, Daily  [Held by provider] tacrolimus, 2 mg, oral, q12h JOEY  valGANciclovir, 450 mg, oral, q48h    Continuous medications     PRN medications  PRN medications: dextrose, dextrose, glucagon, glucagon, HYDROmorphone, naloxone, ondansetron ODT **OR** ondansetron, oxyCODONE, oxyCODONE    Assessment/Plan   Assessment & Plan  Kidney replaced by transplant ([facility])    CKD (chronic kidney disease)    ESRD (end stage renal disease) ([facility])    Type 2 diabetes mellitus with hyperglycemia, with long-term current use of insulin    Anibal Dow is a 59 y.o. male with PMH of HLD, pulmonary hypertension, ESRD 2/2 HTN and DM2, on on dialysis MWF through [facility]. He is now s/p kidney transplant 1/6/25.     Diabetes History  Type of diabetes: Type 2  Year diagnosed or age: at least 15 years  Hospitalizations for DKA or HHS: no  Complications: retinopathy, peripheral neuropathy, nephropathy, and hyperglycemia  Seen by PCP or Endocrinology: PCP  Frequency of glucose checks: via Trupanion 2  Glucose review: 14 day average 175, glucoses in the 150's fasting, rising to the 200's during the day  Frequency of Hypoglycemia: occasional  Hypoglycemia unawareness:  no  Severe hypoglycemia requiring assistance from others:  no     Home Medications  Basal: glargine 5 units  CGM: anisa 2    PLAN  Steroids:  steroid regimen:   methylpred 500mg intra-op  methylpred 250 mg x1d  methylpred 125mg x1d  methylpred 60mg x1d  pred 20mg ongoing     Nutrition:  60 gram carb consistent      - continue  NPH 15 units dosed QAM with steroid 1/9/25 then discontinue       If NPO: continue as long as steroid is given  - Recommend reducing NPH to 8 units at HS x 1 as bridge to glargine start in the morning 1/10/24    - start 15 units of glargine QAM FIRST DOSE 1/10/25     - continue lispro corrective scale #2 with meals, adjust to scale to Q4 if persistently hyperglycemic   = 0u  151-200 = 2u  201-250 = 4u  251-300 = 6u  301-350 = 8u  351-400 = 10u     - continue lispro 5 units with meals; hold if NPO or eating less than 50% of his meal     -Accuchecks (not BMP) TIDAC and QHS- kindly ensure QHS Accucheck is drawn; it is often missed   - Goal -180  -Hypoglycemia protocol  -Will continue to follow and titrate insulin accordingly     Discharge planning:   [] patient may expect to discharge home on glargine and lispro, final doses TBD by titration  []will provide Anisa 3 sample prior to discharge   []will enroll pt in  pharmacy platinum plan program  []follow up with Nerissa SAM in PTDM clinic      I spent 50 minutes in the professional and overall care of this patient.    Rocio Duong, APRN-CNP

## 2025-01-09 NOTE — PROGRESS NOTES
"Mr. Dow Received a kidney transplant on 1/6/24. SW reviewed the pre transplant Social Work evaluation as well as interdisciplinary notes of this admission. Attended transplant interdisciplinary rounds. SW reviewed, participated and is in agreement with MDT Plan of Care.  I met with patient who was awake, alert, oriented and able to discuss their condition.  Functional/Medical Status: SW met with patient in his room, pt's wife Bonita was present. Pt was sitting up in room chair during discussion with SW. Pt reported he is eating and drinking and ambulating.   Adaption to the Stress of Transplant: Pt denied any concerns related to transplant. SW discussed with patient the need for him to reach out to Medicare and Medicaid to begin the process of resuming his Medicare part D prescription coverage as it has lapsed. Pt and his wife reported they have spoken to Medicare and was informed they would receive follow up on Monday regarding pt's insurance resuming status. SW encouraged patient to create and online account with Medicare and Medicaid in order to follow his coverage status and gain any correspondences. Pt's wife reported they would follow as instructed to get's patient's coverage re-established to assist with post-transplant medication.   Mental Health/Substance use: Pt reported his mood as \"tired\". Pt denied any MH concerns. Pt denied any current substance use.   Post Discharge Supports/Recovery Plan: Pt's primary support remains his wife Bonita and his daughter Jenny. Pt reported he will return home with his wife and daughter whom will assist patient with post-transplant care needs and follow up.     Impressions: SW met with patient in his room, pt's wife Bonita was present. Pt was sitting up in room chair during discussion with MARISELA. Pt reported he is eating and drinking and ambulating. Pt denied any concerns related to transplant. SW discussed with patient the need for him to reach out to Medicare and " "Medicaid to begin the process of resuming his Medicare part D perscribtion coverage as it has lapsed. Pt and his wife reported they have spoken to Medicare and was informed they would receive follow up on Monday regarding pt's insurance resuming status. SW encouraged patient to create and online account with Medicare and Medicaid in order to follow his coverage status and gain any correspondences. Pt's wife reported they would follow as instructed to get's patient's coverage re-established to assist with post-transplant medication.  Pt reported his mood as \"tired\". Pt denied any MH concerns. Pt denied any current substance use. Pt's primary support remains his wife Bonita and his daughter Jenny. Pt reported he will return home with his wife and daughter whom will assist patient with post-transplant care needs and follow up. Patient and family/support system are in agreement and report understanding of their treatment plan. They were provided an opportunity to ask questions, though denied any issues or concerns at this time.      PLAN:  We reviewed the importance of having lab work done twice a week or as directed; scheduled post-transplant appointments; reporting alarming symptoms; restrictions of activities and importance of adherence to medical regimen. We also discussed the precautions to take due to being immunosuppressed. Patient indicated understanding of this information along with the discharge plan and instructions. Patient was given the opportunity to discuss any issues. Patient was given social work contact information.   "

## 2025-01-09 NOTE — PROGRESS NOTES
"Anibal Dow is a 59 y.o. male on day 3 of admission presenting with Kidney replaced by transplant ([facility]).    Subjective   No acute events       Objective   Vitals:    01/09/25 0816   BP: 149/88   Pulse: 84   Resp: 18   Temp: 37.1 °C (98.8 °F)   SpO2: 95%       Physical Exam  Constitutional:       Appearance: Normal appearance.   Eyes:      Pupils: Pupils are equal, round, and reactive to light.   Cardiovascular:      Rate and Rhythm: Normal rate.   Pulmonary:      Effort: Pulmonary effort is normal. No respiratory distress.   Abdominal:      General: There is no distension.      Palpations: Abdomen is soft.      Comments: Incision clean, dry, intact   Musculoskeletal:         General: Normal range of motion.      Right lower leg: No edema.      Left lower leg: No edema.   Skin:     General: Skin is warm and dry.   Neurological:      General: No focal deficit present.      Mental Status: He is alert and oriented to person, place, and time.   Psychiatric:         Mood and Affect: Mood normal.         Behavior: Behavior normal.         Last Recorded Vitals  Blood pressure 149/88, pulse 84, temperature 37.1 °C (98.8 °F), temperature source Temporal, resp. rate 18, height 1.753 m (5' 9\"), weight 80.2 kg (176 lb 12.9 oz), SpO2 95%.  Intake/Output last 3 Shifts:  I/O last 3 completed shifts:  In: 1900 (23.7 mL/kg) [P.O.:1900]  Out: 2370 (29.6 mL/kg) [Urine:2350 (0.8 mL/kg/hr); Drains:20]  Weight: 80.2 kg     Relevant Results  Lab Results   Component Value Date    WBC 13.8 (H) 01/09/2025    HGB 11.6 (L) 01/09/2025    HCT 33.1 (L) 01/09/2025    MCV 86 01/09/2025     01/09/2025     Lab Results   Component Value Date    GLUCOSE 124 (H) 01/09/2025    CALCIUM 8.3 (L) 01/09/2025     01/09/2025    K 4.1 01/09/2025    CO2 28 01/09/2025    CL 92 (L) 01/09/2025     (HH) 01/09/2025    CREATININE 10.59 (H) 01/09/2025       acetaminophen, 650 mg, oral, q6h  atorvastatin, 40 mg, oral, Daily  carvedilol, 12.5 " mg, oral, BID  clotrimazole, 10 mg, oral, TID after meals  docusate sodium, 100 mg, oral, BID  furosemide, 80 mg, intravenous, q8h JOEY  heparin (porcine), 5,000 Units, subcutaneous, q8h  [START ON 1/10/2025] insulin glargine, 15 Units, subcutaneous, q AM  insulin lispro, 0-10 Units, subcutaneous, TID AC  insulin lispro, 5 Units, subcutaneous, TID AC  mycophenolate, 1,000 mg, oral, q12h JOEY  oxygen, , inhalation, Continuous - Inhalation  pantoprazole, 40 mg, oral, BID AC  [START ON 1/10/2025] predniSONE, 20 mg, oral, Daily  sennosides, 1 tablet, oral, BID  sulfamethoxazole-trimethoprim, 1 tablet, oral, Daily  [Held by provider] tacrolimus, 2 mg, oral, q12h JOEY  valGANciclovir, 450 mg, oral, q48h          Assessment/Plan   Assessment & Plan    DDKT 1/6/2024  KDPI 83    Kidney allograft function   UOP 1070+   Slow function, creatinine down slightly    Immunosuppression   Thymo completed 3mg/kg   Tac 2/2, hold tac check level tomorrow    Level 26 after 2mg x 3, seem it was drawn at the right time   MMF 1000/1000   Pred taper    DM   Endocrine    I spent 35 minutes in the professional and overall care of this patient.      Javed Prince MD

## 2025-01-09 NOTE — TELEPHONE ENCOUNTER
Result Communication    Resulted Orders   Lipid Panel   Result Value Ref Range    Cholesterol 169 0 - 199 mg/dL      Comment:            Age      Desirable   Borderline High   High     0-19 Y     0 - 169       170 - 199     >/= 200    20-24 Y     0 - 189       190 - 224     >/= 225         >24 Y     0 - 199       200 - 239     >/= 240   **All ranges are based on fasting samples. Specific   therapeutic targets will vary based on patient-specific   cardiac risk.    Pediatric guidelines reference:Pediatrics 2011, 128(S5).Adult guidelines reference: NCEP ATPIII Guidelines,DIVINE 2001, 258:2486-97    Venipuncture immediately after or during the administration of Metamizole may lead to falsely low results. Testing should be performed immediately prior to Metamizole dosing.    HDL-Cholesterol 40.0 mg/dL      Comment:        Age       Very Low   Low     Normal    High    0-19 Y    < 35      < 40     40-45     ----  20-24 Y    ----     < 40      >45      ----        >24 Y      ----     < 40     40-60      >60      Cholesterol/HDL Ratio 4.2       Comment:        Ref Values  Desirable  < 3.4  High Risk  > 5.0    LDL Calculated 104 (H) <=99 mg/dL      Comment:                                  Near   Borderline      AGE      Desirable  Optimal    High     High     Very High     0-19 Y     0 - 109     ---    110-129   >/= 130     ----    20-24 Y     0 - 119     ---    120-159   >/= 160     ----      >24 Y     0 -  99   100-129  130-159   160-189     >/=190      VLDL 25 0 - 40 mg/dL    Triglycerides 123 0 - 149 mg/dL      Comment:      Age              Desirable        Borderline         High        Very High  SEX:B           mg/dL             mg/dL               mg/dL      mg/dL  <=14D                       ----               ----        ----  15D-365D                    ----               ----        ----  1Y-9Y           0-74               75-99             >=100       ----  10Y-19Y        0-89                             >=130       ----  20Y-24Y        0-114             115-149             >=150      ----  >= 25Y         0-149             150-199             200-499    >=500      Venipuncture immediately after or during the administration of Metamizole may lead to falsely low results. Testing should be performed immediately prior to Metamizole dosing.    Non HDL Cholesterol 129 0 - 149 mg/dL      Comment:            Age       Desirable   Borderline High   High     Very High     0-19 Y     0 - 119       120 - 144     >/= 145    >/= 160    20-24 Y     0 - 149       150 - 189     >/= 190      ----         >24 Y    30 mg/dL above LDL Cholesterol goal         10:16 AM      Results were successfully communicated with the patient and they acknowledged their understanding.

## 2025-01-09 NOTE — CARE PLAN
Problem: Safety - Adult  Goal: Free from fall injury  Outcome: Progressing     The patient's goals for the shift include  safety.    The clinical goals for the shift include Safety.    Over the shift, the patient did make progress toward the following goals.

## 2025-01-09 NOTE — PROGRESS NOTES
Anibal Dow is a 59 y.o. male on day 3 of admission presenting with Kidney replaced by transplant ([facility]).      Transitional Care Coordination Progress Note:  Patient discussed during interdisciplinary rounds.      Plan per Medical/Surgical team:    Home Care choice for home going needs, [facility].  Pt Needs: PT/OT/RPM.  [facility] ordered for RPM.    Pt does not want PT/OT, but would like [facility].         Payer: Medicare     Status: INP     Discharge disposition: H@H.      Potential Barriers: None     ADOD:  1/10     This TCC will continue to follow for home going needs and safe DC plan.      WERNER BARAJAS

## 2025-01-10 ENCOUNTER — PHARMACY VISIT (OUTPATIENT)
Dept: PHARMACY | Facility: CLINIC | Age: 60
End: 2025-01-10
Payer: COMMERCIAL

## 2025-01-10 DIAGNOSIS — Z94.0 KIDNEY REPLACED BY TRANSPLANT (HHS-HCC): ICD-10-CM

## 2025-01-10 DIAGNOSIS — N18.6 ESRD (END STAGE RENAL DISEASE) (MULTI): ICD-10-CM

## 2025-01-10 LAB
ALBUMIN SERPL BCP-MCNC: 3.6 G/DL (ref 3.4–5)
ANION GAP SERPL CALC-SCNC: 21 MMOL/L (ref 10–20)
BASOPHILS # BLD AUTO: 0 X10*3/UL (ref 0–0.1)
BASOPHILS NFR BLD AUTO: 0 %
BUN SERPL-MCNC: 110 MG/DL (ref 6–23)
CALCIUM SERPL-MCNC: 8 MG/DL (ref 8.6–10.6)
CHLORIDE SERPL-SCNC: 93 MMOL/L (ref 98–107)
CO2 SERPL-SCNC: 28 MMOL/L (ref 21–32)
CREAT SERPL-MCNC: 9.8 MG/DL (ref 0.5–1.3)
EGFRCR SERPLBLD CKD-EPI 2021: 6 ML/MIN/1.73M*2
EOSINOPHIL # BLD AUTO: 0 X10*3/UL (ref 0–0.7)
EOSINOPHIL NFR BLD AUTO: 0 %
ERYTHROCYTE [DISTWIDTH] IN BLOOD BY AUTOMATED COUNT: 13 % (ref 11.5–14.5)
GLUCOSE BLD MANUAL STRIP-MCNC: 120 MG/DL (ref 74–99)
GLUCOSE BLD MANUAL STRIP-MCNC: 212 MG/DL (ref 74–99)
GLUCOSE BLD MANUAL STRIP-MCNC: 240 MG/DL (ref 74–99)
GLUCOSE BLD MANUAL STRIP-MCNC: 249 MG/DL (ref 74–99)
GLUCOSE BLD MANUAL STRIP-MCNC: 72 MG/DL (ref 74–99)
GLUCOSE SERPL-MCNC: 250 MG/DL (ref 74–99)
HCT VFR BLD AUTO: 28.7 % (ref 41–52)
HGB BLD-MCNC: 9.2 G/DL (ref 13.5–17.5)
IMM GRANULOCYTES # BLD AUTO: 0.03 X10*3/UL (ref 0–0.7)
IMM GRANULOCYTES NFR BLD AUTO: 0.6 % (ref 0–0.9)
LYMPHOCYTES # BLD AUTO: 0.2 X10*3/UL (ref 1.2–4.8)
LYMPHOCYTES NFR BLD AUTO: 3.9 %
MAGNESIUM SERPL-MCNC: 2.18 MG/DL (ref 1.6–2.4)
MCH RBC QN AUTO: 28.4 PG (ref 26–34)
MCHC RBC AUTO-ENTMCNC: 32.1 G/DL (ref 32–36)
MCV RBC AUTO: 89 FL (ref 80–100)
MONOCYTES # BLD AUTO: 0.53 X10*3/UL (ref 0.1–1)
MONOCYTES NFR BLD AUTO: 10.3 %
NEUTROPHILS # BLD AUTO: 4.37 X10*3/UL (ref 1.2–7.7)
NEUTROPHILS NFR BLD AUTO: 85.2 %
NRBC BLD-RTO: 0 /100 WBCS (ref 0–0)
PHOSPHATE SERPL-MCNC: 6.5 MG/DL (ref 2.5–4.9)
PLATELET # BLD AUTO: 116 X10*3/UL (ref 150–450)
POTASSIUM SERPL-SCNC: 3.5 MMOL/L (ref 3.5–5.3)
RBC # BLD AUTO: 3.24 X10*6/UL (ref 4.5–5.9)
SODIUM SERPL-SCNC: 138 MMOL/L (ref 136–145)
TACROLIMUS BLD-MCNC: 8.9 NG/ML
WBC # BLD AUTO: 5.1 X10*3/UL (ref 4.4–11.3)

## 2025-01-10 PROCEDURE — 99232 SBSQ HOSP IP/OBS MODERATE 35: CPT | Performed by: TRANSPLANT SURGERY

## 2025-01-10 PROCEDURE — 80069 RENAL FUNCTION PANEL: CPT | Performed by: STUDENT IN AN ORGANIZED HEALTH CARE EDUCATION/TRAINING PROGRAM

## 2025-01-10 PROCEDURE — 99233 SBSQ HOSP IP/OBS HIGH 50: CPT | Performed by: STUDENT IN AN ORGANIZED HEALTH CARE EDUCATION/TRAINING PROGRAM

## 2025-01-10 PROCEDURE — 2500000002 HC RX 250 W HCPCS SELF ADMINISTERED DRUGS (ALT 637 FOR MEDICARE OP, ALT 636 FOR OP/ED)

## 2025-01-10 PROCEDURE — 2500000002 HC RX 250 W HCPCS SELF ADMINISTERED DRUGS (ALT 637 FOR MEDICARE OP, ALT 636 FOR OP/ED): Performed by: STUDENT IN AN ORGANIZED HEALTH CARE EDUCATION/TRAINING PROGRAM

## 2025-01-10 PROCEDURE — 80197 ASSAY OF TACROLIMUS: CPT | Performed by: STUDENT IN AN ORGANIZED HEALTH CARE EDUCATION/TRAINING PROGRAM

## 2025-01-10 PROCEDURE — RXMED WILLOW AMBULATORY MEDICATION CHARGE

## 2025-01-10 PROCEDURE — 36415 COLL VENOUS BLD VENIPUNCTURE: CPT | Performed by: STUDENT IN AN ORGANIZED HEALTH CARE EDUCATION/TRAINING PROGRAM

## 2025-01-10 PROCEDURE — 2500000001 HC RX 250 WO HCPCS SELF ADMINISTERED DRUGS (ALT 637 FOR MEDICARE OP): Performed by: STUDENT IN AN ORGANIZED HEALTH CARE EDUCATION/TRAINING PROGRAM

## 2025-01-10 PROCEDURE — 2500000004 HC RX 250 GENERAL PHARMACY W/ HCPCS (ALT 636 FOR OP/ED)

## 2025-01-10 PROCEDURE — 2500000004 HC RX 250 GENERAL PHARMACY W/ HCPCS (ALT 636 FOR OP/ED): Performed by: STUDENT IN AN ORGANIZED HEALTH CARE EDUCATION/TRAINING PROGRAM

## 2025-01-10 PROCEDURE — 82947 ASSAY GLUCOSE BLOOD QUANT: CPT

## 2025-01-10 PROCEDURE — 83735 ASSAY OF MAGNESIUM: CPT | Performed by: STUDENT IN AN ORGANIZED HEALTH CARE EDUCATION/TRAINING PROGRAM

## 2025-01-10 PROCEDURE — 85025 COMPLETE CBC W/AUTO DIFF WBC: CPT | Performed by: STUDENT IN AN ORGANIZED HEALTH CARE EDUCATION/TRAINING PROGRAM

## 2025-01-10 PROCEDURE — 97116 GAIT TRAINING THERAPY: CPT | Mod: GP,CQ

## 2025-01-10 PROCEDURE — 2500000001 HC RX 250 WO HCPCS SELF ADMINISTERED DRUGS (ALT 637 FOR MEDICARE OP)

## 2025-01-10 PROCEDURE — 2500000002 HC RX 250 W HCPCS SELF ADMINISTERED DRUGS (ALT 637 FOR MEDICARE OP, ALT 636 FOR OP/ED): Performed by: NURSE PRACTITIONER

## 2025-01-10 PROCEDURE — 2500000002 HC RX 250 W HCPCS SELF ADMINISTERED DRUGS (ALT 637 FOR MEDICARE OP, ALT 636 FOR OP/ED): Performed by: PHYSICIAN ASSISTANT

## 2025-01-10 PROCEDURE — 1200000002 HC GENERAL ROOM WITH TELEMETRY DAILY

## 2025-01-10 PROCEDURE — 2500000005 HC RX 250 GENERAL PHARMACY W/O HCPCS: Performed by: STUDENT IN AN ORGANIZED HEALTH CARE EDUCATION/TRAINING PROGRAM

## 2025-01-10 PROCEDURE — 99233 SBSQ HOSP IP/OBS HIGH 50: CPT | Performed by: PHYSICIAN ASSISTANT

## 2025-01-10 RX ORDER — DIPHENHYDRAMINE HCL 25 MG
25 CAPSULE ORAL ONCE
Status: COMPLETED | OUTPATIENT
Start: 2025-01-10 | End: 2025-01-10

## 2025-01-10 RX ORDER — INSULIN LISPRO 100 [IU]/ML
5 INJECTION, SOLUTION INTRAVENOUS; SUBCUTANEOUS
Status: DISCONTINUED | OUTPATIENT
Start: 2025-01-10 | End: 2025-01-11

## 2025-01-10 RX ORDER — DEXTROSE 50 % IN WATER (D50W) INTRAVENOUS SYRINGE
25
Status: DISCONTINUED | OUTPATIENT
Start: 2025-01-10 | End: 2025-01-11 | Stop reason: HOSPADM

## 2025-01-10 RX ORDER — FUROSEMIDE 40 MG/1
80 TABLET ORAL DAILY
Qty: 60 TABLET | Refills: 0 | Status: SHIPPED | OUTPATIENT
Start: 2025-01-10 | End: 2025-01-21 | Stop reason: ALTCHOICE

## 2025-01-10 RX ORDER — INSULIN GLARGINE 100 [IU]/ML
20 INJECTION, SOLUTION SUBCUTANEOUS EVERY MORNING
Qty: 15 ML | Refills: 0 | Status: SHIPPED | OUTPATIENT
Start: 2025-01-11

## 2025-01-10 RX ORDER — ACETAMINOPHEN 325 MG/1
650 TABLET ORAL ONCE
Status: COMPLETED | OUTPATIENT
Start: 2025-01-10 | End: 2025-01-10

## 2025-01-10 RX ORDER — INSULIN LISPRO 100 [IU]/ML
0-10 INJECTION, SOLUTION INTRAVENOUS; SUBCUTANEOUS
Qty: 15 ML | Refills: 0 | Status: SHIPPED | OUTPATIENT
Start: 2025-01-10 | End: 2025-01-11

## 2025-01-10 RX ORDER — INSULIN GLARGINE 100 [IU]/ML
20 INJECTION, SOLUTION SUBCUTANEOUS EVERY MORNING
Status: DISCONTINUED | OUTPATIENT
Start: 2025-01-11 | End: 2025-01-11 | Stop reason: HOSPADM

## 2025-01-10 RX ORDER — INSULIN LISPRO 100 [IU]/ML
10 INJECTION, SOLUTION INTRAVENOUS; SUBCUTANEOUS
Status: DISCONTINUED | OUTPATIENT
Start: 2025-01-11 | End: 2025-01-11

## 2025-01-10 RX ORDER — INSULIN LISPRO 100 [IU]/ML
10 INJECTION, SOLUTION INTRAVENOUS; SUBCUTANEOUS
Status: DISCONTINUED | OUTPATIENT
Start: 2025-01-10 | End: 2025-01-11

## 2025-01-10 RX ORDER — POLYETHYLENE GLYCOL 3350 17 G/17G
17 POWDER, FOR SOLUTION ORAL DAILY
Qty: 3 PACKET | Refills: 0 | Status: SHIPPED | OUTPATIENT
Start: 2025-01-10 | End: 2025-01-13

## 2025-01-10 RX ADMIN — MYCOPHENOLATE MOFETIL 1000 MG: 250 CAPSULE ORAL at 06:18

## 2025-01-10 RX ADMIN — PANTOPRAZOLE SODIUM 40 MG: 40 TABLET, DELAYED RELEASE ORAL at 15:38

## 2025-01-10 RX ADMIN — ACETAMINOPHEN 650 MG: 325 TABLET ORAL at 23:01

## 2025-01-10 RX ADMIN — HEPARIN SODIUM 5000 UNITS: 5000 INJECTION, SOLUTION INTRAVENOUS; SUBCUTANEOUS at 12:52

## 2025-01-10 RX ADMIN — ACETAMINOPHEN 650 MG: 325 TABLET ORAL at 18:15

## 2025-01-10 RX ADMIN — INSULIN LISPRO 4 UNITS: 100 INJECTION, SOLUTION INTRAVENOUS; SUBCUTANEOUS at 12:53

## 2025-01-10 RX ADMIN — CARVEDILOL 12.5 MG: 12.5 TABLET, FILM COATED ORAL at 09:12

## 2025-01-10 RX ADMIN — FUROSEMIDE 80 MG: 10 INJECTION, SOLUTION INTRAVENOUS at 13:00

## 2025-01-10 RX ADMIN — FUROSEMIDE 80 MG: 10 INJECTION, SOLUTION INTRAVENOUS at 06:18

## 2025-01-10 RX ADMIN — CLOTRIMAZOLE 10 MG: 10 LOZENGE ORAL at 12:51

## 2025-01-10 RX ADMIN — CARVEDILOL 12.5 MG: 12.5 TABLET, FILM COATED ORAL at 20:22

## 2025-01-10 RX ADMIN — PREDNISONE 20 MG: 10 TABLET ORAL at 09:12

## 2025-01-10 RX ADMIN — ACETAMINOPHEN 650 MG: 325 TABLET ORAL at 12:51

## 2025-01-10 RX ADMIN — PANTOPRAZOLE SODIUM 40 MG: 40 TABLET, DELAYED RELEASE ORAL at 06:18

## 2025-01-10 RX ADMIN — SULFAMETHOXAZOLE AND TRIMETHOPRIM 1 TABLET: 400; 80 TABLET ORAL at 09:13

## 2025-01-10 RX ADMIN — SENNOSIDES 8.6 MG: 8.6 TABLET, FILM COATED ORAL at 09:13

## 2025-01-10 RX ADMIN — INSULIN LISPRO 4 UNITS: 100 INJECTION, SOLUTION INTRAVENOUS; SUBCUTANEOUS at 09:26

## 2025-01-10 RX ADMIN — SENNOSIDES 8.6 MG: 8.6 TABLET, FILM COATED ORAL at 20:21

## 2025-01-10 RX ADMIN — CLOTRIMAZOLE 10 MG: 10 LOZENGE ORAL at 09:13

## 2025-01-10 RX ADMIN — Medication 1 L/MIN: at 09:13

## 2025-01-10 RX ADMIN — Medication 21 PERCENT: at 20:30

## 2025-01-10 RX ADMIN — TACROLIMUS 0.5 MG: 0.5 CAPSULE ORAL at 18:15

## 2025-01-10 RX ADMIN — HEPARIN SODIUM 5000 UNITS: 5000 INJECTION, SOLUTION INTRAVENOUS; SUBCUTANEOUS at 20:21

## 2025-01-10 RX ADMIN — SODIUM CHLORIDE 125 MG: 9 INJECTION, SOLUTION INTRAVENOUS at 13:20

## 2025-01-10 RX ADMIN — DOCUSATE SODIUM 100 MG: 100 CAPSULE, LIQUID FILLED ORAL at 20:21

## 2025-01-10 RX ADMIN — INSULIN LISPRO 5 UNITS: 100 INJECTION, SOLUTION INTRAVENOUS; SUBCUTANEOUS at 18:15

## 2025-01-10 RX ADMIN — ATORVASTATIN CALCIUM 40 MG: 40 TABLET, FILM COATED ORAL at 09:13

## 2025-01-10 RX ADMIN — DIPHENHYDRAMINE HYDROCHLORIDE 25 MG: 25 CAPSULE ORAL at 12:50

## 2025-01-10 RX ADMIN — ACETAMINOPHEN 650 MG: 325 TABLET ORAL at 06:18

## 2025-01-10 RX ADMIN — INSULIN LISPRO 10 UNITS: 100 INJECTION, SOLUTION INTRAVENOUS; SUBCUTANEOUS at 12:52

## 2025-01-10 RX ADMIN — CLOTRIMAZOLE 10 MG: 10 LOZENGE ORAL at 18:15

## 2025-01-10 RX ADMIN — MYCOPHENOLATE MOFETIL 1000 MG: 250 CAPSULE ORAL at 18:14

## 2025-01-10 RX ADMIN — DOCUSATE SODIUM 100 MG: 100 CAPSULE, LIQUID FILLED ORAL at 09:12

## 2025-01-10 RX ADMIN — INSULIN GLARGINE 15 UNITS: 100 INJECTION, SOLUTION SUBCUTANEOUS at 09:24

## 2025-01-10 RX ADMIN — TACROLIMUS 0.5 MG: 0.5 CAPSULE ORAL at 06:18

## 2025-01-10 RX ADMIN — INSULIN LISPRO 5 UNITS: 100 INJECTION, SOLUTION INTRAVENOUS; SUBCUTANEOUS at 09:24

## 2025-01-10 RX ADMIN — FUROSEMIDE 80 MG: 10 INJECTION, SOLUTION INTRAVENOUS at 21:55

## 2025-01-10 RX ADMIN — HEPARIN SODIUM 5000 UNITS: 5000 INJECTION, SOLUTION INTRAVENOUS; SUBCUTANEOUS at 03:33

## 2025-01-10 ASSESSMENT — COGNITIVE AND FUNCTIONAL STATUS - GENERAL
MOVING FROM LYING ON BACK TO SITTING ON SIDE OF FLAT BED WITH BEDRAILS: A LITTLE
DRESSING REGULAR LOWER BODY CLOTHING: A LITTLE
MOBILITY SCORE: 21
WALKING IN HOSPITAL ROOM: A LITTLE
TURNING FROM BACK TO SIDE WHILE IN FLAT BAD: A LITTLE
HELP NEEDED FOR BATHING: A LITTLE
DAILY ACTIVITIY SCORE: 22
TURNING FROM BACK TO SIDE WHILE IN FLAT BAD: A LITTLE
MOVING FROM LYING ON BACK TO SITTING ON SIDE OF FLAT BED WITH BEDRAILS: A LITTLE
MOVING TO AND FROM BED TO CHAIR: A LITTLE
MOBILITY SCORE: 20
CLIMB 3 TO 5 STEPS WITH RAILING: A LITTLE

## 2025-01-10 ASSESSMENT — ENCOUNTER SYMPTOMS
UNEXPECTED WEIGHT CHANGE: 0
HEADACHES: 0
FATIGUE: 1
DIARRHEA: 0
DIAPHORESIS: 0
ACTIVITY CHANGE: 0
SORE THROAT: 0
CHEST TIGHTNESS: 0
EYES NEGATIVE: 1
COUGH: 0
SHORTNESS OF BREATH: 0
JOINT SWELLING: 0
NUMBNESS: 0
AGITATION: 0
SPEECH DIFFICULTY: 0
CONFUSION: 0
NAUSEA: 0
ABDOMINAL PAIN: 0
FREQUENCY: 0
POLYPHAGIA: 0
POLYDIPSIA: 0
APPETITE CHANGE: 0
DYSURIA: 0

## 2025-01-10 ASSESSMENT — PAIN SCALES - GENERAL
PAINLEVEL_OUTOF10: 0 - NO PAIN

## 2025-01-10 ASSESSMENT — PAIN - FUNCTIONAL ASSESSMENT
PAIN_FUNCTIONAL_ASSESSMENT: 0-10
PAIN_FUNCTIONAL_ASSESSMENT: 0-10

## 2025-01-10 NOTE — PROGRESS NOTES
Pharmacist Transplant Discharge Note    Medications for Anibal oDw were reviewed in conjunction with the multidisciplinary transplant team. The pharmacist is in agreement with the plan of care for medications at this time.     Approximately 10 minutes were spent with this patient providing follow-up education and reviewing his finalized list of discharge medications. An updated outpatient dosing schedule was provided to the patient. All questions were answered to the patient's satisfaction, and the patient confirmed understanding.    The patient had his medications filled at [facility] and delivered prior to discharge. Further educational reinforcement should be provided in the outpatient clinic.    Dior Lundberg, PharmD, BCTXP  Solid Organ Transplant Clinical Pharmacy Specialist

## 2025-01-10 NOTE — CARE PLAN
Problem: Diabetes  Goal: Achieve decreasing blood glucose levels by end of shift  Outcome: Progressing  Goal: Increase stability of blood glucose readings by end of shift  Outcome: Progressing  Goal: Decrease in ketones present in urine by end of shift  Outcome: Progressing  Goal: Maintain electrolyte levels within acceptable range throughout shift  Outcome: Progressing  Goal: Maintain glucose levels >70mg/dl to <250mg/dl throughout shift  Outcome: Progressing  Goal: No changes in neurological exam by end of shift  Outcome: Progressing  Goal: Learn about and adhere to nutrition recommendations by end of shift  Outcome: Progressing  Goal: Vital signs within normal range for age by end of shift  Outcome: Progressing  Goal: Increase self care and/or family involovement by end of shift  Outcome: Progressing  Goal: Receive DSME education by end of shift  Outcome: Progressing     Problem: Pain - Adult  Goal: Verbalizes/displays adequate comfort level or baseline comfort level  Outcome: Progressing     Problem: Safety - Adult  Goal: Free from fall injury  Outcome: Progressing     Problem: Discharge Planning  Goal: Discharge to home or other facility with appropriate resources  Outcome: Progressing     Problem: Chronic Conditions and Co-morbidities  Goal: Patient's chronic conditions and co-morbidity symptoms are monitored and maintained or improved  Outcome: Progressing     Problem: Skin  Goal: Decreased wound size/increased tissue granulation at next dressing change  Outcome: Progressing  Goal: Participates in plan/prevention/treatment measures  Outcome: Progressing  Goal: Prevent/manage excess moisture  Outcome: Progressing  Goal: Prevent/minimize sheer/friction injuries  Outcome: Progressing  Goal: Promote/optimize nutrition  Outcome: Progressing  Goal: Promote skin healing  Outcome: Progressing     Problem: Fall/Injury  Goal: Not fall by end of shift  Outcome: Progressing  Goal: Be free from injury by end of the  shift  Outcome: Progressing  Goal: Verbalize understanding of personal risk factors for fall in the hospital  Outcome: Progressing  Goal: Verbalize understanding of risk factor reduction measures to prevent injury from fall in the home  Outcome: Progressing  Goal: Use assistive devices by end of the shift  Outcome: Progressing  Goal: Pace activities to prevent fatigue by end of the shift  Outcome: Progressing     Problem: Pain  Goal: Takes deep breaths with improved pain control throughout the shift  Outcome: Progressing  Goal: Turns in bed with improved pain control throughout the shift  Outcome: Progressing  Goal: Walks with improved pain control throughout the shift  Outcome: Progressing  Goal: Performs ADL's with improved pain control throughout shift  Outcome: Progressing  Goal: Participates in PT with improved pain control throughout the shift  Outcome: Progressing  Goal: Free from opioid side effects throughout the shift  Outcome: Progressing  Goal: Free from acute confusion related to pain meds throughout the shift  Outcome: Progressing

## 2025-01-10 NOTE — SIGNIFICANT EVENT
01/08/25 1108   Patient Interaction   Organ Kidney   Type of Interaction Morning rounds   Interdisciplinary Rounds   Attendance Surgeon;Physician;JAIR;Pharmacist;Dietitian   Topics Discussed Medications;Discharge preparation;Blood test results     Transplant Surgery Multidisciplinary Team Note    Anibal Dow is a 59 y.o. male   POD#3 from a kidney transplant from a DCD donor. His post operative complications: None    24 Hour Events  1. No acute events, 5 day medrano    Last Recorded Vitals  Visit Vitals  /76 (BP Location: Right arm, Patient Position: Lying)   Pulse 85   Temp 36.7 °C (98.1 °F) (Temporal)   Resp 18      Intake/Output last 3 Shifts:    Intake/Output Summary (Last 24 hours) at 1/10/2025 1037  Last data filed at 1/10/2025 0910  Gross per 24 hour   Intake 560 ml   Output 2920 ml   Net -2360 ml      Vitals:    01/10/25 0543   Weight: 80.4 kg (177 lb 4 oz)        Assessment/Plan:     This is a 60 yo M with ESRD due to DM and primary HTN S/P DDKT 1/6/25  Immunologic Risk: cPRA 90%, XM neg  Kidney Info: DCD, KDPI 83%, CIT 15 hrs, WIT 31 min  CMV D-/R+, EBV D+/R+, Toxo D-, HBV D-/R immune, HCV D-/R-    Kidney allograft function  -slow function  -UOP 2650 ml  -continue lasix 80mg IV TID  -5 day medrano - remove tomorrow    Immunosuppression  -Thymo 4.5mg/kg complete  -Tac 0.5 mg BID, MMF 1gm BID, steroid taper    HTN  -BP well controlled  -continue coreg 12.5mg BID    DM  --240  -appreciate endocrine recs    Principal Problem:    Management after organ transplant  Active Problems:  Patient Active Problem List   Diagnosis    Abnormality of albumin    Fluid overload, unspecified    Hypervolemia    Anemia in chronic kidney disease    Anemia    Bilateral lower extremity edema    Chronic systolic heart failure    CKD (chronic kidney disease) stage 5, GFR less than 15 ml/min (Multi)    CKD stage G3b/A3, GFR 30-44 and albumin creatinine ratio >300 mg/g (Multi)    Combined form of age-related cataract,  left eye    Dependence on renal dialysis (CMS-HCC)    Diabetic nephropathy associated with type 2 diabetes mellitus (Multi)    Dyslipidemia (high LDL; low HDL)    ESRD (end stage renal disease) on dialysis (Multi)    Essential hypertension    (HFpEF) heart failure with preserved ejection fraction    Congestive heart failure    History of panretinal photocoagulation    Hypertensive nephropathy    Iron deficiency anemia    Metabolic acidosis, normal anion gap (NAG)    Moderate protein-calorie malnutrition (Multi)    Neuropathy    Noncompliance with medication regimen    Nonproliferative diabetic retinopathy with macular edema associated with type 2 diabetes mellitus    Nuclear sclerosis, right    Osteomyelitis of fifth toe of right foot (Multi)    Other disorders resulting from impaired renal tubular function    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 associated with type 2 diabetes mellitus    Pulmonary hypertension (Multi)    Retinal detachment, tractional, right eye    Retinal hemorrhage of left eye    S/P amputation of lesser toe (Multi)    Secondary hyperparathyroidism of renal origin (Multi)    Shortness of breath    Type 2 diabetes mellitus    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 Current dose 0.5 mg BID         MMF 1000 mg po BID       Solumedrol taper  DVT prophylaxis SCDS and subcutaneous heparin 5000 TID  PT/OT  Diet: Diabetic 60gm  Anticipated discharge 1/13, pending prescription drug  miguel Thomas, APRN-CNP

## 2025-01-10 NOTE — PROGRESS NOTES
Anibal Dow is a 59 y.o. male on day 4 of admission presenting with Kidney replaced by transplant ([facility]-HCC).    Subjective   Patient seen at the bedside. He is feeling well. Has a good appetite. Discussed changes to insulin doses today.  Patient agreeable.      I have reviewed histories, allergies and medications have been reviewed and there are no changes     Objective   Review of Systems   Constitutional:  Positive for fatigue. Negative for activity change, appetite change, diaphoresis and unexpected weight change.   HENT: Negative.  Negative for sore throat.    Eyes: Negative.    Respiratory:  Negative for cough, chest tightness and shortness of breath.    Cardiovascular:  Negative for chest pain.   Gastrointestinal:  Negative for abdominal pain, diarrhea and nausea.   Endocrine: Negative for cold intolerance, heat intolerance, polydipsia, polyphagia and polyuria.   Genitourinary:  Negative for dysuria and frequency.   Musculoskeletal:  Negative for gait problem and joint swelling.   Neurological:  Negative for speech difficulty, numbness and headaches.   Psychiatric/Behavioral:  Negative for agitation, behavioral problems and confusion.      Physical Exam  Constitutional:       General: He is not in acute distress.     Appearance: Normal appearance.   HENT:      Nose: Nose normal.      Mouth/Throat:      Mouth: Mucous membranes are moist.      Pharynx: Oropharynx is clear.   Cardiovascular:      Rate and Rhythm: Normal rate and regular rhythm.   Pulmonary:      Effort: Pulmonary effort is normal. No respiratory distress.   Abdominal:      General: There is no distension.      Palpations: Abdomen is soft.   Musculoskeletal:         General: Normal range of motion.   Skin:     General: Skin is warm and dry.   Neurological:      Mental Status: He is alert and oriented to person, place, and time.   Psychiatric:         Mood and Affect: Mood normal.         Behavior: Behavior normal.       Last Recorded  "Vitals  Blood pressure 115/75, pulse 85, temperature 36.6 °C (97.9 °F), temperature source Temporal, resp. rate 17, height 1.753 m (5' 9\"), weight 80.4 kg (177 lb 4 oz), SpO2 94%.  Intake/Output last 3 Shifts:  I/O last 3 completed shifts:  In: 2340 (29.1 mL/kg) [P.O.:2340]  Out: 2700 (33.6 mL/kg) [Urine:2650 (0.9 mL/kg/hr); Drains:50]  Weight: 80.4 kg     Relevant Results  Results from last 7 days   Lab Units 01/10/25  1235 01/10/25  0741 01/10/25  0526 01/09/25  2031 01/09/25  1531 01/09/25  1209 01/09/25  0818 01/09/25  0647 01/08/25  0902 01/08/25  0541 01/07/25  1142 01/07/25  1120 01/07/25  0928 01/07/25  0511   POCT GLUCOSE mg/dL 212* 240*  --  249* 113* 142*   < >  --    < >  --    < >  --    < >  --    GLUCOSE mg/dL  --   --  250*  --   --   --   --  124*  --  359*  --  281*  --  204*    < > = values in this interval not displayed.   Lab Review  Lab Results   Component Value Date    BILITOT 0.5 01/06/2025    CALCIUM 8.0 (L) 01/10/2025    CO2 28 01/10/2025    CL 93 (L) 01/10/2025    CREATININE 9.80 (H) 01/10/2025    GLUCOSE 250 (H) 01/10/2025    ALKPHOS 71 01/06/2025    K 3.5 01/10/2025    PROT 7.7 01/06/2025     01/10/2025    AST 16 01/06/2025    ALT 9 (L) 01/06/2025     (HH) 01/10/2025    ANIONGAP 21 (H) 01/10/2025    MG 2.18 01/10/2025    PHOS 6.5 (H) 01/10/2025    ALBUMIN 3.6 01/10/2025    AMYLASE 54 06/27/2024    LIPASE 167 (H) 08/05/2023    GFRMALE 4 (A) 08/05/2023     Lab Results   Component Value Date    TRIG 123 01/02/2025    CHOL 169 01/02/2025    LDLCALC 104 (H) 01/02/2025    HDL 40.0 01/02/2025     Lab Results   Component Value Date    HGBA1C 6.8 (H) 07/08/2024    HGBA1C 8.0 (H) 01/05/2024    HGBA1C 6.2 (A) 06/13/2023     The 10-year ASCVD risk score (Pascual MONAE, et al., 2019) is: 12.3%    Values used to calculate the score:      Age: 59 years      Sex: Male      Is Non- : Yes      Diabetic: Yes      Tobacco smoker: No      Systolic Blood Pressure: 115 mmHg     "  Is BP treated: No      HDL Cholesterol: 40 mg/dL      Total Cholesterol: 169 mg/dL  Scheduled medications  acetaminophen, 650 mg, oral, q6h  antithymocyte globulin (rabbit), 1.5 mg/kg, intravenous, Once  atorvastatin, 40 mg, oral, Daily  carvedilol, 12.5 mg, oral, BID  clotrimazole, 10 mg, oral, TID after meals  docusate sodium, 100 mg, oral, BID  furosemide, 80 mg, intravenous, q8h JOEY  heparin (porcine), 5,000 Units, subcutaneous, q8h  [START ON 1/11/2025] insulin glargine, 20 Units, subcutaneous, q AM  insulin lispro, 0-10 Units, subcutaneous, TID AC  [START ON 1/11/2025] insulin lispro, 10 Units, subcutaneous, Daily with breakfast  insulin lispro, 10 Units, subcutaneous, Daily with lunch  insulin lispro, 5 Units, subcutaneous, Daily with evening meal  mycophenolate, 1,000 mg, oral, q12h [facility]  oxygen, , inhalation, Continuous - Inhalation  pantoprazole, 40 mg, oral, BID AC  predniSONE, 20 mg, oral, Daily  sennosides, 1 tablet, oral, BID  sulfamethoxazole-trimethoprim, 1 tablet, oral, Daily  tacrolimus, 0.5 mg, oral, q12h JOEY  valGANciclovir, 450 mg, oral, q48h    Continuous medications     PRN medications  PRN medications: dextrose, dextrose, glucagon, naloxone, ondansetron ODT **OR** ondansetron, traMADol, traMADol    Assessment/Plan   Assessment & Plan  Kidney replaced by transplant ([facility]-H[facility])    CKD (chronic kidney disease)    ESRD (end stage renal disease) (Multi)    Type 2 diabetes mellitus with hyperglycemia, with long-term current use of insulin    Anibal Dow is a 59 y.o. male with PMH of HLD, pulmonary hypertension, ESRD 2/2 HTN and DM2, on on dialysis MWF through [facility]. He is now s/p kidney transplant 1/6/25.     Diabetes History  Type of diabetes: Type 2  Year diagnosed or age: at least 15 years  Hospitalizations for DKA or HHS: no  Complications: retinopathy, peripheral neuropathy, nephropathy, and hyperglycemia  Seen by PCP or Endocrinology: PCP  Frequency of glucose checks: via shad  2  Glucose review: 14 day average 175, glucoses in the 150's fasting, rising to the 200's during the day  Frequency of Hypoglycemia: occasional  Hypoglycemia unawareness: no  Severe hypoglycemia requiring assistance from others:  no     Home Medications  Basal: glargine 5 units  CGM: anisa 2    PLAN  Steroids:  steroid regimen:   methylpred 500mg intra-op  methylpred 250 mg x1d  methylpred 125mg x1d  methylpred 60mg x1d  pred 20mg ongoing     Nutrition:  60 gram carb consistent      - increase to 20 units of glargine QAM          If NPO:     - continue lispro corrective scale #2 with meals, adjust to scale to Q4 if persistently hyperglycemic   = 0u  151-200 = 2u  201-250 = 4u  251-300 = 6u  301-350 = 8u  351-400 = 10u     - adjust lispro to 10-10- 5 units with 1st-2nd-3rd meals; hold if NPO or eating less than 50% of his meal     -Accuchecks (not BMP) TIDAC and QHS- kindly ensure QHS Accucheck is drawn; it is often missed   - Goal -180  -Hypoglycemia protocol  -Will continue to follow and titrate insulin accordingly     Discharge planning:   [] patient may expect to discharge home on glargine and lispro, final doses TBD by titration  [x]will provide Anisa 3 sample prior to discharge, not yet confirmed that phone anabel is connected to  [employer]  [x]will enroll pt in  pharmacy platinum plan program  [x]follow up with Nerissa SAM in PTDM clinic      I spent 50 minutes in the professional and overall care of this patient.    Nerissa Rollins PA-C     Home

## 2025-01-10 NOTE — PROGRESS NOTES
"Anibal Dow is a 59 y.o. male on day 4 of admission presenting with Kidney replaced by transplant ([facility]).  S/p DDKT 1/6/25    Subjective   POD4 DDKT. No acute event. No N/V. Tolerates diet well.       Objective   Vital signs - reviewed.   General Appearance - NAD, Good speech, oriented and alert  HEENT - Supple. Not pale. No jaundice.   CVS - RRR. Normal S1/S2. No murmur, click , rub or gallop  Lungs- clear to auscultation bilaterally  Abdomen - soft , not tender, no guarding, no rigidity. No hepatosplenomegaly. Normal bowel sounds. No masses and ascites. S/P Kidney transplant. Incision C/D/I  Extremities - trace edema.   Neuro/Psych - appropriate mood and affect. Motor power V/V all extremities. CN I -XII were grossly intact.  Skin - No visible rash  Access - LUE AVG +thrill      Last Recorded Vitals  Blood pressure 134/76, pulse 85, temperature 36.7 °C (98.1 °F), temperature source Temporal, resp. rate 18, height 1.753 m (5' 9\"), weight 80.4 kg (177 lb 4 oz), SpO2 90%.  Intake/Output last 3 Shifts:  I/O last 3 completed shifts:  In: 2340 (29.1 mL/kg) [P.O.:2340]  Out: 2700 (33.6 mL/kg) [Urine:2650 (0.9 mL/kg/hr); Drains:50]  Weight: 80.4 kg     Relevant Results  Results for orders placed or performed during the hospital encounter of 01/06/25 (from the past 96 hours)   Blood Gas Arterial Full Panel Unsolicited   Result Value Ref Range    POCT pH, Arterial 7.54 (H) 7.38 - 7.42 pH    POCT pCO2, Arterial 32 (L) 38 - 42 mm Hg    POCT pO2, Arterial 181 (H) 85 - 95 mm Hg    POCT SO2, Arterial 97 94 - 100 %    POCT Oxy Hemoglobin, Arterial 97.4 94.0 - 98.0 %    POCT Hematocrit Calculated, Arterial 27.0 (L) 41.0 - 52.0 %    POCT Sodium, Arterial 133 (L) 136 - 145 mmol/L    POCT Potassium, Arterial 5.2 3.5 - 5.3 mmol/L    POCT Chloride, Arterial 98 98 - 107 mmol/L    POCT Ionized Calcium, Arterial 1.04 (L) 1.10 - 1.33 mmol/L    POCT Glucose, Arterial 168 (H) 74 - 99 mg/dL    POCT Lactate, Arterial 1.1 0.4 - 2.0 " mmol/L    POCT Base Excess, Arterial 4.8 (H) -2.0 - 3.0 mmol/L    POCT HCO3 Calculated, Arterial 27.4 (H) 22.0 - 26.0 mmol/L    POCT Hemoglobin, Arterial 8.9 (L) 13.5 - 17.5 g/dL    POCT Anion Gap, Arterial 13 10 - 25 mmo/L    Patient Temperature 37.0 degrees Celsius    FiO2 50 %   Coox Panel, Arterial Unsolicited   Result Value Ref Range    POCT Hemoglobin, Arterial 8.9 (L) 13.5 - 17.5 g/dL    POCT Oxy Hemoglobin, Arterial 97.4 94.0 - 98.0 %    POCT Carboxyhemoglobin, Arterial 0.0 %    POCT Methemoglobin, Arterial 0.0 0.0 - 1.5 %    POCT Deoxy Hemoglobin, Arterial 2.6 0.0 - 5.0 %   CBC   Result Value Ref Range    WBC 4.7 4.4 - 11.3 x10*3/uL    nRBC 0.0 0.0 - 0.0 /100 WBCs    RBC 3.46 (L) 4.50 - 5.90 x10*6/uL    Hemoglobin 9.8 (L) 13.5 - 17.5 g/dL    Hematocrit 30.6 (L) 41.0 - 52.0 %    MCV 88 80 - 100 fL    MCH 28.3 26.0 - 34.0 pg    MCHC 32.0 32.0 - 36.0 g/dL    RDW 13.0 11.5 - 14.5 %    Platelets 128 (L) 150 - 450 x10*3/uL   Renal Function Panel   Result Value Ref Range    Glucose 186 (H) 74 - 99 mg/dL    Sodium 132 (L) 136 - 145 mmol/L    Potassium 6.1 (HH) 3.5 - 5.3 mmol/L    Chloride 91 (L) 98 - 107 mmol/L    Bicarbonate 27 21 - 32 mmol/L    Anion Gap 20 10 - 20 mmol/L    Urea Nitrogen 86 (H) 6 - 23 mg/dL    Creatinine 13.96 (H) 0.50 - 1.30 mg/dL    eGFR 4 (L) >60 mL/min/1.73m*2    Calcium 10.5 8.6 - 10.6 mg/dL    Phosphorus 5.1 (H) 2.5 - 4.9 mg/dL    Albumin 3.5 3.4 - 5.0 g/dL   POCT GLUCOSE   Result Value Ref Range    POCT Glucose 187 (H) 74 - 99 mg/dL   POCT GLUCOSE   Result Value Ref Range    POCT Glucose 316 (H) 74 - 99 mg/dL   POCT GLUCOSE   Result Value Ref Range    POCT Glucose 174 (H) 74 - 99 mg/dL   Blood Gas Arterial Full Panel   Result Value Ref Range    POCT pH, Arterial 7.35 (L) 7.38 - 7.42 pH    POCT pCO2, Arterial 48 (H) 38 - 42 mm Hg    POCT pO2, Arterial 101 (H) 85 - 95 mm Hg    POCT SO2, Arterial 96 94 - 100 %    POCT Oxy Hemoglobin, Arterial 96.0 94.0 - 98.0 %    POCT Hematocrit Calculated,  Arterial 29.0 (L) 41.0 - 52.0 %    POCT Sodium, Arterial 133 (L) 136 - 145 mmol/L    POCT Potassium, Arterial 4.4 3.5 - 5.3 mmol/L    POCT Chloride, Arterial 97 (L) 98 - 107 mmol/L    POCT Ionized Calcium, Arterial 1.20 1.10 - 1.33 mmol/L    POCT Glucose, Arterial 138 (H) 74 - 99 mg/dL    POCT Lactate, Arterial 2.8 (H) 0.4 - 2.0 mmol/L    POCT Base Excess, Arterial 0.5 -2.0 - 3.0 mmol/L    POCT HCO3 Calculated, Arterial 26.5 (H) 22.0 - 26.0 mmol/L    POCT Hemoglobin, Arterial 9.7 (L) 13.5 - 17.5 g/dL    POCT Anion Gap, Arterial 14 10 - 25 mmo/L    Patient Temperature 37.0 degrees Celsius    FiO2 35 %   Blood Gas Lactic Acid, Venous   Result Value Ref Range    POCT Lactate, Venous 2.8 (H) 0.4 - 2.0 mmol/L   POCT GLUCOSE   Result Value Ref Range    POCT Glucose 156 (H) 74 - 99 mg/dL   Magnesium   Result Value Ref Range    Magnesium 2.22 1.60 - 2.40 mg/dL   Renal Function Panel   Result Value Ref Range    Glucose 204 (H) 74 - 99 mg/dL    Sodium 134 (L) 136 - 145 mmol/L    Potassium 5.6 (H) 3.5 - 5.3 mmol/L    Chloride 96 (L) 98 - 107 mmol/L    Bicarbonate 20 (L) 21 - 32 mmol/L    Anion Gap 24 (H) 10 - 20 mmol/L    Urea Nitrogen 83 (H) 6 - 23 mg/dL    Creatinine 11.89 (H) 0.50 - 1.30 mg/dL    eGFR 4 (L) >60 mL/min/1.73m*2    Calcium 8.3 (L) 8.6 - 10.6 mg/dL    Phosphorus 5.6 (H) 2.5 - 4.9 mg/dL    Albumin 3.3 (L) 3.4 - 5.0 g/dL   CBC and Auto Differential   Result Value Ref Range    WBC 10.8 4.4 - 11.3 x10*3/uL    nRBC 0.0 0.0 - 0.0 /100 WBCs    RBC 2.99 (L) 4.50 - 5.90 x10*6/uL    Hemoglobin 8.4 (L) 13.5 - 17.5 g/dL    Hematocrit 28.0 (L) 41.0 - 52.0 %    MCV 94 80 - 100 fL    MCH 28.1 26.0 - 34.0 pg    MCHC 30.0 (L) 32.0 - 36.0 g/dL    RDW 13.5 11.5 - 14.5 %    Platelets 84 (L) 150 - 450 x10*3/uL    Neutrophils % 94.5 40.0 - 80.0 %    Immature Granulocytes %, Automated 1.0 (H) 0.0 - 0.9 %    Lymphocytes % 0.6 13.0 - 44.0 %    Monocytes % 3.8 2.0 - 10.0 %    Eosinophils % 0.0 0.0 - 6.0 %    Basophils % 0.1 0.0 - 2.0 %     Neutrophils Absolute 10.23 (H) 1.20 - 7.70 x10*3/uL    Immature Granulocytes Absolute, Automated 0.11 0.00 - 0.70 x10*3/uL    Lymphocytes Absolute 0.06 (L) 1.20 - 4.80 x10*3/uL    Monocytes Absolute 0.41 0.10 - 1.00 x10*3/uL    Eosinophils Absolute 0.00 0.00 - 0.70 x10*3/uL    Basophils Absolute 0.01 0.00 - 0.10 x10*3/uL   POCT GLUCOSE   Result Value Ref Range    POCT Glucose 247 (H) 74 - 99 mg/dL   Renal function panel   Result Value Ref Range    Glucose 281 (H) 74 - 99 mg/dL    Sodium 133 (L) 136 - 145 mmol/L    Potassium 5.0 3.5 - 5.3 mmol/L    Chloride 92 (L) 98 - 107 mmol/L    Bicarbonate 26 21 - 32 mmol/L    Anion Gap 20 10 - 20 mmol/L    Urea Nitrogen 86 (H) 6 - 23 mg/dL    Creatinine 11.81 (H) 0.50 - 1.30 mg/dL    eGFR 4 (L) >60 mL/min/1.73m*2    Calcium 8.4 (L) 8.6 - 10.6 mg/dL    Phosphorus 6.8 (H) 2.5 - 4.9 mg/dL    Albumin 3.6 3.4 - 5.0 g/dL   POCT GLUCOSE   Result Value Ref Range    POCT Glucose 283 (H) 74 - 99 mg/dL   POCT GLUCOSE   Result Value Ref Range    POCT Glucose 238 (H) 74 - 99 mg/dL   POCT GLUCOSE   Result Value Ref Range    POCT Glucose 292 (H) 74 - 99 mg/dL   Magnesium   Result Value Ref Range    Magnesium 2.20 1.60 - 2.40 mg/dL   Renal Function Panel   Result Value Ref Range    Glucose 359 (H) 74 - 99 mg/dL    Sodium 132 (L) 136 - 145 mmol/L    Potassium 4.3 3.5 - 5.3 mmol/L    Chloride 90 (L) 98 - 107 mmol/L    Bicarbonate 25 21 - 32 mmol/L    Anion Gap 21 (H) 10 - 20 mmol/L    Urea Nitrogen 92 (HH) 6 - 23 mg/dL    Creatinine 11.47 (H) 0.50 - 1.30 mg/dL    eGFR 5 (L) >60 mL/min/1.73m*2    Calcium 8.3 (L) 8.6 - 10.6 mg/dL    Phosphorus 8.4 (H) 2.5 - 4.9 mg/dL    Albumin 3.6 3.4 - 5.0 g/dL   CBC and Auto Differential   Result Value Ref Range    WBC 11.4 (H) 4.4 - 11.3 x10*3/uL    nRBC 0.0 0.0 - 0.0 /100 WBCs    RBC 3.19 (L) 4.50 - 5.90 x10*6/uL    Hemoglobin 9.2 (L) 13.5 - 17.5 g/dL    Hematocrit 28.2 (L) 41.0 - 52.0 %    MCV 88 80 - 100 fL    MCH 28.8 26.0 - 34.0 pg    MCHC 32.6 32.0 -  36.0 g/dL    RDW 12.8 11.5 - 14.5 %    Platelets 121 (L) 150 - 450 x10*3/uL    Neutrophils % 94.7 40.0 - 80.0 %    Immature Granulocytes %, Automated 0.7 0.0 - 0.9 %    Lymphocytes % 1.0 13.0 - 44.0 %    Monocytes % 3.6 2.0 - 10.0 %    Eosinophils % 0.0 0.0 - 6.0 %    Basophils % 0.0 0.0 - 2.0 %    Neutrophils Absolute 10.78 (H) 1.20 - 7.70 x10*3/uL    Immature Granulocytes Absolute, Automated 0.08 0.00 - 0.70 x10*3/uL    Lymphocytes Absolute 0.11 (L) 1.20 - 4.80 x10*3/uL    Monocytes Absolute 0.41 0.10 - 1.00 x10*3/uL    Eosinophils Absolute 0.00 0.00 - 0.70 x10*3/uL    Basophils Absolute 0.00 0.00 - 0.10 x10*3/uL   Tacrolimus level   Result Value Ref Range    Tacrolimus  26.8 (HH) <=15.0 ng/mL   POCT GLUCOSE   Result Value Ref Range    POCT Glucose 330 (H) 74 - 99 mg/dL   POCT GLUCOSE   Result Value Ref Range    POCT Glucose 303 (H) 74 - 99 mg/dL   POCT GLUCOSE   Result Value Ref Range    POCT Glucose 261 (H) 74 - 99 mg/dL   POCT GLUCOSE   Result Value Ref Range    POCT Glucose 177 (H) 74 - 99 mg/dL   Magnesium   Result Value Ref Range    Magnesium 2.21 1.60 - 2.40 mg/dL   Renal Function Panel   Result Value Ref Range    Glucose 124 (H) 74 - 99 mg/dL    Sodium 137 136 - 145 mmol/L    Potassium 4.1 3.5 - 5.3 mmol/L    Chloride 92 (L) 98 - 107 mmol/L    Bicarbonate 28 21 - 32 mmol/L    Anion Gap 21 (H) 10 - 20 mmol/L    Urea Nitrogen 100 (HH) 6 - 23 mg/dL    Creatinine 10.59 (H) 0.50 - 1.30 mg/dL    eGFR 5 (L) >60 mL/min/1.73m*2    Calcium 8.3 (L) 8.6 - 10.6 mg/dL    Phosphorus 7.5 (H) 2.5 - 4.9 mg/dL    Albumin 3.6 3.4 - 5.0 g/dL   CBC and Auto Differential   Result Value Ref Range    WBC 13.8 (H) 4.4 - 11.3 x10*3/uL    nRBC 0.0 0.0 - 0.0 /100 WBCs    RBC 3.86 (L) 4.50 - 5.90 x10*6/uL    Hemoglobin 11.6 (L) 13.5 - 17.5 g/dL    Hematocrit 33.1 (L) 41.0 - 52.0 %    MCV 86 80 - 100 fL    MCH 30.1 26.0 - 34.0 pg    MCHC 35.0 32.0 - 36.0 g/dL    RDW 13.5 11.5 - 14.5 %    Platelets 151 150 - 450 x10*3/uL    Neutrophils %  83.2 40.0 - 80.0 %    Immature Granulocytes %, Automated 2.9 (H) 0.0 - 0.9 %    Lymphocytes % 7.2 13.0 - 44.0 %    Monocytes % 6.3 2.0 - 10.0 %    Eosinophils % 0.0 0.0 - 6.0 %    Basophils % 0.4 0.0 - 2.0 %    Neutrophils Absolute 11.52 (H) 1.20 - 7.70 x10*3/uL    Immature Granulocytes Absolute, Automated 0.40 0.00 - 0.70 x10*3/uL    Lymphocytes Absolute 1.00 (L) 1.20 - 4.80 x10*3/uL    Monocytes Absolute 0.87 0.10 - 1.00 x10*3/uL    Eosinophils Absolute 0.00 0.00 - 0.70 x10*3/uL    Basophils Absolute 0.05 0.00 - 0.10 x10*3/uL   Tacrolimus level   Result Value Ref Range    Tacrolimus  15.8 (H) <=15.0 ng/mL   POCT GLUCOSE   Result Value Ref Range    POCT Glucose 148 (H) 74 - 99 mg/dL   POCT GLUCOSE   Result Value Ref Range    POCT Glucose 142 (H) 74 - 99 mg/dL   POCT GLUCOSE   Result Value Ref Range    POCT Glucose 113 (H) 74 - 99 mg/dL   POCT GLUCOSE   Result Value Ref Range    POCT Glucose 249 (H) 74 - 99 mg/dL   Magnesium   Result Value Ref Range    Magnesium 2.18 1.60 - 2.40 mg/dL   Renal Function Panel   Result Value Ref Range    Glucose 250 (H) 74 - 99 mg/dL    Sodium 138 136 - 145 mmol/L    Potassium 3.5 3.5 - 5.3 mmol/L    Chloride 93 (L) 98 - 107 mmol/L    Bicarbonate 28 21 - 32 mmol/L    Anion Gap 21 (H) 10 - 20 mmol/L    Urea Nitrogen 110 (HH) 6 - 23 mg/dL    Creatinine 9.80 (H) 0.50 - 1.30 mg/dL    eGFR 6 (L) >60 mL/min/1.73m*2    Calcium 8.0 (L) 8.6 - 10.6 mg/dL    Phosphorus 6.5 (H) 2.5 - 4.9 mg/dL    Albumin 3.6 3.4 - 5.0 g/dL   CBC and Auto Differential   Result Value Ref Range    WBC 5.1 4.4 - 11.3 x10*3/uL    nRBC 0.0 0.0 - 0.0 /100 WBCs    RBC 3.24 (L) 4.50 - 5.90 x10*6/uL    Hemoglobin 9.2 (L) 13.5 - 17.5 g/dL    Hematocrit 28.7 (L) 41.0 - 52.0 %    MCV 89 80 - 100 fL    MCH 28.4 26.0 - 34.0 pg    MCHC 32.1 32.0 - 36.0 g/dL    RDW 13.0 11.5 - 14.5 %    Platelets 116 (L) 150 - 450 x10*3/uL    Neutrophils % 85.2 40.0 - 80.0 %    Immature Granulocytes %, Automated 0.6 0.0 - 0.9 %    Lymphocytes %  3.9 13.0 - 44.0 %    Monocytes % 10.3 2.0 - 10.0 %    Eosinophils % 0.0 0.0 - 6.0 %    Basophils % 0.0 0.0 - 2.0 %    Neutrophils Absolute 4.37 1.20 - 7.70 x10*3/uL    Immature Granulocytes Absolute, Automated 0.03 0.00 - 0.70 x10*3/uL    Lymphocytes Absolute 0.20 (L) 1.20 - 4.80 x10*3/uL    Monocytes Absolute 0.53 0.10 - 1.00 x10*3/uL    Eosinophils Absolute 0.00 0.00 - 0.70 x10*3/uL    Basophils Absolute 0.00 0.00 - 0.10 x10*3/uL   POCT GLUCOSE   Result Value Ref Range    POCT Glucose 240 (H) 74 - 99 mg/dL       This patient has a urinary catheter   Reason for the urinary catheter remaining today? perioperative use for selected surgical procedures    Assessment/Plan   Assessment & Plan    This is a 60 yo M with ESRD due to long-standing diabetes and hypertension, primary hypertension admitted for potential DDKT.  DDKT 1/6/25  Immunologic Risk: cPRA 90%, XM neg  Kidney Info: DCD, KDPI 83%, CIT 15 hrs, WIT 31 min  CMV D-/R+, EBV D+/R+, Toxo D-, HBV D-/R immune, HCV D-/R-    Allograft function  SGF  Cr trending down slowly but BUN remains elevated, K ok, Phos trending down   UOP ok  Lasix 80 mg IV q8h  POD0 US ok      Immunosuppression  Induction ATG 4.5 mg/kg - ATG#3 today  TAC 0.5 mg BID   MMF 1000 mg BID  Pred 20 mg/day    Prophylaxis:  PPI  clotrimazole 10 mg TID x 3 mo  TMP-SMX x 6 mo  Valcyte x 3 mo, renally dosed    Blood pressure/Volume - mild volume overload  Diuresis as above  Coreg 12.5 mg BID    Type 2 diabetes with steroid induced hyperglycemia  Basal bolus insulin per endo  Lipitor 40 mg daily    Secondary hyperparathyroidism  cCa ok, mild hyperPhos as expected given the degree of kidney function    Dispo: tomorrow?    Deyanira Sheppard MD

## 2025-01-10 NOTE — PROGRESS NOTES
Physical Therapy    Physical Therapy Treatment    Patient Name: Anibal Dow  MRN: [id number]  Department: [place] [address]  Room: [facility]  Today's Date: 1/10/2025  Time Calculation  Start Time: 1319  Stop Time: 1342  Time Calculation (min): 23 min         Assessment/Plan   PT Assessment  PT Assessment Results: Decreased strength, Impaired balance, Decreased mobility  End of Session Communication: Bedside nurse  Assessment Comment: Pt progressed ambulation to 600' using no AD with SBA. Pt appears slightly unsteady but improved with further ambulation. Pt ambulates with intermittent scissoring gait but able to correct with VCs. Pt able to ascend/descend 7 stairs uing 1 HR with SBA.  End of Session Patient Position: Alarm off, caregiver present  PT Plan  Inpatient/Swing Bed or Outpatient: Inpatient  PT Plan  Treatment/Interventions: Bed mobility, Transfer training, Stair training, Gait training, Balance training, Neuromuscular re-education, Strengthening, Therapeutic exercise, Endurance training, Range of motion, Therapeutic activity, Home exercise program, Postural re-education, Positioning  PT Plan: Ongoing PT  PT Frequency: 4 times per week  PT Discharge Recommendations: Low intensity level of continued care  PT Recommended Transfer Status: Assist x1  PT - OK to Discharge: Yes (Once medically cleared)      General Visit Information:   PT  Visit  PT Received On: 01/10/25  General  Family/Caregiver Present: Yes  Caregiver Feedback: wife present and very supportive.  Prior to Session Communication: Bedside nurse  Patient Position Received: Up in chair, Alarm off, caregiver present  General Comment: Pt seated in chair with RN in room upon arrival. Pt very pleasant and agreeable to therapy.    Subjective   Precautions:  Precautions  Medical Precautions: Abdominal precautions  Post-Surgical Precautions: Abdominal surgery precautions    Vital Signs (Past 2hrs)        Date/Time Vitals Session Patient Position Pulse Resp SpO2  BP MAP (mmHg)    01/10/25 1318 --  --  85  17  94 %  115/75  88                         Objective   Pain:     Cognition:  Cognition  Overall Cognitive Status: Within Functional Limits    Activity Tolerance:  Activity Tolerance  Endurance: Endurance does not limit participation in activity  Treatments:  Ambulation/Gait Training  Ambulation/Gait Training Performed: Yes  Ambulation/Gait Training 1  Surface 1: Level tile  Device 1: No device  Assistance 1: Close supervision  Quality of Gait 1: Narrow base of support, Scissoring, Decreased step length (slightly unsteady, improved with further ambulation)  Comments/Distance (ft) 1: 600' pt with intermittent scissoring gait, increased with directonal changes, pt cued to correct and showed improvement after cuing  Transfers  Transfer: Yes  Transfer 1  Transfer From 1: Sit to, Stand to  Transfer to 1: Stand, Sit  Technique 1: Sit to stand, Stand to sit  Transfer Device 1: Walker  Transfer Level of Assistance 1: Modified independent  Trials/Comments 1: Cues to reach backto control descent.    Stairs  Stairs: Yes  Stairs  Rails 1: Right  Curb Step 1: No  Device 1: Railing  Assistance 1: Close supervision  Comment/Number of Steps 1: 7 Pt cued in B LE sequencing.    Outcome Measures:  [facility] Basic Mobility  Turning from your back to your side while in a flat bed without using bedrails: A little  Moving from lying on your back to sitting on the side of a flat bed without using bedrails: A little  Moving to and from bed to chair (including a wheelchair): None  Standing up from a chair using your arms (e.g. wheelchair or bedside chair): None  To walk in hospital room: A little  Climbing 3-5 steps with railing: A little  Basic Mobility - Total Score: 20    Education Documentation  Precautions, taught by Alexa Mccauley PTA at 1/10/2025  3:14 PM.  Learner: Patient  Readiness: Acceptance  Method: Explanation  Response: Verbalizes Understanding  Comment: Pt cued to increase LALA during  ambulation to improve safety.    Mobility Training, taught by Alexa Mccauley PTA at 1/10/2025  3:14 PM.  Learner: Patient  Readiness: Acceptance  Method: Explanation  Response: Verbalizes Understanding  Comment: Pt cued to increase LALA during ambulation to improve safety.    Education Comments  No comments found.        OP EDUCATION:       Encounter Problems       Encounter Problems (Active)       Mobility       Pt will be Kenneth ambulation >200 ft with LRAD (Progressing)       Start:  01/07/25    Expected End:  01/21/25            Pt will be Kenneth with ascend/descend 4 steps with 1 handrail (Progressing)       Start:  01/07/25    Expected End:  01/21/25               PT Transfers       Pt will be Kenneth with sit to stand and bed to chair transfers with LRAD (Progressing)       Start:  01/07/25    Expected End:  01/21/25            Pt will be Kenneth with bed mobility (Progressing)       Start:  01/07/25    Expected End:  01/21/25               Pain - Adult

## 2025-01-10 NOTE — PROGRESS NOTES
"Anibal Dow is a 59 y.o. male on day 4 of admission presenting with Kidney replaced by transplant ([facility]).    Subjective   No acute events       Objective   Vitals:    01/10/25 0742   BP: 134/76   Pulse: 85   Resp: 18   Temp: 36.7 °C (98.1 °F)   SpO2: 90%       Physical Exam  Constitutional:       Appearance: Normal appearance.   Eyes:      Pupils: Pupils are equal, round, and reactive to light.   Cardiovascular:      Rate and Rhythm: Normal rate.   Pulmonary:      Effort: Pulmonary effort is normal. No respiratory distress.   Abdominal:      General: There is no distension.      Palpations: Abdomen is soft.      Comments: Incision clean, dry, intact   Musculoskeletal:         General: Normal range of motion.      Right lower leg: No edema.      Left lower leg: No edema.   Skin:     General: Skin is warm and dry.   Neurological:      General: No focal deficit present.      Mental Status: He is alert and oriented to person, place, and time.   Psychiatric:         Mood and Affect: Mood normal.         Behavior: Behavior normal.         Last Recorded Vitals  Blood pressure 134/76, pulse 85, temperature 36.7 °C (98.1 °F), temperature source Temporal, resp. rate 18, height 1.753 m (5' 9\"), weight 80.4 kg (177 lb 4 oz), SpO2 90%.  Intake/Output last 3 Shifts:  I/O last 3 completed shifts:  In: 2340 (29.1 mL/kg) [P.O.:2340]  Out: 2700 (33.6 mL/kg) [Urine:2650 (0.9 mL/kg/hr); Drains:50]  Weight: 80.4 kg     Relevant Results  Lab Results   Component Value Date    WBC 5.1 01/10/2025    HGB 9.2 (L) 01/10/2025    HCT 28.7 (L) 01/10/2025    MCV 89 01/10/2025     (L) 01/10/2025     Lab Results   Component Value Date    GLUCOSE 250 (H) 01/10/2025    CALCIUM 8.0 (L) 01/10/2025     01/10/2025    K 3.5 01/10/2025    CO2 28 01/10/2025    CL 93 (L) 01/10/2025     (HH) 01/10/2025    CREATININE 9.80 (H) 01/10/2025       acetaminophen, 650 mg, oral, q6h  acetaminophen, 650 mg, oral, Once  antithymocyte globulin " (rabbit), 1.5 mg/kg, intravenous, Once  atorvastatin, 40 mg, oral, Daily  carvedilol, 12.5 mg, oral, BID  clotrimazole, 10 mg, oral, TID after meals  diphenhydrAMINE, 25 mg, oral, Once  docusate sodium, 100 mg, oral, BID  furosemide, 80 mg, intravenous, q8h JOEY  heparin (porcine), 5,000 Units, subcutaneous, q8h  insulin glargine, 15 Units, subcutaneous, q AM  insulin lispro, 0-10 Units, subcutaneous, TID AC  insulin lispro, 5 Units, subcutaneous, TID AC  mycophenolate, 1,000 mg, oral, q12h JOEY  oxygen, , inhalation, Continuous - Inhalation  pantoprazole, 40 mg, oral, BID AC  predniSONE, 20 mg, oral, Daily  sennosides, 1 tablet, oral, BID  sulfamethoxazole-trimethoprim, 1 tablet, oral, Daily  tacrolimus, 0.5 mg, oral, q12h JOEY  valGANciclovir, 450 mg, oral, q48h          Assessment/Plan   Assessment & Plan    DDKT 1/6/2024  KDPI 83  PRA 90    Kidney allograft function   UOP 2650   Slow function, creatinine down slightly    Immunosuppression   Thymo last dose today, 4.5mg/kg   Tac 0.5/0.5   MMF 1000/1000   Pred taper    DM   Endocrine    I spent 35 minutes in the professional and overall care of this patient.      Javed Prince MD

## 2025-01-11 ENCOUNTER — HOME HEALTH ADMISSION (OUTPATIENT)
Dept: HOME HEALTH SERVICES | Facility: HOME HEALTH | Age: 60
End: 2025-01-11
Payer: MEDICARE

## 2025-01-11 ENCOUNTER — DOCUMENTATION (OUTPATIENT)
Dept: HOME HEALTH SERVICES | Facility: HOME HEALTH | Age: 60
End: 2025-01-11
Payer: MEDICARE

## 2025-01-11 VITALS
OXYGEN SATURATION: 93 % | BODY MASS INDEX: 26.32 KG/M2 | HEIGHT: 69 IN | SYSTOLIC BLOOD PRESSURE: 131 MMHG | HEART RATE: 82 BPM | TEMPERATURE: 98.2 F | DIASTOLIC BLOOD PRESSURE: 73 MMHG | RESPIRATION RATE: 18 BRPM | WEIGHT: 177.69 LBS

## 2025-01-11 LAB
ALBUMIN SERPL BCP-MCNC: 3.4 G/DL (ref 3.4–5)
ANION GAP SERPL CALC-SCNC: 19 MMOL/L (ref 10–20)
BASOPHILS # BLD AUTO: 0 X10*3/UL (ref 0–0.1)
BASOPHILS NFR BLD AUTO: 0 %
BUN SERPL-MCNC: 113 MG/DL (ref 6–23)
CALCIUM SERPL-MCNC: 8 MG/DL (ref 8.6–10.6)
CHLORIDE SERPL-SCNC: 96 MMOL/L (ref 98–107)
CO2 SERPL-SCNC: 29 MMOL/L (ref 21–32)
CREAT SERPL-MCNC: 7.66 MG/DL (ref 0.5–1.3)
EGFRCR SERPLBLD CKD-EPI 2021: 8 ML/MIN/1.73M*2
EOSINOPHIL # BLD AUTO: 0.01 X10*3/UL (ref 0–0.7)
EOSINOPHIL NFR BLD AUTO: 0.3 %
ERYTHROCYTE [DISTWIDTH] IN BLOOD BY AUTOMATED COUNT: 13.1 % (ref 11.5–14.5)
GLUCOSE BLD MANUAL STRIP-MCNC: 309 MG/DL (ref 74–99)
GLUCOSE BLD MANUAL STRIP-MCNC: 324 MG/DL (ref 74–99)
GLUCOSE SERPL-MCNC: 259 MG/DL (ref 74–99)
HCT VFR BLD AUTO: 27.1 % (ref 41–52)
HGB BLD-MCNC: 8.6 G/DL (ref 13.5–17.5)
IMM GRANULOCYTES # BLD AUTO: 0.01 X10*3/UL (ref 0–0.7)
IMM GRANULOCYTES NFR BLD AUTO: 0.3 % (ref 0–0.9)
LYMPHOCYTES # BLD AUTO: 0.13 X10*3/UL (ref 1.2–4.8)
LYMPHOCYTES NFR BLD AUTO: 4.1 %
MAGNESIUM SERPL-MCNC: 2.13 MG/DL (ref 1.6–2.4)
MCH RBC QN AUTO: 27.8 PG (ref 26–34)
MCHC RBC AUTO-ENTMCNC: 31.7 G/DL (ref 32–36)
MCV RBC AUTO: 88 FL (ref 80–100)
MONOCYTES # BLD AUTO: 0.32 X10*3/UL (ref 0.1–1)
MONOCYTES NFR BLD AUTO: 10.2 %
NEUTROPHILS # BLD AUTO: 2.68 X10*3/UL (ref 1.2–7.7)
NEUTROPHILS NFR BLD AUTO: 85.1 %
NRBC BLD-RTO: 0 /100 WBCS (ref 0–0)
PHOSPHATE SERPL-MCNC: 5.4 MG/DL (ref 2.5–4.9)
PLATELET # BLD AUTO: 102 X10*3/UL (ref 150–450)
POTASSIUM SERPL-SCNC: 3.4 MMOL/L (ref 3.5–5.3)
RBC # BLD AUTO: 3.09 X10*6/UL (ref 4.5–5.9)
SODIUM SERPL-SCNC: 141 MMOL/L (ref 136–145)
TACROLIMUS BLD-MCNC: 7.9 NG/ML
WBC # BLD AUTO: 3.2 X10*3/UL (ref 4.4–11.3)

## 2025-01-11 PROCEDURE — 99233 SBSQ HOSP IP/OBS HIGH 50: CPT

## 2025-01-11 PROCEDURE — 80197 ASSAY OF TACROLIMUS: CPT | Performed by: STUDENT IN AN ORGANIZED HEALTH CARE EDUCATION/TRAINING PROGRAM

## 2025-01-11 PROCEDURE — 99232 SBSQ HOSP IP/OBS MODERATE 35: CPT | Performed by: SURGERY

## 2025-01-11 PROCEDURE — 2500000004 HC RX 250 GENERAL PHARMACY W/ HCPCS (ALT 636 FOR OP/ED)

## 2025-01-11 PROCEDURE — 83735 ASSAY OF MAGNESIUM: CPT

## 2025-01-11 PROCEDURE — 2500000002 HC RX 250 W HCPCS SELF ADMINISTERED DRUGS (ALT 637 FOR MEDICARE OP, ALT 636 FOR OP/ED): Performed by: STUDENT IN AN ORGANIZED HEALTH CARE EDUCATION/TRAINING PROGRAM

## 2025-01-11 PROCEDURE — 2500000002 HC RX 250 W HCPCS SELF ADMINISTERED DRUGS (ALT 637 FOR MEDICARE OP, ALT 636 FOR OP/ED)

## 2025-01-11 PROCEDURE — 85025 COMPLETE CBC W/AUTO DIFF WBC: CPT

## 2025-01-11 PROCEDURE — 2500000002 HC RX 250 W HCPCS SELF ADMINISTERED DRUGS (ALT 637 FOR MEDICARE OP, ALT 636 FOR OP/ED): Performed by: PHYSICIAN ASSISTANT

## 2025-01-11 PROCEDURE — 2500000005 HC RX 250 GENERAL PHARMACY W/O HCPCS: Performed by: STUDENT IN AN ORGANIZED HEALTH CARE EDUCATION/TRAINING PROGRAM

## 2025-01-11 PROCEDURE — 82947 ASSAY GLUCOSE BLOOD QUANT: CPT

## 2025-01-11 PROCEDURE — 99239 HOSP IP/OBS DSCHRG MGMT >30: CPT

## 2025-01-11 PROCEDURE — 2500000001 HC RX 250 WO HCPCS SELF ADMINISTERED DRUGS (ALT 637 FOR MEDICARE OP): Performed by: STUDENT IN AN ORGANIZED HEALTH CARE EDUCATION/TRAINING PROGRAM

## 2025-01-11 PROCEDURE — 80069 RENAL FUNCTION PANEL: CPT

## 2025-01-11 PROCEDURE — 36415 COLL VENOUS BLD VENIPUNCTURE: CPT

## 2025-01-11 PROCEDURE — 2500000004 HC RX 250 GENERAL PHARMACY W/ HCPCS (ALT 636 FOR OP/ED): Performed by: STUDENT IN AN ORGANIZED HEALTH CARE EDUCATION/TRAINING PROGRAM

## 2025-01-11 PROCEDURE — 99233 SBSQ HOSP IP/OBS HIGH 50: CPT | Performed by: STUDENT IN AN ORGANIZED HEALTH CARE EDUCATION/TRAINING PROGRAM

## 2025-01-11 RX ORDER — INSULIN LISPRO 100 [IU]/ML
0-10 INJECTION, SOLUTION INTRAVENOUS; SUBCUTANEOUS
Start: 2025-01-11 | End: 2025-02-10

## 2025-01-11 RX ORDER — INSULIN LISPRO 100 [IU]/ML
8 INJECTION, SOLUTION INTRAVENOUS; SUBCUTANEOUS
Status: DISCONTINUED | OUTPATIENT
Start: 2025-01-11 | End: 2025-01-11 | Stop reason: HOSPADM

## 2025-01-11 RX ADMIN — MYCOPHENOLATE MOFETIL 1000 MG: 250 CAPSULE ORAL at 06:01

## 2025-01-11 RX ADMIN — CLOTRIMAZOLE 10 MG: 10 LOZENGE ORAL at 12:23

## 2025-01-11 RX ADMIN — VALGANCICLOVIR 450 MG: 450 TABLET, FILM COATED ORAL at 08:10

## 2025-01-11 RX ADMIN — PREDNISONE 20 MG: 10 TABLET ORAL at 08:09

## 2025-01-11 RX ADMIN — HEPARIN SODIUM 5000 UNITS: 5000 INJECTION, SOLUTION INTRAVENOUS; SUBCUTANEOUS at 03:07

## 2025-01-11 RX ADMIN — CLOTRIMAZOLE 10 MG: 10 LOZENGE ORAL at 08:10

## 2025-01-11 RX ADMIN — PANTOPRAZOLE SODIUM 40 MG: 40 TABLET, DELAYED RELEASE ORAL at 06:05

## 2025-01-11 RX ADMIN — ACETAMINOPHEN 650 MG: 325 TABLET ORAL at 12:23

## 2025-01-11 RX ADMIN — CARVEDILOL 12.5 MG: 12.5 TABLET, FILM COATED ORAL at 08:10

## 2025-01-11 RX ADMIN — INSULIN LISPRO 8 UNITS: 100 INJECTION, SOLUTION INTRAVENOUS; SUBCUTANEOUS at 12:23

## 2025-01-11 RX ADMIN — SULFAMETHOXAZOLE AND TRIMETHOPRIM 1 TABLET: 400; 80 TABLET ORAL at 08:10

## 2025-01-11 RX ADMIN — FUROSEMIDE 80 MG: 10 INJECTION, SOLUTION INTRAVENOUS at 06:01

## 2025-01-11 RX ADMIN — ACETAMINOPHEN 650 MG: 325 TABLET ORAL at 06:01

## 2025-01-11 RX ADMIN — ATORVASTATIN CALCIUM 40 MG: 40 TABLET, FILM COATED ORAL at 08:10

## 2025-01-11 RX ADMIN — INSULIN GLARGINE 20 UNITS: 100 INJECTION, SOLUTION SUBCUTANEOUS at 08:06

## 2025-01-11 RX ADMIN — Medication 1 L/MIN: at 08:00

## 2025-01-11 RX ADMIN — INSULIN LISPRO 10 UNITS: 100 INJECTION, SOLUTION INTRAVENOUS; SUBCUTANEOUS at 08:05

## 2025-01-11 RX ADMIN — HEPARIN SODIUM 5000 UNITS: 5000 INJECTION, SOLUTION INTRAVENOUS; SUBCUTANEOUS at 12:23

## 2025-01-11 RX ADMIN — INSULIN LISPRO 8 UNITS: 100 INJECTION, SOLUTION INTRAVENOUS; SUBCUTANEOUS at 08:07

## 2025-01-11 RX ADMIN — TACROLIMUS 0.5 MG: 0.5 CAPSULE ORAL at 06:01

## 2025-01-11 RX ADMIN — FUROSEMIDE 80 MG: 10 INJECTION, SOLUTION INTRAVENOUS at 13:42

## 2025-01-11 RX ADMIN — INSULIN LISPRO 8 UNITS: 100 INJECTION, SOLUTION INTRAVENOUS; SUBCUTANEOUS at 12:22

## 2025-01-11 ASSESSMENT — ENCOUNTER SYMPTOMS
SORE THROAT: 0
EYES NEGATIVE: 1
FATIGUE: 1
UNEXPECTED WEIGHT CHANGE: 0
NUMBNESS: 0
APPETITE CHANGE: 0
FREQUENCY: 0
AGITATION: 0
NAUSEA: 0
POLYPHAGIA: 0
ABDOMINAL PAIN: 0
DIARRHEA: 0
HEADACHES: 0
CHEST TIGHTNESS: 0
POLYDIPSIA: 0
DIAPHORESIS: 0
SHORTNESS OF BREATH: 0
DYSURIA: 0
SPEECH DIFFICULTY: 0
ACTIVITY CHANGE: 0
COUGH: 0
JOINT SWELLING: 0
CONFUSION: 0

## 2025-01-11 ASSESSMENT — COGNITIVE AND FUNCTIONAL STATUS - GENERAL
DRESSING REGULAR LOWER BODY CLOTHING: A LITTLE
MOBILITY SCORE: 21
MOVING TO AND FROM BED TO CHAIR: A LITTLE
MOVING FROM LYING ON BACK TO SITTING ON SIDE OF FLAT BED WITH BEDRAILS: A LITTLE
HELP NEEDED FOR BATHING: A LITTLE
TURNING FROM BACK TO SIDE WHILE IN FLAT BAD: A LITTLE
DAILY ACTIVITIY SCORE: 22

## 2025-01-11 ASSESSMENT — PAIN SCALES - GENERAL: PAINLEVEL_OUTOF10: 0 - NO PAIN

## 2025-01-11 ASSESSMENT — PAIN - FUNCTIONAL ASSESSMENT: PAIN_FUNCTIONAL_ASSESSMENT: 0-10

## 2025-01-11 NOTE — CARE PLAN
The patient's goals for the shift include      The clinical goals for the shift include Patient will remain free from falls/injuries during this shift.      Problem: Diabetes  Goal: Achieve decreasing blood glucose levels by end of shift  Outcome: Progressing  Goal: Increase stability of blood glucose readings by end of shift  Outcome: Progressing  Goal: Decrease in ketones present in urine by end of shift  Outcome: Progressing  Goal: Maintain electrolyte levels within acceptable range throughout shift  Outcome: Progressing  Goal: Maintain glucose levels >70mg/dl to <250mg/dl throughout shift  Outcome: Progressing  Goal: No changes in neurological exam by end of shift  Outcome: Progressing  Goal: Learn about and adhere to nutrition recommendations by end of shift  Outcome: Progressing  Goal: Vital signs within normal range for age by end of shift  Outcome: Progressing  Goal: Increase self care and/or family involovement by end of shift  Outcome: Progressing  Goal: Receive DSME education by end of shift  Outcome: Progressing     Problem: Pain - Adult  Goal: Verbalizes/displays adequate comfort level or baseline comfort level  Outcome: Progressing     Problem: Safety - Adult  Goal: Free from fall injury  Outcome: Progressing     Problem: Discharge Planning  Goal: Discharge to home or other facility with appropriate resources  Outcome: Progressing     Problem: Chronic Conditions and Co-morbidities  Goal: Patient's chronic conditions and co-morbidity symptoms are monitored and maintained or improved  Outcome: Progressing     Problem: Skin  Goal: Decreased wound size/increased tissue granulation at next dressing change  Outcome: Progressing  Goal: Participates in plan/prevention/treatment measures  Outcome: Progressing  Goal: Prevent/manage excess moisture  Outcome: Progressing  Goal: Prevent/minimize sheer/friction injuries  Outcome: Progressing  Goal: Promote/optimize nutrition  Outcome: Progressing  Goal: Promote skin  healing  Outcome: Progressing     Problem: Fall/Injury  Goal: Not fall by end of shift  Outcome: Progressing  Goal: Be free from injury by end of the shift  Outcome: Progressing  Goal: Verbalize understanding of personal risk factors for fall in the hospital  Outcome: Progressing  Goal: Verbalize understanding of risk factor reduction measures to prevent injury from fall in the home  Outcome: Progressing  Goal: Use assistive devices by end of the shift  Outcome: Progressing  Goal: Pace activities to prevent fatigue by end of the shift  Outcome: Progressing     Problem: Pain  Goal: Takes deep breaths with improved pain control throughout the shift  Outcome: Progressing  Goal: Turns in bed with improved pain control throughout the shift  Outcome: Progressing  Goal: Walks with improved pain control throughout the shift  Outcome: Progressing  Goal: Performs ADL's with improved pain control throughout shift  Outcome: Progressing  Goal: Participates in PT with improved pain control throughout the shift  Outcome: Progressing  Goal: Free from opioid side effects throughout the shift  Outcome: Progressing  Goal: Free from acute confusion related to pain meds throughout the shift  Outcome: Progressing

## 2025-01-11 NOTE — PROGRESS NOTES
Anibal Dow is a 59 y.o. male POD#5 from [facility]KT from a DCD donor. KDPI 83%,  PRA 90    - No acute events    Objective   Gen: A+OX3; NAD  HEENT: PERRL, sclera anicteric, MMM  Cardiac: RRR  Chest: Normal inspiratory effort  Abdomen: S/NT/ND. Incision C/D/I.  Ext: 1+ LE edema  Drain: serosanguinous    Last Recorded Vitals  Visit Vitals  /77 (BP Location: Right arm, Patient Position: Lying)   Pulse 80   Temp 36.6 °C (97.9 °F) (Temporal)   Resp 18      Intake/Output last 3 Shifts:    Intake/Output Summary (Last 24 hours) at 1/11/2025 0844  Last data filed at 1/11/2025 0815  Gross per 24 hour   Intake 1126.4 ml   Output 3396 ml   Net -2269.6 ml      Vitals:    01/11/25 0500   Weight: 80.6 kg (177 lb 11.1 oz)   Scheduled medications  acetaminophen, 650 mg, oral, q6h  atorvastatin, 40 mg, oral, Daily  carvedilol, 12.5 mg, oral, BID  clotrimazole, 10 mg, oral, TID after meals  docusate sodium, 100 mg, oral, BID  furosemide, 80 mg, intravenous, q8h JOEY  heparin (porcine), 5,000 Units, subcutaneous, q8h  insulin glargine, 20 Units, subcutaneous, q AM  insulin lispro, 0-10 Units, subcutaneous, TID AC  insulin lispro, 8 Units, subcutaneous, TID AC  mycophenolate, 1,000 mg, oral, q12h JOEY  oxygen, , inhalation, Continuous - Inhalation  pantoprazole, 40 mg, oral, BID AC  predniSONE, 20 mg, oral, Daily  sennosides, 1 tablet, oral, BID  sulfamethoxazole-trimethoprim, 1 tablet, oral, Daily  tacrolimus, 0.5 mg, oral, q12h JOEY  valGANciclovir, 450 mg, oral, q48h    Assessment/Plan   Principal Problem:    Kidney transplant recipient  Active Problems:  Patient Active Problem List   Diagnosis    Abnormality of albumin    Fluid overload, unspecified    Hypervolemia    Anemia in chronic kidney disease    Anemia    Bilateral lower extremity edema    Chronic systolic heart failure    CKD (chronic kidney disease) stage 5, GFR less than 15 ml/min (Multi)    CKD stage G3b/A3, GFR 30-44 and albumin creatinine ratio >300 mg/g (Multi)     Combined form of age-related cataract, left eye    Dependence on renal dialysis (CMS-HCC)    Diabetic nephropathy associated with type 2 diabetes mellitus (Multi)    Dyslipidemia (high LDL; low HDL)    ESRD (end stage renal disease) on dialysis (Multi)    Essential hypertension    (HFpEF) heart failure with preserved ejection fraction    Congestive heart failure    History of panretinal photocoagulation    Hypertensive nephropathy    Iron deficiency anemia    Metabolic acidosis, normal anion gap (NAG)    Moderate protein-calorie malnutrition (Multi)    Neuropathy    Noncompliance with medication regimen    Nonproliferative diabetic retinopathy with macular edema associated with type 2 diabetes mellitus    Nuclear sclerosis, right    Osteomyelitis of fifth toe of right foot (Multi)    Other disorders resulting from impaired renal tubular function    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 associated with type 2 diabetes mellitus    Pulmonary hypertension (Multi)    Retinal detachment, tractional, right eye    Retinal hemorrhage of left eye    S/P amputation of lesser toe (Multi)    Secondary hyperparathyroidism of renal origin (Multi)    Shortness of breath    Type 2 diabetes mellitus    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 (Multi)    Vitamin D deficiency    Pre-transplant evaluation for chronic kidney disease    Abnormal findings on diagnostic imaging of heart and coronary circulation    ESRD (end stage renal disease) (Multi)    CKD (chronic kidney disease)    Kidney replaced by transplant ([facility])    Type 2 diabetes mellitus with hyperglycemia, with long-term current use of insulin      Kidney allograft function              UOP 3L              Robust creatinine decrease today   Remove DESHAUN drain      Immunosuppression              Thymo completed 4.5mg/kg              Tac 0.5 mg bid              MMF 1000 mg bid              Prednisone 20 mg     Dispo   Discharge home  today   Medication coverage pending; first 30 days being covered by [facility]   RTC 1/14 with labs    I spent 35 minutes in the professional and overall care of this patient, including immunosuppression management.    Yamile Quick MD, PHD, MPH, FACS  Chief Transplant and Hepatobiliary Surgery

## 2025-01-11 NOTE — PROGRESS NOTES
Anibal Dow is a 59 y.o. male on day 5 of admission presenting with Kidney replaced by transplant ([facility]-HCC).    Subjective   Patient seen at the bedside. AM hyperglycemia 2/2 to eating applesauce before bed. We discussed lowering carb intake at night. Downloaded Glycode 3 anabel to phone and connected to  diabetes, patient's wife will place at home as she took sample home. They have used shad in the past. Reviewed homegoing insulin regimen with patient.      I have reviewed histories, allergies and medications have been reviewed and there are no changes     Objective   Review of Systems   Constitutional:  Positive for fatigue. Negative for activity change, appetite change, diaphoresis and unexpected weight change.   HENT: Negative.  Negative for sore throat.    Eyes: Negative.    Respiratory:  Negative for cough, chest tightness and shortness of breath.    Cardiovascular:  Negative for chest pain.   Gastrointestinal:  Negative for abdominal pain, diarrhea and nausea.   Endocrine: Negative for cold intolerance, heat intolerance, polydipsia, polyphagia and polyuria.   Genitourinary:  Negative for dysuria and frequency.   Musculoskeletal:  Negative for gait problem and joint swelling.   Neurological:  Negative for speech difficulty, numbness and headaches.   Psychiatric/Behavioral:  Negative for agitation, behavioral problems and confusion.      Physical Exam  Constitutional:       General: He is not in acute distress.     Appearance: Normal appearance.   HENT:      Nose: Nose normal.      Mouth/Throat:      Mouth: Mucous membranes are moist.      Pharynx: Oropharynx is clear.   Cardiovascular:      Rate and Rhythm: Normal rate and regular rhythm.   Pulmonary:      Effort: Pulmonary effort is normal. No respiratory distress.   Abdominal:      General: There is no distension.      Palpations: Abdomen is soft.   Musculoskeletal:         General: Normal range of motion.   Skin:     General: Skin is warm and dry.  "  Neurological:      Mental Status: He is alert and oriented to person, place, and time.   Psychiatric:         Mood and Affect: Mood normal.         Behavior: Behavior normal.       Last Recorded Vitals  Blood pressure 132/77, pulse 80, temperature 36.6 °C (97.9 °F), temperature source Temporal, resp. rate 18, height 1.753 m (5' 9\"), weight 80.6 kg (177 lb 11.1 oz), SpO2 95%.  Intake/Output last 3 Shifts:  I/O last 3 completed shifts:  In: 1326.4 (16.5 mL/kg) [P.O.:800; IV Piggyback:526.4]  Out: 4529 (56.2 mL/kg) [Urine:4469 (1.5 mL/kg/hr); Drains:60]  Weight: 80.6 kg     Relevant Results  Results from last 7 days   Lab Units 01/11/25  0757 01/11/25  0527 01/10/25  1930 01/10/25  1813 01/10/25  1235 01/10/25  0741 01/10/25  0526 01/09/25  0818 01/09/25  0647 01/08/25  0902 01/08/25  0541 01/07/25  1142 01/07/25  1120   POCT GLUCOSE mg/dL 309*  --  120* 72* 212* 240*  --    < >  --    < >  --    < >  --    GLUCOSE mg/dL  --  259*  --   --   --   --  250*  --  124*  --  359*  --  281*    < > = values in this interval not displayed.   Lab Review  Lab Results   Component Value Date    BILITOT 0.5 01/06/2025    CALCIUM 8.0 (L) 01/11/2025    CO2 29 01/11/2025    CL 96 (L) 01/11/2025    CREATININE 7.66 (H) 01/11/2025    GLUCOSE 259 (H) 01/11/2025    ALKPHOS 71 01/06/2025    K 3.4 (L) 01/11/2025    PROT 7.7 01/06/2025     01/11/2025    AST 16 01/06/2025    ALT 9 (L) 01/06/2025     (HH) 01/11/2025    ANIONGAP 19 01/11/2025    MG 2.13 01/11/2025    PHOS 5.4 (H) 01/11/2025    ALBUMIN 3.4 01/11/2025    AMYLASE 54 06/27/2024    LIPASE 167 (H) 08/05/2023    GFRMALE 4 (A) 08/05/2023     Lab Results   Component Value Date    TRIG 123 01/02/2025    CHOL 169 01/02/2025    LDLCALC 104 (H) 01/02/2025    HDL 40.0 01/02/2025     Lab Results   Component Value Date    HGBA1C 6.8 (H) 07/08/2024    HGBA1C 8.0 (H) 01/05/2024    HGBA1C 6.2 (A) 06/13/2023     The 10-year ASCVD risk score (Pascual DK, et al., 2019) is: 15.5%    " Values used to calculate the score:      Age: 59 years      Sex: Male      Is Non- : Yes      Diabetic: Yes      Tobacco smoker: No      Systolic Blood Pressure: 132 mmHg      Is BP treated: No      HDL Cholesterol: 40 mg/dL      Total Cholesterol: 169 mg/dL  Scheduled medications  acetaminophen, 650 mg, oral, q6h  atorvastatin, 40 mg, oral, Daily  carvedilol, 12.5 mg, oral, BID  clotrimazole, 10 mg, oral, TID after meals  docusate sodium, 100 mg, oral, BID  furosemide, 80 mg, intravenous, q8h JOEY  heparin (porcine), 5,000 Units, subcutaneous, q8h  insulin glargine, 20 Units, subcutaneous, q AM  insulin lispro, 0-10 Units, subcutaneous, TID AC  insulin lispro, 8 Units, subcutaneous, TID AC  mycophenolate, 1,000 mg, oral, q12h JOEY  oxygen, , inhalation, Continuous - Inhalation  pantoprazole, 40 mg, oral, BID AC  predniSONE, 20 mg, oral, Daily  sennosides, 1 tablet, oral, BID  sulfamethoxazole-trimethoprim, 1 tablet, oral, Daily  tacrolimus, 0.5 mg, oral, q12h JOEY  valGANciclovir, 450 mg, oral, q48h    Continuous medications     PRN medications  PRN medications: dextrose, dextrose, glucagon, naloxone, ondansetron ODT **OR** ondansetron, traMADol, traMADol    Assessment/Plan   Assessment & Plan  Kidney replaced by transplant ([facility]-HCC)    CKD (chronic kidney disease)    ESRD (end stage renal disease) (Multi)    Type 2 diabetes mellitus with hyperglycemia, with long-term current use of insulin    Anibal Dow is a 59 y.o. male with PMH of HLD, pulmonary hypertension, ESRD 2/2 HTN and DM2, on on dialysis MWF through [facility]. He is now s/p kidney transplant 1/6/25.     Diabetes History  Type of diabetes: Type 2  Year diagnosed or age: at least 15 years  Hospitalizations for DKA or HHS: no  Complications: retinopathy, peripheral neuropathy, nephropathy, and hyperglycemia  Seen by PCP or Endocrinology: PCP  Frequency of glucose checks: via [employer]  Glucose review: 14 day average 175, glucoses in  the 150's fasting, rising to the 200's during the day  Frequency of Hypoglycemia: occasional  Hypoglycemia unawareness: no  Severe hypoglycemia requiring assistance from others:  no     Home Medications  Basal: glargine 5 units  CGM: shad 2    PLAN  Steroids:  steroid regimen:   methylpred 500mg intra-op  methylpred 250 mg x1d  methylpred 125mg x1d  methylpred 60mg x1d  pred 20mg ongoing     Nutrition:  60 gram carb consistent      - continue to 20 units of glargine QAM          If NPO: reduce to 15u    - continue lispro corrective scale #2 with meals, adjust to scale to Q4 if persistently hyperglycemic   = 0u  151-200 = 2u  201-250 = 4u  251-300 = 6u  301-350 = 8u  351-400 = 10u     - adjust lispro to 8 units with meals; hold if NPO or eating less than 50% of his meal     -Accuchecks (not BMP) TIDAC and QHS- kindly ensure QHS Accucheck is drawn; it is often missed   - Goal -180  -Hypoglycemia protocol  -Will continue to follow and titrate insulin accordingly     Discharge planning:   [x] patient may expect to discharge home on the above insulin regimen  [x]will provide Shad 3 sample prior to discharge, anabel downloaded and connected to clinic, wife will place sample on at home as she took it home  [x]will enroll pt in  pharmacy platinum plan program  [x]follow up with Nerissa SAM in PTDM clinic      I spent 50 minutes in the professional and overall care of this patient.    Maureen Quick PA-C

## 2025-01-11 NOTE — NURSING NOTE
Transplant Coordinator Note    Inpatient Discharge Education Provided to patient and spouse today in anticipation of discharge on Saturday. The following topics were reviewed:   signs and symptoms of rejection   signs and symptoms of infection   transplant medication indications   transplant medication administration   transplant medication side effects   the importance of following post-transplant lab and appointment schedules   Patient and spouse verbalized good understanding of all information provided   Pill box was filled by coordinator with instructions given to patient's spouse. They will do first outpatient fill with team in clinic   Patient partially completed discharge education but reported frequent interruptions. They will complete tomorrow when it is quieter      Provided to patient all [facility] contact information for both  during and after hours    Outpatient Plan  Primary though sluggish function with , Cr 9.8  DESHAUN in place with OP 50cc s/s  Weekly DSA x4 for cPRA 90%  XM neg, no DSA  Tacro 0.5mg bid, MMF 1000mg bid, pred 20mg at time of education  Patient reported diarrhea - coordinator alerted team  Prescription coverage concerns - SW and [facility] to follow up with patient as outpatient  First TI appt Tues 1/14  Will need pill box assistance in clinic at least once to confirm patient and spouse able to fill independently

## 2025-01-11 NOTE — HH CARE COORDINATION
Home Care received a Referral for Nursing, Physical Therapy, and Occupational Therapy. We have processed the referral for a Start of Care on 1/12-1/13.     If you have any questions or concerns, please feel free to contact us at [phone]. Follow the prompts, enter your five digit zip code, and you will be directed to your care team on CENTL 3.

## 2025-01-11 NOTE — CARE PLAN
PATIENT INFORMATION    Anticipatory guidance discussed  Add one food at a time every 3-5 days to see if tolerated  Adequate diet for breastfeeding  Avoid cow's milk until 12 months of age  Avoid infant walkers  Avoid potential choking hazards (large, spherical, or coin shaped foods)  Avoid putting to bed with bottle  Avoid small toys (choking hazard)  Car seat issues, including proper placement  Caution with possible poisons (including pills, plants, cosmetics)  Child-proof home with cabinet locks, outlet plugs, window guards and stair solorio  Encouraged that any formula used be iron-fortified  Fluoride supplementation if unfluoridated water supply  Impossible to \"spoil\" infants at this age  Limit daytime sleep to 3-4 hours at a time  Make middle-of-night feeds \"brief and boring\"  Most babies sleep through night by 6 months of age  Never leave unattended except in crib  Observe while eating; consider CPR classes  Obtain and know how to use thermometer  Place in crib before completely asleep  Poison Control phone number [phone]  Risk of falling once learns to roll  Safe sleep furniture  Set hot water heater less than 120 degrees F  Sleep face up to decrease the chances of SIDS  Smoke detectors  Starting solids gradually at 4-6 months  Use of transitional object (cassidy bear, etc.) to help with sleep    Follow-Up  - Return for your 9 month well child visit.    6 months old Health and Safety Tips - The following hyperlinks are available to access via [employer]    Parent Education from [employer]    Educación para padres sobre niños sanos    Common dosing for acetaminophen and ibuprofen:   Acetaminophen (Tylenol) can be given every 4 hours.  · Infant or Children's Elixir: 160mg/5ml for  Weight 6-11 lbs 12-17 lbs 18-23 lbs 24-35 lbs   Dose 1.25 ml 2.5 ml 3.75 ml 5 ml      Weight 36-47 lbs 48-59 lbs 60-71 lsb 72-95 lbs   Dose 7.5 ml 10 ml 12.5 ml 15 ml     Ibuprofen (Motrin) can be given every 6 hours.  Safe for  The patient's goals for the shift include      The clinical goals for the shift include Patient will remain free from falls/injuries during this shift.      Problem: Diabetes  Goal: Achieve decreasing blood glucose levels by end of shift  Outcome: Progressing  Goal: Increase stability of blood glucose readings by end of shift  Outcome: Progressing  Goal: Decrease in ketones present in urine by end of shift  Outcome: Progressing  Goal: Maintain electrolyte levels within acceptable range throughout shift  Outcome: Progressing  Goal: Maintain glucose levels >70mg/dl to <250mg/dl throughout shift  Outcome: Progressing  Goal: No changes in neurological exam by end of shift  Outcome: Progressing  Goal: Learn about and adhere to nutrition recommendations by end of shift  Outcome: Progressing  Goal: Vital signs within normal range for age by end of shift  Outcome: Progressing  Goal: Increase self care and/or family involovement by end of shift  Outcome: Progressing  Goal: Receive DSME education by end of shift  Outcome: Progressing     Problem: Pain - Adult  Goal: Verbalizes/displays adequate comfort level or baseline comfort level  Outcome: Progressing     Problem: Safety - Adult  Goal: Free from fall injury  Outcome: Progressing     Problem: Discharge Planning  Goal: Discharge to home or other facility with appropriate resources  Outcome: Progressing     Problem: Chronic Conditions and Co-morbidities  Goal: Patient's chronic conditions and co-morbidity symptoms are monitored and maintained or improved  Outcome: Progressing      children 6 months and older.  · Infant Drops: 50mg/1.25ml  Weight 12-17 lbs 18-23 lbs   Dose 1.25 ml 1.875 ml     · Children's Elixir 100mg/5ml   Weight 12-17 lbs 18-23 lbs 24-35 lbs 36-47 lbs 48-59 lbs   Dose 2.5 ml 3.75 ml 5 ml 7.5 ml 10 ml        Weight 60-71 lbs 72-95 lbs   Dose 12.5 ml 15 ml     Additional Educational Resources:  For additional resources regarding your symptoms, diagnosis, or further health information, please visit he Online Health Resources section in MyChart.

## 2025-01-11 NOTE — PROGRESS NOTES
1/11/25 @1048 Transitional Care Coordinator Note  Called into pt's room to introduce myself, role and to discuss discharge planning, no answer. Called pt's cell phone ([phone] ) no answer. Per Lou Hart RN at [facility] they are pending insurance for home care for this pt. Will continue to follow.     1/11/25 @1236 addendum  Per Lou Hart RN [facility] SOC within 48HRS, Will continue to follow.

## 2025-01-11 NOTE — DISCHARGE SUMMARY
Call back to Clair (EC).    She says Lisa continues with the shaking in her legs when doing something or trying to get up. She feels this is worse, and would like Dr Carter to increase her Primidone.     Patient is currently taking Primidone 50 mg TID    Dr Carter would need to see her with a request in medication increase.        From 4/26/21:   Per verbal of Dr Carter this can not be increased over the phone.  Brook was called message left to return call.     Please schedule a follow up appointment when call is returned.      LOV: 4/7/21:      PLAN:  1.  Discontinue Mysoline.  2.  Call in 1 week to 10 days.  3.  Valproic Acid 250 mg p.o. b.i.d.  4.  I will see her in 3 months for a followup visit.    FUV: 5/4/21      Lisa is also complaining of a sore, red and watery right eye; \"I think she may have an infection.\"  I recommended she check with Lisa's PCP in regards to this.           Discharge Diagnosis  Kidney replaced by transplant ([facility])    Issues Requiring Follow-Up  DDKT 25  DESHAUN drain removed    DM2  Appreciate endocrine recs.   Will follow up with Nerissa SAM in clinic.     Disposition  Home with home PT/OT. Healthy @Home.     Test Results Pending At Discharge  Pending Labs       No current pending labs.            Hospital Course  Pt is a 58 y/o male with a hx of ESRD secondary to HTN and DM2 whom received a  donor kidney transplant on 25 by Dr. Prince with a KDPI of 83% and PRA of 90%.  HCV -/- and donor has not met risk factors. EBV +/+. Right donor kidney transplanted to patient right pelvis. Dry weight is 76 kg (discharge weight is 80.6 kg ).  Pt received a total of 4.5 mg/kg total of thymoglobulin induction therapy in conjunction with 500mg IV solumedrol.  Pt was initiated on Tacrolimus & Cellcept immunosuppression in conjunction with IV solumedrol taper.  Pt was started on valcyte (CMV D - / R + ) and bactrim for CMV & PCP prophylaxis per protocol. He was started on clotrimazole as antifungal coverage per protocol. Operative course was uncomplicated.  Hospital course was uncomplicated. Aragon catheter  was removed on POD #5 and the patient is voiding spontaneously.  Pt did not require dialysis and electrolytes remain stable. Dialysis access via LUE AVF and was patent on last use. BP & glucose under good control.     Pt is tolerating a diabetic diet, maintaining adequate hydration with oral intake, ambulating independently and having BMs. Immunosuppression was managed by the transplant team. Patient is in stable condition for discharge home with renal telehealth today. RX delivered to bedside prior to discharge. The patient will f/u in the transplant institute and have labs drawn in the walk-in clinic.     Pertinent Physical Exam At Time of Discharge  Physical Exam  Vitals reviewed.   HENT:      Head: Normocephalic.   Cardiovascular:      Rate and Rhythm:  Normal rate.      Pulses: Normal pulses.   Pulmonary:      Effort: Pulmonary effort is normal.   Abdominal:      Palpations: Abdomen is soft.   Musculoskeletal:         General: Normal range of motion.   Skin:     General: Skin is warm and dry.      Comments: RLQ incision c/d/I with staples    Neurological:      Mental Status: He is alert and oriented to person, place, and time.   Psychiatric:         Mood and Affect: Mood normal.         Home Medications     Medication List      START taking these medications     acetaminophen 325 mg tablet; Commonly known as: Tylenol; Take 2 tablets   (650 mg) by mouth every 6 hours.   clotrimazole 10 mg davin; Commonly known as: Mycelex; Take 1 tablet (10   mg) by mouth 3 times a day after meals.   docusate sodium 100 mg capsule; Commonly known as: Colace; Take 1   capsule (100 mg) by mouth 2 times a day.   furosemide 40 mg tablet; Commonly known as: Lasix; Take 2 tablets (80   mg) by mouth once daily.   insulin lispro 100 unit/mL injection; Commonly known as: HumaLOG; Inject   0-10 Units under the skin 3 times a day before meals. Inject 8 units with   meals. Check blood sugar & follow sliding scale. =0u, 151-200=2u,   201-250=4u, 251-300=6u, 301-350=8u, 351-400=10u. May need up to 55 units   per day   mycophenolate 250 mg capsule; Commonly known as: Cellcept; Take 4   capsules (1,000 mg) by mouth every 12 hours.   pantoprazole 40 mg EC tablet; Commonly known as: ProtoNix; Take 1 tablet   (40 mg) by mouth 2 times a day before meals. Do not crush, chew, or split.   polyethylene glycol 17 gram packet; Commonly known as: Glycolax,   Miralax; Take 17 g by mouth once daily for 3 days.   predniSONE 5 mg tablet; Commonly known as: Deltasone; Take 4 tablets (20   mg) by mouth once daily for 10 days. Do not fill before January 10, 2025.   sulfamethoxazole-trimethoprim 400-80 mg tablet; Commonly known as:   Bactrim; Take 1 tablet by mouth once daily.   * tacrolimus 1 mg capsule;  "Commonly known as: Prograf; Take 2 capsules   (2 mg) by mouth every 12 hours.   * tacrolimus 0.5 mg capsule; Commonly known as: Prograf; Take 1 capsule   (0.5 mg) by mouth 2 times a day.   traMADol 50 mg tablet; Commonly known as: Ultram; Take 1 tablet (50 mg)   by mouth every 12 hours if needed for severe pain (7 - 10) or moderate   pain (4 - 6) for up to 7 days.   valGANciclovir 450 mg tablet; Commonly known as: Valcyte; Take 1 tablet   (450 mg) by mouth every other day. Do not crush or chew.  * This list has 2 medication(s) that are the same as other medications   prescribed for you. Read the directions carefully, and ask your doctor or   other care provider to review them with you.     CHANGE how you take these medications     Basaglar KwikPen U-100 Insulin 100 unit/mL (3 mL) pen; Generic drug:   insulin glargine; Inject 20 Units under the skin once daily in the   morning. Take as directed per insulin instructions. Do not fill before   January 11, 2025.; What changed: medication strength, how much to take,   when to take this   carvedilol 12.5 mg tablet; Commonly known as: Coreg; Take 1 tablet (12.5   mg) by mouth 2 times a day.; What changed: medication strength, how much   to take     CONTINUE taking these medications     atorvastatin 40 mg tablet; Commonly known as: Lipitor; Take 1 tablet (40   mg) by mouth once daily.   insulin syringe-needle U-100 31G X 5/16\" 1 mL syringe   TUMS ORAL   ULTRA FINE LANCETS MISC     STOP taking these medications     Blood Glucose Test strip; Generic drug: blood sugar diagnostic   blood sugar diagnostic strip   calcium acetate 667 mg tablet; Commonly known as: Phoslo   hydrALAZINE 100 mg tablet; Commonly known as: Apresoline   losartan 25 mg tablet; Commonly known as: Cozaar   RenaPlex-D 800 mcg-12.5 mg -2,000 unit tablet; Generic drug: vit   B,C-FA-zinc-selen-vit D3-E       Outpatient Follow-Up  Future Appointments   Date Time Provider Department Center   1/14/2025  9:00 AM " TXP ABDOMINAL SURGEON [id number]   1/14/2025 11:00 AM TXP ABD  [id number]   1/21/2025  9:00 AM TXP ABDOMINAL SURGEON [id number]   1/28/2025  9:00 AM TXP ABDOMINAL SURGEON [id number]   1/30/2025 10:00 AM Rand Reyes RDN, LD [id number]   2/4/2025  9:00 AM TXP ABDOMINAL SURGEON [id number]   2/6/2025  1:00 PM Familia Mclean MD [id number]   3/18/2025  8:40 AM Ridge Noonan MD [id number] [facility]       Peggy Celestin, APRN-CNP

## 2025-01-11 NOTE — PROGRESS NOTES
"Anibal Dow is a 59 y.o. male on day 5 of admission presenting with Kidney replaced by transplant ([facility]).  S/p DDKT 1/6/25    Subjective   POD5 DDKT. No acute event. No N/V. No uremic symptoms  Aragon catheter got removed       Objective   Vital signs - reviewed.   General Appearance - NAD, Good speech, oriented and alert  HEENT - Supple. Not pale. No jaundice.   CVS - RRR. Normal S1/S2. No murmur, click , rub or gallop  Lungs- clear to auscultation bilaterally  Abdomen - soft , not tender, no guarding, no rigidity. . Normal bowel sounds.. S/P Kidney transplant. Incision C/D/I  Extremities - trace edema.   Neuro/Psych - appropriate mood and affect. Motor power V/V all extremities. CN I -XII were grossly intact.  Skin - No visible rash  Access - LUE AVG +thrill      Last Recorded Vitals  Blood pressure 110/68, pulse 85, temperature 36.4 °C (97.5 °F), temperature source Temporal, resp. rate 18, height 1.753 m (5' 9\"), weight 80.6 kg (177 lb 11.1 oz), SpO2 96%.  Intake/Output last 3 Shifts:  I/O last 3 completed shifts:  In: 1326.4 (16.5 mL/kg) [P.O.:800; IV Piggyback:526.4]  Out: 4529 (56.2 mL/kg) [Urine:4469 (1.5 mL/kg/hr); Drains:60]  Weight: 80.6 kg     Relevant Results  Results for orders placed or performed during the hospital encounter of 01/06/25 (from the past 96 hours)   POCT GLUCOSE   Result Value Ref Range    POCT Glucose 247 (H) 74 - 99 mg/dL   Renal function panel   Result Value Ref Range    Glucose 281 (H) 74 - 99 mg/dL    Sodium 133 (L) 136 - 145 mmol/L    Potassium 5.0 3.5 - 5.3 mmol/L    Chloride 92 (L) 98 - 107 mmol/L    Bicarbonate 26 21 - 32 mmol/L    Anion Gap 20 10 - 20 mmol/L    Urea Nitrogen 86 (H) 6 - 23 mg/dL    Creatinine 11.81 (H) 0.50 - 1.30 mg/dL    eGFR 4 (L) >60 mL/min/1.73m*2    Calcium 8.4 (L) 8.6 - 10.6 mg/dL    Phosphorus 6.8 (H) 2.5 - 4.9 mg/dL    Albumin 3.6 3.4 - 5.0 g/dL   POCT GLUCOSE   Result Value Ref Range    POCT Glucose 283 (H) 74 - 99 mg/dL   POCT GLUCOSE   Result " Value Ref Range    POCT Glucose 238 (H) 74 - 99 mg/dL   POCT GLUCOSE   Result Value Ref Range    POCT Glucose 292 (H) 74 - 99 mg/dL   Magnesium   Result Value Ref Range    Magnesium 2.20 1.60 - 2.40 mg/dL   Renal Function Panel   Result Value Ref Range    Glucose 359 (H) 74 - 99 mg/dL    Sodium 132 (L) 136 - 145 mmol/L    Potassium 4.3 3.5 - 5.3 mmol/L    Chloride 90 (L) 98 - 107 mmol/L    Bicarbonate 25 21 - 32 mmol/L    Anion Gap 21 (H) 10 - 20 mmol/L    Urea Nitrogen 92 (HH) 6 - 23 mg/dL    Creatinine 11.47 (H) 0.50 - 1.30 mg/dL    eGFR 5 (L) >60 mL/min/1.73m*2    Calcium 8.3 (L) 8.6 - 10.6 mg/dL    Phosphorus 8.4 (H) 2.5 - 4.9 mg/dL    Albumin 3.6 3.4 - 5.0 g/dL   CBC and Auto Differential   Result Value Ref Range    WBC 11.4 (H) 4.4 - 11.3 x10*3/uL    nRBC 0.0 0.0 - 0.0 /100 WBCs    RBC 3.19 (L) 4.50 - 5.90 x10*6/uL    Hemoglobin 9.2 (L) 13.5 - 17.5 g/dL    Hematocrit 28.2 (L) 41.0 - 52.0 %    MCV 88 80 - 100 fL    MCH 28.8 26.0 - 34.0 pg    MCHC 32.6 32.0 - 36.0 g/dL    RDW 12.8 11.5 - 14.5 %    Platelets 121 (L) 150 - 450 x10*3/uL    Neutrophils % 94.7 40.0 - 80.0 %    Immature Granulocytes %, Automated 0.7 0.0 - 0.9 %    Lymphocytes % 1.0 13.0 - 44.0 %    Monocytes % 3.6 2.0 - 10.0 %    Eosinophils % 0.0 0.0 - 6.0 %    Basophils % 0.0 0.0 - 2.0 %    Neutrophils Absolute 10.78 (H) 1.20 - 7.70 x10*3/uL    Immature Granulocytes Absolute, Automated 0.08 0.00 - 0.70 x10*3/uL    Lymphocytes Absolute 0.11 (L) 1.20 - 4.80 x10*3/uL    Monocytes Absolute 0.41 0.10 - 1.00 x10*3/uL    Eosinophils Absolute 0.00 0.00 - 0.70 x10*3/uL    Basophils Absolute 0.00 0.00 - 0.10 x10*3/uL   Tacrolimus level   Result Value Ref Range    Tacrolimus  26.8 (HH) <=15.0 ng/mL   POCT GLUCOSE   Result Value Ref Range    POCT Glucose 330 (H) 74 - 99 mg/dL   POCT GLUCOSE   Result Value Ref Range    POCT Glucose 303 (H) 74 - 99 mg/dL   POCT GLUCOSE   Result Value Ref Range    POCT Glucose 261 (H) 74 - 99 mg/dL   POCT GLUCOSE   Result Value  Ref Range    POCT Glucose 177 (H) 74 - 99 mg/dL   Magnesium   Result Value Ref Range    Magnesium 2.21 1.60 - 2.40 mg/dL   Renal Function Panel   Result Value Ref Range    Glucose 124 (H) 74 - 99 mg/dL    Sodium 137 136 - 145 mmol/L    Potassium 4.1 3.5 - 5.3 mmol/L    Chloride 92 (L) 98 - 107 mmol/L    Bicarbonate 28 21 - 32 mmol/L    Anion Gap 21 (H) 10 - 20 mmol/L    Urea Nitrogen 100 (HH) 6 - 23 mg/dL    Creatinine 10.59 (H) 0.50 - 1.30 mg/dL    eGFR 5 (L) >60 mL/min/1.73m*2    Calcium 8.3 (L) 8.6 - 10.6 mg/dL    Phosphorus 7.5 (H) 2.5 - 4.9 mg/dL    Albumin 3.6 3.4 - 5.0 g/dL   CBC and Auto Differential   Result Value Ref Range    WBC 13.8 (H) 4.4 - 11.3 x10*3/uL    nRBC 0.0 0.0 - 0.0 /100 WBCs    RBC 3.86 (L) 4.50 - 5.90 x10*6/uL    Hemoglobin 11.6 (L) 13.5 - 17.5 g/dL    Hematocrit 33.1 (L) 41.0 - 52.0 %    MCV 86 80 - 100 fL    MCH 30.1 26.0 - 34.0 pg    MCHC 35.0 32.0 - 36.0 g/dL    RDW 13.5 11.5 - 14.5 %    Platelets 151 150 - 450 x10*3/uL    Neutrophils % 83.2 40.0 - 80.0 %    Immature Granulocytes %, Automated 2.9 (H) 0.0 - 0.9 %    Lymphocytes % 7.2 13.0 - 44.0 %    Monocytes % 6.3 2.0 - 10.0 %    Eosinophils % 0.0 0.0 - 6.0 %    Basophils % 0.4 0.0 - 2.0 %    Neutrophils Absolute 11.52 (H) 1.20 - 7.70 x10*3/uL    Immature Granulocytes Absolute, Automated 0.40 0.00 - 0.70 x10*3/uL    Lymphocytes Absolute 1.00 (L) 1.20 - 4.80 x10*3/uL    Monocytes Absolute 0.87 0.10 - 1.00 x10*3/uL    Eosinophils Absolute 0.00 0.00 - 0.70 x10*3/uL    Basophils Absolute 0.05 0.00 - 0.10 x10*3/uL   Tacrolimus level   Result Value Ref Range    Tacrolimus  15.8 (H) <=15.0 ng/mL   POCT GLUCOSE   Result Value Ref Range    POCT Glucose 148 (H) 74 - 99 mg/dL   POCT GLUCOSE   Result Value Ref Range    POCT Glucose 142 (H) 74 - 99 mg/dL   POCT GLUCOSE   Result Value Ref Range    POCT Glucose 113 (H) 74 - 99 mg/dL   POCT GLUCOSE   Result Value Ref Range    POCT Glucose 249 (H) 74 - 99 mg/dL   Magnesium   Result Value Ref Range     Magnesium 2.18 1.60 - 2.40 mg/dL   Renal Function Panel   Result Value Ref Range    Glucose 250 (H) 74 - 99 mg/dL    Sodium 138 136 - 145 mmol/L    Potassium 3.5 3.5 - 5.3 mmol/L    Chloride 93 (L) 98 - 107 mmol/L    Bicarbonate 28 21 - 32 mmol/L    Anion Gap 21 (H) 10 - 20 mmol/L    Urea Nitrogen 110 (HH) 6 - 23 mg/dL    Creatinine 9.80 (H) 0.50 - 1.30 mg/dL    eGFR 6 (L) >60 mL/min/1.73m*2    Calcium 8.0 (L) 8.6 - 10.6 mg/dL    Phosphorus 6.5 (H) 2.5 - 4.9 mg/dL    Albumin 3.6 3.4 - 5.0 g/dL   CBC and Auto Differential   Result Value Ref Range    WBC 5.1 4.4 - 11.3 x10*3/uL    nRBC 0.0 0.0 - 0.0 /100 WBCs    RBC 3.24 (L) 4.50 - 5.90 x10*6/uL    Hemoglobin 9.2 (L) 13.5 - 17.5 g/dL    Hematocrit 28.7 (L) 41.0 - 52.0 %    MCV 89 80 - 100 fL    MCH 28.4 26.0 - 34.0 pg    MCHC 32.1 32.0 - 36.0 g/dL    RDW 13.0 11.5 - 14.5 %    Platelets 116 (L) 150 - 450 x10*3/uL    Neutrophils % 85.2 40.0 - 80.0 %    Immature Granulocytes %, Automated 0.6 0.0 - 0.9 %    Lymphocytes % 3.9 13.0 - 44.0 %    Monocytes % 10.3 2.0 - 10.0 %    Eosinophils % 0.0 0.0 - 6.0 %    Basophils % 0.0 0.0 - 2.0 %    Neutrophils Absolute 4.37 1.20 - 7.70 x10*3/uL    Immature Granulocytes Absolute, Automated 0.03 0.00 - 0.70 x10*3/uL    Lymphocytes Absolute 0.20 (L) 1.20 - 4.80 x10*3/uL    Monocytes Absolute 0.53 0.10 - 1.00 x10*3/uL    Eosinophils Absolute 0.00 0.00 - 0.70 x10*3/uL    Basophils Absolute 0.00 0.00 - 0.10 x10*3/uL   Tacrolimus level   Result Value Ref Range    Tacrolimus  8.9 <=15.0 ng/mL   POCT GLUCOSE   Result Value Ref Range    POCT Glucose 240 (H) 74 - 99 mg/dL   POCT GLUCOSE   Result Value Ref Range    POCT Glucose 212 (H) 74 - 99 mg/dL   POCT GLUCOSE   Result Value Ref Range    POCT Glucose 72 (L) 74 - 99 mg/dL   POCT GLUCOSE   Result Value Ref Range    POCT Glucose 120 (H) 74 - 99 mg/dL   CBC and Auto Differential   Result Value Ref Range    WBC 3.2 (L) 4.4 - 11.3 x10*3/uL    nRBC 0.0 0.0 - 0.0 /100 WBCs    RBC 3.09 (L) 4.50 -  5.90 x10*6/uL    Hemoglobin 8.6 (L) 13.5 - 17.5 g/dL    Hematocrit 27.1 (L) 41.0 - 52.0 %    MCV 88 80 - 100 fL    MCH 27.8 26.0 - 34.0 pg    MCHC 31.7 (L) 32.0 - 36.0 g/dL    RDW 13.1 11.5 - 14.5 %    Platelets 102 (L) 150 - 450 x10*3/uL    Neutrophils % 85.1 40.0 - 80.0 %    Immature Granulocytes %, Automated 0.3 0.0 - 0.9 %    Lymphocytes % 4.1 13.0 - 44.0 %    Monocytes % 10.2 2.0 - 10.0 %    Eosinophils % 0.3 0.0 - 6.0 %    Basophils % 0.0 0.0 - 2.0 %    Neutrophils Absolute 2.68 1.20 - 7.70 x10*3/uL    Immature Granulocytes Absolute, Automated 0.01 0.00 - 0.70 x10*3/uL    Lymphocytes Absolute 0.13 (L) 1.20 - 4.80 x10*3/uL    Monocytes Absolute 0.32 0.10 - 1.00 x10*3/uL    Eosinophils Absolute 0.01 0.00 - 0.70 x10*3/uL    Basophils Absolute 0.00 0.00 - 0.10 x10*3/uL   Magnesium   Result Value Ref Range    Magnesium 2.13 1.60 - 2.40 mg/dL   Renal Function Panel   Result Value Ref Range    Glucose 259 (H) 74 - 99 mg/dL    Sodium 141 136 - 145 mmol/L    Potassium 3.4 (L) 3.5 - 5.3 mmol/L    Chloride 96 (L) 98 - 107 mmol/L    Bicarbonate 29 21 - 32 mmol/L    Anion Gap 19 10 - 20 mmol/L    Urea Nitrogen 113 (HH) 6 - 23 mg/dL    Creatinine 7.66 (H) 0.50 - 1.30 mg/dL    eGFR 8 (L) >60 mL/min/1.73m*2    Calcium 8.0 (L) 8.6 - 10.6 mg/dL    Phosphorus 5.4 (H) 2.5 - 4.9 mg/dL    Albumin 3.4 3.4 - 5.0 g/dL   POCT GLUCOSE   Result Value Ref Range    POCT Glucose 309 (H) 74 - 99 mg/dL       This patient has a urinary catheter   Reason for the urinary catheter remaining today? perioperative use for selected surgical procedures    Assessment/Plan   Assessment & Plan  ESRD (end stage renal disease) ([facility])    Kidney replaced by transplant ([facility])    Type 2 diabetes mellitus with hyperglycemia, with long-term current use of insulin    This is a 60 yo M with ESRD due to long-standing diabetes and hypertension, primary hypertension admitted for potential DDKT.  DDKT 1/6/25  Immunologic Risk: cPRA 90%, XM neg  Kidney Info: DCD,  KDPI 83%, CIT 15 hrs, WIT 31 min  CMV D-/R+, EBV D+/R+, Toxo D-, HBV D-/R immune, HCV D-/R-    Allograft function  SGF  Cr trending down slowly but BUN remains elevated, K ok, Phos trending down   UOP ok  Stop IV Lasix --> PO Lasix 80 mg/day  POD0 US ok    Immunosuppression  Induction ATG 4.5 mg/kg - ATG#3 today  TAC 0.5 mg BID   MMF 1000 mg BID  Pred 20 mg/day    Prophylaxis:  PPI  clotrimazole 10 mg TID x 3 mo  TMP-SMX x 6 mo  Valcyte x 3 mo, renally dosed    Blood pressure/Volume - mild volume overload  Diuresis as above  Coreg 12.5 mg BID    Type 2 diabetes with steroid induced hyperglycemia  Basal bolus insulin per endo  Lipitor 40 mg daily    Secondary hyperparathyroidism  cCa ok, mild hyperPhos as expected given the degree of kidney function    Dispo: 01/11/25    Deyanira Sheppard MD

## 2025-01-13 ENCOUNTER — PATIENT OUTREACH (OUTPATIENT)
Dept: CARE COORDINATION | Age: 60
End: 2025-01-13
Payer: MEDICARE

## 2025-01-13 ENCOUNTER — DOCUMENTATION (OUTPATIENT)
Dept: TRANSPLANT | Facility: HOSPITAL | Age: 60
End: 2025-01-13
Payer: MEDICARE

## 2025-01-13 ENCOUNTER — TELEPHONE (OUTPATIENT)
Dept: HOME HEALTH SERVICES | Facility: HOME HEALTH | Age: 60
End: 2025-01-13
Payer: MEDICARE

## 2025-01-13 ENCOUNTER — DOCUMENTATION (OUTPATIENT)
Dept: CARE COORDINATION | Age: 60
End: 2025-01-13
Payer: MEDICARE

## 2025-01-13 ASSESSMENT — ENCOUNTER SYMPTOMS
HALLUCINATIONS: 0
DIARRHEA: 0
AGITATION: 0
FREQUENCY: 0
ARTHRALGIAS: 0
DIZZINESS: 0
FEVER: 0
CONSTIPATION: 0
HEMATURIA: 0
CONFUSION: 0
ABDOMINAL PAIN: 0
CHILLS: 0
DYSURIA: 0
ADENOPATHY: 0
WEAKNESS: 0
COLOR CHANGE: 0
COUGH: 0
LIGHT-HEADEDNESS: 0
EYES NEGATIVE: 1
SHORTNESS OF BREATH: 0
ABDOMINAL DISTENTION: 0

## 2025-01-13 NOTE — PROGRESS NOTES
Transplant event completed.  Verified serologies and cross clamp time with UNET.  Verified recipient is listed in the transplant phase in Epic.

## 2025-01-13 NOTE — TELEPHONE ENCOUNTER
Good morning Dr. Ascencio  - I wanted to inform you that your patient has declined homecare services at this time. If patient changes their mind and would like homecare, a new referral can be sent in at that time. Please let me know if there are any questions or concerns. Thank you!

## 2025-01-14 ENCOUNTER — OFFICE VISIT (OUTPATIENT)
Facility: HOSPITAL | Age: 60
End: 2025-01-14
Payer: MEDICARE

## 2025-01-14 ENCOUNTER — APPOINTMENT (OUTPATIENT)
Facility: HOSPITAL | Age: 60
End: 2025-01-14
Payer: MEDICARE

## 2025-01-14 ENCOUNTER — LAB (OUTPATIENT)
Dept: LAB | Facility: LAB | Age: 60
End: 2025-01-14
Payer: MEDICARE

## 2025-01-14 VITALS
SYSTOLIC BLOOD PRESSURE: 141 MMHG | BODY MASS INDEX: 24.6 KG/M2 | TEMPERATURE: 97.1 F | DIASTOLIC BLOOD PRESSURE: 84 MMHG | HEART RATE: 81 BPM | WEIGHT: 166.6 LBS | OXYGEN SATURATION: 95 %

## 2025-01-14 DIAGNOSIS — Z94.0 KIDNEY REPLACED BY TRANSPLANT (HHS-HCC): ICD-10-CM

## 2025-01-14 DIAGNOSIS — D84.9 IMMUNOSUPPRESSION: ICD-10-CM

## 2025-01-14 DIAGNOSIS — Z94.0 KIDNEY REPLACED BY TRANSPLANT (HHS-HCC): Primary | ICD-10-CM

## 2025-01-14 LAB
ALBUMIN SERPL BCP-MCNC: 3.8 G/DL (ref 3.4–5)
ANION GAP SERPL CALC-SCNC: 14 MMOL/L (ref 10–20)
BUN SERPL-MCNC: 66 MG/DL (ref 6–23)
CALCIUM SERPL-MCNC: 9 MG/DL (ref 8.6–10.6)
CHLORIDE SERPL-SCNC: 99 MMOL/L (ref 98–107)
CO2 SERPL-SCNC: 34 MMOL/L (ref 21–32)
CREAT SERPL-MCNC: 2.69 MG/DL (ref 0.5–1.3)
EGFRCR SERPLBLD CKD-EPI 2021: 26 ML/MIN/1.73M*2
ERYTHROCYTE [DISTWIDTH] IN BLOOD BY AUTOMATED COUNT: 13.2 % (ref 11.5–14.5)
GLUCOSE SERPL-MCNC: 250 MG/DL (ref 74–99)
HCT VFR BLD AUTO: 27.4 % (ref 41–52)
HGB BLD-MCNC: 8.6 G/DL (ref 13.5–17.5)
MAGNESIUM SERPL-MCNC: 1.98 MG/DL (ref 1.6–2.4)
MCH RBC QN AUTO: 27.6 PG (ref 26–34)
MCHC RBC AUTO-ENTMCNC: 31.4 G/DL (ref 32–36)
MCV RBC AUTO: 88 FL (ref 80–100)
NRBC BLD-RTO: 0 /100 WBCS (ref 0–0)
PHOSPHATE SERPL-MCNC: 3.3 MG/DL (ref 2.5–4.9)
PLATELET # BLD AUTO: 190 X10*3/UL (ref 150–450)
POTASSIUM SERPL-SCNC: 3.6 MMOL/L (ref 3.5–5.3)
RBC # BLD AUTO: 3.12 X10*6/UL (ref 4.5–5.9)
SODIUM SERPL-SCNC: 143 MMOL/L (ref 136–145)
TACROLIMUS BLD-MCNC: 3.3 NG/ML
WBC # BLD AUTO: 5.4 X10*3/UL (ref 4.4–11.3)

## 2025-01-14 PROCEDURE — 3049F LDL-C 100-129 MG/DL: CPT | Performed by: TRANSPLANT SURGERY

## 2025-01-14 PROCEDURE — 3077F SYST BP >= 140 MM HG: CPT | Performed by: TRANSPLANT SURGERY

## 2025-01-14 PROCEDURE — 3079F DIAST BP 80-89 MM HG: CPT | Performed by: TRANSPLANT SURGERY

## 2025-01-14 PROCEDURE — 99214 OFFICE O/P EST MOD 30 MIN: CPT | Mod: 24 | Performed by: TRANSPLANT SURGERY

## 2025-01-14 PROCEDURE — 99214 OFFICE O/P EST MOD 30 MIN: CPT | Performed by: TRANSPLANT SURGERY

## 2025-01-14 RX ORDER — PANTOPRAZOLE SODIUM 40 MG/1
40 TABLET, DELAYED RELEASE ORAL
Qty: 60 TABLET | Refills: 0 | Status: SHIPPED | OUTPATIENT
Start: 2025-01-14 | End: 2025-02-13

## 2025-01-14 RX ORDER — CARVEDILOL 12.5 MG/1
12.5 TABLET ORAL 2 TIMES DAILY
Qty: 60 TABLET | Refills: 11 | Status: SHIPPED | OUTPATIENT
Start: 2025-01-14 | End: 2026-01-09

## 2025-01-14 RX ORDER — MYCOPHENOLATE MOFETIL 250 MG/1
1000 CAPSULE ORAL
Qty: 240 CAPSULE | Refills: 11 | Status: SHIPPED | OUTPATIENT
Start: 2025-01-14 | End: 2026-01-09

## 2025-01-14 RX ORDER — CLOTRIMAZOLE 10 MG/1
10 LOZENGE ORAL
Qty: 100 TABLET | Refills: 2 | Status: SHIPPED | OUTPATIENT
Start: 2025-01-14 | End: 2025-02-13

## 2025-01-14 RX ORDER — SULFAMETHOXAZOLE AND TRIMETHOPRIM 400; 80 MG/1; MG/1
1 TABLET ORAL DAILY
Qty: 30 TABLET | Refills: 5 | Status: SHIPPED | OUTPATIENT
Start: 2025-01-14 | End: 2025-07-13

## 2025-01-14 RX ORDER — PREDNISONE 5 MG/1
20 TABLET ORAL DAILY
Qty: 120 TABLET | Refills: 11 | Status: SHIPPED | OUTPATIENT
Start: 2025-01-14 | End: 2026-01-09

## 2025-01-14 RX ORDER — TACROLIMUS 0.5 MG/1
0.5 CAPSULE ORAL 2 TIMES DAILY
Qty: 60 CAPSULE | Refills: 11 | Status: SHIPPED | OUTPATIENT
Start: 2025-01-14 | End: 2025-01-15 | Stop reason: SDUPTHER

## 2025-01-14 RX ORDER — VALGANCICLOVIR 450 MG/1
450 TABLET, FILM COATED ORAL
Qty: 15 TABLET | Refills: 2 | Status: SHIPPED | OUTPATIENT
Start: 2025-01-14 | End: 2025-04-14

## 2025-01-14 ASSESSMENT — PAIN SCALES - GENERAL: PAINLEVEL_OUTOF10: 0-NO PAIN

## 2025-01-14 NOTE — PROGRESS NOTES
Subjective   Anibal Dow is a 59 y.o. male who underwent DDKT on 2025 (Kidney). Here today for follow-up.      Discharge summary  Pt is a 58 y/o male with a hx of ESRD secondary to HTN and DM2 whom received a  donor kidney transplant on 25 by Dr. Prince with a KDPI of 83% and PRA of 90%.  HCV -/- and donor has not met risk factors. EBV +/+. Right donor kidney transplanted to patient right pelvis. Dry weight is 76 kg (discharge weight is 80.6 kg ).  Pt received a total of 4.5 mg/kg total of thymoglobulin induction therapy in conjunction with 500mg IV solumedrol.  Pt was initiated on Tacrolimus & Cellcept immunosuppression in conjunction with IV solumedrol taper.  Pt was started on valcyte (CMV D - / R + ) and bactrim for CMV & PCP prophylaxis per protocol. He was started on clotrimazole as antifungal coverage per protocol. Operative course was uncomplicated.  Hospital course was uncomplicated. Aragon catheter  was removed on POD #5 and the patient is voiding spontaneously.  Pt did not require dialysis and electrolytes remain stable. Dialysis access via LUE AVF and was patent on last use. BP & glucose under good control.        Review of Systems   Constitutional:  Negative for chills and fever.   HENT: Negative.  Negative for congestion.    Eyes: Negative.    Respiratory:  Negative for cough and shortness of breath.    Cardiovascular:  Negative for chest pain.   Gastrointestinal:  Negative for abdominal distention, abdominal pain, constipation and diarrhea.   Endocrine: Negative for cold intolerance and heat intolerance.   Genitourinary:  Negative for dysuria, frequency, hematuria and urgency.   Musculoskeletal:  Negative for arthralgias.   Skin:  Negative for color change.   Allergic/Immunologic: Negative for environmental allergies.   Neurological:  Negative for dizziness, weakness and light-headedness.   Hematological:  Negative for adenopathy.   Psychiatric/Behavioral:  Negative for agitation,  confusion and hallucinations.         Objective   Vitals:    01/14/25 0856   BP: 141/84   Pulse: 81   Temp: 36.2 °C (97.1 °F)   SpO2: 95%       Physical Exam  Constitutional:       Appearance: Normal appearance.   Eyes:      Pupils: Pupils are equal, round, and reactive to light.   Cardiovascular:      Rate and Rhythm: Normal rate.   Pulmonary:      Effort: Pulmonary effort is normal. No respiratory distress.   Abdominal:      General: There is no distension.      Palpations: Abdomen is soft.      Comments: Incision clean, dry, intact   Musculoskeletal:         General: Normal range of motion.      Right lower leg: No edema.      Left lower leg: No edema.   Skin:     General: Skin is warm and dry.   Neurological:      General: No focal deficit present.      Mental Status: He is alert and oriented to person, place, and time.   Psychiatric:         Mood and Affect: Mood normal.         Behavior: Behavior normal.       Lab Results   Component Value Date    CREATININE 7.66 (H) 01/11/2025    K 3.4 (L) 01/11/2025    GLUCOSE 259 (H) 01/11/2025    HCT 27.1 (L) 01/11/2025    WBC 3.2 (L) 01/11/2025     (L) 01/11/2025    CALCIUM 8.0 (L) 01/11/2025     Lab Results   Component Value Date    WBC 3.2 (L) 01/11/2025    HGB 8.6 (L) 01/11/2025    HCT 27.1 (L) 01/11/2025    MCV 88 01/11/2025     (L) 01/11/2025     Lab Results   Component Value Date    GLUCOSE 259 (H) 01/11/2025    CALCIUM 8.0 (L) 01/11/2025     01/11/2025    K 3.4 (L) 01/11/2025    CO2 29 01/11/2025    CL 96 (L) 01/11/2025     (HH) 01/11/2025    CREATININE 7.66 (H) 01/11/2025       Current Outpatient Medications:     acetaminophen (Tylenol) 325 mg tablet, Take 2 tablets (650 mg) by mouth every 6 hours., Disp: 240 tablet, Rfl: 0    atorvastatin (Lipitor) 40 mg tablet, Take 1 tablet (40 mg) by mouth once daily., Disp: 30 tablet, Rfl: 3    calcium carbonate (TUMS ORAL), Take 1 tablet by mouth once daily as needed (heartburn)., Disp: , Rfl:      "carvedilol (Coreg) 12.5 mg tablet, Take 1 tablet (12.5 mg) by mouth 2 times a day., Disp: 60 tablet, Rfl: 0    clotrimazole (Mycelex) 10 mg davin, Take 1 tablet (10 mg) by mouth 3 times a day after meals., Disp: 100 tablet, Rfl: 0    docusate sodium (Colace) 100 mg capsule, Take 1 capsule (100 mg) by mouth 2 times a day., Disp: 60 capsule, Rfl: 0    furosemide (Lasix) 40 mg tablet, Take 2 tablets (80 mg) by mouth once daily., Disp: 60 tablet, Rfl: 0    insulin glargine (Lantus) 100 unit/mL (3 mL) pen, Inject 20 Units under the skin once daily in the morning. Take as directed per insulin instructions. Do not fill before January 11, 2025., Disp: 15 mL, Rfl: 0    insulin lispro (HumaLOG) 100 unit/mL injection, Inject 0-10 Units under the skin 3 times a day before meals. Inject 8 units with meals. Check blood sugar & follow sliding scale. =0u, 151-200=2u, 201-250=4u, 251-300=6u, 301-350=8u, 351-400=10u. May need up to 55 units per day, Disp: , Rfl:     insulin syringe-needle U-100 31G X 5/16\" 1 mL syringe, Use as directed by physician, Disp: , Rfl:     mycophenolate (Cellcept) 250 mg capsule, Take 4 capsules (1,000 mg) by mouth every 12 hours., Disp: 240 capsule, Rfl: 0    pantoprazole (ProtoNix) 40 mg EC tablet, Take 1 tablet (40 mg) by mouth 2 times a day before meals. Do not crush, chew, or split., Disp: 60 tablet, Rfl: 0    predniSONE (Deltasone) 5 mg tablet, Take 4 tablets (20 mg) by mouth once daily for 10 days. Do not fill before January 10, 2025., Disp: 40 tablet, Rfl: 0    sulfamethoxazole-trimethoprim (Bactrim) 400-80 mg tablet, Take 1 tablet by mouth once daily., Disp: 30 tablet, Rfl: 0    tacrolimus (Prograf) 0.5 mg capsule, Take 1 capsule (0.5 mg) by mouth 2 times a day., Disp: 60 capsule, Rfl: 0    tacrolimus (Prograf) 1 mg capsule, Take 2 capsules (2 mg) by mouth every 12 hours., Disp: 120 capsule, Rfl: 0    traMADol (Ultram) 50 mg tablet, Take 1 tablet (50 mg) by mouth every 12 hours if needed for " severe pain (7 - 10) or moderate pain (4 - 6) for up to 7 days., Disp: 14 tablet, Rfl: 0    ULTRA FINE LANCETS MISC, Use to test blood sugar up to 3 times daily, Disp: , Rfl:     valGANciclovir (Valcyte) 450 mg tablet, Take 1 tablet (450 mg) by mouth every other day. Do not crush or chew., Disp: 15 tablet, Rfl: 0    Assessment/Plan     DDKT: 1/6/2025  KDPI: 83%  PRA 90%    Kidney allograft function   primary function, no DGF   Creatinine down trending   Labs pending today     Immunosuppression   Thymo 4.5mg/kg  Tacrolimus 0.5/0.5   MMF 1000/1000   Pred 20    HTN   Coreg  DM   Insulin, follow up Endocrine

## 2025-01-15 ENCOUNTER — TELEPHONE (OUTPATIENT)
Facility: HOSPITAL | Age: 60
End: 2025-01-15
Payer: MEDICARE

## 2025-01-15 DIAGNOSIS — Z94.0 KIDNEY REPLACED BY TRANSPLANT (HHS-HCC): ICD-10-CM

## 2025-01-15 RX ORDER — TACROLIMUS 0.5 MG/1
1 CAPSULE ORAL 2 TIMES DAILY
Qty: 120 CAPSULE | Refills: 11 | Status: SHIPPED | OUTPATIENT
Start: 2025-01-15 | End: 2025-01-17 | Stop reason: DRUGHIGH

## 2025-01-15 NOTE — TELEPHONE ENCOUNTER
Labs reviewed with Dr Atkinson - increase tac 1 mg BID.  Call placed to patient, instructed him to increase tacrolimus. He verbalized understanding.

## 2025-01-16 ENCOUNTER — DOCUMENTATION (OUTPATIENT)
Dept: CARE COORDINATION | Age: 60
End: 2025-01-16

## 2025-01-16 ENCOUNTER — LAB (OUTPATIENT)
Dept: LAB | Facility: LAB | Age: 60
End: 2025-01-16
Payer: MEDICARE

## 2025-01-16 ENCOUNTER — DOCUMENTATION (OUTPATIENT)
Dept: GASTROENTEROLOGY | Facility: HOSPITAL | Age: 60
End: 2025-01-16

## 2025-01-16 DIAGNOSIS — Z94.0 KIDNEY REPLACED BY TRANSPLANT (HHS-HCC): ICD-10-CM

## 2025-01-16 LAB
ALBUMIN SERPL BCP-MCNC: 3.7 G/DL (ref 3.4–5)
ANION GAP SERPL CALC-SCNC: 16 MMOL/L (ref 10–20)
BUN SERPL-MCNC: 44 MG/DL (ref 6–23)
CALCIUM SERPL-MCNC: 9.4 MG/DL (ref 8.6–10.6)
CHLORIDE SERPL-SCNC: 99 MMOL/L (ref 98–107)
CO2 SERPL-SCNC: 32 MMOL/L (ref 21–32)
CREAT SERPL-MCNC: 1.99 MG/DL (ref 0.5–1.3)
EGFRCR SERPLBLD CKD-EPI 2021: 38 ML/MIN/1.73M*2
ERYTHROCYTE [DISTWIDTH] IN BLOOD BY AUTOMATED COUNT: 13.2 % (ref 11.5–14.5)
GLUCOSE SERPL-MCNC: 220 MG/DL (ref 74–99)
HCT VFR BLD AUTO: 28.9 % (ref 41–52)
HGB BLD-MCNC: 8.8 G/DL (ref 13.5–17.5)
HLA RESULTS: NORMAL
HLA-A+B+C AB NFR SER: NORMAL %
HLA-DP+DQ+DR AB NFR SER: NORMAL %
MAGNESIUM SERPL-MCNC: 1.9 MG/DL (ref 1.6–2.4)
MCH RBC QN AUTO: 27.8 PG (ref 26–34)
MCHC RBC AUTO-ENTMCNC: 30.4 G/DL (ref 32–36)
MCV RBC AUTO: 91 FL (ref 80–100)
NRBC BLD-RTO: 0 /100 WBCS (ref 0–0)
PHOSPHATE SERPL-MCNC: 2.6 MG/DL (ref 2.5–4.9)
PLATELET # BLD AUTO: 235 X10*3/UL (ref 150–450)
POTASSIUM SERPL-SCNC: 3.8 MMOL/L (ref 3.5–5.3)
RBC # BLD AUTO: 3.17 X10*6/UL (ref 4.5–5.9)
SODIUM SERPL-SCNC: 143 MMOL/L (ref 136–145)
TACROLIMUS BLD-MCNC: 3.5 NG/ML
WBC # BLD AUTO: 5.9 X10*3/UL (ref 4.4–11.3)

## 2025-01-16 PROCEDURE — 80069 RENAL FUNCTION PANEL: CPT

## 2025-01-16 PROCEDURE — 80197 ASSAY OF TACROLIMUS: CPT

## 2025-01-16 PROCEDURE — 85027 COMPLETE CBC AUTOMATED: CPT

## 2025-01-16 PROCEDURE — 83735 ASSAY OF MAGNESIUM: CPT

## 2025-01-16 NOTE — PROGRESS NOTES
Visited Mr Dow for Transplant team to connect scale & BP cuff. Unable to attach BP cuff no pin. Picture of surgical wound sent to chart. Patient had no complaints. 125/80 76 18 97% room air. Weight 161.4. RN notified

## 2025-01-16 NOTE — PROGRESS NOTES
MARISELA attempted to reach Pt via telephone to gain a update on patient's Medicare RX coverage status. MARISELA was unable to speak with Pt's, but left VM with MARISELA contact information.    Plan: MARISELA to await return phone call.    MARISELA verified Pt's spouse identity by confirming Pt's name and phone number listed on file. MARISELA spoke to patient's spouse via phone whom informed SW that patient and she were informed by Medicare that his RX coverage portion of his insurance plan would begin February 1st 2025. MARISELA was also informed patient has to pay a monthly premium for this coverage to keep it active. MARISELA encouraged patient's spouse of the importance of keeping this portion of patient's insurance plan active and to notify SW if patient has any financial difficulties paying his premium so that SW can evaluate any supportive services that may be available for him to utilize. Pt's spouse verbalized understanding.       Plan: SW will remain available. MARISELA to inform post-transplant coordinator of information received.

## 2025-01-17 ENCOUNTER — DOCUMENTATION (OUTPATIENT)
Dept: CARE COORDINATION | Age: 60
End: 2025-01-17
Payer: MEDICARE

## 2025-01-17 ENCOUNTER — PATIENT OUTREACH (OUTPATIENT)
Dept: CARE COORDINATION | Age: 60
End: 2025-01-17
Payer: MEDICARE

## 2025-01-17 DIAGNOSIS — Z94.0 KIDNEY REPLACED BY TRANSPLANT (HHS-HCC): ICD-10-CM

## 2025-01-17 NOTE — PROGRESS NOTES
Transplant Telehealth: Called and spoke with patient regarding transmission of vital signs. Pt needs a new bp monitor, scheduled medic visit for 1/18. He is logging his bp and I&O for his clinic appointment until he is fully connected with RPM.

## 2025-01-18 ENCOUNTER — PATIENT OUTREACH (OUTPATIENT)
Dept: CARE COORDINATION | Age: 60
End: 2025-01-18
Payer: MEDICARE

## 2025-01-18 VITALS — DIASTOLIC BLOOD PRESSURE: 83 MMHG | HEART RATE: 81 BPM | SYSTOLIC BLOOD PRESSURE: 136 MMHG | TEMPERATURE: 98.5 F

## 2025-01-18 RX ORDER — TACROLIMUS 1 MG/1
2 CAPSULE ORAL EVERY 12 HOURS
Qty: 120 CAPSULE | Refills: 11 | Status: SHIPPED | OUTPATIENT
Start: 2025-01-18 | End: 2025-01-23 | Stop reason: SDUPTHER

## 2025-01-18 NOTE — PROGRESS NOTES
Spoke to patient  feels good today afebrile  Pt was not at home to give intake and out put results but said is drinking ample amounts of fluids and urinating  a good amount each time uses bathroom will follow in am

## 2025-01-19 ENCOUNTER — PATIENT OUTREACH (OUTPATIENT)
Dept: CARE COORDINATION | Age: 60
End: 2025-01-19
Payer: MEDICARE

## 2025-01-19 VITALS
DIASTOLIC BLOOD PRESSURE: 85 MMHG | SYSTOLIC BLOOD PRESSURE: 129 MMHG | BODY MASS INDEX: 24.69 KG/M2 | HEART RATE: 80 BPM | WEIGHT: 167.2 LBS | TEMPERATURE: 97.5 F

## 2025-01-19 NOTE — PROGRESS NOTES
Called pt feeling good afebrile   Temp 97.5   Weight 167.2  Bp 129/85  Hr 80    Intake 2.5 L  Output 1900 ml  Incision with stales   Moving bowels   Appetite fair   Getting labs  on Tues and has clinic appointment

## 2025-01-20 ENCOUNTER — DOCUMENTATION (OUTPATIENT)
Dept: CARE COORDINATION | Age: 60
End: 2025-01-20
Payer: MEDICARE

## 2025-01-20 ENCOUNTER — PATIENT OUTREACH (OUTPATIENT)
Dept: CARE COORDINATION | Age: 60
End: 2025-01-20
Payer: MEDICARE

## 2025-01-20 ENCOUNTER — SPECIALTY PHARMACY (OUTPATIENT)
Dept: PHARMACY | Facility: CLINIC | Age: 60
End: 2025-01-20

## 2025-01-20 NOTE — PROGRESS NOTES
Daily Call, patient returned call. Pt not at home at the time of call, will bring intake and output log to clinic appt. 1/21. Vitals transmitted, WNL.

## 2025-01-21 ENCOUNTER — TELEPHONE (OUTPATIENT)
Facility: HOSPITAL | Age: 60
End: 2025-01-21

## 2025-01-21 ENCOUNTER — LAB (OUTPATIENT)
Dept: LAB | Facility: LAB | Age: 60
End: 2025-01-21
Payer: MEDICARE

## 2025-01-21 ENCOUNTER — OFFICE VISIT (OUTPATIENT)
Facility: HOSPITAL | Age: 60
End: 2025-01-21
Payer: MEDICARE

## 2025-01-21 VITALS
OXYGEN SATURATION: 98 % | HEART RATE: 81 BPM | TEMPERATURE: 97.1 F | BODY MASS INDEX: 24.6 KG/M2 | SYSTOLIC BLOOD PRESSURE: 119 MMHG | DIASTOLIC BLOOD PRESSURE: 79 MMHG | WEIGHT: 166.6 LBS

## 2025-01-21 DIAGNOSIS — D84.9 IMMUNOSUPPRESSION: Primary | ICD-10-CM

## 2025-01-21 DIAGNOSIS — Z94.0 KIDNEY REPLACED BY TRANSPLANT (HHS-HCC): ICD-10-CM

## 2025-01-21 LAB
ALBUMIN SERPL BCP-MCNC: 4 G/DL (ref 3.4–5)
ANION GAP SERPL CALC-SCNC: 13 MMOL/L (ref 10–20)
BUN SERPL-MCNC: 31 MG/DL (ref 6–23)
CALCIUM SERPL-MCNC: 9 MG/DL (ref 8.6–10.6)
CHLORIDE SERPL-SCNC: 100 MMOL/L (ref 98–107)
CO2 SERPL-SCNC: 30 MMOL/L (ref 21–32)
CREAT SERPL-MCNC: 1.68 MG/DL (ref 0.5–1.3)
EGFRCR SERPLBLD CKD-EPI 2021: 47 ML/MIN/1.73M*2
ERYTHROCYTE [DISTWIDTH] IN BLOOD BY AUTOMATED COUNT: 13.9 % (ref 11.5–14.5)
GLUCOSE SERPL-MCNC: 158 MG/DL (ref 74–99)
HCT VFR BLD AUTO: 29.3 % (ref 41–52)
HGB BLD-MCNC: 9.2 G/DL (ref 13.5–17.5)
MAGNESIUM SERPL-MCNC: 1.72 MG/DL (ref 1.6–2.4)
MCH RBC QN AUTO: 28.4 PG (ref 26–34)
MCHC RBC AUTO-ENTMCNC: 31.4 G/DL (ref 32–36)
MCV RBC AUTO: 90 FL (ref 80–100)
NRBC BLD-RTO: 0 /100 WBCS (ref 0–0)
PHOSPHATE SERPL-MCNC: 2.4 MG/DL (ref 2.5–4.9)
PLATELET # BLD AUTO: 231 X10*3/UL (ref 150–450)
POTASSIUM SERPL-SCNC: 4.1 MMOL/L (ref 3.5–5.3)
RBC # BLD AUTO: 3.24 X10*6/UL (ref 4.5–5.9)
SODIUM SERPL-SCNC: 139 MMOL/L (ref 136–145)
TACROLIMUS BLD-MCNC: 8.4 NG/ML
WBC # BLD AUTO: 7.1 X10*3/UL (ref 4.4–11.3)

## 2025-01-21 PROCEDURE — 99214 OFFICE O/P EST MOD 30 MIN: CPT | Performed by: TRANSPLANT SURGERY

## 2025-01-21 PROCEDURE — 85027 COMPLETE CBC AUTOMATED: CPT

## 2025-01-21 PROCEDURE — 3049F LDL-C 100-129 MG/DL: CPT | Performed by: TRANSPLANT SURGERY

## 2025-01-21 PROCEDURE — 3078F DIAST BP <80 MM HG: CPT | Performed by: TRANSPLANT SURGERY

## 2025-01-21 PROCEDURE — 3074F SYST BP LT 130 MM HG: CPT | Performed by: TRANSPLANT SURGERY

## 2025-01-21 PROCEDURE — 80069 RENAL FUNCTION PANEL: CPT

## 2025-01-21 PROCEDURE — 83735 ASSAY OF MAGNESIUM: CPT

## 2025-01-21 PROCEDURE — 80197 ASSAY OF TACROLIMUS: CPT

## 2025-01-21 PROCEDURE — RXMED WILLOW AMBULATORY MEDICATION CHARGE

## 2025-01-21 RX ORDER — FUROSEMIDE 40 MG/1
80 TABLET ORAL DAILY
Qty: 60 TABLET | Refills: 0 | Status: CANCELLED | OUTPATIENT
Start: 2025-01-21 | End: 2025-02-20

## 2025-01-21 RX ORDER — VALGANCICLOVIR 450 MG/1
450 TABLET, FILM COATED ORAL DAILY
Qty: 30 TABLET | Refills: 2 | Status: SHIPPED | OUTPATIENT
Start: 2025-01-21 | End: 2025-01-21 | Stop reason: SDUPTHER

## 2025-01-21 RX ORDER — PREDNISONE 5 MG/1
15 TABLET ORAL DAILY
Qty: 90 TABLET | Refills: 11 | Status: SHIPPED | OUTPATIENT
Start: 2025-01-21 | End: 2025-01-21 | Stop reason: SDUPTHER

## 2025-01-21 RX ORDER — VALGANCICLOVIR 450 MG/1
450 TABLET, FILM COATED ORAL DAILY
Qty: 30 TABLET | Refills: 2 | Status: SHIPPED | OUTPATIENT
Start: 2025-01-21 | End: 2025-04-21

## 2025-01-21 RX ORDER — PREDNISONE 5 MG/1
15 TABLET ORAL DAILY
Qty: 90 TABLET | Refills: 11 | Status: SHIPPED | OUTPATIENT
Start: 2025-01-21 | End: 2026-01-21

## 2025-01-21 ASSESSMENT — ENCOUNTER SYMPTOMS
ARTHRALGIAS: 0
HEMATURIA: 0
COUGH: 0
ABDOMINAL PAIN: 0
ADENOPATHY: 0
SHORTNESS OF BREATH: 0
LIGHT-HEADEDNESS: 0
WEAKNESS: 0
ABDOMINAL DISTENTION: 0
CONSTIPATION: 0
EYES NEGATIVE: 1
DYSURIA: 0
CHILLS: 0
AGITATION: 0
HALLUCINATIONS: 0
FREQUENCY: 0
CONFUSION: 0
DIARRHEA: 0
DIZZINESS: 0
FEVER: 0
COLOR CHANGE: 0

## 2025-01-21 ASSESSMENT — PAIN SCALES - GENERAL: PAINLEVEL_OUTOF10: 0-NO PAIN

## 2025-01-21 NOTE — PROGRESS NOTES
Pharmacist Post-Transplant Clinic Visit    Anibal Dow is a 59 y.o. male who underwent DDKT on 1/6/25.     Anibal was seen today in transplant surgery clinic by the transplant pharmacist. The patient's wife was also present for this education session.       Post-Transplant Medication Discussion:    Pillbox  The medication pillbox was brought to clinic. The only slot filled was Tuesday AM.  The following errors were identified and corrected: Tacrolimus 1 mg (2 x 0.5 mg capsules) was filled in the slot instead of 2 mg (2 x 1 mg capsules). The patient's wife was shown the 2 different bottles and reinforcement was provided regarding checking the capsule strengths carefully.  The Tuesday AM slot also contained a 25 mg tablet of carvedilol instead of 12.5 mg. The patient's wife thinks 25 mg is what she has been filling in the box. Blood pressure log showed acceptable BPs, will likely continue with 25 mg BID.  The medication pillbox was filled for the rest of the week correctly by the patient's wife with PharmD supervision with the exception of two medications which were not available during the visit:  The patient had no more prednisone to fill the rest of the box with. An updated prescription was sent to [facility], patient and his wife are aware to pick that up before tomorrow so that he doesn't run out  The patient did not bring the carvedilol bottle to the visit  Both of the above medications were starred on the med list to add once they picked up the script/got home    Pharmacy Interventions  Pillbox fill  Education  Facilitation of refills    Transplant Pharmacy Follow Up  Patient's wife was educated regarding double checking the strength of tacrolimus being used to fill the pillbox and otherwise is able to fill the box independently, therefore no further pharmacy needs identified at this time.    Time spent: 40 minutes    Dior Lundberg, PharmD, BCTXP   Solid Organ Transplant Clinical  Pharmacy Specialist

## 2025-01-21 NOTE — PROGRESS NOTES
Subjective   Anibal Dow is a 59 y.o. male who underwent DDKT on 2025 (Kidney). Here today for follow-up.      Discharge summary  Pt is a 60 y/o male with a hx of ESRD secondary to HTN and DM2 whom received a  donor kidney transplant on 25 by Dr. Prince with a KDPI of 83% and PRA of 90%.  HCV -/- and donor has not met risk factors. EBV +/+. Right donor kidney transplanted to patient right pelvis. Dry weight is 76 kg (discharge weight is 80.6 kg ).  Pt received a total of 4.5 mg/kg total of thymoglobulin induction therapy in conjunction with 500mg IV solumedrol.  Pt was initiated on Tacrolimus & Cellcept immunosuppression in conjunction with IV solumedrol taper.  Pt was started on valcyte (CMV D - / R + ) and bactrim for CMV & PCP prophylaxis per protocol. He was started on clotrimazole as antifungal coverage per protocol. Operative course was uncomplicated.  Hospital course was uncomplicated. Aragon catheter  was removed on POD #5 and the patient is voiding spontaneously.  Pt did not require dialysis and electrolytes remain stable. Dialysis access via LUE AVF and was patent on last use. BP & glucose under good control.        Review of Systems   Constitutional:  Negative for chills and fever.   HENT: Negative.  Negative for congestion.    Eyes: Negative.    Respiratory:  Negative for cough and shortness of breath.    Cardiovascular:  Negative for chest pain.   Gastrointestinal:  Negative for abdominal distention, abdominal pain, constipation and diarrhea.   Endocrine: Negative for cold intolerance and heat intolerance.   Genitourinary:  Negative for dysuria, frequency, hematuria and urgency.   Musculoskeletal:  Negative for arthralgias.   Skin:  Negative for color change.   Allergic/Immunologic: Negative for environmental allergies.   Neurological:  Negative for dizziness, weakness and light-headedness.   Hematological:  Negative for adenopathy.   Psychiatric/Behavioral:  Negative for agitation,  confusion and hallucinations.         Objective   Vitals:    01/21/25 0900   BP: 119/79   Pulse: 81   Temp: 36.2 °C (97.1 °F)   SpO2: 98%       Physical Exam  Constitutional:       Appearance: Normal appearance.   Eyes:      Pupils: Pupils are equal, round, and reactive to light.   Cardiovascular:      Rate and Rhythm: Normal rate.   Pulmonary:      Effort: Pulmonary effort is normal. No respiratory distress.   Abdominal:      General: There is no distension.      Palpations: Abdomen is soft.      Comments: Incision clean, dry, intact   Musculoskeletal:         General: Normal range of motion.      Right lower leg: No edema.      Left lower leg: No edema.   Skin:     General: Skin is warm and dry.   Neurological:      General: No focal deficit present.      Mental Status: He is alert and oriented to person, place, and time.   Psychiatric:         Mood and Affect: Mood normal.         Behavior: Behavior normal.       Lab Results   Component Value Date    CREATININE 1.99 (H) 01/16/2025    K 3.8 01/16/2025    GLUCOSE 220 (H) 01/16/2025    HCT 28.9 (L) 01/16/2025    WBC 5.9 01/16/2025     01/16/2025    CALCIUM 9.4 01/16/2025     Lab Results   Component Value Date    WBC 5.9 01/16/2025    HGB 8.8 (L) 01/16/2025    HCT 28.9 (L) 01/16/2025    MCV 91 01/16/2025     01/16/2025     Lab Results   Component Value Date    GLUCOSE 220 (H) 01/16/2025    CALCIUM 9.4 01/16/2025     01/16/2025    K 3.8 01/16/2025    CO2 32 01/16/2025    CL 99 01/16/2025    BUN 44 (H) 01/16/2025    CREATININE 1.99 (H) 01/16/2025       Current Outpatient Medications:     acetaminophen (Tylenol) 325 mg tablet, Take 2 tablets (650 mg) by mouth every 6 hours., Disp: 240 tablet, Rfl: 0    atorvastatin (Lipitor) 40 mg tablet, Take 1 tablet (40 mg) by mouth once daily., Disp: 30 tablet, Rfl: 3    calcium carbonate (TUMS ORAL), Take 1 tablet by mouth once daily as needed (heartburn)., Disp: , Rfl:     carvedilol (Coreg) 12.5 mg tablet,  "Take 1 tablet (12.5 mg) by mouth 2 times a day., Disp: 60 tablet, Rfl: 11    clotrimazole (Mycelex) 10 mg davin, Take 1 tablet (10 mg) by mouth 3 times a day after meals., Disp: 100 tablet, Rfl: 2    docusate sodium (Colace) 100 mg capsule, Take 1 capsule (100 mg) by mouth 2 times a day., Disp: 60 capsule, Rfl: 0    furosemide (Lasix) 40 mg tablet, Take 2 tablets (80 mg) by mouth once daily., Disp: 60 tablet, Rfl: 0    insulin glargine (Lantus) 100 unit/mL (3 mL) pen, Inject 20 Units under the skin once daily in the morning. Take as directed per insulin instructions. Do not fill before January 11, 2025., Disp: 15 mL, Rfl: 0    insulin lispro (HumaLOG) 100 unit/mL injection, Inject 0-10 Units under the skin 3 times a day before meals. Inject 8 units with meals. Check blood sugar & follow sliding scale. =0u, 151-200=2u, 201-250=4u, 251-300=6u, 301-350=8u, 351-400=10u. May need up to 55 units per day, Disp: , Rfl:     insulin syringe-needle U-100 31G X 5/16\" 1 mL syringe, Use as directed by physician, Disp: , Rfl:     mycophenolate (Cellcept) 250 mg capsule, Take 4 capsules (1,000 mg) by mouth every 12 hours., Disp: 240 capsule, Rfl: 11    pantoprazole (ProtoNix) 40 mg EC tablet, Take 1 tablet (40 mg) by mouth 2 times a day before meals. Do not crush, chew, or split., Disp: 60 tablet, Rfl: 0    predniSONE (Deltasone) 5 mg tablet, Take 4 tablets (20 mg) by mouth once daily for 10 days. Do not fill before January 10, 2025., Disp: 120 tablet, Rfl: 11    sulfamethoxazole-trimethoprim (Bactrim) 400-80 mg tablet, Take 1 tablet by mouth once daily., Disp: 30 tablet, Rfl: 5    tacrolimus (Prograf) 1 mg capsule, Take 2 capsules (2 mg) by mouth every 12 hours., Disp: 120 capsule, Rfl: 11    ULTRA FINE LANCETS MISC, Use to test blood sugar up to 3 times daily, Disp: , Rfl:     valGANciclovir (Valcyte) 450 mg tablet, Take 1 tablet (450 mg) by mouth every other day. Do not crush or chew., Disp: 15 tablet, Rfl: " 2    Assessment/Plan     DDKT: 1/6/2025  KDPI: 83%  PRA 90%    Kidney allograft function   primary function, no DGF   Creatinine down trending   Labs pending today     Immunosuppression   Thymo 4.5mg/kg  Tacrolimus 2/2   MMF 1000/1000   Pred 20, decrease to 15    HTN   Coreg  DM   Insulin, follow up Endocrine

## 2025-01-21 NOTE — TELEPHONE ENCOUNTER
Spoke with patient and spouse. [facility] told them they won't fill scripts that were sent today for him. Will transfer back to [facility]. Scripts pended for Dr [facility]'s signature. [facility]

## 2025-01-21 NOTE — PATIENT INSTRUCTIONS
Medication Changes:  Stop tums (calcium carbonate)  Increase valcyte (valganciclovir) to 450mg (1 tab) every day  Stop lasix (furosemide)  Decrease prednisone to 15mg (3 tablets) every morning    Plan:  Call [facility] to see what the cost of medication through them is - it's often much cheaper  No sexual activity for about 4 more weeks  Labs Tuesdays & Thursdays  Next clinic appt Tuesday 1/28 at 9:00am  The endocrinology pharmacist will be calling you at 9:45am tomorrow to see how your blood sugars are and if anything needs changed

## 2025-01-21 NOTE — TELEPHONE ENCOUNTER
Patient called requesting to speak with coordinator about  the price of a new med being too expensive.  .  Patient call back number is [phone] .

## 2025-01-22 ENCOUNTER — APPOINTMENT (OUTPATIENT)
Dept: PHARMACY | Facility: HOSPITAL | Age: 60
End: 2025-01-22
Payer: MEDICARE

## 2025-01-22 ENCOUNTER — PATIENT OUTREACH (OUTPATIENT)
Dept: CARE COORDINATION | Age: 60
End: 2025-01-22

## 2025-01-22 DIAGNOSIS — E11.65 TYPE 2 DIABETES MELLITUS WITH HYPERGLYCEMIA, WITH LONG-TERM CURRENT USE OF INSULIN: ICD-10-CM

## 2025-01-22 DIAGNOSIS — Z79.4 TYPE 2 DIABETES MELLITUS WITH HYPERGLYCEMIA, WITH LONG-TERM CURRENT USE OF INSULIN: ICD-10-CM

## 2025-01-22 DIAGNOSIS — Z94.0 KIDNEY REPLACED BY TRANSPLANT (HHS-HCC): ICD-10-CM

## 2025-01-22 RX ORDER — BLOOD-GLUCOSE SENSOR
EACH MISCELLANEOUS
Qty: 6 EACH | Refills: 3 | Status: SHIPPED | OUTPATIENT
Start: 2025-01-22

## 2025-01-22 NOTE — PROGRESS NOTES
I have reviewed the pharmacist's documentation and verified the findings in the note as written with additions or exceptions as stated in the body of this note.    Nerissa Rollins PA-C

## 2025-01-22 NOTE — PROGRESS NOTES
"Clinical Pharmacy Team contacted Miquel Dow regarding a consultation for diabetes management thanks to a referral from ALBAN Garcia. Below is a summary of our conversation and recommendations:  ________________________________________________________________________      No Known Allergies    Objective     There were no vitals taken for this visit.    Current Outpatient Medications on File Prior to Visit   Medication Sig Dispense Refill    acetaminophen (Tylenol) 325 mg tablet Take 2 tablets (650 mg) by mouth every 6 hours. 240 tablet 0    atorvastatin (Lipitor) 40 mg tablet Take 1 tablet (40 mg) by mouth once daily. 30 tablet 3    carvedilol (Coreg) 12.5 mg tablet Take 1 tablet (12.5 mg) by mouth 2 times a day. 60 tablet 11    clotrimazole (Mycelex) 10 mg davin Take 1 tablet (10 mg) by mouth 3 times a day after meals. 100 tablet 2    docusate sodium (Colace) 100 mg capsule Take 1 capsule (100 mg) by mouth 2 times a day. 60 capsule 0    insulin glargine (Lantus) 100 unit/mL (3 mL) pen Inject 20 Units under the skin once daily in the morning. Take as directed per insulin instructions. Do not fill before January 11, 2025. 15 mL 0    insulin lispro (HumaLOG) 100 unit/mL injection Inject 0-10 Units under the skin 3 times a day before meals. Inject 8 units with meals. Check blood sugar & follow sliding scale. =0u, 151-200=2u, 201-250=4u, 251-300=6u, 301-350=8u, 351-400=10u. May need up to 55 units per day      insulin syringe-needle U-100 31G X 5/16\" 1 mL syringe Use as directed by physician      mycophenolate (Cellcept) 250 mg capsule Take 4 capsules (1,000 mg) by mouth every 12 hours. 240 capsule 11    pantoprazole (ProtoNix) 40 mg EC tablet Take 1 tablet (40 mg) by mouth 2 times a day before meals. Do not crush, chew, or split. 60 tablet 0    predniSONE (Deltasone) 5 mg tablet Take 3 tablets (15 mg) by mouth once daily. 90 tablet 11    sulfamethoxazole-trimethoprim (Bactrim) 400-80 mg tablet Take " 1 tablet by mouth once daily. 30 tablet 5    tacrolimus (Prograf) 1 mg capsule Take 2 capsules (2 mg) by mouth every 12 hours. 120 capsule 11    [] traMADol (Ultram) 50 mg tablet Take 1 tablet (50 mg) by mouth every 12 hours if needed for severe pain (7 - 10) or moderate pain (4 - 6) for up to 7 days. 14 tablet 0    ULTRA FINE LANCETS MISC Use to test blood sugar up to 3 times daily      valGANciclovir (Valcyte) 450 mg tablet Take 1 tablet (450 mg) by mouth once daily. Do not crush or chew. 30 tablet 2    [DISCONTINUED] calcium carbonate (TUMS ORAL) Take 1 tablet by mouth once daily as needed (heartburn).      [DISCONTINUED] furosemide (Lasix) 40 mg tablet Take 2 tablets (80 mg) by mouth once daily. 60 tablet 0    [DISCONTINUED] predniSONE (Deltasone) 5 mg tablet Take 4 tablets (20 mg) by mouth once daily for 10 days. Do not fill before January 10, 2025. 120 tablet 11    [DISCONTINUED] predniSONE (Deltasone) 5 mg tablet Take 3 tablets (15 mg) by mouth once daily. 90 tablet 11    [DISCONTINUED] tacrolimus (Prograf) 0.5 mg capsule Take 2 capsules (1 mg) by mouth 2 times a day. 120 capsule 11    [DISCONTINUED] valGANciclovir (Valcyte) 450 mg tablet Take 1 tablet (450 mg) by mouth every other day. Do not crush or chew. 15 tablet 2    [DISCONTINUED] valGANciclovir (Valcyte) 450 mg tablet Take 1 tablet (450 mg) by mouth once daily. Do not crush or chew. 30 tablet 2     No current facility-administered medications on file prior to visit.           Lab Review  Lab Results   Component Value Date    BILITOT 0.5 2025    CALCIUM 9.0 2025    CO2 30 2025     2025    CREATININE 1.68 (H) 2025    GLUCOSE 158 (H) 2025    ALKPHOS 71 2025    K 4.1 2025    PROT 7.7 2025     2025    AST 16 2025    ALT 9 (L) 2025    BUN 31 (H) 2025    ANIONGAP 13 2025    MG 1.72 2025    PHOS 2.4 (L) 2025    ALBUMIN 4.0 2025    AMYLASE  54 2024    LIPASE 167 (H) 2023    GFRMALE 4 (A) 2023     Lab Results   Component Value Date    TRIG 123 2025    CHOL 169 2025    LDLCALC 104 (H) 2025    HDL 40.0 2025     Lab Results   Component Value Date    HGBA1C 6.8 (H) 2024    HGBA1C 8.0 (H) 2024    HGBA1C 6.2 (A) 2023     The 10-year ASCVD risk score (Pascual MONAE, et al., 2019) is: 13%    Values used to calculate the score:      Age: 59 years      Sex: Male      Is Non- : Yes      Diabetic: Yes      Tobacco smoker: No      Systolic Blood Pressure: 119 mmHg      Is BP treated: No      HDL Cholesterol: 40 mg/dL      Total Cholesterol: 169 mg/dL      Monitoring         Assessment/Plan     The patient reports today for a diabetes consultation. Reviewed CGM data and discussed it with the patient. TIR is at goal. Interval hx notable for kidney transplant on 25. Patient reports doing well post transplant. Glycemic control is doing well on current regimen. Recommend no changes at this time. Can consider SGLT2i in 1 year post transplant. Will renew orders for CGM. Patient to meet with endo provider next week. Patient was agreeable to these recommendations.         PATIENT EDUCATION/GOALS      Goals  Fasting B - 130 mg/dL  Postprandial BG: less than 180 mg/dL  A1c: less than 7%    Provided counseling on lifestyle modifications, medications, and self-monitoring. Patient has no additional questions at this time. Pharmacy to follow up in 6-8 weeks. Please reach out with any questions. Thank you.       Mery Alcantara, Theresa      Continue all meds under the continuation of care with the referring provider and clinical pharmacy team.

## 2025-01-23 ENCOUNTER — PATIENT MESSAGE (OUTPATIENT)
Facility: HOSPITAL | Age: 60
End: 2025-01-23

## 2025-01-23 ENCOUNTER — PHARMACY VISIT (OUTPATIENT)
Dept: PHARMACY | Facility: CLINIC | Age: 60
End: 2025-01-23
Payer: COMMERCIAL

## 2025-01-23 ENCOUNTER — PATIENT OUTREACH (OUTPATIENT)
Dept: CARE COORDINATION | Age: 60
End: 2025-01-23

## 2025-01-23 ENCOUNTER — LAB (OUTPATIENT)
Dept: LAB | Facility: LAB | Age: 60
End: 2025-01-23
Payer: MEDICARE

## 2025-01-23 DIAGNOSIS — Z94.0 KIDNEY REPLACED BY TRANSPLANT (HHS-HCC): ICD-10-CM

## 2025-01-23 LAB
ALBUMIN SERPL BCP-MCNC: 3.8 G/DL (ref 3.4–5)
ANION GAP SERPL CALC-SCNC: 14 MMOL/L (ref 10–20)
BUN SERPL-MCNC: 25 MG/DL (ref 6–23)
CALCIUM SERPL-MCNC: 9.1 MG/DL (ref 8.6–10.6)
CHLORIDE SERPL-SCNC: 104 MMOL/L (ref 98–107)
CO2 SERPL-SCNC: 26 MMOL/L (ref 21–32)
CREAT SERPL-MCNC: 1.54 MG/DL (ref 0.5–1.3)
EGFRCR SERPLBLD CKD-EPI 2021: 52 ML/MIN/1.73M*2
ERYTHROCYTE [DISTWIDTH] IN BLOOD BY AUTOMATED COUNT: 14.9 % (ref 11.5–14.5)
GLUCOSE SERPL-MCNC: 145 MG/DL (ref 74–99)
HCT VFR BLD AUTO: 31.2 % (ref 41–52)
HGB BLD-MCNC: 9.3 G/DL (ref 13.5–17.5)
MAGNESIUM SERPL-MCNC: 1.89 MG/DL (ref 1.6–2.4)
MCH RBC QN AUTO: 28.1 PG (ref 26–34)
MCHC RBC AUTO-ENTMCNC: 29.8 G/DL (ref 32–36)
MCV RBC AUTO: 94 FL (ref 80–100)
NRBC BLD-RTO: 0 /100 WBCS (ref 0–0)
PHOSPHATE SERPL-MCNC: 1.6 MG/DL (ref 2.5–4.9)
PLATELET # BLD AUTO: 199 X10*3/UL (ref 150–450)
POTASSIUM SERPL-SCNC: 4.7 MMOL/L (ref 3.5–5.3)
RBC # BLD AUTO: 3.31 X10*6/UL (ref 4.5–5.9)
SODIUM SERPL-SCNC: 139 MMOL/L (ref 136–145)
TACROLIMUS BLD-MCNC: 21.1 NG/ML
WBC # BLD AUTO: 6.5 X10*3/UL (ref 4.4–11.3)

## 2025-01-23 PROCEDURE — 80069 RENAL FUNCTION PANEL: CPT

## 2025-01-23 PROCEDURE — 80197 ASSAY OF TACROLIMUS: CPT

## 2025-01-23 PROCEDURE — 86833 HLA CLASS II HIGH DEFIN QUAL: CPT

## 2025-01-23 PROCEDURE — 83735 ASSAY OF MAGNESIUM: CPT

## 2025-01-23 PROCEDURE — 85027 COMPLETE CBC AUTOMATED: CPT

## 2025-01-23 PROCEDURE — 86832 HLA CLASS I HIGH DEFIN QUAL: CPT

## 2025-01-23 RX ORDER — TACROLIMUS 1 MG/1
1 CAPSULE ORAL EVERY 12 HOURS
Qty: 60 CAPSULE | Refills: 11 | Status: SHIPPED | OUTPATIENT
Start: 2025-01-23 | End: 2026-01-23

## 2025-01-23 RX ORDER — DIBASIC SODIUM PHOSPHATE, MONOBASIC POTASSIUM PHOSPHATE AND MONOBASIC SODIUM PHOSPHATE 852; 155; 130 MG/1; MG/1; MG/1
2 TABLET ORAL 2 TIMES DAILY
Qty: 120 TABLET | Refills: 11 | Status: SHIPPED | OUTPATIENT
Start: 2025-01-23 | End: 2026-01-23

## 2025-01-23 NOTE — PATIENT COMMUNICATION
Pt not yet read [employer] message, called pt on multiple phone numbers and none are in service. Called pt's wife who answered, discussed changes made by Dr Prince, she verbalized understanding and will make changes now.

## 2025-01-24 ENCOUNTER — DOCUMENTATION (OUTPATIENT)
Dept: CARE COORDINATION | Age: 60
End: 2025-01-24
Payer: MEDICARE

## 2025-01-24 ENCOUNTER — PATIENT OUTREACH (OUTPATIENT)
Dept: CARE COORDINATION | Age: 60
End: 2025-01-24
Payer: MEDICARE

## 2025-01-24 LAB
HLA RESULTS: NORMAL
HLA-A+B+C AB NFR SER: NORMAL %
HLA-DP+DQ+DR AB NFR SER: NORMAL %

## 2025-01-24 NOTE — PROGRESS NOTES
Daily Call: Patient reported vitals and I&O. Patient has question about CGM, contacted transplant for follow up.

## 2025-01-25 ENCOUNTER — PATIENT OUTREACH (OUTPATIENT)
Dept: CARE COORDINATION | Age: 60
End: 2025-01-25
Payer: MEDICARE

## 2025-01-25 VITALS
DIASTOLIC BLOOD PRESSURE: 87 MMHG | BODY MASS INDEX: 22.77 KG/M2 | WEIGHT: 154.2 LBS | TEMPERATURE: 97.5 F | SYSTOLIC BLOOD PRESSURE: 132 MMHG | HEART RATE: 84 BPM | OXYGEN SATURATION: 98 %

## 2025-01-25 NOTE — PROGRESS NOTES
Spoke to pt  afebrile  97.5 pt feels good  Weight 154,2  Bp 132/87  Hr 84  Pox 98   Intake 3L   Output 1650 ml  Incision with staples intact   Moving bowels  Appetite good  Clinic appointment on Tuesday

## 2025-01-26 ENCOUNTER — PATIENT OUTREACH (OUTPATIENT)
Dept: CARE COORDINATION | Age: 60
End: 2025-01-26
Payer: MEDICARE

## 2025-01-26 ENCOUNTER — SPECIALTY PHARMACY (OUTPATIENT)
Dept: PHARMACY | Facility: CLINIC | Age: 60
End: 2025-01-26

## 2025-01-26 VITALS
HEART RATE: 82 BPM | TEMPERATURE: 97.5 F | OXYGEN SATURATION: 99 % | SYSTOLIC BLOOD PRESSURE: 127 MMHG | WEIGHT: 150.2 LBS | BODY MASS INDEX: 22.18 KG/M2 | DIASTOLIC BLOOD PRESSURE: 83 MMHG

## 2025-01-26 NOTE — PROGRESS NOTES
Called pt afebrile feeling good   Intake 3L   Output 2100cc  Incision with staples intact no drainage no redness  Moving bowels   Appetite good   Pt getting labs possible staple removal at clinic appointment Tues         [Consultation] : a consultation visit

## 2025-01-28 ENCOUNTER — OFFICE VISIT (OUTPATIENT)
Facility: HOSPITAL | Age: 60
End: 2025-01-28
Payer: MEDICARE

## 2025-01-28 ENCOUNTER — LAB REQUISITION (OUTPATIENT)
Dept: LAB | Facility: CLINIC | Age: 60
End: 2025-01-28
Payer: MEDICARE

## 2025-01-28 ENCOUNTER — LAB (OUTPATIENT)
Dept: LAB | Facility: HOSPITAL | Age: 60
End: 2025-01-28
Payer: MEDICARE

## 2025-01-28 VITALS
DIASTOLIC BLOOD PRESSURE: 88 MMHG | OXYGEN SATURATION: 97 % | TEMPERATURE: 97.5 F | BODY MASS INDEX: 24.71 KG/M2 | HEART RATE: 81 BPM | WEIGHT: 167.3 LBS | SYSTOLIC BLOOD PRESSURE: 154 MMHG

## 2025-01-28 DIAGNOSIS — N18.6 END STAGE RENAL DISEASE (MULTI): ICD-10-CM

## 2025-01-28 DIAGNOSIS — Z94.0 KIDNEY REPLACED BY TRANSPLANT (HHS-HCC): Primary | ICD-10-CM

## 2025-01-28 DIAGNOSIS — Z94.0 KIDNEY TRANSPLANT STATUS: Primary | ICD-10-CM

## 2025-01-28 LAB
DIAGNOSIS-VIRTUAL CROSSMATCH: NORMAL
HOLD SPECIMEN: NORMAL
HOLD SPECIMEN: NORMAL
PATH REVIEW-VIRTUAL CROSSMATCH: NORMAL
PATHOLOGIST ID-VIRTUAL CROSSMATCH: NORMAL

## 2025-01-28 PROCEDURE — 99214 OFFICE O/P EST MOD 30 MIN: CPT

## 2025-01-28 PROCEDURE — 3079F DIAST BP 80-89 MM HG: CPT | Performed by: STUDENT IN AN ORGANIZED HEALTH CARE EDUCATION/TRAINING PROGRAM

## 2025-01-28 PROCEDURE — 3049F LDL-C 100-129 MG/DL: CPT | Performed by: STUDENT IN AN ORGANIZED HEALTH CARE EDUCATION/TRAINING PROGRAM

## 2025-01-28 PROCEDURE — 36415 COLL VENOUS BLD VENIPUNCTURE: CPT

## 2025-01-28 PROCEDURE — 3077F SYST BP >= 140 MM HG: CPT | Performed by: STUDENT IN AN ORGANIZED HEALTH CARE EDUCATION/TRAINING PROGRAM

## 2025-01-28 RX ORDER — PREDNISONE 5 MG/1
10 TABLET ORAL DAILY
Qty: 60 TABLET | Refills: 11 | Status: SHIPPED | OUTPATIENT
Start: 2025-01-28 | End: 2026-01-28

## 2025-01-28 ASSESSMENT — ENCOUNTER SYMPTOMS
COLOR CHANGE: 0
COUGH: 0
CONFUSION: 0
SHORTNESS OF BREATH: 0
HALLUCINATIONS: 0
DIZZINESS: 0
ARTHRALGIAS: 0
EYES NEGATIVE: 1
CHILLS: 0
DYSURIA: 0
LIGHT-HEADEDNESS: 0
CONSTIPATION: 0
WEAKNESS: 0
ADENOPATHY: 0
FREQUENCY: 0
HEMATURIA: 0
DIARRHEA: 0
AGITATION: 0
ABDOMINAL PAIN: 0
ABDOMINAL DISTENTION: 0
FEVER: 0

## 2025-01-28 ASSESSMENT — PAIN SCALES - GENERAL: PAINLEVEL_OUTOF10: 0-NO PAIN

## 2025-01-28 NOTE — PATIENT INSTRUCTIONS
Medication Changes:  Decrease prednisone to 10mg (2 tabs) every morning  We'll call/[employer] message with any other changes after we see your lab results    Plan:  Staples were removed today   Ok to use lotion and do baths instead of shower at the end of the week  Increase phosphorus in diet  Ok to drive now  Be sure to do walking to keep active  In a couple more weeks, you'll be able to increase your activity and lifting  Labs once a week unless we ask for more often  Next clinic appt in 1 week as scheduled

## 2025-01-28 NOTE — PROGRESS NOTES
Subjective   Anibal Dow is a 59 y.o. male who underwent DDKT on 2025 (Kidney). Here today for follow-up.    Doing well. No issues.  Did not  phos supplement     Discharge summary  Pt is a 60 y/o male with a hx of ESRD secondary to HTN and DM2 whom received a  donor kidney transplant on 25 by Dr. Prince with a KDPI of 83% and PRA of 90%.  HCV -/- and donor has not met risk factors. EBV +/+. Right donor kidney transplanted to patient right pelvis. Dry weight is 76 kg (discharge weight is 80.6 kg ).  Pt received a total of 4.5 mg/kg total of thymoglobulin induction therapy in conjunction with 500mg IV solumedrol.  Pt was initiated on Tacrolimus & Cellcept immunosuppression in conjunction with IV solumedrol taper.  Pt was started on valcyte (CMV D - / R + ) and bactrim for CMV & PCP prophylaxis per protocol. He was started on clotrimazole as antifungal coverage per protocol. Operative course was uncomplicated.  Hospital course was uncomplicated. Aragon catheter  was removed on POD #5 and the patient is voiding spontaneously.  Pt did not require dialysis and electrolytes remain stable. Dialysis access via LUE AVF and was patent on last use. BP & glucose under good control.        Review of Systems   Constitutional:  Negative for chills and fever.   HENT: Negative.  Negative for congestion.    Eyes: Negative.    Respiratory:  Negative for cough and shortness of breath.    Cardiovascular:  Negative for chest pain.   Gastrointestinal:  Negative for abdominal distention, abdominal pain, constipation and diarrhea.   Endocrine: Negative for cold intolerance and heat intolerance.   Genitourinary:  Negative for dysuria, frequency, hematuria and urgency.   Musculoskeletal:  Negative for arthralgias.   Skin:  Negative for color change.   Allergic/Immunologic: Negative for environmental allergies.   Neurological:  Negative for dizziness, weakness and light-headedness.   Hematological:  Negative for  adenopathy.   Psychiatric/Behavioral:  Negative for agitation, confusion and hallucinations.         Objective   There were no vitals filed for this visit.      Physical Exam  Constitutional:       Appearance: Normal appearance.   Eyes:      Pupils: Pupils are equal, round, and reactive to light.   Cardiovascular:      Rate and Rhythm: Normal rate.   Pulmonary:      Effort: Pulmonary effort is normal. No respiratory distress.   Abdominal:      General: There is no distension.      Palpations: Abdomen is soft.      Comments: Incision clean, dry, intact   Musculoskeletal:         General: Normal range of motion.      Right lower leg: No edema.      Left lower leg: No edema.   Skin:     General: Skin is warm and dry.   Neurological:      General: No focal deficit present.      Mental Status: He is alert and oriented to person, place, and time.   Psychiatric:         Mood and Affect: Mood normal.         Behavior: Behavior normal.     Lab Results   Component Value Date    CREATININE 1.54 (H) 01/23/2025    K 4.7 01/23/2025    GLUCOSE 145 (H) 01/23/2025    HCT 31.2 (L) 01/23/2025    WBC 6.5 01/23/2025     01/23/2025    CALCIUM 9.1 01/23/2025     Lab Results   Component Value Date    WBC 6.5 01/23/2025    HGB 9.3 (L) 01/23/2025    HCT 31.2 (L) 01/23/2025    MCV 94 01/23/2025     01/23/2025     Lab Results   Component Value Date    GLUCOSE 145 (H) 01/23/2025    CALCIUM 9.1 01/23/2025     01/23/2025    K 4.7 01/23/2025    CO2 26 01/23/2025     01/23/2025    BUN 25 (H) 01/23/2025    CREATININE 1.54 (H) 01/23/2025       Current Outpatient Medications:     acetaminophen (Tylenol) 325 mg tablet, Take 2 tablets (650 mg) by mouth every 6 hours., Disp: 240 tablet, Rfl: 0    atorvastatin (Lipitor) 40 mg tablet, Take 1 tablet (40 mg) by mouth once daily., Disp: 30 tablet, Rfl: 3    blood-glucose sensor (FreeStyle Anisa 3 Plus Sensor) device, Use as directed. Change every 15 days, Disp: 6 each, Rfl: 3     "carvedilol (Coreg) 12.5 mg tablet, Take 1 tablet (12.5 mg) by mouth 2 times a day., Disp: 60 tablet, Rfl: 11    clotrimazole (Mycelex) 10 mg davin, Take 1 tablet (10 mg) by mouth 3 times a day after meals., Disp: 100 tablet, Rfl: 2    docusate sodium (Colace) 100 mg capsule, Take 1 capsule (100 mg) by mouth 2 times a day., Disp: 60 capsule, Rfl: 0    insulin glargine (Lantus) 100 unit/mL (3 mL) pen, Inject 20 Units under the skin once daily in the morning. Take as directed per insulin instructions. Do not fill before January 11, 2025., Disp: 15 mL, Rfl: 0    insulin lispro (HumaLOG) 100 unit/mL injection, Inject 0-10 Units under the skin 3 times a day before meals. Inject 8 units with meals. Check blood sugar & follow sliding scale. =0u, 151-200=2u, 201-250=4u, 251-300=6u, 301-350=8u, 351-400=10u. May need up to 55 units per day, Disp: , Rfl:     insulin syringe-needle U-100 31G X 5/16\" 1 mL syringe, Use as directed by physician, Disp: , Rfl:     mycophenolate (Cellcept) 250 mg capsule, Take 4 capsules (1,000 mg) by mouth every 12 hours., Disp: 240 capsule, Rfl: 11    pantoprazole (ProtoNix) 40 mg EC tablet, Take 1 tablet (40 mg) by mouth 2 times a day before meals. Do not crush, chew, or split., Disp: 60 tablet, Rfl: 0    predniSONE (Deltasone) 5 mg tablet, Take 3 tablets (15 mg) by mouth once daily., Disp: 90 tablet, Rfl: 11    sod phos di, mono-K phos mono (Phospha 250 Neutral) tablet, Take 2 tablets (500 mg) by mouth 2 times a day., Disp: 120 tablet, Rfl: 11    sulfamethoxazole-trimethoprim (Bactrim) 400-80 mg tablet, Take 1 tablet by mouth once daily., Disp: 30 tablet, Rfl: 5    tacrolimus (Prograf) 1 mg capsule, Take 1 capsule (1 mg) by mouth every 12 hours., Disp: 60 capsule, Rfl: 11    ULTRA FINE LANCETS MISC, Use to test blood sugar up to 3 times daily, Disp: , Rfl:     valGANciclovir (Valcyte) 450 mg tablet, Take 1 tablet (450 mg) by mouth once daily. Do not crush or chew., Disp: 30 tablet, Rfl: " 2    Assessment/Plan     DDKT: 1/6/2025  KDPI: 83%  PRA 90%    Kidney allograft function   primary function    Creatinine down trending   Phos supplement not filled - pending repeat labs today. Will give list of phos rich foods. If still low will give coupon for good rx   Labs pending    Pending stent removal 2/6/25     Immunosuppression   Thymo 4.5mg/kg  Tacrolimus 1:1 (was high, pending level)   MMF 1000/1000   Pred to 10   Last DSA neg 1/23/25    HTN   Coreg    DM   Insulin, follow up Endocrine    Pending insurance    Follow up   1 weeks (to follow prescriptions)  Labs weekly

## 2025-01-29 ENCOUNTER — TELEPHONE (OUTPATIENT)
Facility: HOSPITAL | Age: 60
End: 2025-01-29
Payer: MEDICARE

## 2025-01-29 ENCOUNTER — HOSPITAL ENCOUNTER (EMERGENCY)
Facility: HOSPITAL | Age: 60
Discharge: HOME | End: 2025-01-29
Attending: EMERGENCY MEDICINE
Payer: MEDICARE

## 2025-01-29 VITALS
HEIGHT: 69 IN | TEMPERATURE: 98.1 F | RESPIRATION RATE: 18 BRPM | WEIGHT: 157 LBS | HEART RATE: 91 BPM | DIASTOLIC BLOOD PRESSURE: 76 MMHG | OXYGEN SATURATION: 100 % | BODY MASS INDEX: 23.25 KG/M2 | SYSTOLIC BLOOD PRESSURE: 121 MMHG

## 2025-01-29 DIAGNOSIS — Z94.0 KIDNEY REPLACED BY TRANSPLANT (HHS-HCC): ICD-10-CM

## 2025-01-29 DIAGNOSIS — Z94.0 RENAL TRANSPLANT RECIPIENT (HHS-HCC): ICD-10-CM

## 2025-01-29 DIAGNOSIS — R11.2 NAUSEA AND VOMITING, UNSPECIFIED VOMITING TYPE: ICD-10-CM

## 2025-01-29 DIAGNOSIS — E83.39 HYPOPHOSPHATEMIA: Primary | ICD-10-CM

## 2025-01-29 LAB
ALBUMIN SERPL BCP-MCNC: 3.9 G/DL (ref 3.4–5)
ALP SERPL-CCNC: 80 U/L (ref 33–120)
ALT SERPL W P-5'-P-CCNC: 8 U/L (ref 10–52)
ANION GAP SERPL CALC-SCNC: 11 MMOL/L (ref 10–20)
AST SERPL W P-5'-P-CCNC: 10 U/L (ref 9–39)
BASOPHILS # BLD AUTO: 0.03 X10*3/UL (ref 0–0.1)
BASOPHILS NFR BLD AUTO: 0.7 %
BILIRUB SERPL-MCNC: 0.6 MG/DL (ref 0–1.2)
BUN SERPL-MCNC: 21 MG/DL (ref 6–23)
CALCIUM SERPL-MCNC: 9.2 MG/DL (ref 8.6–10.6)
CHLORIDE SERPL-SCNC: 107 MMOL/L (ref 98–107)
CMV DNA SERPL NAA+PROBE-LOG IU: NORMAL {LOG_IU}/ML
CO2 SERPL-SCNC: 24 MMOL/L (ref 21–32)
CREAT SERPL-MCNC: 1.3 MG/DL (ref 0.5–1.3)
EGFRCR SERPLBLD CKD-EPI 2021: 63 ML/MIN/1.73M*2
EOSINOPHIL # BLD AUTO: 0.05 X10*3/UL (ref 0–0.7)
EOSINOPHIL NFR BLD AUTO: 1.1 %
ERYTHROCYTE [DISTWIDTH] IN BLOOD BY AUTOMATED COUNT: 15.7 % (ref 11.5–14.5)
GLUCOSE SERPL-MCNC: 163 MG/DL (ref 74–99)
HCT VFR BLD AUTO: 33.2 % (ref 41–52)
HGB BLD-MCNC: 10.1 G/DL (ref 13.5–17.5)
IMM GRANULOCYTES # BLD AUTO: 0.02 X10*3/UL (ref 0–0.7)
IMM GRANULOCYTES NFR BLD AUTO: 0.5 % (ref 0–0.9)
LABORATORY COMMENT REPORT: NOT DETECTED
LIPASE SERPL-CCNC: 13 U/L (ref 9–82)
LYMPHOCYTES # BLD AUTO: 0.21 X10*3/UL (ref 1.2–4.8)
LYMPHOCYTES NFR BLD AUTO: 4.8 %
MCH RBC QN AUTO: 27.7 PG (ref 26–34)
MCHC RBC AUTO-ENTMCNC: 30.4 G/DL (ref 32–36)
MCV RBC AUTO: 91 FL (ref 80–100)
MONOCYTES # BLD AUTO: 0.27 X10*3/UL (ref 0.1–1)
MONOCYTES NFR BLD AUTO: 6.2 %
NEUTROPHILS # BLD AUTO: 3.8 X10*3/UL (ref 1.2–7.7)
NEUTROPHILS NFR BLD AUTO: 86.7 %
NRBC BLD-RTO: 0 /100 WBCS (ref 0–0)
PHOSPHATE SERPL-MCNC: 1.4 MG/DL (ref 2.5–4.9)
PLATELET # BLD AUTO: 194 X10*3/UL (ref 150–450)
POTASSIUM SERPL-SCNC: 4.7 MMOL/L (ref 3.5–5.3)
PROT SERPL-MCNC: 7.1 G/DL (ref 6.4–8.2)
RBC # BLD AUTO: 3.65 X10*6/UL (ref 4.5–5.9)
SODIUM SERPL-SCNC: 137 MMOL/L (ref 136–145)
WBC # BLD AUTO: 4.4 X10*3/UL (ref 4.4–11.3)

## 2025-01-29 PROCEDURE — 84100 ASSAY OF PHOSPHORUS: CPT | Performed by: EMERGENCY MEDICINE

## 2025-01-29 PROCEDURE — 99284 EMERGENCY DEPT VISIT MOD MDM: CPT

## 2025-01-29 PROCEDURE — 83690 ASSAY OF LIPASE: CPT | Performed by: EMERGENCY MEDICINE

## 2025-01-29 PROCEDURE — 84075 ASSAY ALKALINE PHOSPHATASE: CPT | Performed by: EMERGENCY MEDICINE

## 2025-01-29 PROCEDURE — 36415 COLL VENOUS BLD VENIPUNCTURE: CPT | Performed by: EMERGENCY MEDICINE

## 2025-01-29 PROCEDURE — 99285 EMERGENCY DEPT VISIT HI MDM: CPT | Performed by: EMERGENCY MEDICINE

## 2025-01-29 PROCEDURE — 80053 COMPREHEN METABOLIC PANEL: CPT | Performed by: EMERGENCY MEDICINE

## 2025-01-29 PROCEDURE — 85025 COMPLETE CBC W/AUTO DIFF WBC: CPT | Performed by: EMERGENCY MEDICINE

## 2025-01-29 PROCEDURE — 99283 EMERGENCY DEPT VISIT LOW MDM: CPT | Performed by: EMERGENCY MEDICINE

## 2025-01-29 RX ORDER — ONDANSETRON HYDROCHLORIDE 2 MG/ML
4 INJECTION, SOLUTION INTRAVENOUS ONCE
Status: DISCONTINUED | OUTPATIENT
Start: 2025-01-29 | End: 2025-01-29 | Stop reason: HOSPADM

## 2025-01-29 RX ORDER — DIBASIC SODIUM PHOSPHATE, MONOBASIC POTASSIUM PHOSPHATE AND MONOBASIC SODIUM PHOSPHATE 852; 155; 130 MG/1; MG/1; MG/1
2 TABLET ORAL 2 TIMES DAILY
Qty: 120 TABLET | Refills: 0 | Status: SHIPPED | OUTPATIENT
Start: 2025-01-29 | End: 2025-02-04 | Stop reason: SDUPTHER

## 2025-01-29 ASSESSMENT — PAIN SCALES - GENERAL: PAINLEVEL_OUTOF10: 0 - NO PAIN

## 2025-01-29 ASSESSMENT — PAIN - FUNCTIONAL ASSESSMENT: PAIN_FUNCTIONAL_ASSESSMENT: 0-10

## 2025-01-29 NOTE — TELEPHONE ENCOUNTER
Wife called requesting to speak with coordinator about  PT sick, throwing up since this morning. Wife gave PT his meds, and has been throwing up since .  Patient call back number is [phone] .

## 2025-01-29 NOTE — TELEPHONE ENCOUNTER
Patient called requesting to speak with coordinator about Has not taken his 1 pm medication wants to know how much longer it will take to be seen .  Patient call back number is .

## 2025-01-29 NOTE — DISCHARGE INSTRUCTIONS
Please take an extra dose of Phospha 250 Neutral (supplemental sodium potassium phosphorus) tomorrow.  Transplant surgery team will arrange repeat labs and clinic follow-up.  Return to emergency department if new or worsening symptoms.

## 2025-01-29 NOTE — ED PROVIDER NOTES
Chief Complaint   Patient presents with    abnormal labs     History of Present Illness   Anibal Dow   MRN [id number]    59-year-old presents for evaluation of nausea and vomiting in the setting of recent renal transplant on 1/6/2025.  Patient contacted transplant team and they advised emergency department evaluation.    Physical Exam   T 36.8 °C (98.2 °F)  HR 88  /80  RR 17  O2 99 % None (Room air)    General:  No acute distress.  Eating popcorn.  Head: Atraumatic.  Eyes: No conjunctival injection.   Ears Nose Throat: No epistaxis. Oral mucosa moist.  Neck: Supple.  Cardiovascular: Extremities warm.   Pulmonary: No stridor. Normal respiratory effort.  Abdominal: No distention.  Musculoskeletal: No deformity or joint swelling.  Skin: No rash.  Psychiatric: Does not appear to respond to internal stimuli.  Neurologic: Alert. Follows directions. Face symmetric. No aphasia or dysarthria. Symmetric movement of upper and lower extremities.  Ambulates with steady gait.    ED Course & Medical Decision Making   Triage vital signs within normal limits.  Nurse initiated protocol labs collected which demonstrated no leukocytosis, baseline normocytic anemia, normal renal function, mild hyperglycemia similar to baseline without elevated anion gap or acidosis.  Potassium 4.7.  Labs most notable for hypophosphatemia phosphorus 1.4 compared to 1.6 on 1/23/2025.  I received a call from transplant team who requested intravenous phosphorus supplementation and I ordered Zofran for reported nausea vomiting and intravenous sodium phosphate however patient did not receive either medication.  He was evaluated by transplant surgery team who assessed the patient was stable for discharge.  I evaluated patient after transplant surgery team informed him that he could be discharged and he did not want to wait for intravenous sodium phosphate but did confirm that he felt entirely well without abdominal pain or nausea and was  agreeable to oral phosphate supplementation before discharge.  Subsequently changed his mind and requested to return home without receiving phosphate supplementation.  Patient asked that I switch prescription for Phospha 250 Neutral to [facility] pharmacy and he will pick this up tomorrow morning.    Diagnoses as of 01/29/25 1941   Hypophosphatemia   Renal transplant recipient ([facility])   Nausea and vomiting, unspecified vomiting type            Tye Astorga MD  01/29/25 1941

## 2025-01-29 NOTE — TELEPHONE ENCOUNTER
Call returned to wife, patient unable to keep down food and medications since 10 am.   Last labs drawn 1/23 phos low at 1.6  Labs not drawn yesterday 1/28 due to issues with [facility] transition.   Discussed with Dr. Atkinson- directed to ED.   Called and updated patient wife, will come to [facility] ED.   Call placed to ED doctor through transfer center for report    Direct message sent to Dr. Prince and Dr. Amaya with update.     Will alert primary coordinator as well.

## 2025-01-29 NOTE — ED TRIAGE NOTES
Pt presents to ED after low phosphorus level was reported to him by his doctor, kidney transplant on 1/6/25,

## 2025-01-29 NOTE — PROGRESS NOTES
Reason for Nutrition Visit:  Pt is a 59 y.o. male referred for food safety education s/p kidney transplant on 1/6/2025.     Transplant Coordinator: Mercedes Warren RN    Past Medical Hx:  Patient Active Problem List   Diagnosis    Abnormality of albumin    Fluid overload, unspecified    Hypervolemia    Anemia in chronic kidney disease    Anemia    Bilateral lower extremity edema    Chronic systolic heart failure    CKD (chronic kidney disease) stage 5, GFR less than 15 ml/min (Multi)    CKD stage G3b/A3, GFR 30-44 and albumin creatinine ratio >300 mg/g (Multi)    Combined form of age-related cataract, left eye    Dependence on renal dialysis ([facility])    Diabetic nephropathy associated with type 2 diabetes mellitus (Multi)    Dyslipidemia (high LDL; low HDL)    ESRD (end stage renal disease) on dialysis (Multi)    Essential hypertension    (HFpEF) heart failure with preserved ejection fraction    Congestive heart failure    History of panretinal photocoagulation    Hypertensive nephropathy    Iron deficiency anemia    Metabolic acidosis, normal anion gap (NAG)    Moderate protein-calorie malnutrition (Multi)    Neuropathy    Noncompliance with medication regimen    Nonproliferative diabetic retinopathy with macular edema associated with type 2 diabetes mellitus    Nuclear sclerosis, right    Osteomyelitis of fifth toe of right foot (Multi)    Other disorders resulting from impaired renal tubular function    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 associated with type 2 diabetes mellitus    Pulmonary hypertension (Multi)    Retinal detachment, tractional, right eye    Retinal hemorrhage of left eye    S/P amputation of lesser toe (Multi)    Secondary hyperparathyroidism of renal origin (Multi)    Shortness of breath    Type 2 diabetes mellitus    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 (Multi)    Vitamin D deficiency    Pre-transplant evaluation for chronic kidney disease    Abnormal  "findings on diagnostic imaging of heart and coronary circulation    ESRD (end stage renal disease) (Multi)    CKD (chronic kidney disease)    Kidney replaced by transplant ([facility])    Type 2 diabetes mellitus with hyperglycemia, with long-term current use of insulin      Lab Results   Component Value Date    HGBA1C 6.8 (H) 07/08/2024    HGBA1C 8.0 (H) 01/05/2024    HGBA1C 6.2 (A) 06/13/2023     01/29/2025    K 4.7 01/29/2025    PHOS 1.4 (L) 01/29/2025     01/29/2025    CO2 24 01/29/2025    BUN 21 01/29/2025    CREATININE 1.30 01/29/2025    CALCIUM 9.2 01/29/2025    ALBUMIN 3.9 01/29/2025    PROT 7.1 01/29/2025    BILITOT 0.6 01/29/2025    ALKPHOS 80 01/29/2025    ALT 8 (L) 01/29/2025    AST 10 01/29/2025    GLUCOSE 163 (H) 01/29/2025    CHOL 169 01/02/2025    TRIG 123 01/02/2025    HDL 40.0 01/02/2025    CPEPTIDE 2.8 06/27/2024     Lab Results   Component Value Date    HGB 10.1 (L) 01/29/2025     Iron Panel + Serum Ferritin Trend: No results for input(s): \"IRON\", \"UIBC\", \"TIBC\", \"IRONSAT\", \"FERRITIN\" in the last 22302 hours., Vitamin B12: No results found for: \"LEBBOGGO67\" , and Vitamin D: No results found for: \"VITD25\"     Food History and Nutrition Assessment    Appetite: Good  Intake: >75%  GI Symptoms : None - ED visit 1/29/2025 for N/V, abdominal pain. Pt states symptoms have resolved.  Swallowing Difficulty: No problems with swallowing  Dentition : own  Allergies: None  Intolerance: None    Types of Activities: House/Yard Work and Walking  Duration: <30 minutes daily    Sleep duration/quality : 7+ hours and disrupted sleep  Sleep disorders: none    Food History:  Met pt and pt's wife today in clinic. Patient describes a very good appetite. Is eating 3 meals per day with snacks.     24 Hour Diet Recall:  Meal 1: Pancake with 1 sausage and 1 egg (scrambled, over hard), or grits with 1 egg and 1 piece of fish   AM Snack: Apple   Meal 2: Tuna Melt (1/2 can tuna, toasted behzad bread) or Salad with fish " (tuna, perch)   PM Snack (occasionally): Apple or applesauce or popcorn   Meal 3: Spaghetti with meatballs (4oz) or Hamburger with sauteed onions, mushrooms, gravy or Fish (4oz) with corn and salad.    Late Snack: Apple or popcorn   Beverages: Water, rarely has small diet coke     Food Preparation: Partner/Spouse  Cooking Skills/Barriers: None reported  Grocery Shopping: Partner/Spouse    Supplements: Denies   Patient describes issues using coupon to  phosphorus supplement at pharmacy.    Weight History:  CBW: 71.2 kg (157 lbs)   BMI: 23.2 kg/m2    Wt Readings from Last 12 Encounters:   01/28/25 75.9 kg (167 lb 4.8 oz)   01/26/25 68.1 kg (150 lb 3.2 oz)   01/25/25 69.9 kg (154 lb 3.2 oz)   01/21/25 75.6 kg (166 lb 9.6 oz)   01/19/25 75.8 kg (167 lb 3.2 oz)   01/14/25 75.6 kg (166 lb 9.6 oz)   01/11/25 80.6 kg (177 lb 11.1 oz)   01/24/25 72.7 kg (160 lb 3.2 oz)   10/21/24 72.6 kg (160 lb)   09/30/24 73.5 kg (162 lb)   09/17/24 74.6 kg (164 lb 8 oz)   09/03/24 70 kg (154 lb 5.2 oz)     Recorded dry weight: 76 kg  Weight change:  Recent weights fluctuations may be due to fluid shifts and/or differences in scale accuracy.   Patient stated weight: Patient believes weight is going down, but pt and pt's wife questions accuracy of scale at home as it reads very different than scales in clinic. Last measured weight at home was 147 lbs.      Nutrition Focused Physical Exam:    Performed/Deferred: Deferred as pt visually appears well-nourished with no signs of malnutrition    Malnutrition Present: No    Nutrition Diagnosis    Food and nutrition related knowledge deficit related to lack of or limited prior nutrition related education as evidence by no prior knowledge of need for food safety related recommendations/ kidney transplant 23 days ago.     Nutrition Education, Intervention, and Goals    Reviewed phosphorus-rich foods including dairy products (milk, yogurt, cheese), chicken, salmon, beef, and beans.   Encouraged  "intake of oral foods first. Ensure and other oral nutrition supplements can be used for when appetite/intake is low. Incorporate daily if weight loss occurs.   Reviewed \"Food Safety: A Need-to-Know Guide for Those at Risk\" education booklet with patient. The food safety guide highlights include:   Cook meat, poultry and seafood to a safe internal temperature. (Temps found on p. 22 of your Food Safety Booklet.) Do not eat any meat, poultry or seafood that is raw or undercooked. (No pink meats, no sushi, no raw oysters.) Using a meat thermometer for accuracy is recommended.  Do not eat deli meats or hot dogs cold. Reheat them in the microwave until they are steaming hot. This typically takes 30 seconds, depending on the microwave.  Eggs need to have a firm yolk. No runny eggs. No raw cookie dough.  Do not consume any milk or milk products including cheese that is not pasteurized. Pasteurized milk is ok. Hard cheese or soft cheese from pasteurized milk is also ok.  Avoid cross-contamination of foods while cooking by having a cutting board for meats and a separate cutting board for produce.  Do not eat unwashed fruits and vegetables. Do not eat raw sprouts. If you want to eat bean sprouts or alfalfa sprouts for example, they will need to be cooked.   It is not safe for you to eat from buffets.  Eat leftovers within 2 days of being cooked.   Please never consume grapefruit, pomegranate or star fruit as they are not compatible with your medications.  Avoid turmeric in all forms due to the interactions with the immunosuppressives.  If you are a tea drinker...  Plain black tea or michelle tea are preferred tea types  Matcha or green tea (Limit to 1-2 cups per day)  Most food type flavored tea is fine (apple, lemon, mint, caramel)  Avoid: green tea extract, chamomile, tumeric, Bergamot flavoring (Manoj Jefferson, Nicole), Chamomile, Lemongrass, Valerian, [place], Rooibos, Meri, Turmeric (Curcumin), Cordyceps (Cordyceps mushroom), " Dandelion, Echinacea (Coneflower, Black Santiago), Ginseng and Feverfew. Some are meant to boost the immune system, so we avoid due to the risk of causing rejection. Some, like turmeric and miguelina, have drug interactions with immunosuppression.    Educational Handouts: N/A    Nutrition Goals  Nutrition Goals: Maintain stable weight  Dairy/Calcium Foods: Increase  Meat/Protein Foods: Increase  Consume meal or snack every 3-4 hours  Compliance with food safety guidance    Nutrition Monitoring and Evaluation    Additional Recommendations/Referrals: Pt requested assistance using coupons to get discount on phosphorus supplement at pharmacy. RDN requested nurse coordinator meet with patient in clinic to discuss these issues.     Follow up: It was a pleasure speaking with you today, Mr. Dow! If you would like to meet again at any point in the future, or if you have any questions, please ask your coordinator to put you on my schedule. I would be happy to meet with you! I am available for both in-person and virtual appointments. Take care!

## 2025-01-29 NOTE — TELEPHONE ENCOUNTER
Patient called requesting to speak with coordinator about Has not taken his 1 and is going to miss his 4 pm medication they are going to leave and come to tomorrow visit  Patient call back number is

## 2025-01-29 NOTE — PROGRESS NOTES
Patient is 3 weeks status post kidney transplant.  Has been having intractable nausea and vomiting and unable to keep down fluids or meds at home.

## 2025-01-29 NOTE — TELEPHONE ENCOUNTER
Alerted inpatient team that patient has not had 1 pm or 4 pm medications and wanting to leave ED and come back tomorrow, requested they go see patient in ED.

## 2025-01-29 NOTE — H&P
[facility]  ABDOMINAL TRANSPLANT SURGERY  HISTORY AND PHYSICAL / CONSULT    Patient Name: Anibal Dow  MRN: [id number]  Admit Date:   : 1965  AGE: 59 y.o.   GENDER: male  ==============================================================================  TODAY'S ASSESSMENT AND PLAN OF CARE:  Anibal Dow is a 59 y.o. male with PMH of HLD, pulmonary hypertension, ESRD 2/2 HTN and DM2 who is POD #22 from a DDKT and presented to the ED for vomiting this morning and missing his daily dosing of immunosuppression.     PLAN:  Discharge to Home per ED  Follow current lab schedules (next scheduled lab: Tuesday)  Resume evening dose of immunosuppression tonight and then regular schedule tomorrow       Discussed with attending Dr. Prince.    Rand Nelson MD  PGY1 Abdominal Transplant Surgery  Pager [phone]  Available via secure chat    ==============================================================================  CHIEF COMPLAINT/REASON FOR CONSULT:  Pt is a 60 y/o male with a hx of ESRD secondary to HTN and DM2 whom received a  donor kidney transplant on 25 by Dr. Prince with a KDPI of 83% and PRA of 90%. HCV -/- and donor has not met risk factors. EBV +/+. Right donor kidney transplanted to patient right pelvis. Dry weight is 76 kg (discharge weight is 80.6 kg ). Pt received a total of 4.5 mg/kg total of thymoglobulin induction therapy in conjunction with 500mg IV solumedrol. Pt was initiated on Tacrolimus & Cellcept immunosuppression in conjunction with IV solumedrol taper. Pt was started on valcyte (CMV D - / R + ) and bactrim for CMV & PCP prophylaxis per protocol. He was started on clotrimazole as antifungal coverage per protocol. Operative course was uncomplicated. Hospital course was uncomplicated. Aragon catheter was removed on POD #5 and the patient is voiding spontaneously. Pt did not require dialysis and electrolytes remain stable. Dialysis access via LUE  AVF and was patent on last use.     Since approximately 10:00 am this morning, patient has had two episodes of emesis which he states have resolved. He reports being able to drink and eat since the episodes of emesis without difficulty and that his main concern was missing his immunosuppression dosing. He reports no recent illnesses, no recent sick contacts, and denies any fevers or chills.     ROS:  Remainder of 12 point ROS are negative unless explicitly stated above.    PAST MEDICAL HISTORY:   PMH:   Past Medical History:   Diagnosis Date    Diabetes mellitus (Multi)     ESRD (end stage renal disease) (Multi)     Hypertension     Personal history of other diseases of urinary system 05/21/2020    History of chronic kidney disease         PSH:   Past Surgical History:   Procedure Laterality Date    CARDIAC CATHETERIZATION N/A 10/21/2024    Procedure: Right Heart Cath;  Surgeon: Maegan Anthony MD;  Location: [place] [address] Cardiac Cath Lab;  Service: Cardiovascular;  Laterality: N/A;    CT ANGIO CORONARY ART WITH HEARTFLOW IF SCORE >30%  01/20/2020    [place] HEART CORONARY [place] [facility] [place]    EYE SURGERY      OTHER SURGICAL HISTORY  05/21/2020    Toe amputation       FH:   No family history on file.    SOCIAL HISTORY:    Smoking: Never     Alcohol: Not currently     Drug use: Never    Other: Denies    ALLERGIES:   No Known Allergies    MEDICATIONS:   Prior to Admission medications    Medication Sig Start Date End Date Taking? Authorizing Provider   acetaminophen (Tylenol) 325 mg tablet Take 2 tablets (650 mg) by mouth every 6 hours. 1/7/25 2/9/25  Peggy Celestin APRN-CNP   atorvastatin (Lipitor) 40 mg tablet Take 1 tablet (40 mg) by mouth once daily. 9/17/24 9/17/25  Ridge Noonan MD   blood-glucose sensor (FreeStyle Anisa 3 Plus Sensor) device Use as directed. Change every 15 days 1/22/25   Nerissa Rollins PA-C   carvedilol (Coreg) 12.5 mg tablet Take 1 tablet (12.5 mg) by mouth 2 times  "a day. 1/14/25 1/9/26  Javed Prince MD   clotrimazole (Mycelex) 10 mg davin Take 1 tablet (10 mg) by mouth 3 times a day after meals. 1/14/25 2/13/25  Javed Prince MD   docusate sodium (Colace) 100 mg capsule Take 1 capsule (100 mg) by mouth 2 times a day. 1/7/25 2/9/25  SELENE Rico   insulin glargine (Lantus) 100 unit/mL (3 mL) pen Inject 20 Units under the skin once daily in the morning. Take as directed per insulin instructions. Do not fill before January 11, 2025. 1/11/25   SELENE Hernandez   insulin lispro (HumaLOG) 100 unit/mL injection Inject 0-10 Units under the skin 3 times a day before meals. Inject 8 units with meals. Check blood sugar & follow sliding scale. =0u, 151-200=2u, 201-250=4u, 251-300=6u, 301-350=8u, 351-400=10u. May need up to 55 units per day 1/11/25 2/10/25  SELENE Rico   insulin syringe-needle U-100 31G X 5/16\" 1 mL syringe Use as directed by physician 4/28/16   Historical Provider, MD   mycophenolate (Cellcept) 250 mg capsule Take 4 capsules (1,000 mg) by mouth every 12 hours. 1/14/25 1/9/26  Javed Prince MD   pantoprazole (ProtoNix) 40 mg EC tablet Take 1 tablet (40 mg) by mouth 2 times a day before meals. Do not crush, chew, or split. 1/14/25 2/13/25  Javed Prince MD   predniSONE (Deltasone) 5 mg tablet Take 2 tablets (10 mg) by mouth once daily. 1/28/25 1/28/26  Vanesa Atkinson MD   sod phos di, mono-K phos mono (Phospha 250 Neutral) tablet Take 2 tablets (500 mg) by mouth 2 times a day. 1/23/25 1/23/26  Javed Prince MD   sulfamethoxazole-trimethoprim (Bactrim) 400-80 mg tablet Take 1 tablet by mouth once daily. 1/14/25 7/13/25  Javed Prince MD   tacrolimus (Prograf) 1 mg capsule Take 1 capsule (1 mg) by mouth every 12 hours. 1/23/25 1/23/26  Javed Prince MD   ULTRA FINE LANCETS MISC Use to test blood sugar up to 3 times daily 8/13/15   Historical Provider, MD   valGANciclovir (Valcyte) 450 mg tablet Take 1 tablet (450 mg) by mouth once daily. Do not crush or " chew. 1/21/25 4/21/25  Javed Prince MD   predniSONE (Deltasone) 5 mg tablet Take 3 tablets (15 mg) by mouth once daily. 1/21/25 1/28/25  Javed Prince MD   tacrolimus (Prograf) 1 mg capsule Take 2 capsules (2 mg) by mouth every 12 hours. 1/18/25 1/23/25  Javed Prince MD       PHYSICAL EXAM:   General: well-apprearing, NAD, AOx4, conversive  HEENT: Atraumatic, no scleral icterus, PERRL, iEOM, MMM  Cardiovascular: RRR  Pulmonary: breathing comfortably on room air  Abdomen: soft, nontender, nondistended, nonperitonitic  : deferred  Surgical Site: Dressing clean dry and intact, healed well. No purulence or tenderness around Armando incision.  Skin: warm and dry  Neuro: A/O x4, no focal deficits  Psych: normal mood and behavior      IMAGING SUMMARY:    No imaging to review for this encounter, although chart is extensive and other imaging results can be seen in the Results tab.     LABS:  Results from last 7 days   Lab Units 01/23/25  0850   WBC AUTO x10*3/uL 6.5   HEMOGLOBIN g/dL 9.3*   HEMATOCRIT % 31.2*   PLATELETS AUTO x10*3/uL 199         Results from last 7 days   Lab Units 01/23/25  0850   SODIUM mmol/L 139   POTASSIUM mmol/L 4.7   CHLORIDE mmol/L 104   CO2 mmol/L 26   BUN mg/dL 25*   CREATININE mg/dL 1.54*   CALCIUM mg/dL 9.1   GLUCOSE mg/dL 145*                 I have reviewed all laboratory and imaging results ordered/pertinent for this encounter.

## 2025-01-30 ENCOUNTER — DOCUMENTATION (OUTPATIENT)
Dept: CARE COORDINATION | Age: 60
End: 2025-01-30

## 2025-01-30 ENCOUNTER — LAB REQUISITION (OUTPATIENT)
Dept: LAB | Facility: CLINIC | Age: 60
DRG: 650 | End: 2025-01-30
Payer: MEDICARE

## 2025-01-30 ENCOUNTER — PHARMACY VISIT (OUTPATIENT)
Dept: PHARMACY | Facility: CLINIC | Age: 60
End: 2025-01-30
Payer: COMMERCIAL

## 2025-01-30 ENCOUNTER — NUTRITION (OUTPATIENT)
Facility: HOSPITAL | Age: 60
End: 2025-01-30
Payer: MEDICARE

## 2025-01-30 DIAGNOSIS — Z94.0 KIDNEY TRANSPLANT STATUS: ICD-10-CM

## 2025-01-30 LAB
HLA CLS I TYP PNL BLD/T DONR HIGH RES: NORMAL
HLA RESULTS: NORMAL
HLA-DP2 QL: NORMAL
HLA-DQB1 HIGH RES: NORMAL
HLA-DRB1 HIGH RES: NORMAL

## 2025-01-30 PROCEDURE — RXMED WILLOW AMBULATORY MEDICATION CHARGE

## 2025-02-02 DIAGNOSIS — Z13.89 SCREENING FOR BLOOD OR PROTEIN IN URINE: ICD-10-CM

## 2025-02-02 DIAGNOSIS — Z91.89 AT RISK FOR INFECTION TRANSMITTED FROM DONOR: ICD-10-CM

## 2025-02-02 DIAGNOSIS — Z94.0 KIDNEY REPLACED BY TRANSPLANT (HHS-HCC): ICD-10-CM

## 2025-02-03 DIAGNOSIS — Z91.89 AT RISK FOR INFECTION TRANSMITTED FROM DONOR: ICD-10-CM

## 2025-02-03 DIAGNOSIS — Z94.0 KIDNEY REPLACED BY TRANSPLANT (HHS-HCC): ICD-10-CM

## 2025-02-04 ENCOUNTER — OFFICE VISIT (OUTPATIENT)
Facility: HOSPITAL | Age: 60
End: 2025-02-04
Payer: MEDICARE

## 2025-02-04 ENCOUNTER — NURSE ONLY (OUTPATIENT)
Facility: HOSPITAL | Age: 60
End: 2025-02-04
Payer: MEDICARE

## 2025-02-04 VITALS
HEART RATE: 88 BPM | WEIGHT: 168.5 LBS | DIASTOLIC BLOOD PRESSURE: 83 MMHG | SYSTOLIC BLOOD PRESSURE: 118 MMHG | BODY MASS INDEX: 24.88 KG/M2 | TEMPERATURE: 97.1 F | OXYGEN SATURATION: 96 %

## 2025-02-04 DIAGNOSIS — Z94.0 KIDNEY REPLACED BY TRANSPLANT (HHS-HCC): ICD-10-CM

## 2025-02-04 DIAGNOSIS — Z13.89 SCREENING FOR BLOOD OR PROTEIN IN URINE: ICD-10-CM

## 2025-02-04 LAB
ALBUMIN SERPL BCP-MCNC: 4 G/DL (ref 3.4–5)
ANION GAP SERPL CALC-SCNC: 10 MMOL/L (ref 10–20)
BUN SERPL-MCNC: 24 MG/DL (ref 6–23)
CALCIUM SERPL-MCNC: 9.2 MG/DL (ref 8.6–10.6)
CHLORIDE SERPL-SCNC: 104 MMOL/L (ref 98–107)
CO2 SERPL-SCNC: 27 MMOL/L (ref 21–32)
CREAT SERPL-MCNC: 1.47 MG/DL (ref 0.5–1.3)
CREAT UR-MCNC: 99.2 MG/DL (ref 20–370)
EGFRCR SERPLBLD CKD-EPI 2021: 55 ML/MIN/1.73M*2
ERYTHROCYTE [DISTWIDTH] IN BLOOD BY AUTOMATED COUNT: 15.9 % (ref 11.5–14.5)
GLUCOSE SERPL-MCNC: 184 MG/DL (ref 74–99)
HCT VFR BLD AUTO: 34.3 % (ref 41–52)
HGB BLD-MCNC: 10.2 G/DL (ref 13.5–17.5)
MAGNESIUM SERPL-MCNC: 1.84 MG/DL (ref 1.6–2.4)
MCH RBC QN AUTO: 27.9 PG (ref 26–34)
MCHC RBC AUTO-ENTMCNC: 29.7 G/DL (ref 32–36)
MCV RBC AUTO: 94 FL (ref 80–100)
NRBC BLD-RTO: 0 /100 WBCS (ref 0–0)
PHOSPHATE SERPL-MCNC: 1.9 MG/DL (ref 2.5–4.9)
PLATELET # BLD AUTO: 193 X10*3/UL (ref 150–450)
POTASSIUM SERPL-SCNC: 4.8 MMOL/L (ref 3.5–5.3)
PROT UR-ACNC: 40 MG/DL (ref 5–25)
PROT/CREAT UR: 0.4 MG/MG CREAT (ref 0–0.17)
RBC # BLD AUTO: 3.66 X10*6/UL (ref 4.5–5.9)
SODIUM SERPL-SCNC: 136 MMOL/L (ref 136–145)
WBC # BLD AUTO: 3.5 X10*3/UL (ref 4.4–11.3)

## 2025-02-04 PROCEDURE — 80197 ASSAY OF TACROLIMUS: CPT

## 2025-02-04 PROCEDURE — 36415 COLL VENOUS BLD VENIPUNCTURE: CPT

## 2025-02-04 PROCEDURE — 80069 RENAL FUNCTION PANEL: CPT

## 2025-02-04 PROCEDURE — 84156 ASSAY OF PROTEIN URINE: CPT

## 2025-02-04 PROCEDURE — 86833 HLA CLASS II HIGH DEFIN QUAL: CPT

## 2025-02-04 PROCEDURE — 83735 ASSAY OF MAGNESIUM: CPT

## 2025-02-04 PROCEDURE — 87799 DETECT AGENT NOS DNA QUANT: CPT

## 2025-02-04 PROCEDURE — 99214 OFFICE O/P EST MOD 30 MIN: CPT | Performed by: TRANSPLANT SURGERY

## 2025-02-04 PROCEDURE — 82570 ASSAY OF URINE CREATININE: CPT

## 2025-02-04 PROCEDURE — 87385 HISTOPLASMA CAPSUL AG IA: CPT

## 2025-02-04 PROCEDURE — 3074F SYST BP LT 130 MM HG: CPT | Performed by: TRANSPLANT SURGERY

## 2025-02-04 PROCEDURE — 85027 COMPLETE CBC AUTOMATED: CPT

## 2025-02-04 PROCEDURE — 99214 OFFICE O/P EST MOD 30 MIN: CPT | Mod: 24 | Performed by: TRANSPLANT SURGERY

## 2025-02-04 PROCEDURE — 86832 HLA CLASS I HIGH DEFIN QUAL: CPT

## 2025-02-04 PROCEDURE — 3049F LDL-C 100-129 MG/DL: CPT | Performed by: TRANSPLANT SURGERY

## 2025-02-04 PROCEDURE — 3079F DIAST BP 80-89 MM HG: CPT | Performed by: TRANSPLANT SURGERY

## 2025-02-04 RX ORDER — VALGANCICLOVIR 450 MG/1
900 TABLET, FILM COATED ORAL DAILY
Qty: 60 TABLET | Refills: 1 | Status: SHIPPED | OUTPATIENT
Start: 2025-02-04 | End: 2025-04-05

## 2025-02-04 RX ORDER — DIBASIC SODIUM PHOSPHATE, MONOBASIC POTASSIUM PHOSPHATE AND MONOBASIC SODIUM PHOSPHATE 852; 155; 130 MG/1; MG/1; MG/1
2 TABLET ORAL 2 TIMES DAILY
Qty: 120 TABLET | Refills: 11 | Status: SHIPPED | OUTPATIENT
Start: 2025-02-04 | End: 2026-02-04

## 2025-02-04 ASSESSMENT — ENCOUNTER SYMPTOMS
CONSTIPATION: 0
HALLUCINATIONS: 0
DYSURIA: 0
ADENOPATHY: 0
ABDOMINAL DISTENTION: 0
LIGHT-HEADEDNESS: 0
FREQUENCY: 0
HEMATURIA: 0
SHORTNESS OF BREATH: 0
FEVER: 0
DIZZINESS: 0
CONFUSION: 0
COLOR CHANGE: 0
EYES NEGATIVE: 1
DIARRHEA: 0
CHILLS: 0
ABDOMINAL PAIN: 0
ARTHRALGIAS: 0
AGITATION: 0
COUGH: 0
WEAKNESS: 0

## 2025-02-04 ASSESSMENT — PAIN SCALES - GENERAL: PAINLEVEL_OUTOF10: 0-NO PAIN

## 2025-02-04 NOTE — PATIENT INSTRUCTIONS
To order medication refills, please call [facility] at [phone] and they will deliver it to your house  Ok to stop checking vital signs and measuring urine output  Increase Valcyte 900 mg (2 tablets) daily  Increase phos supplement to 2 tablets every 12 hours  Labs Tuesday/Thursday's  RTC 1 week

## 2025-02-04 NOTE — PROGRESS NOTES
Subjective   Anibal Dow is a 59 y.o. male who underwent DDKT on 2025 (Kidney). Here today for follow-up.      Discharge summary  Pt is a 60 y/o male with a hx of ESRD secondary to HTN and DM2 whom received a  donor kidney transplant on 25 by Dr. Prince with a KDPI of 83% and PRA of 90%.  HCV -/- and donor has not met risk factors. EBV +/+. Right donor kidney transplanted to patient right pelvis. Dry weight is 76 kg (discharge weight is 80.6 kg ).  Pt received a total of 4.5 mg/kg total of thymoglobulin induction therapy in conjunction with 500mg IV solumedrol.  Pt was initiated on Tacrolimus & Cellcept immunosuppression in conjunction with IV solumedrol taper.  Pt was started on valcyte (CMV D - / R + ) and bactrim for CMV & PCP prophylaxis per protocol. He was started on clotrimazole as antifungal coverage per protocol. Operative course was uncomplicated.  Hospital course was uncomplicated. Aragon catheter  was removed on POD #5 and the patient is voiding spontaneously.  Pt did not require dialysis and electrolytes remain stable. Dialysis access via LUE AVF and was patent on last use. BP & glucose under good control.        Review of Systems   Constitutional:  Negative for chills and fever.   HENT: Negative.  Negative for congestion.    Eyes: Negative.    Respiratory:  Negative for cough and shortness of breath.    Cardiovascular:  Negative for chest pain.   Gastrointestinal:  Negative for abdominal distention, abdominal pain, constipation and diarrhea.   Endocrine: Negative for cold intolerance and heat intolerance.   Genitourinary:  Negative for dysuria, frequency, hematuria and urgency.   Musculoskeletal:  Negative for arthralgias.   Skin:  Negative for color change.   Allergic/Immunologic: Negative for environmental allergies.   Neurological:  Negative for dizziness, weakness and light-headedness.   Hematological:  Negative for adenopathy.   Psychiatric/Behavioral:  Negative for agitation,  confusion and hallucinations.         Objective   There were no vitals filed for this visit.      Physical Exam  Constitutional:       Appearance: Normal appearance.   Eyes:      Pupils: Pupils are equal, round, and reactive to light.   Cardiovascular:      Rate and Rhythm: Normal rate.   Pulmonary:      Effort: Pulmonary effort is normal. No respiratory distress.   Abdominal:      General: There is no distension.      Palpations: Abdomen is soft.      Comments: Incision clean, dry, intact   Musculoskeletal:         General: Normal range of motion.      Right lower leg: No edema.      Left lower leg: No edema.   Skin:     General: Skin is warm and dry.   Neurological:      General: No focal deficit present.      Mental Status: He is alert and oriented to person, place, and time.   Psychiatric:         Mood and Affect: Mood normal.         Behavior: Behavior normal.       Lab Results   Component Value Date    CREATININE 1.30 01/29/2025    K 4.7 01/29/2025    GLUCOSE 163 (H) 01/29/2025    HCT 33.2 (L) 01/29/2025    WBC 4.4 01/29/2025     01/29/2025    CALCIUM 9.2 01/29/2025     Lab Results   Component Value Date    WBC 4.4 01/29/2025    HGB 10.1 (L) 01/29/2025    HCT 33.2 (L) 01/29/2025    MCV 91 01/29/2025     01/29/2025     Lab Results   Component Value Date    GLUCOSE 163 (H) 01/29/2025    CALCIUM 9.2 01/29/2025     01/29/2025    K 4.7 01/29/2025    CO2 24 01/29/2025     01/29/2025    BUN 21 01/29/2025    CREATININE 1.30 01/29/2025       Current Outpatient Medications:     acetaminophen (Tylenol) 325 mg tablet, Take 2 tablets (650 mg) by mouth every 6 hours., Disp: 240 tablet, Rfl: 0    atorvastatin (Lipitor) 40 mg tablet, Take 1 tablet (40 mg) by mouth once daily., Disp: 30 tablet, Rfl: 3    blood-glucose sensor (FreeStyle Anisa 3 Plus Sensor) device, Use as directed. Change every 15 days, Disp: 6 each, Rfl: 3    carvedilol (Coreg) 12.5 mg tablet, Take 1 tablet (12.5 mg) by mouth 2 times  "a day., Disp: 60 tablet, Rfl: 11    clotrimazole (Mycelex) 10 mg davin, Take 1 tablet (10 mg) by mouth 3 times a day after meals., Disp: 100 tablet, Rfl: 2    docusate sodium (Colace) 100 mg capsule, Take 1 capsule (100 mg) by mouth 2 times a day., Disp: 60 capsule, Rfl: 0    insulin glargine (Lantus) 100 unit/mL (3 mL) pen, Inject 20 Units under the skin once daily in the morning. Take as directed per insulin instructions. Do not fill before January 11, 2025., Disp: 15 mL, Rfl: 0    insulin lispro (HumaLOG) 100 unit/mL injection, Inject 0-10 Units under the skin 3 times a day before meals. Inject 8 units with meals. Check blood sugar & follow sliding scale. =0u, 151-200=2u, 201-250=4u, 251-300=6u, 301-350=8u, 351-400=10u. May need up to 55 units per day, Disp: , Rfl:     insulin syringe-needle U-100 31G X 5/16\" 1 mL syringe, Use as directed by physician, Disp: , Rfl:     mycophenolate (Cellcept) 250 mg capsule, Take 4 capsules (1,000 mg) by mouth every 12 hours., Disp: 240 capsule, Rfl: 11    pantoprazole (ProtoNix) 40 mg EC tablet, Take 1 tablet (40 mg) by mouth 2 times a day before meals. Do not crush, chew, or split., Disp: 60 tablet, Rfl: 0    predniSONE (Deltasone) 5 mg tablet, Take 2 tablets (10 mg) by mouth once daily., Disp: 60 tablet, Rfl: 11    sod phos di, mono-K phos mono (Phospha 250 Neutral) tablet, Take 2 tablets (500 mg) by mouth 2 times a day., Disp: 120 tablet, Rfl: 0    sulfamethoxazole-trimethoprim (Bactrim) 400-80 mg tablet, Take 1 tablet by mouth once daily., Disp: 30 tablet, Rfl: 5    tacrolimus (Prograf) 1 mg capsule, Take 1 capsule (1 mg) by mouth every 12 hours., Disp: 60 capsule, Rfl: 11    ULTRA FINE LANCETS MISC, Use to test blood sugar up to 3 times daily, Disp: , Rfl:     valGANciclovir (Valcyte) 450 mg tablet, Take 1 tablet (450 mg) by mouth once daily. Do not crush or chew., Disp: 30 tablet, Rfl: 2    Assessment/Plan     DDKT: 1/6/2025  KDPI: 83%  PRA 90%    Kidney allograft " function   primary function, no DGF   Creatinine 1.3     Immunosuppression   Thymo 4.5mg/kg  Tacrolimus 1/1, level pending today   MMF 1000/1000   Pred 10    HTN   Coreg  DM   Insulin, follow up Endocrine

## 2025-02-05 DIAGNOSIS — Z94.0 KIDNEY REPLACED BY TRANSPLANT (HHS-HCC): ICD-10-CM

## 2025-02-05 LAB
BKV DNA SERPL NAA+PROBE-LOG#: NORMAL {LOG_COPIES}/ML
EBV DNA SPEC NAA+PROBE-LOG#: NORMAL {LOG_COPIES}/ML
LABORATORY COMMENT REPORT: NOT DETECTED
LABORATORY COMMENT REPORT: NOT DETECTED
TACROLIMUS BLD-MCNC: 12.6 NG/ML

## 2025-02-06 ENCOUNTER — PROCEDURE VISIT (OUTPATIENT)
Dept: UROLOGY | Facility: CLINIC | Age: 60
End: 2025-02-06
Payer: MEDICARE

## 2025-02-06 ENCOUNTER — SPECIALTY PHARMACY (OUTPATIENT)
Dept: PHARMACY | Facility: CLINIC | Age: 60
End: 2025-02-06

## 2025-02-06 ENCOUNTER — LAB (OUTPATIENT)
Dept: LAB | Facility: HOSPITAL | Age: 60
End: 2025-02-06
Payer: MEDICARE

## 2025-02-06 VITALS
WEIGHT: 170 LBS | TEMPERATURE: 96.1 F | RESPIRATION RATE: 16 BRPM | DIASTOLIC BLOOD PRESSURE: 84 MMHG | OXYGEN SATURATION: 99 % | SYSTOLIC BLOOD PRESSURE: 123 MMHG | HEART RATE: 87 BPM | HEIGHT: 69 IN | BODY MASS INDEX: 25.18 KG/M2

## 2025-02-06 DIAGNOSIS — Z94.0 KIDNEY REPLACED BY TRANSPLANT (HHS-HCC): ICD-10-CM

## 2025-02-06 DIAGNOSIS — Z13.89 SCREENING FOR BLOOD OR PROTEIN IN URINE: ICD-10-CM

## 2025-02-06 DIAGNOSIS — Z99.2 ESRD (END STAGE RENAL DISEASE) ON DIALYSIS (MULTI): Primary | ICD-10-CM

## 2025-02-06 DIAGNOSIS — N18.6 ESRD (END STAGE RENAL DISEASE) ON DIALYSIS (MULTI): Primary | ICD-10-CM

## 2025-02-06 LAB
ALLOSURE SCORE - KIDNEY: 0.52 %
CAREDX_ORDER_ID: NORMAL
CENTER_ORDER_ID: NORMAL
CLIENT SPECIMEN ID - ALLOSURE: NORMAL
CREAT UR-MCNC: 145.9 MG/DL (ref 20–370)
DONOR RELATION - ALLOSURE: NORMAL
HLA RESULTS: NORMAL
HLA-A+B+C AB NFR SER: NORMAL %
HLA-DP+DQ+DR AB NFR SER: NORMAL %
NOTES - ALLOSURE: NORMAL
PROT UR-ACNC: 57 MG/DL (ref 5–25)
PROT/CREAT UR: 0.39 MG/MG CREAT (ref 0–0.17)
RELATIVE CHANGE VALUE - KIDNEY: NORMAL
TEST COMMENTS - ALLOSURE: NORMAL
TIME POST TX - ALLOSURE: NORMAL
TRANSPLANTED ORGAN - ALLOSURE: NORMAL
TX DATE - ALLOSURE/ALLOMAP: NORMAL
WP_ORDER_ID: NORMAL

## 2025-02-06 PROCEDURE — RXMED WILLOW AMBULATORY MEDICATION CHARGE

## 2025-02-06 PROCEDURE — 87799 DETECT AGENT NOS DNA QUANT: CPT

## 2025-02-06 PROCEDURE — G2211 COMPLEX E/M VISIT ADD ON: HCPCS | Performed by: STUDENT IN AN ORGANIZED HEALTH CARE EDUCATION/TRAINING PROGRAM

## 2025-02-06 PROCEDURE — 82570 ASSAY OF URINE CREATININE: CPT

## 2025-02-06 PROCEDURE — 87801 DETECT AGNT MULT DNA AMPLI: CPT

## 2025-02-06 PROCEDURE — 99212 OFFICE O/P EST SF 10 MIN: CPT | Performed by: STUDENT IN AN ORGANIZED HEALTH CARE EDUCATION/TRAINING PROGRAM

## 2025-02-06 PROCEDURE — 84156 ASSAY OF PROTEIN URINE: CPT

## 2025-02-06 PROCEDURE — 99202 OFFICE O/P NEW SF 15 MIN: CPT | Performed by: STUDENT IN AN ORGANIZED HEALTH CARE EDUCATION/TRAINING PROGRAM

## 2025-02-06 ASSESSMENT — PAIN SCALES - GENERAL: PAINLEVEL_OUTOF10: 0-NO PAIN

## 2025-02-06 NOTE — PROGRESS NOTES
"PROCEDURE NOTE:    PREOPERATIVE DIAGNOSIS:  ESRD s/p renal transplant    POSTOPERATIVE DIAGNOSIS:  Same    OPERATION:  Flexible Cystourethroscopy, stent removal    SURGEON:  Familia Mclean MD    ANESTHESIA:  2%  lidocaine jelly    COMPLICATIONS:  None    EBL: Minimal    SPECIMEN:    DISPOSITION:  The patient was discharged home after the procedure, per routine.    INDICATIONS: :  59 y.o. male who presents today for Cystoscopy and stent removal s/p renal transplant    The indications, risks and benefits of this procedure were discussed with the patient, consent was obtained prior to the procedure, and to the best of my judgement the patient seemed to understand and agree to the procedure.    PROCEDURE:  The patient  was brought into the procedure suite and informed consent was reviewed and confirmed. Vital signs were obtained prior to the procedure: /84   Pulse 87   Temp 35.6 °C (96.1 °F) (Temporal)   Resp 16   Ht 1.753 m (5' 9\")   Wt 77.1 kg (170 lb)   SpO2 99%   BMI 25.10 kg/m² .   The patient was placed supine , prepped with betadine and draped in the usual standard surgical fashion.  Intraurethral 2% viscous lidocaine jelly was used for local analgesia.  A 16 Thai flexible cystourethroscope was inserted into the urethra.     Upon entering the bladder the entire bladder was surveyed in a 360 degree fashion. There was no evidence of any bladder lesions, stones or evidence of any mucosal changes. Ureteral stent was grasped with flexible grasping forceps and brought out through urethral meatus.  The cystoscope was then fully removed.   The patient tolerated the procedure well.  Vitals were stable after the procedure.  The patient was able to void and was discharged home.  Verbal and written Post procedure instructions were reviewed with the patient.    IMPRESSION:  Successful stent removal    PLAN:  Ppx abx and fu prn        Scribe Attestation  By signing my name below, ILaina Scribe "   attest that this documentation has been prepared under the direction and in the presence of Familia Mclean MD.'

## 2025-02-06 NOTE — PROGRESS NOTES
UROLOGIC INITIAL EVALUATION     PROBLEM LIST:  1. ESRD (end stage renal disease) on dialysis (Multi)          HISTORY OF PRESENT ILLNESS:   Anibal Dow is a 59 y.o. male with a hx of ESRD secondary to HTN and DM2 who is s/p kidney transplant on 1/6/25 by Dr. Prince with a KDPI of 83% and PRA of 90%. Right donor kidney transplanted to patient right pelvis. He presents today for cystoscopy and stent removal. He is doing overall well.    PAST MEDICAL HISTORY:  Past Medical History:   Diagnosis Date    Diabetes mellitus (Multi)     ESRD (end stage renal disease) (Multi)     Hypertension     Personal history of other diseases of urinary system 05/21/2020    History of chronic kidney disease       PAST SURGICAL HISTORY:  Past Surgical History:   Procedure Laterality Date    CARDIAC CATHETERIZATION N/A 10/21/2024    Procedure: Right Heart Cath;  Surgeon: Maegan Anthony MD;  Location: [place] [address] Cardiac Cath Lab;  Service: Cardiovascular;  Laterality: N/A;    CT ANGIO CORONARY ART WITH HEARTFLOW IF SCORE >30%  01/20/2020    CT HEART CORONARY ANGIOGRAM 1/20/2020 [facility] ANCILLARY LEGACY    EYE SURGERY      OTHER SURGICAL HISTORY  05/21/2020    Toe amputation        ALLERGIES:   No Known Allergies     MEDICATIONS:     Current Outpatient Medications:     acetaminophen (Tylenol) 325 mg tablet, Take 2 tablets (650 mg) by mouth every 6 hours., Disp: 240 tablet, Rfl: 0    atorvastatin (Lipitor) 40 mg tablet, Take 1 tablet (40 mg) by mouth once daily., Disp: 30 tablet, Rfl: 3    blood-glucose sensor (FreeStyle Anisa 3 Plus Sensor) device, Use as directed. Change every 15 days, Disp: 6 each, Rfl: 3    carvedilol (Coreg) 12.5 mg tablet, Take 1 tablet (12.5 mg) by mouth 2 times a day., Disp: 60 tablet, Rfl: 11    clotrimazole (Mycelex) 10 mg davin, Take 1 tablet (10 mg) by mouth 3 times a day after meals., Disp: 100 tablet, Rfl: 2    insulin glargine (Lantus) 100 unit/mL (3 mL) pen, Inject 20 Units under the skin once daily in the  "morning. Take as directed per insulin instructions. Do not fill before January 11, 2025., Disp: 15 mL, Rfl: 0    insulin lispro (HumaLOG) 100 unit/mL injection, Inject 0-10 Units under the skin 3 times a day before meals. Inject 8 units with meals. Check blood sugar & follow sliding scale. =0u, 151-200=2u, 201-250=4u, 251-300=6u, 301-350=8u, 351-400=10u. May need up to 55 units per day, Disp: , Rfl:     insulin syringe-needle U-100 31G X 5/16\" 1 mL syringe, Use as directed by physician, Disp: , Rfl:     mycophenolate (Cellcept) 250 mg capsule, Take 4 capsules (1,000 mg) by mouth every 12 hours., Disp: 240 capsule, Rfl: 11    pantoprazole (ProtoNix) 40 mg EC tablet, Take 1 tablet (40 mg) by mouth 2 times a day before meals. Do not crush, chew, or split., Disp: 60 tablet, Rfl: 0    predniSONE (Deltasone) 5 mg tablet, Take 2 tablets (10 mg) by mouth once daily., Disp: 60 tablet, Rfl: 11    sod phos di, mono-K phos mono (Phospha 250 Neutral) tablet, Take 2 tablets (500 mg) by mouth 2 times a day., Disp: 120 tablet, Rfl: 11    sulfamethoxazole-trimethoprim (Bactrim) 400-80 mg tablet, Take 1 tablet by mouth once daily., Disp: 30 tablet, Rfl: 5    tacrolimus (Prograf) 1 mg capsule, Take 1 capsule (1 mg) by mouth every 12 hours., Disp: 60 capsule, Rfl: 11    ULTRA FINE LANCETS MISC, Use to test blood sugar up to 3 times daily, Disp: , Rfl:     valGANciclovir (Valcyte) 450 mg tablet, Take 2 tablets (900 mg) by mouth once daily. Do not crush or chew., Disp: 60 tablet, Rfl: 1      SOCIAL HISTORY:  Patient  reports that he has never smoked. He has never used smokeless tobacco. He reports that he does not currently use alcohol. He reports that he does not use drugs.   Social History     Socioeconomic History    Marital status:      Spouse name: Not on file    Number of children: Not on file    Years of education: Not on file    Highest education level: Not on file   Occupational History    Not on file   Tobacco Use "    Smoking status: Never    Smokeless tobacco: Never   Substance and Sexual Activity    Alcohol use: Not Currently    Drug use: Never    Sexual activity: Not on file   Other Topics Concern    Not on file   Social History Narrative    Not on file     Social Drivers of Health     Financial Resource Strain: Low Risk  (1/7/2025)    Overall Financial Resource Strain (CARDIA)     Difficulty of Paying Living Expenses: Not hard at all   Food Insecurity: Food Insecurity Present (6/9/2024)    Received from [employer]    Hunger Vital Sign     Worried About Running Out of Food in the Last Year: Often true     Ran Out of Food in the Last Year: Often true   Transportation Needs: No Transportation Needs (1/7/2025)    PRAPARE - Transportation     Lack of Transportation (Medical): No     Lack of Transportation (Non-Medical): No   Physical Activity: Insufficiently Active (6/9/2024)    Received from [employer]    Exercise Vital Sign     Days of Exercise per Week: 4 days     Minutes of Exercise per Session: 20 min   Stress: Stress Concern Present (6/9/2024)    Received from [employer]    [place] [employer] - Occupational Stress Questionnaire     Feeling of Stress : To some extent   Social Connections: Moderately Integrated (6/9/2024)    Received from [employer]    Social Connection and Isolation Panel [NHANES]     Frequency of Communication with Friends and Family: Once a week     Frequency of Social Gatherings with Friends and Family: Once a week     Attends Voodoo Services: More than 4 times per year     Active Member of Clubs or Organizations: Yes     Attends Club or Organization Meetings: More than 4 times per year     Marital Status:    Intimate Partner Violence: Not At Risk (6/9/2024)    Received from [employer]    Humiliation, Afraid, Rape, and Kick questionnaire     Fear of Current or Ex-Partner: No     Emotionally Abused: No      Physically Abused: No     Sexually Abused: No   Housing Stability: Low Risk  (1/7/2025)    Housing Stability Vital Sign     Unable to Pay for Housing in the Last Year: No     Number of Times Moved in the Last Year: 0     Homeless in the Last Year: No       FAMILY HISTORY:  No family history on file.    REVIEW OF SYSTEMS:  Constitutional: Negative for fever and chills. Denies anorexia, weight loss.  Eyes: Negative for visual disturbance.   Respiratory: Negative for shortness of breath.    Cardiovascular: Negative for chest pain.   Gastrointestinal: Negative for nausea and vomiting.   Genitourinary: See interval history above.  Skin: Negative for rash.   Neurological: Negative for dizziness and numbness.   Psychiatric/Behavioral: Negative for confusion and decreased concentration.     PHYSICAL EXAM:  There were no vitals taken for this visit.  Constitutional: Patient appears well-developed and well-nourished. No distress.    Head: Normocephalic and atraumatic.    Neck: Normal range of motion.    Cardiovascular: Normal rate.    Pulmonary/Chest: Effort normal. No respiratory distress.   Abdominal: surgical incision well healed, soft NTND  Musculoskeletal: Normal range of motion.    Neurological: Alert and oriented to person, place, and time.  Psychiatric: Normal mood and affect. Behavior is normal. Thought content normal.        LABORATORY REVIEW:     Lab Results   Component Value Date    BUN 24 (H) 02/04/2025    CREATININE 1.47 (H) 02/04/2025    EGFR 55 (L) 02/04/2025     02/04/2025    K 4.8 02/04/2025     02/04/2025    CO2 27 02/04/2025    CALCIUM 9.2 02/04/2025      Lab Results   Component Value Date    WBC 3.5 (L) 02/04/2025    RBC 3.66 (L) 02/04/2025    HGB 10.2 (L) 02/04/2025    HCT 34.3 (L) 02/04/2025    MCV 94 02/04/2025    MCH 27.9 02/04/2025    MCHC 29.7 (L) 02/04/2025    RDW 15.9 (H) 02/04/2025     02/04/2025        Lab Results   Component Value Date    PSA 0.6 06/27/2024    PSA 0.40  06/13/2023    PSA 0.59 05/26/2022    PSA 0.73 06/29/2021    PSA 0.91 08/04/2020    PSA 1.07 06/11/2019           Assessment:     1. ESRD (end stage renal disease) on dialysis (Multi)          Plan:    We dicussed his presentation in detail.   Stent removal today  Continue the current Rx regimen    23 minutes total spent on patient's care today; >50% time spent on counseling/coordination of care    Scribe Attestation  By signing my name below, Laina ZARAGOZA Scribe   attest that this documentation has been prepared under the direction and in the presence of Familia Mclean MD.

## 2025-02-07 LAB
EBV DNA SPEC NAA+PROBE-LOG#: NORMAL {LOG_COPIES}/ML
H CAPSUL AG UR QL: NOT DETECTED
LABORATORY COMMENT REPORT: NOT DETECTED
SCAN RESULT: NORMAL

## 2025-02-08 LAB
BKV DNA SERPL NAA+PROBE-LOG#: NORMAL {LOG_COPIES}/ML
LABORATORY COMMENT REPORT: NOT DETECTED

## 2025-02-09 LAB — HIV1 RNA SERPL DONR QL PROBE AMP: NORMAL

## 2025-02-10 ENCOUNTER — DOCUMENTATION (OUTPATIENT)
Facility: HOSPITAL | Age: 60
End: 2025-02-10
Payer: MEDICARE

## 2025-02-10 ASSESSMENT — ENCOUNTER SYMPTOMS
DYSURIA: 0
ARTHRALGIAS: 0
COLOR CHANGE: 0
AGITATION: 0
ABDOMINAL PAIN: 0
EYES NEGATIVE: 1
ADENOPATHY: 0
DIARRHEA: 0
LIGHT-HEADEDNESS: 0
CONSTIPATION: 0
HALLUCINATIONS: 0
FEVER: 0
FREQUENCY: 0
COUGH: 0
CONFUSION: 0
WEAKNESS: 0
SHORTNESS OF BREATH: 0
HEMATURIA: 0
ABDOMINAL DISTENTION: 0
DIZZINESS: 0
CHILLS: 0

## 2025-02-11 ENCOUNTER — OFFICE VISIT (OUTPATIENT)
Facility: HOSPITAL | Age: 60
End: 2025-02-11
Payer: MEDICARE

## 2025-02-11 ENCOUNTER — PHARMACY VISIT (OUTPATIENT)
Dept: PHARMACY | Facility: CLINIC | Age: 60
End: 2025-02-11
Payer: COMMERCIAL

## 2025-02-11 ENCOUNTER — NURSE ONLY (OUTPATIENT)
Facility: HOSPITAL | Age: 60
End: 2025-02-11
Payer: MEDICARE

## 2025-02-11 VITALS
HEART RATE: 87 BPM | DIASTOLIC BLOOD PRESSURE: 94 MMHG | OXYGEN SATURATION: 98 % | BODY MASS INDEX: 25.3 KG/M2 | WEIGHT: 171.3 LBS | TEMPERATURE: 97.2 F | SYSTOLIC BLOOD PRESSURE: 152 MMHG

## 2025-02-11 DIAGNOSIS — Z94.0 KIDNEY REPLACED BY TRANSPLANT (HHS-HCC): Primary | ICD-10-CM

## 2025-02-11 DIAGNOSIS — D84.9 IMMUNOSUPPRESSION: ICD-10-CM

## 2025-02-11 LAB
ALBUMIN SERPL BCP-MCNC: 3.9 G/DL (ref 3.4–5)
ANION GAP SERPL CALC-SCNC: 13 MMOL/L (ref 10–20)
BUN SERPL-MCNC: 23 MG/DL (ref 6–23)
CALCIUM SERPL-MCNC: 9.2 MG/DL (ref 8.6–10.6)
CHLORIDE SERPL-SCNC: 104 MMOL/L (ref 98–107)
CO2 SERPL-SCNC: 26 MMOL/L (ref 21–32)
CREAT SERPL-MCNC: 1.49 MG/DL (ref 0.5–1.3)
CREAT UR-MCNC: 111.5 MG/DL (ref 20–370)
EGFRCR SERPLBLD CKD-EPI 2021: 54 ML/MIN/1.73M*2
ERYTHROCYTE [DISTWIDTH] IN BLOOD BY AUTOMATED COUNT: 16 % (ref 11.5–14.5)
GLUCOSE SERPL-MCNC: 183 MG/DL (ref 74–99)
HCT VFR BLD AUTO: 35.3 % (ref 41–52)
HGB BLD-MCNC: 10.5 G/DL (ref 13.5–17.5)
MAGNESIUM SERPL-MCNC: 1.75 MG/DL (ref 1.6–2.4)
MCH RBC QN AUTO: 27.6 PG (ref 26–34)
MCHC RBC AUTO-ENTMCNC: 29.7 G/DL (ref 32–36)
MCV RBC AUTO: 93 FL (ref 80–100)
NRBC BLD-RTO: 0 /100 WBCS (ref 0–0)
PHOSPHATE SERPL-MCNC: 2.3 MG/DL (ref 2.5–4.9)
PLATELET # BLD AUTO: 172 X10*3/UL (ref 150–450)
POTASSIUM SERPL-SCNC: 4.6 MMOL/L (ref 3.5–5.3)
PROT UR-ACNC: 46 MG/DL (ref 5–25)
PROT/CREAT UR: 0.41 MG/MG CREAT (ref 0–0.17)
RBC # BLD AUTO: 3.81 X10*6/UL (ref 4.5–5.9)
SODIUM SERPL-SCNC: 138 MMOL/L (ref 136–145)
TACROLIMUS BLD-MCNC: 12.2 NG/ML
WBC # BLD AUTO: 6.1 X10*3/UL (ref 4.4–11.3)

## 2025-02-11 PROCEDURE — RXMED WILLOW AMBULATORY MEDICATION CHARGE

## 2025-02-11 PROCEDURE — 87799 DETECT AGENT NOS DNA QUANT: CPT | Performed by: TRANSPLANT SURGERY

## 2025-02-11 PROCEDURE — 85027 COMPLETE CBC AUTOMATED: CPT | Performed by: TRANSPLANT SURGERY

## 2025-02-11 PROCEDURE — 36415 COLL VENOUS BLD VENIPUNCTURE: CPT

## 2025-02-11 PROCEDURE — 83735 ASSAY OF MAGNESIUM: CPT | Performed by: TRANSPLANT SURGERY

## 2025-02-11 PROCEDURE — 87385 HISTOPLASMA CAPSUL AG IA: CPT | Performed by: TRANSPLANT SURGERY

## 2025-02-11 PROCEDURE — 3077F SYST BP >= 140 MM HG: CPT | Performed by: TRANSPLANT SURGERY

## 2025-02-11 PROCEDURE — 87801 DETECT AGNT MULT DNA AMPLI: CPT | Performed by: TRANSPLANT SURGERY

## 2025-02-11 PROCEDURE — 84520 ASSAY OF UREA NITROGEN: CPT | Performed by: TRANSPLANT SURGERY

## 2025-02-11 PROCEDURE — 99214 OFFICE O/P EST MOD 30 MIN: CPT | Performed by: TRANSPLANT SURGERY

## 2025-02-11 PROCEDURE — 82570 ASSAY OF URINE CREATININE: CPT | Performed by: TRANSPLANT SURGERY

## 2025-02-11 PROCEDURE — 3060F POS MICROALBUMINURIA REV: CPT | Performed by: TRANSPLANT SURGERY

## 2025-02-11 PROCEDURE — 84156 ASSAY OF PROTEIN URINE: CPT | Performed by: TRANSPLANT SURGERY

## 2025-02-11 PROCEDURE — 3049F LDL-C 100-129 MG/DL: CPT | Performed by: TRANSPLANT SURGERY

## 2025-02-11 PROCEDURE — 99214 OFFICE O/P EST MOD 30 MIN: CPT | Mod: 24 | Performed by: TRANSPLANT SURGERY

## 2025-02-11 PROCEDURE — 80197 ASSAY OF TACROLIMUS: CPT | Performed by: TRANSPLANT SURGERY

## 2025-02-11 PROCEDURE — 3080F DIAST BP >= 90 MM HG: CPT | Performed by: TRANSPLANT SURGERY

## 2025-02-11 RX ORDER — CALCIUM CARBONATE 750 MG/1
1 TABLET, CHEWABLE ORAL 2 TIMES DAILY
Qty: 1 KIT | Refills: 0 | Status: SHIPPED | OUTPATIENT
Start: 2025-02-11

## 2025-02-11 RX ORDER — CARVEDILOL 12.5 MG/1
25 TABLET ORAL 2 TIMES DAILY
Qty: 120 TABLET | Refills: 11 | Status: CANCELLED | OUTPATIENT
Start: 2025-02-11 | End: 2026-02-06

## 2025-02-11 RX ORDER — PREDNISONE 5 MG/1
5 TABLET ORAL DAILY
Qty: 30 TABLET | Refills: 11 | Status: SHIPPED | OUTPATIENT
Start: 2025-02-11 | End: 2026-02-11

## 2025-02-11 ASSESSMENT — PAIN SCALES - GENERAL: PAINLEVEL_OUTOF10: 0-NO PAIN

## 2025-02-11 NOTE — PROGRESS NOTES
Subjective   Anibal Dow is a 59 y.o. male who underwent DDKT on 2025 (Kidney). Here today for follow-up.      Discharge summary  Pt is a 58 y/o male with a hx of ESRD secondary to HTN and DM2 whom received a  donor kidney transplant on 25 by Dr. Prince with a KDPI of 83% and PRA of 90%.  HCV -/- and donor has not met risk factors. EBV +/+. Right donor kidney transplanted to patient right pelvis. Dry weight is 76 kg (discharge weight is 80.6 kg ).  Pt received a total of 4.5 mg/kg total of thymoglobulin induction therapy in conjunction with 500mg IV solumedrol.  Pt was initiated on Tacrolimus & Cellcept immunosuppression in conjunction with IV solumedrol taper.  Pt was started on valcyte (CMV D - / R + ) and bactrim for CMV & PCP prophylaxis per protocol. He was started on clotrimazole as antifungal coverage per protocol. Operative course was uncomplicated.  Hospital course was uncomplicated. Aragon catheter  was removed on POD #5 and the patient is voiding spontaneously.  Pt did not require dialysis and electrolytes remain stable. Dialysis access via LUE AVF and was patent on last use. BP & glucose under good control.        Review of Systems   Constitutional:  Negative for chills and fever.   HENT: Negative.  Negative for congestion.    Eyes: Negative.    Respiratory:  Negative for cough and shortness of breath.    Cardiovascular:  Negative for chest pain.   Gastrointestinal:  Negative for abdominal distention, abdominal pain, constipation and diarrhea.   Endocrine: Negative for cold intolerance and heat intolerance.   Genitourinary:  Negative for dysuria, frequency, hematuria and urgency.   Musculoskeletal:  Negative for arthralgias.   Skin:  Negative for color change.   Allergic/Immunologic: Negative for environmental allergies.   Neurological:  Negative for dizziness, weakness and light-headedness.   Hematological:  Negative for adenopathy.   Psychiatric/Behavioral:  Negative for agitation,  confusion and hallucinations.         Objective   Vitals:    02/11/25 0831   BP: (!) 152/94   Pulse: 87   Temp: 36.2 °C (97.2 °F)   SpO2: 98%         Physical Exam  Constitutional:       Appearance: Normal appearance.   Eyes:      Pupils: Pupils are equal, round, and reactive to light.   Cardiovascular:      Rate and Rhythm: Normal rate.   Pulmonary:      Effort: Pulmonary effort is normal. No respiratory distress.   Abdominal:      General: There is no distension.      Palpations: Abdomen is soft.      Comments: Incision clean, dry, intact   Musculoskeletal:         General: Normal range of motion.      Right lower leg: No edema.      Left lower leg: No edema.   Skin:     General: Skin is warm and dry.   Neurological:      General: No focal deficit present.      Mental Status: He is alert and oriented to person, place, and time.   Psychiatric:         Mood and Affect: Mood normal.         Behavior: Behavior normal.       Lab Results   Component Value Date    CREATININE 1.47 (H) 02/04/2025    K 4.8 02/04/2025    GLUCOSE 184 (H) 02/04/2025    HCT 34.3 (L) 02/04/2025    WBC 3.5 (L) 02/04/2025     02/04/2025    CALCIUM 9.2 02/04/2025     Lab Results   Component Value Date    WBC 3.5 (L) 02/04/2025    HGB 10.2 (L) 02/04/2025    HCT 34.3 (L) 02/04/2025    MCV 94 02/04/2025     02/04/2025     Lab Results   Component Value Date    GLUCOSE 184 (H) 02/04/2025    CALCIUM 9.2 02/04/2025     02/04/2025    K 4.8 02/04/2025    CO2 27 02/04/2025     02/04/2025    BUN 24 (H) 02/04/2025    CREATININE 1.47 (H) 02/04/2025       Current Outpatient Medications:     atorvastatin (Lipitor) 40 mg tablet, Take 1 tablet (40 mg) by mouth once daily., Disp: 30 tablet, Rfl: 3    blood-glucose sensor (FreeStyle Anisa 3 Plus Sensor) device, Use as directed. Change every 15 days, Disp: 6 each, Rfl: 3    carvedilol (Coreg) 12.5 mg tablet, Take 1 tablet (12.5 mg) by mouth 2 times a day., Disp: 60 tablet, Rfl: 11     "clotrimazole (Mycelex) 10 mg davin, Take 1 tablet (10 mg) by mouth 3 times a day after meals., Disp: 100 tablet, Rfl: 2    insulin glargine (Lantus) 100 unit/mL (3 mL) pen, Inject 20 Units under the skin once daily in the morning. Take as directed per insulin instructions. Do not fill before January 11, 2025., Disp: 15 mL, Rfl: 0    insulin lispro (HumaLOG) 100 unit/mL injection, Inject 0-10 Units under the skin 3 times a day before meals. Inject 8 units with meals. Check blood sugar & follow sliding scale. =0u, 151-200=2u, 201-250=4u, 251-300=6u, 301-350=8u, 351-400=10u. May need up to 55 units per day, Disp: , Rfl:     insulin syringe-needle U-100 31G X 5/16\" 1 mL syringe, Use as directed by physician, Disp: , Rfl:     mycophenolate (Cellcept) 250 mg capsule, Take 4 capsules (1,000 mg) by mouth every 12 hours., Disp: 240 capsule, Rfl: 11    pantoprazole (ProtoNix) 40 mg EC tablet, Take 1 tablet (40 mg) by mouth 2 times a day before meals. Do not crush, chew, or split., Disp: 60 tablet, Rfl: 0    predniSONE (Deltasone) 5 mg tablet, Take 2 tablets (10 mg) by mouth once daily., Disp: 60 tablet, Rfl: 11    sod phos di, mono-K phos mono (Phospha 250 Neutral) tablet, Take 2 tablets (500 mg) by mouth 2 times a day., Disp: 120 tablet, Rfl: 11    sulfamethoxazole-trimethoprim (Bactrim) 400-80 mg tablet, Take 1 tablet by mouth once daily., Disp: 30 tablet, Rfl: 5    tacrolimus (Prograf) 1 mg capsule, Take 1 capsule (1 mg) by mouth every 12 hours., Disp: 60 capsule, Rfl: 11    ULTRA FINE LANCETS MISC, Use to test blood sugar up to 3 times daily, Disp: , Rfl:     valGANciclovir (Valcyte) 450 mg tablet, Take 2 tablets (900 mg) by mouth once daily. Do not crush or chew., Disp: 60 tablet, Rfl: 1    Assessment/Plan     DDKT: 1/6/2025  KDPI: 83%  PRA 90%    Kidney allograft function   primary function, no DGF   Last Creatinine 1.47    Need labs today     Immunosuppression   Thymo 4.5mg/kg  Tacrolimus 1/1, last level " 12   MMF 1000/1000   Pred 10, can decrease to 5    HTN   Coreg  DM   Insulin, follow up Endocrine

## 2025-02-11 NOTE — PATIENT INSTRUCTIONS
Decrease prednisone 5 mg daily  Monitor blood pressure at home twice a day  If your blood pressure is >160/90 consistently, please let us know  Labs weekly  RTC 2 weeks  
Patient tolerated procedure well.

## 2025-02-12 LAB
BKV DNA SERPL NAA+PROBE-LOG#: NORMAL {LOG_COPIES}/ML
EBV DNA SPEC NAA+PROBE-LOG#: NORMAL {LOG_COPIES}/ML
LABORATORY COMMENT REPORT: NOT DETECTED
LABORATORY COMMENT REPORT: NOT DETECTED

## 2025-02-13 ENCOUNTER — PHARMACY VISIT (OUTPATIENT)
Dept: PHARMACY | Facility: CLINIC | Age: 60
End: 2025-02-13
Payer: COMMERCIAL

## 2025-02-14 LAB
H CAPSUL AG UR QL: NOT DETECTED
SCAN RESULT: NORMAL

## 2025-02-15 ENCOUNTER — HOSPITAL ENCOUNTER (EMERGENCY)
Facility: HOSPITAL | Age: 60
Discharge: HOME | End: 2025-02-15
Attending: EMERGENCY MEDICINE
Payer: MEDICARE

## 2025-02-15 VITALS
RESPIRATION RATE: 15 BRPM | DIASTOLIC BLOOD PRESSURE: 70 MMHG | WEIGHT: 160 LBS | OXYGEN SATURATION: 99 % | SYSTOLIC BLOOD PRESSURE: 137 MMHG | TEMPERATURE: 97.1 F | HEIGHT: 69 IN | HEART RATE: 80 BPM | BODY MASS INDEX: 23.7 KG/M2

## 2025-02-15 DIAGNOSIS — E16.2 HYPOGLYCEMIA: Primary | ICD-10-CM

## 2025-02-15 LAB
ALBUMIN SERPL BCP-MCNC: 4.1 G/DL (ref 3.4–5)
ALP SERPL-CCNC: 82 U/L (ref 33–120)
ALT SERPL W P-5'-P-CCNC: 6 U/L (ref 10–52)
ANION GAP BLDV CALCULATED.4IONS-SCNC: 11 MMOL/L (ref 10–25)
ANION GAP SERPL CALC-SCNC: 12 MMOL/L (ref 10–20)
AST SERPL W P-5'-P-CCNC: 14 U/L (ref 9–39)
BASE EXCESS BLDV CALC-SCNC: 0.5 MMOL/L (ref -2–3)
BASOPHILS # BLD AUTO: 0.04 X10*3/UL (ref 0–0.1)
BASOPHILS NFR BLD AUTO: 0.5 %
BILIRUB SERPL-MCNC: 0.5 MG/DL (ref 0–1.2)
BODY TEMPERATURE: 37 DEGREES CELSIUS
BUN SERPL-MCNC: 30 MG/DL (ref 6–23)
CA-I BLDV-SCNC: 1.3 MMOL/L (ref 1.1–1.33)
CALCIUM SERPL-MCNC: 9.4 MG/DL (ref 8.6–10.6)
CHLORIDE BLDV-SCNC: 102 MMOL/L (ref 98–107)
CHLORIDE SERPL-SCNC: 104 MMOL/L (ref 98–107)
CO2 SERPL-SCNC: 25 MMOL/L (ref 21–32)
CREAT SERPL-MCNC: 1.73 MG/DL (ref 0.5–1.3)
EGFRCR SERPLBLD CKD-EPI 2021: 45 ML/MIN/1.73M*2
EOSINOPHIL # BLD AUTO: 0.04 X10*3/UL (ref 0–0.7)
EOSINOPHIL NFR BLD AUTO: 0.5 %
ERYTHROCYTE [DISTWIDTH] IN BLOOD BY AUTOMATED COUNT: 15.3 % (ref 11.5–14.5)
GLUCOSE BLD MANUAL STRIP-MCNC: 177 MG/DL (ref 74–99)
GLUCOSE BLD MANUAL STRIP-MCNC: 207 MG/DL (ref 74–99)
GLUCOSE BLD MANUAL STRIP-MCNC: 208 MG/DL (ref 74–99)
GLUCOSE BLD MANUAL STRIP-MCNC: 78 MG/DL (ref 74–99)
GLUCOSE BLDV-MCNC: 88 MG/DL (ref 74–99)
GLUCOSE SERPL-MCNC: 81 MG/DL (ref 74–99)
HCO3 BLDV-SCNC: 27.9 MMOL/L (ref 22–26)
HCT VFR BLD AUTO: 33.6 % (ref 41–52)
HCT VFR BLD EST: 33 % (ref 41–52)
HGB BLD-MCNC: 10.7 G/DL (ref 13.5–17.5)
HGB BLDV-MCNC: 10.9 G/DL (ref 13.5–17.5)
IMM GRANULOCYTES # BLD AUTO: 0.06 X10*3/UL (ref 0–0.7)
IMM GRANULOCYTES NFR BLD AUTO: 0.8 % (ref 0–0.9)
LACTATE BLDV-SCNC: 1.5 MMOL/L (ref 0.4–2)
LYMPHOCYTES # BLD AUTO: 0.33 X10*3/UL (ref 1.2–4.8)
LYMPHOCYTES NFR BLD AUTO: 4.4 %
MCH RBC QN AUTO: 27.9 PG (ref 26–34)
MCHC RBC AUTO-ENTMCNC: 31.8 G/DL (ref 32–36)
MCV RBC AUTO: 88 FL (ref 80–100)
MONOCYTES # BLD AUTO: 0.42 X10*3/UL (ref 0.1–1)
MONOCYTES NFR BLD AUTO: 5.6 %
NEUTROPHILS # BLD AUTO: 6.57 X10*3/UL (ref 1.2–7.7)
NEUTROPHILS NFR BLD AUTO: 88.2 %
NRBC BLD-RTO: 0 /100 WBCS (ref 0–0)
OXYHGB MFR BLDV: 19.3 % (ref 45–75)
PCO2 BLDV: 58 MM HG (ref 41–51)
PH BLDV: 7.29 PH (ref 7.33–7.43)
PLATELET # BLD AUTO: 168 X10*3/UL (ref 150–450)
PO2 BLDV: 20 MM HG (ref 35–45)
POTASSIUM BLDV-SCNC: 4.3 MMOL/L (ref 3.5–5.3)
POTASSIUM SERPL-SCNC: 3.9 MMOL/L (ref 3.5–5.3)
PROT SERPL-MCNC: 7.4 G/DL (ref 6.4–8.2)
RBC # BLD AUTO: 3.83 X10*6/UL (ref 4.5–5.9)
SAO2 % BLDV: 20 % (ref 45–75)
SODIUM BLDV-SCNC: 137 MMOL/L (ref 136–145)
SODIUM SERPL-SCNC: 137 MMOL/L (ref 136–145)
TACROLIMUS BLD-MCNC: 8.5 NG/ML
WBC # BLD AUTO: 7.5 X10*3/UL (ref 4.4–11.3)

## 2025-02-15 PROCEDURE — 84132 ASSAY OF SERUM POTASSIUM: CPT | Mod: 59

## 2025-02-15 PROCEDURE — 85025 COMPLETE CBC W/AUTO DIFF WBC: CPT

## 2025-02-15 PROCEDURE — 82330 ASSAY OF CALCIUM: CPT

## 2025-02-15 PROCEDURE — 80197 ASSAY OF TACROLIMUS: CPT

## 2025-02-15 PROCEDURE — 96374 THER/PROPH/DIAG INJ IV PUSH: CPT

## 2025-02-15 PROCEDURE — 99284 EMERGENCY DEPT VISIT MOD MDM: CPT | Mod: 25 | Performed by: EMERGENCY MEDICINE

## 2025-02-15 PROCEDURE — 82947 ASSAY GLUCOSE BLOOD QUANT: CPT

## 2025-02-15 PROCEDURE — 2500000005 HC RX 250 GENERAL PHARMACY W/O HCPCS

## 2025-02-15 PROCEDURE — 99285 EMERGENCY DEPT VISIT HI MDM: CPT | Performed by: EMERGENCY MEDICINE

## 2025-02-15 PROCEDURE — 36415 COLL VENOUS BLD VENIPUNCTURE: CPT

## 2025-02-15 PROCEDURE — 96361 HYDRATE IV INFUSION ADD-ON: CPT

## 2025-02-15 PROCEDURE — 84132 ASSAY OF SERUM POTASSIUM: CPT

## 2025-02-15 PROCEDURE — 2500000004 HC RX 250 GENERAL PHARMACY W/ HCPCS (ALT 636 FOR OP/ED)

## 2025-02-15 RX ORDER — DEXTROSE 50 % IN WATER (D50W) INTRAVENOUS SYRINGE
Status: COMPLETED
Start: 2025-02-15 | End: 2025-02-15

## 2025-02-15 RX ORDER — DEXTROSE 50 % IN WATER (D50W) INTRAVENOUS SYRINGE
25 ONCE
Status: COMPLETED | OUTPATIENT
Start: 2025-02-15 | End: 2025-02-15

## 2025-02-15 RX ORDER — BLOOD-GLUCOSE SENSOR
3 EACH MISCELLANEOUS
Qty: 3 EACH | Refills: 0 | Status: SHIPPED | OUTPATIENT
Start: 2025-02-15 | End: 2025-02-15

## 2025-02-15 RX ORDER — BLOOD-GLUCOSE SENSOR
3 EACH MISCELLANEOUS
Qty: 3 EACH | Refills: 0 | Status: SHIPPED | OUTPATIENT
Start: 2025-02-15

## 2025-02-15 RX ADMIN — SODIUM CHLORIDE, POTASSIUM CHLORIDE, SODIUM LACTATE AND CALCIUM CHLORIDE 1000 ML: 600; 310; 30; 20 INJECTION, SOLUTION INTRAVENOUS at 14:17

## 2025-02-15 RX ADMIN — DEXTROSE 50 % IN WATER (D50W) INTRAVENOUS SYRINGE 25 G: at 12:39

## 2025-02-15 RX ADMIN — DEXTROSE MONOHYDRATE 25 G: 25 INJECTION, SOLUTION INTRAVENOUS at 12:39

## 2025-02-15 ASSESSMENT — LIFESTYLE VARIABLES
HAVE PEOPLE ANNOYED YOU BY CRITICIZING YOUR DRINKING: NO
EVER FELT BAD OR GUILTY ABOUT YOUR DRINKING: NO
HAVE YOU EVER FELT YOU SHOULD CUT DOWN ON YOUR DRINKING: NO

## 2025-02-15 ASSESSMENT — PAIN SCALES - GENERAL: PAINLEVEL_OUTOF10: 0 - NO PAIN

## 2025-02-15 NOTE — ED PROVIDER NOTES
Emergency Department Provider Note          History of Present Illness     CC: Hypoglycemia     History provided by: Patient  Limitations to History: None    HPI:   Anibal Dow is a 59 y.o.male with PMH ESRD with recent kidney transplant 1/6/2025 (on tacro/cellcept), HTN, HLD, pulmonary HTN, T2DM (on insulin) presenting to the Emergency Department for Hypoglycemia.  Brought in today by EMS.  Reportedly checked his glucose earlier today with his wife and found his levels to be 448 which is up from his baseline.  Patient reports has been urinating more frequent than usual lately.  He is insulin-dependent and did not take his insulin earlier today.  Reports good compliance over the course of the past couple days with only a few missed doses.  Given 20 units of Lantus and 18 of lispro earlier today resulting in him becoming diaphoretic, clammy, and altered.  EMS arrived at scene and found his glucose to be 39.  Given an amp of D50, started on D10 drip, and brought to the emergency department for further evaluation.  On arrival, patient is back to his neurologic baseline now.  Denies chest pain, shortness of breath, fever/chills, abdominal pain, or changes in urination/stool.     Records Reviewed: Recent available ED and inpatient notes reviewed in EMR.    PMHx/PSHx:  Per HPI.   - has a past medical history of Diabetes mellitus (Multi), ESRD (end stage renal disease) (Multi), Hypertension, and Personal history of other diseases of urinary system.  - has a past surgical history that includes Other surgical history (05/21/2020); CT angio coronary art with heartflow if score >30% (01/20/2020); Cardiac catheterization (N/A, 10/21/2024); and Eye surgery.  - has Abnormality of albumin; Fluid overload, unspecified; Hypervolemia; Anemia in chronic kidney disease; Anemia; Bilateral lower extremity edema; Chronic systolic heart failure; CKD (chronic kidney disease) stage 5, GFR less than 15 ml/min (Multi); CKD stage G3b/A3,  GFR 30-44 and albumin creatinine ratio >300 mg/g (Multi); Combined form of age-related cataract, left eye; Dependence on renal dialysis (CMS-HCC); Diabetic nephropathy associated with type 2 diabetes mellitus (Multi); Dyslipidemia (high LDL; low HDL); ESRD (end stage renal disease) on dialysis (Multi); Essential hypertension; (HFpEF) heart failure with preserved ejection fraction; Congestive heart failure; History of panretinal photocoagulation; Hypertensive nephropathy; Iron deficiency anemia; Metabolic acidosis, normal anion gap (NAG); Moderate protein-calorie malnutrition (Multi); Neuropathy; Noncompliance with medication regimen; Nonproliferative diabetic retinopathy with macular edema associated with type 2 diabetes mellitus; Nuclear sclerosis, right; Osteomyelitis of fifth toe of right foot (Multi); Other disorders resulting from impaired renal tubular function; 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 associated with type 2 diabetes mellitus; Pulmonary hypertension (Multi); Retinal detachment, tractional, right eye; Retinal hemorrhage of left eye; S/P amputation of lesser toe (Multi); Secondary hyperparathyroidism of renal origin (Multi); Shortness of breath; Type 2 diabetes mellitus; 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 (Multi); Vitamin D deficiency; Pre-transplant evaluation for chronic kidney disease; Abnormal findings on diagnostic imaging of heart and coronary circulation; ESRD (end stage renal disease) (Multi); CKD (chronic kidney disease); Kidney [facility] ([facility]); and Type 2 diabetes mellitus with hyperglycemia, with long-term current use of insulin on their problem list.    Medications:  Current Outpatient Medications   Medication Instructions    atorvastatin (LIPITOR) 40 mg, oral, Daily    blood pressure test kit-medium kit 1 kit, miscellaneous, 2 times daily    blood-glucose sensor (FreeStyle Anisa 3 Plus Sensor) device Use as directed. Change every 15  "days    blood-glucose sensor (FreeStyle Anisa 3 Plus Sensor) device 3 each, miscellaneous, Every 14 days    carvedilol (COREG) 12.5 mg, oral, 2 times daily    clotrimazole (MYCELEX) 10 mg, oral, 3 times daily after meals    insulin glargine (Lantus) 100 unit/mL (3 mL) pen Inject 20 Units under the skin once daily in the morning. Take as directed per insulin instructions. Do not fill before January 11, 2025.    insulin lispro (HUMALOG) 0-10 Units, subcutaneous, 3 times daily before meals, Inject 8 units with meals. Check blood sugar & follow sliding scale. =0u, 151-200=2u, 201-250=4u, 251-300=6u, 301-350=8u, 351-400=10u. May need up to 55 units per day    insulin syringe-needle U-100 31G X 5/16\" 1 mL syringe  Use as directed by physician      mycophenolate (CELLCEPT) 1,000 mg, oral, Every 12 hours scheduled (0630,1830)    pantoprazole (PROTONIX) 40 mg, oral, 2 times daily before meals, Do not crush, chew, or split.    predniSONE (DELTASONE) 5 mg, oral, Daily    sod phos di, mono-K phos mono (Phospha 250 Neutral) tablet 500 mg, oral, 2 times daily    sulfamethoxazole-trimethoprim (Bactrim) 400-80 mg tablet 1 tablet, oral, Daily    tacrolimus (PROGRAF) 1 mg, oral, Every 12 hours    ULTRA FINE LANCETS MISC Use to test blood sugar up to 3 times daily      valGANciclovir (VALCYTE) 900 mg, oral, Daily, Do not crush or chew.        Allergies:  Patient has no known allergies.    Social History:  - Tobacco:  reports that he has never smoked. He has never used smokeless tobacco.   - Alcohol:  reports that he does not currently use alcohol.   - Illicit Drugs:  reports no history of drug use.     ROS:  Per HPI.       Physical Exam     Triage Vitals:  T 36.2 °C (97.1 °F)  HR 84  /84  RR 20  O2 96 % None (Room air)    General: Awake, alert, in no acute distress  Eyes: Gaze conjugate.  No scleral icterus or injection  HENT: Normo-cephalic, atraumatic. No stridor  CV: Regular rate, regular rhythm. Radial pulses 2+ " bilaterally  Resp: Breathing non-labored, speaking in full sentences.  Clear to auscultation bilaterally  GI: Soft, non-distended, non-tender. No rebound or guarding.  MSK/Extremities: No gross bony deformities. Moving all extremities  Skin: Warm. Appropriate color  Neuro: Alert. Oriented. Face symmetric. Speech is fluent.  Gross strength and sensation intact in b/l UE and LEs  Psych: Appropriate mood and affect    Media Information              [place] Coma Scale Score: 15                    Medical Decision Making & ED Course     EKG: EKG interpreted by myself. Please see ED Course for full interpretation.    Medical Decision Making   Anibal Dow is a 59 y.o.male presenting to the Emergency Department for hypoglycemia.  On arrival, vital signs within normal limits, afebrile for us. On examination, patient appears otherwise clinically well.  Mentating appropriately for us.  Oriented x 4.  Initial point of care glucose, 79.  Given amp of D50 here.  Labs for Emergency Department showed a white count of 7.5, lower concern for systemic infectious process.  Right lower abdomen showed evidence of scarring over previous PEG tube site.  Healing well.  Hemoglobin stable at 10.7, lower concern for acute blood loss anemia.  Chemistries showed a creatinine of 1.73 consistent with chronic CKD, mildly worse than patient's baseline.  Touched base with the transplant surgery team who had no acute recommendations and believes he is appropriate for discharge at this time.  Tac level within normal limits.  Observed in the ED for few hours with stable glucose checks.  Anisa 3 sent to his pharmacy for closer glucose monitoring.  Will be discharged in stable condition with close PCP follow-up.      Diagnoses as of 02/15/25 1941   Hypoglycemia       Independent Result Review and Interpretation: Relevant laboratory and radiographic results were reviewed and independently interpreted by myself.  As necessary, they are commented on in  the ED Course.    Chronic conditions affecting the patient's care: As documented above in MDM.      Disposition   Discharge    Terence Castro MD  Emergency Medicine PGY3      Procedures     Procedures ? [employer] last updated 2/15/2025 7:41 PM        Terence Castro MD  Resident  02/15/25 1941

## 2025-02-15 NOTE — ED TRIAGE NOTES
Pt arrives to the ED, brought in by [facility], due to hypoglycemia. Morning home BGL reading was 400. Spouse administered 20 units of Lantus and 18 units of Lispro. Per [facility], BGL on arrival was 39. [facility] administered D10, getting BGL to 70. [place] [facility] [place] BGL was 78.     Hx of kidney transplant on 1/6/2025

## 2025-02-15 NOTE — CONSULTS
"  Transplant Surgery Consult Note    Subjective   Chief Complaint/Reason for Consult: Hypoglycemia    HPI:  Anibal Dow is a 59 y.o. male with  history of DDKT 1/6/2025  who presents to  ED for concerns of hypoglycemia. Pt had a one time glucose of 400 at home. Attempted corection with 18 of lispro. EMS was called for subsequent AMS. EMS obtained POC gluc of 39. EMS gave dextrose. Pt denies LOC, head strike, or fall. Says he felt weak and laid down and dose not remember much until the point of EMS arriving and giving him an amp of D10. His glucoses corrected to 70-80s after the amp. He says he started to remember again when he got into the ambulance. His wife accompanied him at this time. At time of evaluation he says he \"feels great\". He says the 400gluc level is not normal for him. He usually takes 20 lantus in the morning, then has breakfast, checks his glucose again for correction, and those values are usually around 250s. From a transplant standpoint he says he takes is immunosuppression and all other txp related medications religiously and goes to all of his follow up appointments and has had no issues.     A 12-point ROS was performed and was unremarkable except as above.    PMH:  Past Medical History:   Diagnosis Date    Diabetes mellitus (Multi)     ESRD (end stage renal disease) (Multi)     Hypertension     Personal history of other diseases of urinary system 05/21/2020    History of chronic kidney disease     PSH:  Past Surgical History:   Procedure Laterality Date    CARDIAC CATHETERIZATION N/A 10/21/2024    Procedure: Right Heart Cath;  Surgeon: Maegan Anthony MD;  Location: [place] [address] Cardiac Cath Lab;  Service: Cardiovascular;  Laterality: N/A;    CT ANGIO CORONARY ART WITH HEARTFLOW IF SCORE >30%  01/20/2020    CT HEART CORONARY ANGIOGRAM 1/20/2020 [facility] ANCILLARY LEGACY    EYE SURGERY      OTHER SURGICAL HISTORY  05/21/2020    Toe amputation     Soc Hx:  Social History     Socioeconomic " History    Marital status:      Spouse name: Not on file    Number of children: Not on file    Years of education: Not on file    Highest education level: Not on file   Occupational History    Not on file   Tobacco Use    Smoking status: Never    Smokeless tobacco: Never   Substance and Sexual Activity    Alcohol use: Not Currently    Drug use: Never    Sexual activity: Not on file   Other Topics Concern    Not on file   Social History Narrative    Not on file     Social Drivers of Health     Financial Resource Strain: Low Risk  (1/7/2025)    Overall Financial Resource Strain (CARDIA)     Difficulty of Paying Living Expenses: Not hard at all   Food Insecurity: Food Insecurity Present (6/9/2024)    Received from [employer]    Hunger Vital Sign     Worried About Running Out of Food in the Last Year: Often true     Ran Out of Food in the Last Year: Often true   Transportation Needs: No Transportation Needs (1/7/2025)    PRAPARE - Transportation     Lack of Transportation (Medical): No     Lack of Transportation (Non-Medical): No   Physical Activity: Insufficiently Active (6/9/2024)    Received from [employer]    Exercise Vital Sign     Days of Exercise per Week: 4 days     Minutes of Exercise per Session: 20 min   Stress: Stress Concern Present (6/9/2024)    Received from [employer]    [employer] [place] [employer] - Occupational Stress Questionnaire     Feeling of Stress : To some extent   Social Connections: Moderately Integrated (6/9/2024)    Received from [employer]    Social Connection and Isolation Panel [NHANES]     Frequency of Communication with Friends and Family: Once a week     Frequency of Social Gatherings with Friends and Family: Once a week     Attends [employer] Services: More than 4 times per year     Active Member of Clubs or Organizations: Yes     Attends Club or Organization Meetings: More than 4 times per year     Marital  "Status:    Intimate Partner Violence: Not At Risk (2024)    [facility]    Humiliation, Afraid, Rape, and Kick questionnaire     Fear of Current or Ex-Partner: No     Emotionally Abused: No     Physically Abused: No     Sexually Abused: No   Housing Stability: Low Risk  (2025)    Housing Stability Vital Sign     Unable to Pay for Housing in the Last Year: No     Number of Times Moved in the Last Year: 0     Homeless in the Last Year: No     Fam Hx:  No family history on file.   Allergies:  No Known Allergies  Current Medications:  No current facility-administered medications on file prior to encounter.     Current Outpatient Medications on File Prior to Encounter   Medication Sig Dispense Refill    [] acetaminophen (Tylenol) 325 mg tablet Take 2 tablets (650 mg) by mouth every 6 hours. 240 tablet 0    atorvastatin (Lipitor) 40 mg tablet Take 1 tablet (40 mg) by mouth once daily. 30 tablet 3    blood pressure test kit-medium kit 1 kit 2 times a day. 1 kit 0    blood-glucose sensor (FreeStyle Anisa 3 Plus Sensor) device Use as directed. Change every 15 days 6 each 3    carvedilol (Coreg) 12.5 mg tablet Take 1 tablet (12.5 mg) by mouth 2 times a day. 60 tablet 11    clotrimazole (Mycelex) 10 mg davin Take 1 tablet (10 mg) by mouth 3 times a day after meals. 100 tablet 2    insulin glargine (Lantus) 100 unit/mL (3 mL) pen Inject 20 Units under the skin once daily in the morning. Take as directed per insulin instructions. Do not fill before 2025. 15 mL 0    insulin lispro (HumaLOG) 100 unit/mL injection Inject 0-10 Units under the skin 3 times a day before meals. Inject 8 units with meals. Check blood sugar & follow sliding scale. =0u, 151-200=2u, 201-250=4u, 251-300=6u, 301-350=8u, 351-400=10u. May need up to 55 units per day      insulin syringe-needle U-100 31G X 5/16\" 1 mL syringe Use as directed by physician      mycophenolate (Cellcept) 250 mg " capsule Take 4 capsules (1,000 mg) by mouth every 12 hours. 240 capsule 11    pantoprazole (ProtoNix) 40 mg EC tablet Take 1 tablet (40 mg) by mouth 2 times a day before meals. Do not crush, chew, or split. 60 tablet 0    predniSONE (Deltasone) 5 mg tablet Take 1 tablet (5 mg) by mouth once daily. 30 tablet 11    sod phos di, mono-K phos mono (Phospha 250 Neutral) tablet Take 2 tablets (500 mg) by mouth 2 times a day. 120 tablet 11    sulfamethoxazole-trimethoprim (Bactrim) 400-80 mg tablet Take 1 tablet by mouth once daily. 30 tablet 5    tacrolimus (Prograf) 1 mg capsule Take 1 capsule (1 mg) by mouth every 12 hours. 60 capsule 11    ULTRA FINE LANCETS MISC Use to test blood sugar up to 3 times daily      valGANciclovir (Valcyte) 450 mg tablet Take 2 tablets (900 mg) by mouth once daily. Do not crush or chew. 60 tablet 1    [DISCONTINUED] predniSONE (Deltasone) 5 mg tablet Take 2 tablets (10 mg) by mouth once daily. 60 tablet 11         Objective   Vitals:  Visit Vitals  /84 (BP Location: Right arm, Patient Position: Sitting)   Pulse 84   Temp 36.2 °C (97.1 °F) (Temporal)   Resp 20       Physical Exam:  GEN: No acute distress. Alert, awake and conversant.  HEENT: Sclera anicteric. Moist mucous membranes.  RESP: Breathing non-labored, equal chest rise. On RA.  CV: Regular rate, normotensive  GI: Abdomen soft, nondistended, nontender.   : Voiding spontaneously.  MSK: No gross deformities. Moves all extremities spontaneously.  NEURO: Alert and oriented x3. No focal deficits.  PSYCH: Appropriate mood and affect.  SKIN: No rashes or lesions.    Labs within past 24h:  Results for orders placed or performed during the hospital encounter of 02/15/25 (from the past 24 hours)   POCT GLUCOSE   Result Value Ref Range    POCT Glucose 78 74 - 99 mg/dL   Blood Gas Venous Full Panel Unsolicited   Result Value Ref Range    POCT pH, Venous 7.29 (L) 7.33 - 7.43 pH    POCT pCO2, Venous 58 (H) 41 - 51 mm Hg    POCT pO2, Venous  20 (L) 35 - 45 mm Hg    POCT SO2, Venous 20 (L) 45 - 75 %    POCT Oxy Hemoglobin, Venous 19.3 (L) 45.0 - 75.0 %    POCT Hematocrit Calculated, Venous 33.0 (L) 41.0 - 52.0 %    POCT Sodium, Venous 137 136 - 145 mmol/L    POCT Potassium, Venous 4.3 3.5 - 5.3 mmol/L    POCT Chloride, Venous 102 98 - 107 mmol/L    POCT Ionized Calicum, Venous 1.30 1.10 - 1.33 mmol/L    POCT Glucose, Venous 88 74 - 99 mg/dL    POCT Lactate, Venous 1.5 0.4 - 2.0 mmol/L    POCT Base Excess, Venous 0.5 -2.0 - 3.0 mmol/L    POCT HCO3 Calculated, Venous 27.9 (H) 22.0 - 26.0 mmol/L    POCT Hemoglobin, Venous 10.9 (L) 13.5 - 17.5 g/dL    POCT Anion Gap, Venous 11.0 10.0 - 25.0 mmol/L    Patient Temperature 37.0 degrees Celsius   CBC and Auto Differential   Result Value Ref Range    WBC 7.5 4.4 - 11.3 x10*3/uL    nRBC 0.0 0.0 - 0.0 /100 WBCs    RBC 3.83 (L) 4.50 - 5.90 x10*6/uL    Hemoglobin 10.7 (L) 13.5 - 17.5 g/dL    Hematocrit 33.6 (L) 41.0 - 52.0 %    MCV 88 80 - 100 fL    MCH 27.9 26.0 - 34.0 pg    MCHC 31.8 (L) 32.0 - 36.0 g/dL    RDW 15.3 (H) 11.5 - 14.5 %    Platelets 168 150 - 450 x10*3/uL    Neutrophils % 88.2 40.0 - 80.0 %    Immature Granulocytes %, Automated 0.8 0.0 - 0.9 %    Lymphocytes % 4.4 13.0 - 44.0 %    Monocytes % 5.6 2.0 - 10.0 %    Eosinophils % 0.5 0.0 - 6.0 %    Basophils % 0.5 0.0 - 2.0 %    Neutrophils Absolute 6.57 1.20 - 7.70 x10*3/uL    Immature Granulocytes Absolute, Automated 0.06 0.00 - 0.70 x10*3/uL    Lymphocytes Absolute 0.33 (L) 1.20 - 4.80 x10*3/uL    Monocytes Absolute 0.42 0.10 - 1.00 x10*3/uL    Eosinophils Absolute 0.04 0.00 - 0.70 x10*3/uL    Basophils Absolute 0.04 0.00 - 0.10 x10*3/uL   Comprehensive metabolic panel   Result Value Ref Range    Glucose 81 74 - 99 mg/dL    Sodium 137 136 - 145 mmol/L    Potassium 3.9 3.5 - 5.3 mmol/L    Chloride 104 98 - 107 mmol/L    Bicarbonate 25 21 - 32 mmol/L    Anion Gap 12 10 - 20 mmol/L    Urea Nitrogen 30 (H) 6 - 23 mg/dL    Creatinine 1.73 (H) 0.50 - 1.30  mg/dL    eGFR 45 (L) >60 mL/min/1.73m*2    Calcium 9.4 8.6 - 10.6 mg/dL    Albumin 4.1 3.4 - 5.0 g/dL    Alkaline Phosphatase 82 33 - 120 U/L    Total Protein 7.4 6.4 - 8.2 g/dL    AST 14 9 - 39 U/L    Bilirubin, Total 0.5 0.0 - 1.2 mg/dL    ALT 6 (L) 10 - 52 U/L   POCT GLUCOSE   Result Value Ref Range    POCT Glucose 177 (H) 74 - 99 mg/dL       Imaging within past 24h:  No results found.    I have reviewed the imaging above as it pertains to the patient's surgical concerns and agree with the radiologist's interpretation.     ASSESSMENT  Anibal Dow is a 59 y.o. male with history of ddkt 1/6/25   who presents to  ED for concerns of hypoglycemia. Patient is in stable condition. His hypoglycemia is a product of his overcorrection from insulin for a one time high glucose reading >400. He follows closely with endocrine and says he has an upcoming follow up. Given his normalizing sugars and back to baseline mentation, he seems safe to be discharged home with his wife for transport. Transplant team will coordinate endocrine and txp follow within the week. Pt is amendable. Per ED team, they will keep him for a couple of hours to watch prior to discharge. If his sugars go low again, they may keep overnight for CDU monitoring.     PLAN:  - Disposition per ED  - Txp team to set up endo and txp f/up outpatient in the coming week  - Txp available for further questions and concerns while patient is being monitored in ED    Discussed with Dr. Jairon Quick.    Jhonatan Henderson MD  PGY-1 General Surgery  Transplant Surgery [id number]

## 2025-02-16 LAB — HIV1 RNA SERPL DONR QL PROBE AMP: NORMAL

## 2025-02-17 PROCEDURE — 87799 DETECT AGENT NOS DNA QUANT: CPT

## 2025-02-18 ENCOUNTER — LAB (OUTPATIENT)
Dept: LAB | Facility: HOSPITAL | Age: 60
End: 2025-02-18
Payer: MEDICARE

## 2025-02-18 DIAGNOSIS — Z94.0 KIDNEY REPLACED BY TRANSPLANT (HHS-HCC): ICD-10-CM

## 2025-02-18 DIAGNOSIS — Z13.89 SCREENING FOR BLOOD OR PROTEIN IN URINE: ICD-10-CM

## 2025-02-18 PROCEDURE — 82570 ASSAY OF URINE CREATININE: CPT

## 2025-02-18 PROCEDURE — 84156 ASSAY OF PROTEIN URINE: CPT

## 2025-02-19 LAB
CREAT UR-MCNC: 108.3 MG/DL (ref 20–370)
CREAT UR-MCNC: 108.7 MG/DL (ref 20–370)
PROT UR-ACNC: 28 MG/DL (ref 5–25)
PROT/CREAT UR: 0.26 MG/MG CREAT (ref 0–0.17)

## 2025-02-20 ENCOUNTER — SPECIALTY PHARMACY (OUTPATIENT)
Dept: PHARMACY | Facility: CLINIC | Age: 60
End: 2025-02-20

## 2025-02-20 PROCEDURE — RXMED WILLOW AMBULATORY MEDICATION CHARGE

## 2025-02-21 ENCOUNTER — PHARMACY VISIT (OUTPATIENT)
Dept: PHARMACY | Facility: CLINIC | Age: 60
End: 2025-02-21
Payer: COMMERCIAL

## 2025-02-24 ASSESSMENT — ENCOUNTER SYMPTOMS
DIARRHEA: 0
LIGHT-HEADEDNESS: 0
ARTHRALGIAS: 0
COLOR CHANGE: 0
DIZZINESS: 0
ABDOMINAL PAIN: 0
CONSTIPATION: 0
EYES NEGATIVE: 1
SHORTNESS OF BREATH: 0
HALLUCINATIONS: 0
CONFUSION: 0
FEVER: 0
CHILLS: 0
ADENOPATHY: 0
HEMATURIA: 0
AGITATION: 0
ABDOMINAL DISTENTION: 0
WEAKNESS: 0
FREQUENCY: 0
COUGH: 0
DYSURIA: 0

## 2025-02-25 ENCOUNTER — PATIENT MESSAGE (OUTPATIENT)
Facility: HOSPITAL | Age: 60
End: 2025-02-25

## 2025-02-25 ENCOUNTER — SPECIALTY PHARMACY (OUTPATIENT)
Dept: PHARMACY | Facility: CLINIC | Age: 60
End: 2025-02-25

## 2025-02-25 ENCOUNTER — OFFICE VISIT (OUTPATIENT)
Facility: HOSPITAL | Age: 60
End: 2025-02-25
Payer: MEDICARE

## 2025-02-25 ENCOUNTER — NURSE ONLY (OUTPATIENT)
Facility: HOSPITAL | Age: 60
End: 2025-02-25
Payer: MEDICARE

## 2025-02-25 VITALS
DIASTOLIC BLOOD PRESSURE: 99 MMHG | HEART RATE: 86 BPM | TEMPERATURE: 97.8 F | BODY MASS INDEX: 25.06 KG/M2 | WEIGHT: 169.7 LBS | SYSTOLIC BLOOD PRESSURE: 165 MMHG | OXYGEN SATURATION: 96 %

## 2025-02-25 DIAGNOSIS — Z94.0 KIDNEY REPLACED BY TRANSPLANT (HHS-HCC): Primary | ICD-10-CM

## 2025-02-25 DIAGNOSIS — N18.9 CHRONIC KIDNEY DISEASE, UNSPECIFIED CKD STAGE: ICD-10-CM

## 2025-02-25 DIAGNOSIS — Z94.0 KIDNEY REPLACED BY TRANSPLANT (HHS-HCC): ICD-10-CM

## 2025-02-25 DIAGNOSIS — Z01.812 BLOOD TESTS PRIOR TO TREATMENT OR PROCEDURE: ICD-10-CM

## 2025-02-25 DIAGNOSIS — D84.9 IMMUNOSUPPRESSION: ICD-10-CM

## 2025-02-25 DIAGNOSIS — T86.19 DELAYED GRAFT FUNCTION OF KIDNEY (HHS-HCC): ICD-10-CM

## 2025-02-25 DIAGNOSIS — E87.5 HYPERKALEMIA: ICD-10-CM

## 2025-02-25 LAB
ALBUMIN SERPL BCP-MCNC: 4.2 G/DL (ref 3.4–5)
ANION GAP SERPL CALC-SCNC: 12 MMOL/L (ref 10–20)
BUN SERPL-MCNC: 28 MG/DL (ref 6–23)
CALCIUM SERPL-MCNC: 9.3 MG/DL (ref 8.6–10.6)
CHLORIDE SERPL-SCNC: 104 MMOL/L (ref 98–107)
CO2 SERPL-SCNC: 28 MMOL/L (ref 21–32)
CREAT SERPL-MCNC: 1.96 MG/DL (ref 0.5–1.3)
EGFRCR SERPLBLD CKD-EPI 2021: 39 ML/MIN/1.73M*2
ERYTHROCYTE [DISTWIDTH] IN BLOOD BY AUTOMATED COUNT: 14.8 % (ref 11.5–14.5)
GLUCOSE SERPL-MCNC: 226 MG/DL (ref 74–99)
HCT VFR BLD AUTO: 39.6 % (ref 41–52)
HGB BLD-MCNC: 11.5 G/DL (ref 13.5–17.5)
MAGNESIUM SERPL-MCNC: 2.06 MG/DL (ref 1.6–2.4)
MCH RBC QN AUTO: 27.1 PG (ref 26–34)
MCHC RBC AUTO-ENTMCNC: 29 G/DL (ref 32–36)
MCV RBC AUTO: 93 FL (ref 80–100)
NRBC BLD-RTO: 0 /100 WBCS (ref 0–0)
PHOSPHATE SERPL-MCNC: 3.1 MG/DL (ref 2.5–4.9)
PLATELET # BLD AUTO: 235 X10*3/UL (ref 150–450)
POTASSIUM SERPL-SCNC: 5.5 MMOL/L (ref 3.5–5.3)
RBC # BLD AUTO: 4.24 X10*6/UL (ref 4.5–5.9)
SODIUM SERPL-SCNC: 138 MMOL/L (ref 136–145)
TACROLIMUS BLD-MCNC: 10.6 NG/ML
WBC # BLD AUTO: 4.6 X10*3/UL (ref 4.4–11.3)

## 2025-02-25 PROCEDURE — 99214 OFFICE O/P EST MOD 30 MIN: CPT | Mod: 24 | Performed by: TRANSPLANT SURGERY

## 2025-02-25 PROCEDURE — 3077F SYST BP >= 140 MM HG: CPT | Performed by: TRANSPLANT SURGERY

## 2025-02-25 PROCEDURE — 85027 COMPLETE CBC AUTOMATED: CPT

## 2025-02-25 PROCEDURE — 99214 OFFICE O/P EST MOD 30 MIN: CPT | Performed by: TRANSPLANT SURGERY

## 2025-02-25 PROCEDURE — 36415 COLL VENOUS BLD VENIPUNCTURE: CPT

## 2025-02-25 PROCEDURE — RXMED WILLOW AMBULATORY MEDICATION CHARGE

## 2025-02-25 PROCEDURE — 3049F LDL-C 100-129 MG/DL: CPT | Performed by: TRANSPLANT SURGERY

## 2025-02-25 PROCEDURE — 80069 RENAL FUNCTION PANEL: CPT

## 2025-02-25 PROCEDURE — 3061F NEG MICROALBUMINURIA REV: CPT | Performed by: TRANSPLANT SURGERY

## 2025-02-25 PROCEDURE — 3080F DIAST BP >= 90 MM HG: CPT | Performed by: TRANSPLANT SURGERY

## 2025-02-25 PROCEDURE — 83735 ASSAY OF MAGNESIUM: CPT

## 2025-02-25 PROCEDURE — 80197 ASSAY OF TACROLIMUS: CPT

## 2025-02-25 RX ORDER — PANTOPRAZOLE SODIUM 40 MG/1
40 TABLET, DELAYED RELEASE ORAL
Start: 2025-02-25 | End: 2025-03-27

## 2025-02-25 ASSESSMENT — PAIN SCALES - GENERAL: PAINLEVEL_OUTOF10: 0-NO PAIN

## 2025-02-25 NOTE — PROGRESS NOTES
Subjective   Anibal Dow is a 59 y.o. male who underwent DDKT on 1/6/2025 (Kidney). Here today for follow-up.    Doing well    Review of Systems   Constitutional:  Negative for chills and fever.   HENT: Negative.  Negative for congestion.    Eyes: Negative.    Respiratory:  Negative for cough and shortness of breath.    Cardiovascular:  Negative for chest pain.   Gastrointestinal:  Negative for abdominal distention, abdominal pain, constipation and diarrhea.   Endocrine: Negative for cold intolerance and heat intolerance.   Genitourinary:  Negative for dysuria, frequency, hematuria and urgency.   Musculoskeletal:  Negative for arthralgias.   Skin:  Negative for color change.   Allergic/Immunologic: Negative for environmental allergies.   Neurological:  Negative for dizziness, weakness and light-headedness.   Hematological:  Negative for adenopathy.   Psychiatric/Behavioral:  Negative for agitation, confusion and hallucinations.         Objective   Vitals:    02/25/25 0815   BP: (!) 165/99   Pulse: 86   Temp: 36.6 °C (97.8 °F)   SpO2: 96%           Physical Exam  Constitutional:       Appearance: Normal appearance.   Eyes:      Pupils: Pupils are equal, round, and reactive to light.   Cardiovascular:      Rate and Rhythm: Normal rate.   Pulmonary:      Effort: Pulmonary effort is normal. No respiratory distress.   Abdominal:      General: There is no distension.      Palpations: Abdomen is soft.      Comments: Incision clean, dry, intact   Musculoskeletal:         General: Normal range of motion.      Right lower leg: No edema.      Left lower leg: No edema.   Skin:     General: Skin is warm and dry.   Neurological:      General: No focal deficit present.      Mental Status: He is alert and oriented to person, place, and time.   Psychiatric:         Mood and Affect: Mood normal.         Behavior: Behavior normal.       Lab Results   Component Value Date    CREATININE 1.73 (H) 02/15/2025    K 3.9 02/15/2025     "GLUCOSE 81 02/15/2025    HCT 33.6 (L) 02/15/2025    WBC 7.5 02/15/2025     02/15/2025    CALCIUM 9.4 02/15/2025     Lab Results   Component Value Date    WBC 7.5 02/15/2025    HGB 10.7 (L) 02/15/2025    HCT 33.6 (L) 02/15/2025    MCV 88 02/15/2025     02/15/2025     Lab Results   Component Value Date    GLUCOSE 81 02/15/2025    CALCIUM 9.4 02/15/2025     02/15/2025    K 3.9 02/15/2025    CO2 25 02/15/2025     02/15/2025    BUN 30 (H) 02/15/2025    CREATININE 1.73 (H) 02/15/2025       Current Outpatient Medications:     atorvastatin (Lipitor) 40 mg tablet, Take 1 tablet (40 mg) by mouth once daily., Disp: 30 tablet, Rfl: 3    blood pressure test kit-medium kit, 1 kit 2 times a day., Disp: 1 kit, Rfl: 0    blood-glucose sensor (FreeStyle Anisa 3 Plus Sensor) device, Use as directed. Change every 15 days, Disp: 6 each, Rfl: 3    blood-glucose sensor (FreeStyle Anisa 3 Plus Sensor) device, 3 each every 14 (fourteen) days., Disp: 3 each, Rfl: 0    carvedilol (Coreg) 12.5 mg tablet, Take 1 tablet (12.5 mg) by mouth 2 times a day., Disp: 60 tablet, Rfl: 11    clotrimazole (Mycelex) 10 mg davin, Take 1 tablet (10 mg) by mouth 3 times a day after meals., Disp: 100 tablet, Rfl: 2    insulin glargine (Lantus) 100 unit/mL (3 mL) pen, Inject 20 Units under the skin once daily in the morning. Take as directed per insulin instructions. Do not fill before January 11, 2025., Disp: 15 mL, Rfl: 0    insulin lispro (HumaLOG) 100 unit/mL injection, Inject 0-10 Units under the skin 3 times a day before meals. Inject 8 units with meals. Check blood sugar & follow sliding scale. =0u, 151-200=2u, 201-250=4u, 251-300=6u, 301-350=8u, 351-400=10u. May need up to 55 units per day, Disp: , Rfl:     insulin syringe-needle U-100 31G X 5/16\" 1 mL syringe, Use as directed by physician, Disp: , Rfl:     mycophenolate (Cellcept) 250 mg capsule, Take 4 capsules (1,000 mg) by mouth every 12 hours., Disp: 240 capsule, " Rfl: 11    pantoprazole (ProtoNix) 40 mg EC tablet, Take 1 tablet (40 mg) by mouth 2 times a day before meals. Do not crush, chew, or split., Disp: 60 tablet, Rfl: 0    predniSONE (Deltasone) 5 mg tablet, Take 1 tablet (5 mg) by mouth once daily., Disp: 30 tablet, Rfl: 11    sod phos di, mono-K phos mono (Phospha 250 Neutral) tablet, Take 2 tablets (500 mg) by mouth 2 times a day., Disp: 120 tablet, Rfl: 11    sulfamethoxazole-trimethoprim (Bactrim) 400-80 mg tablet, Take 1 tablet by mouth once daily., Disp: 30 tablet, Rfl: 5    tacrolimus (Prograf) 1 mg capsule, Take 1 capsule (1 mg) by mouth every 12 hours., Disp: 60 capsule, Rfl: 11    ULTRA FINE LANCETS MISC, Use to test blood sugar up to 3 times daily, Disp: , Rfl:     valGANciclovir (Valcyte) 450 mg tablet, Take 2 tablets (900 mg) by mouth once daily. Do not crush or chew., Disp: 60 tablet, Rfl: 1    Assessment/Plan     DDKT: 1/6/2025  KDPI: 83%  PRA 90%    Kidney allograft function   Stable function   Last Creatinine 1.7 (candy 1.3), labs pending today     Immunosuppression   Thymo 4.5mg/kg  Tacrolimus 1/1, last level 8.5, labs today   MMF 1000/1000   Pred 5   Ppx: valcyte, bactrim, clotrimazole    HTN   Coreg, BP fine at home (-130), keep same dose     DM   Insulin, follow up Endocrine this thursday

## 2025-02-25 NOTE — PATIENT INSTRUCTIONS
Medication Changes:  Decrease pantoprazole to once a day    Plan:  Be sure to stay hydrated - lots of water! Keep that kidney happy. :)  Ok for light exercise  Keep monitoring blood pressure. Let us know if the top number is over 160 more than once  Labs once a week on Monday or Tuesday  Next clinic appt in 2 weeks

## 2025-02-26 ENCOUNTER — PHARMACY VISIT (OUTPATIENT)
Dept: PHARMACY | Facility: CLINIC | Age: 60
End: 2025-02-26
Payer: COMMERCIAL

## 2025-02-27 ENCOUNTER — OFFICE VISIT (OUTPATIENT)
Facility: HOSPITAL | Age: 60
End: 2025-02-27
Payer: MEDICARE

## 2025-02-27 ENCOUNTER — DOCUMENTATION (OUTPATIENT)
Dept: TRANSPLANT | Facility: HOSPITAL | Age: 60
End: 2025-02-27
Payer: MEDICARE

## 2025-02-27 VITALS
TEMPERATURE: 98.9 F | BODY MASS INDEX: 25.37 KG/M2 | WEIGHT: 171.8 LBS | OXYGEN SATURATION: 95 % | DIASTOLIC BLOOD PRESSURE: 87 MMHG | HEART RATE: 86 BPM | SYSTOLIC BLOOD PRESSURE: 126 MMHG

## 2025-02-27 DIAGNOSIS — Z94.0 KIDNEY REPLACED BY TRANSPLANT (HHS-HCC): ICD-10-CM

## 2025-02-27 DIAGNOSIS — E11.65 TYPE 2 DIABETES MELLITUS WITH HYPERGLYCEMIA, WITH LONG-TERM CURRENT USE OF INSULIN: Primary | ICD-10-CM

## 2025-02-27 DIAGNOSIS — Z79.4 TYPE 2 DIABETES MELLITUS WITH HYPERGLYCEMIA, WITH LONG-TERM CURRENT USE OF INSULIN: Primary | ICD-10-CM

## 2025-02-27 PROCEDURE — 3049F LDL-C 100-129 MG/DL: CPT | Performed by: PHYSICIAN ASSISTANT

## 2025-02-27 PROCEDURE — 99214 OFFICE O/P EST MOD 30 MIN: CPT | Performed by: PHYSICIAN ASSISTANT

## 2025-02-27 PROCEDURE — 3061F NEG MICROALBUMINURIA REV: CPT | Performed by: PHYSICIAN ASSISTANT

## 2025-02-27 PROCEDURE — 95251 CONT GLUC MNTR ANALYSIS I&R: CPT | Performed by: PHYSICIAN ASSISTANT

## 2025-02-27 PROCEDURE — 3079F DIAST BP 80-89 MM HG: CPT | Performed by: PHYSICIAN ASSISTANT

## 2025-02-27 PROCEDURE — 3074F SYST BP LT 130 MM HG: CPT | Performed by: PHYSICIAN ASSISTANT

## 2025-02-27 ASSESSMENT — PAIN SCALES - GENERAL: PAINLEVEL_OUTOF10: 0-NO PAIN

## 2025-02-27 NOTE — PROGRESS NOTES
Subjective   Anibal Dow is a 59 y.o. male who presents for follow-up of Type 2 diabetes mellitus. The initial diagnosis of diabetes was made  at least 15 years ago, if not more . The patient does not have a known family history of diabetes.    Known complications due to diabetes included retinopathy, peripheral vascular disease, chronic kidney disease, and CHF    Cardiovascular risk factors include advanced age (older than 55 for men, 65 for women), diabetes mellitus, and male gender. The patient is not on an ACE inhibitor or angiotensin II receptor blocker.  The patient has not been previously hospitalized due to diabetic ketoacidosis.     Current symptoms/problems include hyperglycemia and hypoglycemia . His clinical course has fluctuated.     Current diabetes regimen is as follows:  glargine 20u qAM, lispro 8u ACTID, and ssi 2u:50>150mg/dL ACTID      The patient is currently checking the blood glucose 3-4 times per day.    Patient is using: continuous glucose monitor - only received one shad 3 sensor upon hospital discharge, therefore has no CGM glucose data since 1/27/25. Has been doing fingerstick at home since that time.         Hypoglycemia frequency: 5%  Hypoglycemia awareness: Yes     Exercise: rarely - limited by recent kidney transplantation  Meal panning: He is using avoidance of concentrated sweets.    Review of Systems   All other systems reviewed and are negative.      Objective   /87   Pulse 86   Temp 37.2 °C (98.9 °F) (Temporal)   Wt 77.9 kg (171 lb 12.8 oz)   SpO2 95%   BMI 25.37 kg/m²   Physical Exam  Constitutional:       Appearance: Normal appearance. He is normal weight.   HENT:      Head: Normocephalic and atraumatic.      Nose: Nose normal.      Mouth/Throat:      Mouth: Mucous membranes are dry.      Pharynx: Oropharynx is clear.   Eyes:      Extraocular Movements: Extraocular movements intact.      Conjunctiva/sclera: Conjunctivae normal.   Cardiovascular:      Rate and  Rhythm: Normal rate and regular rhythm.      Pulses: Normal pulses.      Heart sounds: Normal heart sounds.   Pulmonary:      Effort: Pulmonary effort is normal.      Breath sounds: Normal breath sounds.   Abdominal:      General: Abdomen is flat. Bowel sounds are normal.      Palpations: Abdomen is soft.   Skin:     General: Skin is warm and dry.   Neurological:      General: No focal deficit present.      Mental Status: He is alert and oriented to person, place, and time. Mental status is at baseline.   Psychiatric:         Mood and Affect: Mood normal.         Behavior: Behavior normal.         Thought Content: Thought content normal.         Judgment: Judgment normal.         Lab Review  Glucose (mg/dL)   Date Value   02/25/2025 226 (H)   02/15/2025 81   02/11/2025 183 (H)     Hemoglobin A1C (%)   Date Value   07/08/2024 6.8 (H)   01/05/2024 8.0 (H)   06/13/2023 6.2 (A)   05/16/2023 6.7 (H)     Bicarbonate (mmol/L)   Date Value   02/25/2025 28   02/15/2025 25   02/11/2025 26     Urea Nitrogen (mg/dL)   Date Value   02/25/2025 28 (H)   02/15/2025 30 (H)   02/11/2025 23     Creatinine (mg/dL)   Date Value   02/25/2025 1.96 (H)   02/15/2025 1.73 (H)   02/11/2025 1.49 (H)       Health Maintenance:   Foot Exam: due for update  Eye Exam: due for update  Lipid Panel: up to date as of Jan 2025 with LDL above goal <70, ordered repeat for 6 months after transplant  Urine Albumin: due for update, ordered for 6 months after transplant    Assessment/Plan   Diagnoses and all orders for this visit:  Type 2 diabetes mellitus with hyperglycemia, with long-term current use of insulin  -     Lipid Panel; Future  -     Albumin-Creatinine Ratio, Random Urine; Future  Kidney replaced by transplant ([facility])  -     Lipid Panel; Future  -     Albumin-Creatinine Ratio, Random Urine; Future      Type 2 diabetes mellitus, is not at goal. Pt is currently having concerns for low glucose    RX changes:  see updated insulin plan below     Education:  interpretation of lab results and blood sugar goals  Follow up: I recommend diabetes care be 1 month.      INSULIN INSTRUCTIONS    LANTUS = 15 units once every morning    HUMALOG        BREAKFAST = 6 units plus sliding scale (take 15 min before meal)       LUNCH = 6 units plus sliding scale (take 15 min before meal)       DINNER = 4 units plus sliding scale (take 15 min before meal)    SLIDING SCALE    = 0u  151-200 = 2u  201-250 = 4u  251-300 = 6u  301-350 = 8u  351-400 = 10u  Over 400 = repeat 10u every 4 hours

## 2025-02-27 NOTE — PATIENT INSTRUCTIONS
Type 2 diabetes mellitus with hyperglycemia, with long-term current use of insulin  -     Lipid Panel; Future  -     Albumin-Creatinine Ratio, Random Urine; Future  Kidney replaced by transplant ([facility])  -     Lipid Panel; Future  -     Albumin-Creatinine Ratio, Random Urine; Future      Type 2 diabetes mellitus, is not at goal. Pt is currently having concerns for low glucose    RX changes: see updated insulin plan below   Education:  interpretation of lab results and blood sugar goals  Follow up: I recommend diabetes care be 1 month.      INSULIN INSTRUCTIONS    LANTUS = 15 units once every morning    HUMALOG        BREAKFAST = 6 units plus sliding scale (take 15 min before meal)       LUNCH = 6 units plus sliding scale (take 15 min before meal)       DINNER = 4 units plus sliding scale (take 15 min before meal)    SLIDING SCALE    = 0u  151-200 = 2u  201-250 = 4u  251-300 = 6u  301-350 = 8u  351-400 = 10u  Over 400 = repeat 10u every 4 hours

## 2025-03-03 DIAGNOSIS — N18.9 CHRONIC KIDNEY DISEASE, UNSPECIFIED CKD STAGE: ICD-10-CM

## 2025-03-03 DIAGNOSIS — Z94.0 KIDNEY REPLACED BY TRANSPLANT (HHS-HCC): ICD-10-CM

## 2025-03-03 DIAGNOSIS — Z01.812 BLOOD TESTS PRIOR TO TREATMENT OR PROCEDURE: ICD-10-CM

## 2025-03-04 ENCOUNTER — LAB (OUTPATIENT)
Dept: LAB | Facility: HOSPITAL | Age: 60
End: 2025-03-04
Payer: MEDICARE

## 2025-03-04 DIAGNOSIS — Z94.0 KIDNEY REPLACED BY TRANSPLANT (HHS-HCC): ICD-10-CM

## 2025-03-06 LAB
ALLOSURE SCORE - KIDNEY: 0.15 %
CAREDX_ORDER_ID: NORMAL
CENTER_ORDER_ID: NORMAL
CLIENT SPECIMEN ID - ALLOSURE: NORMAL
DONOR RELATION - ALLOSURE: NORMAL
NOTES - ALLOSURE: NORMAL
RELATIVE CHANGE VALUE - KIDNEY: NORMAL
TEST COMMENTS - ALLOSURE: NORMAL
TIME POST TX - ALLOSURE: NORMAL
TRANSPLANTED ORGAN - ALLOSURE: NORMAL
TX DATE - ALLOSURE/ALLOMAP: NORMAL
WP_ORDER_ID: NORMAL

## 2025-03-10 ASSESSMENT — ENCOUNTER SYMPTOMS
CONSTIPATION: 0
ADENOPATHY: 0
COLOR CHANGE: 0
CHILLS: 0
DIARRHEA: 0
CONFUSION: 0
SHORTNESS OF BREATH: 0
DYSURIA: 0
ABDOMINAL DISTENTION: 0
FEVER: 0
HEMATURIA: 0
FREQUENCY: 0
AGITATION: 0
ABDOMINAL PAIN: 0
WEAKNESS: 0
EYES NEGATIVE: 1
COUGH: 0
ARTHRALGIAS: 0
DIZZINESS: 0
LIGHT-HEADEDNESS: 0
HALLUCINATIONS: 0

## 2025-03-11 ENCOUNTER — NURSE ONLY (OUTPATIENT)
Facility: HOSPITAL | Age: 60
End: 2025-03-11
Payer: MEDICARE

## 2025-03-11 ENCOUNTER — PATIENT MESSAGE (OUTPATIENT)
Facility: HOSPITAL | Age: 60
End: 2025-03-11

## 2025-03-11 ENCOUNTER — OFFICE VISIT (OUTPATIENT)
Facility: HOSPITAL | Age: 60
End: 2025-03-11
Payer: MEDICARE

## 2025-03-11 VITALS
DIASTOLIC BLOOD PRESSURE: 102 MMHG | BODY MASS INDEX: 25.58 KG/M2 | TEMPERATURE: 97.8 F | OXYGEN SATURATION: 98 % | WEIGHT: 173.2 LBS | SYSTOLIC BLOOD PRESSURE: 162 MMHG

## 2025-03-11 DIAGNOSIS — Z94.0 KIDNEY REPLACED BY TRANSPLANT (HHS-HCC): ICD-10-CM

## 2025-03-11 DIAGNOSIS — D84.9 IMMUNOSUPPRESSION: Primary | ICD-10-CM

## 2025-03-11 DIAGNOSIS — Z13.89 SCREENING FOR BLOOD OR PROTEIN IN URINE: ICD-10-CM

## 2025-03-11 LAB
ALBUMIN SERPL BCP-MCNC: 4.2 G/DL (ref 3.4–5)
ANION GAP SERPL CALC-SCNC: 13 MMOL/L (ref 10–20)
APTT PPP: 31 SECONDS (ref 26–36)
BUN SERPL-MCNC: 36 MG/DL (ref 6–23)
CALCIUM SERPL-MCNC: 9.3 MG/DL (ref 8.6–10.6)
CHLORIDE SERPL-SCNC: 102 MMOL/L (ref 98–107)
CO2 SERPL-SCNC: 26 MMOL/L (ref 21–32)
CREAT SERPL-MCNC: 1.73 MG/DL (ref 0.5–1.3)
CREAT UR-MCNC: 124.3 MG/DL (ref 20–370)
EGFRCR SERPLBLD CKD-EPI 2021: 45 ML/MIN/1.73M*2
ERYTHROCYTE [DISTWIDTH] IN BLOOD BY AUTOMATED COUNT: 13.8 % (ref 11.5–14.5)
GLUCOSE SERPL-MCNC: 264 MG/DL (ref 74–99)
HCT VFR BLD AUTO: 39.7 % (ref 41–52)
HGB BLD-MCNC: 11.8 G/DL (ref 13.5–17.5)
INR PPP: 1.1 (ref 0.9–1.1)
MAGNESIUM SERPL-MCNC: 1.91 MG/DL (ref 1.6–2.4)
MCH RBC QN AUTO: 27 PG (ref 26–34)
MCHC RBC AUTO-ENTMCNC: 29.7 G/DL (ref 32–36)
MCV RBC AUTO: 91 FL (ref 80–100)
NRBC BLD-RTO: 0 /100 WBCS (ref 0–0)
PHOSPHATE SERPL-MCNC: 2.8 MG/DL (ref 2.5–4.9)
PLATELET # BLD AUTO: 212 X10*3/UL (ref 150–450)
POTASSIUM SERPL-SCNC: 5.1 MMOL/L (ref 3.5–5.3)
PROT UR-ACNC: 32 MG/DL (ref 5–25)
PROT/CREAT UR: 0.26 MG/MG CREAT (ref 0–0.17)
PROTHROMBIN TIME: 11.9 SECONDS (ref 9.8–12.4)
RBC # BLD AUTO: 4.37 X10*6/UL (ref 4.5–5.9)
SODIUM SERPL-SCNC: 136 MMOL/L (ref 136–145)
TACROLIMUS BLD-MCNC: 16.7 NG/ML
WBC # BLD AUTO: 4.5 X10*3/UL (ref 4.4–11.3)

## 2025-03-11 PROCEDURE — 87385 HISTOPLASMA CAPSUL AG IA: CPT

## 2025-03-11 PROCEDURE — 80069 RENAL FUNCTION PANEL: CPT

## 2025-03-11 PROCEDURE — 85027 COMPLETE CBC AUTOMATED: CPT

## 2025-03-11 PROCEDURE — 85610 PROTHROMBIN TIME: CPT | Performed by: TRANSPLANT SURGERY

## 2025-03-11 PROCEDURE — 3080F DIAST BP >= 90 MM HG: CPT | Performed by: TRANSPLANT SURGERY

## 2025-03-11 PROCEDURE — 3077F SYST BP >= 140 MM HG: CPT | Performed by: TRANSPLANT SURGERY

## 2025-03-11 PROCEDURE — 99214 OFFICE O/P EST MOD 30 MIN: CPT | Performed by: TRANSPLANT SURGERY

## 2025-03-11 PROCEDURE — 80197 ASSAY OF TACROLIMUS: CPT

## 2025-03-11 PROCEDURE — 3061F NEG MICROALBUMINURIA REV: CPT | Performed by: TRANSPLANT SURGERY

## 2025-03-11 PROCEDURE — 87799 DETECT AGENT NOS DNA QUANT: CPT

## 2025-03-11 PROCEDURE — RXMED WILLOW AMBULATORY MEDICATION CHARGE

## 2025-03-11 PROCEDURE — 86833 HLA CLASS II HIGH DEFIN QUAL: CPT | Performed by: TRANSPLANT SURGERY

## 2025-03-11 PROCEDURE — 3049F LDL-C 100-129 MG/DL: CPT | Performed by: TRANSPLANT SURGERY

## 2025-03-11 PROCEDURE — 83735 ASSAY OF MAGNESIUM: CPT

## 2025-03-11 PROCEDURE — 99214 OFFICE O/P EST MOD 30 MIN: CPT | Mod: 24 | Performed by: TRANSPLANT SURGERY

## 2025-03-11 PROCEDURE — 84156 ASSAY OF PROTEIN URINE: CPT

## 2025-03-11 RX ORDER — PREDNISONE 5 MG/1
5 TABLET ORAL DAILY
Qty: 30 TABLET | Refills: 11 | Status: SHIPPED | OUTPATIENT
Start: 2025-03-11 | End: 2026-03-11

## 2025-03-11 ASSESSMENT — PAIN SCALES - GENERAL: PAINLEVEL_OUTOF10: 0-NO PAIN

## 2025-03-11 NOTE — PROGRESS NOTES
Subjective   Anibal Dow is a 59 y.o. male who underwent DDKT on 1/6/2025 (Kidney). Here today for follow-up.    Doing well    Review of Systems   Constitutional:  Negative for chills and fever.   HENT: Negative.  Negative for congestion.    Eyes: Negative.    Respiratory:  Negative for cough and shortness of breath.    Cardiovascular:  Negative for chest pain.   Gastrointestinal:  Negative for abdominal distention, abdominal pain, constipation and diarrhea.   Endocrine: Negative for cold intolerance and heat intolerance.   Genitourinary:  Negative for dysuria, frequency, hematuria and urgency.   Musculoskeletal:  Negative for arthralgias.   Skin:  Negative for color change.   Allergic/Immunologic: Negative for environmental allergies.   Neurological:  Negative for dizziness, weakness and light-headedness.   Hematological:  Negative for adenopathy.   Psychiatric/Behavioral:  Negative for agitation, confusion and hallucinations.         Objective   Vitals:    03/11/25 0847   BP: (!) 162/102   Temp: 36.6 °C (97.8 °F)   SpO2: 98%             Physical Exam  Constitutional:       Appearance: Normal appearance.   Eyes:      Pupils: Pupils are equal, round, and reactive to light.   Cardiovascular:      Rate and Rhythm: Normal rate.   Pulmonary:      Effort: Pulmonary effort is normal. No respiratory distress.   Abdominal:      General: There is no distension.      Palpations: Abdomen is soft.      Comments: Incision clean, dry, intact   Musculoskeletal:         General: Normal range of motion.      Right lower leg: No edema.      Left lower leg: No edema.   Skin:     General: Skin is warm and dry.   Neurological:      General: No focal deficit present.      Mental Status: He is alert and oriented to person, place, and time.   Psychiatric:         Mood and Affect: Mood normal.         Behavior: Behavior normal.       Lab Results   Component Value Date    CREATININE 1.96 (H) 02/25/2025    K 5.5 (H) 02/25/2025    GLUCOSE  226 (H) 02/25/2025    HCT 39.6 (L) 02/25/2025    WBC 4.6 02/25/2025     02/25/2025    CALCIUM 9.3 02/25/2025     Lab Results   Component Value Date    WBC 4.6 02/25/2025    HGB 11.5 (L) 02/25/2025    HCT 39.6 (L) 02/25/2025    MCV 93 02/25/2025     02/25/2025     Lab Results   Component Value Date    GLUCOSE 226 (H) 02/25/2025    CALCIUM 9.3 02/25/2025     02/25/2025    K 5.5 (H) 02/25/2025    CO2 28 02/25/2025     02/25/2025    BUN 28 (H) 02/25/2025    CREATININE 1.96 (H) 02/25/2025       Current Outpatient Medications:     atorvastatin (Lipitor) 40 mg tablet, Take 1 tablet (40 mg) by mouth once daily., Disp: 30 tablet, Rfl: 3    blood pressure test kit-medium kit, 1 kit 2 times a day., Disp: 1 kit, Rfl: 0    blood-glucose sensor (FreeStyle Anisa 3 Plus Sensor) device, Use as directed. Change every 15 days, Disp: 6 each, Rfl: 3    blood-glucose sensor (FreeStyle Anisa 3 Plus Sensor) device, 3 each every 14 (fourteen) days., Disp: 3 each, Rfl: 0    carvedilol (Coreg) 12.5 mg tablet, Take 1 tablet (12.5 mg) by mouth 2 times a day., Disp: 60 tablet, Rfl: 11    clotrimazole (Mycelex) 10 mg davin, Take 1 tablet (10 mg) by mouth 3 times a day after meals., Disp: 100 tablet, Rfl: 2    insulin glargine (Lantus) 100 unit/mL (3 mL) pen, Inject 20 Units under the skin once daily in the morning. Take as directed per insulin instructions. Do not fill before January 11, 2025., Disp: 15 mL, Rfl: 0    insulin lispro (HumaLOG) 100 unit/mL injection, Inject 0-10 Units under the skin 3 times a day before meals. Inject 8 units with meals. Check blood sugar & follow sliding scale. =0u, 151-200=2u, 201-250=4u, 251-300=6u, 301-350=8u, 351-400=10u. May need up to 55 units per day, Disp: , Rfl:     mycophenolate (Cellcept) 250 mg capsule, Take 4 capsules (1,000 mg) by mouth every 12 hours., Disp: 240 capsule, Rfl: 11    pantoprazole (ProtoNix) 40 mg EC tablet, Take 1 tablet (40 mg) by mouth once daily in  the morning. Take before meals. Do not crush, chew, or split., Disp: , Rfl:     predniSONE (Deltasone) 5 mg tablet, Take 1 tablet (5 mg) by mouth once daily., Disp: 30 tablet, Rfl: 11    sod phos di, mono-K phos mono (Phospha 250 Neutral) tablet, Take 2 tablets (500 mg) by mouth 2 times a day., Disp: 120 tablet, Rfl: 11    sodium zirconium cyclosilicate (Lokelma) 5 gram packet, Take 5 g by mouth once daily at noon. Take with meals. Must be  from transplant medication by 3 hours or more, Disp: 30 packet, Rfl: 1    sulfamethoxazole-trimethoprim (Bactrim) 400-80 mg tablet, Take 1 tablet by mouth once daily., Disp: 30 tablet, Rfl: 5    tacrolimus (Prograf) 1 mg capsule, Take 1 capsule (1 mg) by mouth every 12 hours., Disp: 60 capsule, Rfl: 11    ULTRA FINE LANCETS MISC, Use to test blood sugar up to 3 times daily, Disp: , Rfl:     valGANciclovir (Valcyte) 450 mg tablet, Take 2 tablets (900 mg) by mouth once daily. Do not crush or chew., Disp: 60 tablet, Rfl: 1    Assessment/Plan     DDKT: 1/6/2025  KDPI: 83%  PRA 90%    Kidney allograft function   Stable function   Last Creatinine 1.9, candy 1.3     Immunosuppression   Thymo 4.5mg/kg  Tacrolimus 1/1   MMF 1000/1000   Pred 5   Ppx: valcyte, bactrim, clotrimazole    HTN   Coreg, BP fine at home (-140), keep same dose

## 2025-03-11 NOTE — PATIENT INSTRUCTIONS
No medication changes today, if we need to do anything based on your labs we'll call/message you  Labs weekly  RTC 2 weeks

## 2025-03-12 ENCOUNTER — APPOINTMENT (OUTPATIENT)
Dept: PHARMACY | Facility: HOSPITAL | Age: 60
End: 2025-03-12
Payer: MEDICARE

## 2025-03-12 PROCEDURE — RXMED WILLOW AMBULATORY MEDICATION CHARGE

## 2025-03-12 RX ORDER — TACROLIMUS 0.5 MG/1
0.5 CAPSULE ORAL EVERY 12 HOURS
Qty: 60 CAPSULE | Refills: 11 | Status: SHIPPED | OUTPATIENT
Start: 2025-03-12 | End: 2026-03-12

## 2025-03-13 LAB
H CAPSUL AG UR QL: NOT DETECTED
HLA RESULTS: NORMAL
HLA-A+B+C AB NFR SER: NORMAL %
HLA-DP+DQ+DR AB NFR SER: NORMAL %
SCAN RESULT: NORMAL

## 2025-03-17 ENCOUNTER — PHARMACY VISIT (OUTPATIENT)
Dept: PHARMACY | Facility: CLINIC | Age: 60
End: 2025-03-17
Payer: COMMERCIAL

## 2025-03-18 ENCOUNTER — APPOINTMENT (OUTPATIENT)
Dept: CARDIOLOGY | Facility: CLINIC | Age: 60
End: 2025-03-18
Payer: COMMERCIAL

## 2025-03-18 ENCOUNTER — TELEPHONE (OUTPATIENT)
Facility: HOSPITAL | Age: 60
End: 2025-03-18
Payer: MEDICARE

## 2025-03-18 ENCOUNTER — NURSE ONLY (OUTPATIENT)
Facility: HOSPITAL | Age: 60
End: 2025-03-18
Payer: MEDICARE

## 2025-03-18 LAB
ALBUMIN SERPL BCP-MCNC: 4.2 G/DL (ref 3.4–5)
ANION GAP SERPL CALC-SCNC: 12 MMOL/L (ref 10–20)
BUN SERPL-MCNC: 17 MG/DL (ref 6–23)
CALCIUM SERPL-MCNC: 9.5 MG/DL (ref 8.6–10.6)
CHLORIDE SERPL-SCNC: 101 MMOL/L (ref 98–107)
CO2 SERPL-SCNC: 27 MMOL/L (ref 21–32)
CREAT SERPL-MCNC: 1.63 MG/DL (ref 0.5–1.3)
CREAT UR-MCNC: 130.7 MG/DL (ref 20–370)
EGFRCR SERPLBLD CKD-EPI 2021: 48 ML/MIN/1.73M*2
ERYTHROCYTE [DISTWIDTH] IN BLOOD BY AUTOMATED COUNT: 13.4 % (ref 11.5–14.5)
GLUCOSE SERPL-MCNC: 213 MG/DL (ref 74–99)
HCT VFR BLD AUTO: 40.8 % (ref 41–52)
HGB BLD-MCNC: 12.3 G/DL (ref 13.5–17.5)
MAGNESIUM SERPL-MCNC: 1.75 MG/DL (ref 1.6–2.4)
MCH RBC QN AUTO: 27.3 PG (ref 26–34)
MCHC RBC AUTO-ENTMCNC: 30.1 G/DL (ref 32–36)
MCV RBC AUTO: 91 FL (ref 80–100)
NRBC BLD-RTO: 0 /100 WBCS (ref 0–0)
PHOSPHATE SERPL-MCNC: 2 MG/DL (ref 2.5–4.9)
PLATELET # BLD AUTO: 176 X10*3/UL (ref 150–450)
POTASSIUM SERPL-SCNC: 4.9 MMOL/L (ref 3.5–5.3)
PROT UR-ACNC: 72 MG/DL (ref 5–25)
PROT/CREAT UR: 0.55 MG/MG CREAT (ref 0–0.17)
RBC # BLD AUTO: 4.5 X10*6/UL (ref 4.5–5.9)
SODIUM SERPL-SCNC: 135 MMOL/L (ref 136–145)
TACROLIMUS BLD-MCNC: 7.5 NG/ML
WBC # BLD AUTO: 5.3 X10*3/UL (ref 4.4–11.3)

## 2025-03-18 PROCEDURE — 87385 HISTOPLASMA CAPSUL AG IA: CPT | Performed by: TRANSPLANT SURGERY

## 2025-03-18 PROCEDURE — 83735 ASSAY OF MAGNESIUM: CPT | Performed by: TRANSPLANT SURGERY

## 2025-03-18 PROCEDURE — 85027 COMPLETE CBC AUTOMATED: CPT | Performed by: TRANSPLANT SURGERY

## 2025-03-18 PROCEDURE — 80197 ASSAY OF TACROLIMUS: CPT | Performed by: TRANSPLANT SURGERY

## 2025-03-18 PROCEDURE — 80069 RENAL FUNCTION PANEL: CPT | Performed by: TRANSPLANT SURGERY

## 2025-03-18 PROCEDURE — 87799 DETECT AGENT NOS DNA QUANT: CPT | Performed by: TRANSPLANT SURGERY

## 2025-03-18 PROCEDURE — 82570 ASSAY OF URINE CREATININE: CPT | Performed by: TRANSPLANT SURGERY

## 2025-03-20 ENCOUNTER — APPOINTMENT (OUTPATIENT)
Dept: PHARMACY | Facility: HOSPITAL | Age: 60
End: 2025-03-20
Payer: MEDICARE

## 2025-03-20 LAB
H CAPSUL AG UR QL: NOT DETECTED
SCAN RESULT: NORMAL

## 2025-03-20 NOTE — TELEPHONE ENCOUNTER
Patient called requesting to speak with coordinator about  has a dry cough. .  Patient call back number is [phone] .

## 2025-03-20 NOTE — TELEPHONE ENCOUNTER
Returned call to patient, he reports having a dry cough and a little bit of a runny nose, otherwise no other symptoms. Advised patient what OTC meds he can take and to alert us if anything worsens.

## 2025-03-25 ENCOUNTER — NURSE ONLY (OUTPATIENT)
Facility: HOSPITAL | Age: 60
End: 2025-03-25
Payer: MEDICARE

## 2025-03-25 ENCOUNTER — OFFICE VISIT (OUTPATIENT)
Facility: HOSPITAL | Age: 60
End: 2025-03-25
Payer: MEDICARE

## 2025-03-25 VITALS
WEIGHT: 169.8 LBS | HEART RATE: 83 BPM | TEMPERATURE: 97.8 F | BODY MASS INDEX: 25.08 KG/M2 | DIASTOLIC BLOOD PRESSURE: 91 MMHG | OXYGEN SATURATION: 95 % | SYSTOLIC BLOOD PRESSURE: 137 MMHG

## 2025-03-25 DIAGNOSIS — D84.9 IMMUNOSUPPRESSION: Primary | ICD-10-CM

## 2025-03-25 DIAGNOSIS — D72.819 LEUKOPENIA, UNSPECIFIED TYPE: ICD-10-CM

## 2025-03-25 DIAGNOSIS — Z94.0 KIDNEY REPLACED BY TRANSPLANT (HHS-HCC): ICD-10-CM

## 2025-03-25 DIAGNOSIS — Z13.89 SCREENING FOR BLOOD OR PROTEIN IN URINE: ICD-10-CM

## 2025-03-25 DIAGNOSIS — Z92.25 PERSONAL HISTORY OF IMMUNOSUPRESSION THERAPY: ICD-10-CM

## 2025-03-25 LAB
ALBUMIN SERPL BCP-MCNC: 4.3 G/DL (ref 3.4–5)
ANION GAP SERPL CALC-SCNC: 13 MMOL/L (ref 10–20)
BASOPHILS # BLD AUTO: 0.02 X10*3/UL (ref 0–0.1)
BASOPHILS NFR BLD AUTO: 0.9 %
BUN SERPL-MCNC: 28 MG/DL (ref 6–23)
CALCIUM SERPL-MCNC: 9.9 MG/DL (ref 8.6–10.6)
CHLORIDE SERPL-SCNC: 103 MMOL/L (ref 98–107)
CO2 SERPL-SCNC: 26 MMOL/L (ref 21–32)
CREAT SERPL-MCNC: 1.81 MG/DL (ref 0.5–1.3)
CREAT UR-MCNC: 139.6 MG/DL (ref 20–370)
EGFRCR SERPLBLD CKD-EPI 2021: 42 ML/MIN/1.73M*2
EOSINOPHIL # BLD AUTO: 0.04 X10*3/UL (ref 0–0.7)
EOSINOPHIL NFR BLD AUTO: 1.7 %
ERYTHROCYTE [DISTWIDTH] IN BLOOD BY AUTOMATED COUNT: 12.7 % (ref 11.5–14.5)
ERYTHROCYTE [DISTWIDTH] IN BLOOD BY AUTOMATED COUNT: 12.7 % (ref 11.5–14.5)
GLUCOSE SERPL-MCNC: 225 MG/DL (ref 74–99)
HCT VFR BLD AUTO: 44.1 % (ref 41–52)
HCT VFR BLD AUTO: 44.1 % (ref 41–52)
HGB BLD-MCNC: 13.3 G/DL (ref 13.5–17.5)
HGB BLD-MCNC: 13.3 G/DL (ref 13.5–17.5)
IMM GRANULOCYTES # BLD AUTO: 0.02 X10*3/UL (ref 0–0.7)
IMM GRANULOCYTES NFR BLD AUTO: 0.9 % (ref 0–0.9)
LYMPHOCYTES # BLD AUTO: 0.22 X10*3/UL (ref 1.2–4.8)
LYMPHOCYTES NFR BLD AUTO: 9.4 %
MCH RBC QN AUTO: 27.6 PG (ref 26–34)
MCH RBC QN AUTO: 27.6 PG (ref 26–34)
MCHC RBC AUTO-ENTMCNC: 30.2 G/DL (ref 32–36)
MCHC RBC AUTO-ENTMCNC: 30.2 G/DL (ref 32–36)
MCV RBC AUTO: 92 FL (ref 80–100)
MCV RBC AUTO: 92 FL (ref 80–100)
MONOCYTES # BLD AUTO: 0.12 X10*3/UL (ref 0.1–1)
MONOCYTES NFR BLD AUTO: 5.1 %
NEUTROPHILS # BLD AUTO: 1.93 X10*3/UL (ref 1.2–7.7)
NEUTROPHILS NFR BLD AUTO: 82 %
NRBC BLD-RTO: 0 /100 WBCS (ref 0–0)
NRBC BLD-RTO: 0 /100 WBCS (ref 0–0)
PHOSPHATE SERPL-MCNC: 2.4 MG/DL (ref 2.5–4.9)
PLATELET # BLD AUTO: 243 X10*3/UL (ref 150–450)
PLATELET # BLD AUTO: 243 X10*3/UL (ref 150–450)
POTASSIUM SERPL-SCNC: 5.1 MMOL/L (ref 3.5–5.3)
PROT UR-ACNC: 36 MG/DL (ref 5–25)
PROT/CREAT UR: 0.26 MG/MG CREAT (ref 0–0.17)
RBC # BLD AUTO: 4.82 X10*6/UL (ref 4.5–5.9)
RBC # BLD AUTO: 4.82 X10*6/UL (ref 4.5–5.9)
SODIUM SERPL-SCNC: 137 MMOL/L (ref 136–145)
TACROLIMUS BLD-MCNC: 12.1 NG/ML
WBC # BLD AUTO: 2.5 X10*3/UL (ref 4.4–11.3)
WBC # BLD AUTO: 2.5 X10*3/UL (ref 4.4–11.3)

## 2025-03-25 PROCEDURE — 80069 RENAL FUNCTION PANEL: CPT

## 2025-03-25 PROCEDURE — 85025 COMPLETE CBC W/AUTO DIFF WBC: CPT

## 2025-03-25 PROCEDURE — 87799 DETECT AGENT NOS DNA QUANT: CPT

## 2025-03-25 PROCEDURE — 82570 ASSAY OF URINE CREATININE: CPT

## 2025-03-25 PROCEDURE — 80197 ASSAY OF TACROLIMUS: CPT

## 2025-03-25 PROCEDURE — 99214 OFFICE O/P EST MOD 30 MIN: CPT | Mod: 24 | Performed by: STUDENT IN AN ORGANIZED HEALTH CARE EDUCATION/TRAINING PROGRAM

## 2025-03-25 PROCEDURE — 87385 HISTOPLASMA CAPSUL AG IA: CPT

## 2025-03-25 PROCEDURE — 36415 COLL VENOUS BLD VENIPUNCTURE: CPT

## 2025-03-25 ASSESSMENT — PAIN SCALES - GENERAL: PAINLEVEL_OUTOF10: 0-NO PAIN

## 2025-03-25 NOTE — PATIENT INSTRUCTIONS
Medication Changes:  Ok to stop pantoprazole from transplant perspective- if heartburn or symptoms of acid reflux start, please alert your primary care doctor  Stop clotrimazole davin when you run out  Stop valganciclovir (valcyte) when you run out  No need to restart Lokelma  Continue phos supplement    Plan:  Labs once a week  Next clinic appt in 3 weeks with transplant nephrology

## 2025-03-25 NOTE — PROGRESS NOTES
TRANSPLANT SURGERY FOLLOW UP    Reason for visit:    Anibal Dow underwent transplant surgery,  1/6/2025 (Kidney), and returns to clinic for follow up evaluation focusing on their current immunosuppression. Specifically, Anibal Dow's regiment was evaluated to ensure adequate levels to prevent life threatening or organ function impairing rejection while not increasing risk of adverse effects including malignancy, infection, bone health, or accelerated cardiovascular disease.  I reviewed common side effects including tremor, hair loss, GI toxicity, and agitation.  The patient reported that they were experiencing: none.    The long-term management of maintenance immunosuppression in organ transplant recipients remains complex given differences in clinical characteristics, comorbid conditions, and prior rejection episodes.  These factors were evaluated at this visit and used in formulating this plan of care. Immunosuppression following the transplant is medically necessary to closely monitor and to reduce the risk of post-operative complications distinct from the post operative management of the transplant surgical procedure.     Interval history  Doing well    Patient Active Problem List   Diagnosis    Abnormality of albumin    Fluid overload, unspecified    Hypervolemia    Anemia in chronic kidney disease    Anemia    Bilateral lower extremity edema    Chronic systolic heart failure    CKD (chronic kidney disease) stage 5, GFR less than 15 ml/min (Multi)    CKD stage G3b/A3, GFR 30-44 and albumin creatinine ratio >300 mg/g (Multi)    Combined form of age-related cataract, left eye    Dependence on renal dialysis (CMS-HCC)    Diabetic nephropathy associated with type 2 diabetes mellitus (Multi)    Dyslipidemia (high LDL; low HDL)    ESRD (end stage renal disease) on dialysis (Multi)    Essential hypertension    (HFpEF) heart failure with preserved ejection fraction    Congestive heart failure     History of panretinal photocoagulation    Hypertensive nephropathy    Iron deficiency anemia    Metabolic acidosis, normal anion gap (NAG)    Moderate protein-calorie malnutrition (Multi)    Neuropathy    Noncompliance with medication regimen    Nonproliferative diabetic retinopathy with macular edema associated with type 2 diabetes mellitus    Nuclear sclerosis, right    Osteomyelitis of fifth toe of right foot (Multi)    Other disorders resulting from impaired renal tubular function    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 associated with type 2 diabetes mellitus    Pulmonary hypertension (Multi)    Retinal detachment, tractional, right eye    Retinal hemorrhage of left eye    S/P amputation of lesser toe (Multi)    Secondary hyperparathyroidism of renal origin (Multi)    Shortness of breath    Type 2 diabetes mellitus    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 (Multi)    Vitamin D deficiency    Pre-transplant evaluation for chronic kidney disease    Abnormal findings on diagnostic imaging of heart and coronary circulation    ESRD (end stage renal disease) (Multi)    CKD (chronic kidney disease)    Kidney replaced by transplant ([facility])    Type 2 diabetes mellitus with hyperglycemia, with long-term current use of insulin       Medications:    Current Outpatient Medications   Medication Instructions    atorvastatin (LIPITOR) 40 mg, oral, Daily    blood pressure test kit-medium kit 1 kit, miscellaneous, 2 times daily    blood-glucose sensor (FreeStyle Anisa 3 Plus Sensor) device Use as directed. Change every 15 days    blood-glucose sensor (FreeStyle Anisa 3 Plus Sensor) device 3 each, miscellaneous, Every 14 days    carvedilol (COREG) 12.5 mg, oral, 2 times daily    insulin glargine (Lantus) 100 unit/mL (3 mL) pen Inject 20 Units under the skin once daily in the morning. Take as directed per insulin instructions. Do not fill before January 11, 2025.    insulin lispro (HUMALOG) 0-10  Units, subcutaneous, 3 times daily before meals, Inject 8 units with meals. Check blood sugar & follow sliding scale. =0u, 151-200=2u, 201-250=4u, 251-300=6u, 301-350=8u, 351-400=10u. May need up to 55 units per day    mycophenolate (CELLCEPT) 1,000 mg, oral, Every 12 hours scheduled (0630,1830)    pantoprazole (PROTONIX) 40 mg, oral, Daily before breakfast, Do not crush, chew, or split.    predniSONE (DELTASONE) 5 mg, oral, Daily    sod phos di, mono-K phos mono (Phospha 250 Neutral) tablet 500 mg, oral, 2 times daily    sodium zirconium cyclosilicate (LOKELMA) 5 g, oral, Daily with lunch, Must be  from transplant medication by 3 hours or more    sulfamethoxazole-trimethoprim (Bactrim) 400-80 mg tablet 1 tablet, oral, Daily    tacrolimus (PROGRAF) 0.5 mg, oral, Every 12 hours    ULTRA FINE LANCETS MISC Use to test blood sugar up to 3 times daily      valGANciclovir (VALCYTE) 900 mg, oral, Daily, Do not crush or chew.       Physical Exam  Vitals:    03/25/25 0811   BP: (!) 137/91   Pulse: 83   Temp: 36.6 °C (97.8 °F)   SpO2: 95%          Physical Exam          ALLERGY:  No Known Allergies    LABS:  No results found for this or any previous visit (from the past 24 hours).   Lab Results   Component Value Date    ALT 6 (L) 02/15/2025    AST 14 02/15/2025    ALKPHOS 82 02/15/2025    BILITOT 0.5 02/15/2025         ASSESSMENT AND PLAN:    Mr. Dow is a 60 y.o. male who underwent  transplant surgery on 1/6/2025 (Kidney).    KDPI: 83%  PRA 90%    1. Immunosuppression reviewed and adjusted       Tacrolimus: Yes - 0.5 mg bid goal 8-10        Mycophenolate: Yes - 1000 mg bid      Prednisone: Yes - 5 mg daily    2. Prophylaxis adherence protocol to prevent immunosuppression related infection      Valcyte: Yes - completing soon    3. Hypertension         Blood Pressures         3/25/2025  0811             BP: 137/91              Current antihypertensives: Coreg 12.5 bid          4. GI prophylaxis       Okay to  stop pantoprazole     5.  Follow up:       Labs  1 times per week       RTC: 3 week(s)    I spent a total of 31 min providing care for this patient including record review, examination, face to face counseling, and documentation.     Sophie Ortiz MD    Transplant Surgery Attending

## 2025-03-26 ENCOUNTER — PATIENT MESSAGE (OUTPATIENT)
Facility: HOSPITAL | Age: 60
End: 2025-03-26
Payer: MEDICARE

## 2025-03-26 DIAGNOSIS — Z94.0 KIDNEY REPLACED BY TRANSPLANT (HHS-HCC): ICD-10-CM

## 2025-03-26 RX ORDER — MYCOPHENOLATE MOFETIL 250 MG/1
750 CAPSULE ORAL 2 TIMES DAILY
Qty: 180 CAPSULE | Refills: 11 | Status: SHIPPED | OUTPATIENT
Start: 2025-03-26 | End: 2026-03-26

## 2025-03-27 LAB
H CAPSUL AG UR QL: NOT DETECTED
SCAN RESULT: NORMAL

## 2025-03-30 RX ORDER — PANTOPRAZOLE SODIUM 40 MG/1
40 TABLET, DELAYED RELEASE ORAL
Qty: 30 TABLET | Refills: 0 | Status: SHIPPED | OUTPATIENT
Start: 2025-03-30 | End: 2025-04-30

## 2025-04-01 ENCOUNTER — LAB (OUTPATIENT)
Dept: LAB | Facility: HOSPITAL | Age: 60
End: 2025-04-01
Payer: MEDICARE

## 2025-04-01 DIAGNOSIS — Z94.0 KIDNEY REPLACED BY TRANSPLANT (HHS-HCC): ICD-10-CM

## 2025-04-02 ENCOUNTER — APPOINTMENT (OUTPATIENT)
Dept: PHARMACY | Facility: HOSPITAL | Age: 60
End: 2025-04-02
Payer: MEDICARE

## 2025-04-03 ENCOUNTER — TELEPHONE (OUTPATIENT)
Facility: HOSPITAL | Age: 60
End: 2025-04-03
Payer: MEDICARE

## 2025-04-07 DIAGNOSIS — Z79.4 TYPE 2 DIABETES MELLITUS WITH HYPERGLYCEMIA, WITH LONG-TERM CURRENT USE OF INSULIN: Primary | ICD-10-CM

## 2025-04-07 DIAGNOSIS — Z94.0 KIDNEY REPLACED BY TRANSPLANT (HHS-HCC): ICD-10-CM

## 2025-04-07 DIAGNOSIS — E11.65 TYPE 2 DIABETES MELLITUS WITH HYPERGLYCEMIA, WITH LONG-TERM CURRENT USE OF INSULIN: Primary | ICD-10-CM

## 2025-04-07 RX ORDER — PEN NEEDLE, DIABETIC 30 GX3/16"
1 NEEDLE, DISPOSABLE MISCELLANEOUS 4 TIMES DAILY
Qty: 400 EACH | Refills: 3 | Status: SHIPPED | OUTPATIENT
Start: 2025-04-07 | End: 2025-04-10 | Stop reason: SDUPTHER

## 2025-04-07 RX ORDER — INSULIN GLARGINE 100 [IU]/ML
20 INJECTION, SOLUTION SUBCUTANEOUS EVERY MORNING
Qty: 15 ML | Refills: 1 | Status: SHIPPED | OUTPATIENT
Start: 2025-04-07 | End: 2025-04-10 | Stop reason: SDUPTHER

## 2025-04-07 RX ORDER — INSULIN LISPRO 100 [IU]/ML
0-10 INJECTION, SOLUTION INTRAVENOUS; SUBCUTANEOUS
Qty: 15 ML | Refills: 2 | Status: SHIPPED | OUTPATIENT
Start: 2025-04-07 | End: 2025-09-04

## 2025-04-07 NOTE — PROGRESS NOTES
Pt called to request glargine refill. Both insulins and pen needles reordered.     Nerissa Rollins PA-C   Endocrinology  Inpatient Diabetes Management Team

## 2025-04-07 NOTE — TELEPHONE ENCOUNTER
Patient called to request refills of the below medication(s) and dose(s).  Please send prescription for the checked items below to         [facility]  [address], [address]  [place] [id number]  Phone: [phone] Fax: [phone]           Needs his long lasting insulin refilled.

## 2025-04-08 ENCOUNTER — LAB (OUTPATIENT)
Dept: LAB | Facility: HOSPITAL | Age: 60
End: 2025-04-08
Payer: MEDICARE

## 2025-04-08 DIAGNOSIS — Z94.0 KIDNEY TRANSPLANT STATUS: Primary | ICD-10-CM

## 2025-04-08 LAB
CREAT UR-MCNC: 92.4 MG/DL (ref 20–370)
PROT UR-ACNC: 29 MG/DL (ref 5–25)
PROT/CREAT UR: 0.31 MG/MG CREAT (ref 0–0.17)

## 2025-04-08 PROCEDURE — 87799 DETECT AGENT NOS DNA QUANT: CPT

## 2025-04-08 PROCEDURE — 82570 ASSAY OF URINE CREATININE: CPT

## 2025-04-08 PROCEDURE — 84156 ASSAY OF PROTEIN URINE: CPT

## 2025-04-09 LAB
BKV DNA SERPL NAA+PROBE-LOG#: NORMAL {LOG_COPIES}/ML
CMV DNA SERPL NAA+PROBE-LOG IU: NORMAL {LOG_IU}/ML
EBV DNA SPEC NAA+PROBE-LOG#: NORMAL {LOG_COPIES}/ML
LABORATORY COMMENT REPORT: NOT DETECTED

## 2025-04-10 ENCOUNTER — OFFICE VISIT (OUTPATIENT)
Facility: HOSPITAL | Age: 60
End: 2025-04-10
Payer: MEDICARE

## 2025-04-10 VITALS
WEIGHT: 168.5 LBS | OXYGEN SATURATION: 95 % | BODY MASS INDEX: 24.88 KG/M2 | TEMPERATURE: 98.2 F | HEART RATE: 83 BPM | SYSTOLIC BLOOD PRESSURE: 160 MMHG | DIASTOLIC BLOOD PRESSURE: 105 MMHG

## 2025-04-10 DIAGNOSIS — Z79.4 TYPE 2 DIABETES MELLITUS WITH HYPERGLYCEMIA, WITH LONG-TERM CURRENT USE OF INSULIN: Primary | ICD-10-CM

## 2025-04-10 DIAGNOSIS — Z94.0 KIDNEY REPLACED BY TRANSPLANT (HHS-HCC): ICD-10-CM

## 2025-04-10 DIAGNOSIS — I50.30 HEART FAILURE WITH PRESERVED EJECTION FRACTION, UNSPECIFIED HF CHRONICITY: ICD-10-CM

## 2025-04-10 DIAGNOSIS — E11.65 TYPE 2 DIABETES MELLITUS WITH HYPERGLYCEMIA, WITH LONG-TERM CURRENT USE OF INSULIN: Primary | ICD-10-CM

## 2025-04-10 PROCEDURE — 3049F LDL-C 100-129 MG/DL: CPT | Performed by: PHYSICIAN ASSISTANT

## 2025-04-10 PROCEDURE — 99214 OFFICE O/P EST MOD 30 MIN: CPT | Performed by: PHYSICIAN ASSISTANT

## 2025-04-10 PROCEDURE — 3077F SYST BP >= 140 MM HG: CPT | Performed by: PHYSICIAN ASSISTANT

## 2025-04-10 PROCEDURE — 3061F NEG MICROALBUMINURIA REV: CPT | Performed by: PHYSICIAN ASSISTANT

## 2025-04-10 PROCEDURE — 3080F DIAST BP >= 90 MM HG: CPT | Performed by: PHYSICIAN ASSISTANT

## 2025-04-10 RX ORDER — INSULIN GLARGINE 100 [IU]/ML
20 INJECTION, SOLUTION SUBCUTANEOUS EVERY MORNING
Qty: 15 ML | Refills: 1 | Status: SHIPPED | OUTPATIENT
Start: 2025-04-10 | End: 2025-09-07

## 2025-04-10 RX ORDER — PEN NEEDLE, DIABETIC 30 GX3/16"
1 NEEDLE, DISPOSABLE MISCELLANEOUS 4 TIMES DAILY
Qty: 400 EACH | Refills: 3 | Status: SHIPPED | OUTPATIENT
Start: 2025-04-10 | End: 2026-04-10

## 2025-04-10 ASSESSMENT — PAIN SCALES - GENERAL: PAINLEVEL_OUTOF10: 0-NO PAIN

## 2025-04-10 NOTE — PATIENT INSTRUCTIONS
"Type 2 diabetes mellitus with hyperglycemia, with long-term current use of insulin  -     pen needle, diabetic 32 gauge x 5/32\" needle; 1 each 4 times a day. Use to inject insulin 4 times daily  Kidney replaced by transplant ([facility])  -     insulin glargine (Lantus) 100 unit/mL (3 mL) pen; Inject 20 Units under the skin once daily in the morning. Take as directed per insulin instructions. Do not fill before January 11, 2025.  -     pen needle, diabetic 32 gauge x 5/32\" needle; 1 each 4 times a day. Use to inject insulin 4 times daily  Heart failure with preserved ejection fraction, unspecified HF chronicity        Type 2 diabetes mellitus, is not at goal. Pt is currently having concerns for labile glucose without glargine.     RX changes: see updated insulin plan below   Education:  interpretation of lab results and blood sugar goals  Follow up: I recommend diabetes care be in 3 weeks over video chat.      INSULIN INSTRUCTIONS    LANTUS = 20 units once every morning    HUMALOG        BREAKFAST = 6 units plus sliding scale (take 15 min before meal)       LUNCH = 6 units plus sliding scale (take 15 min before meal)       DINNER = 4 units plus sliding scale (take 15 min before meal)    SLIDING SCALE    = 0u  151-200 = 2u  201-250 = 4u  251-300 = 6u  301-350 = 8u  351-400 = 10u  Over 400 = repeat 10u every 4 hours   "

## 2025-04-10 NOTE — PROGRESS NOTES
Subjective   Anibal Dow is a 60 y.o. male who presents for follow-up of Type 2 diabetes mellitus. The initial diagnosis of diabetes was made  at least 15 years ago, if not more . The patient does not have a known family history of diabetes.    Known complications due to diabetes included retinopathy, peripheral vascular disease, chronic kidney disease, and CHF    Cardiovascular risk factors include advanced age (older than 55 for men, 65 for women), diabetes mellitus, and male gender. The patient is not on an ACE inhibitor or angiotensin II receptor blocker.  The patient has not been previously hospitalized due to diabetic ketoacidosis.     Current symptoms/problems include hyperglycemia and hypoglycemia . His clinical course has fluctuated.     Current diabetes regimen is as follows:  glargine 16u qAM, lispro 6-6-4u ACTID, and ssi 2u:50>150mg/dL ACTID  Pt had been without glargine x2 weeks. His wife did not  lantus rx as she understood his basal insulin brand to be basaglar and did not recognize the medication name.    The patient is currently checking the blood glucose 3-4 times per day.    Patient is using: continuous glucose monitor - shad 3+ with reader, unable to download    CGM INTERPRETATION per past 30 day report in CGM reader hx:  Average blood sugar 206 mg/dL    Glucose less than 70 mg/dL equals 1% of time worn  Glucose ranging between 70 to 180 mg/dL represented by 38% of time worn  Glucose ranging greater than 180 mg/dL represented by 61% of time worn    72 hours of data reviewed in order to inform diabetes treatment plan decision making, patient is not currently at risk for recurrent hypoglycemia safety concerns      Hypoglycemia frequency: 1%  Hypoglycemia awareness: Yes     Exercise: rarely - limited by recent kidney transplantation  Meal panning: He is using avoidance of concentrated sweets.    Review of Systems   All other systems reviewed and are negative.      Objective   BP (!)  160/105 (BP Location: Right arm, Patient Position: Sitting, BP Cuff Size: Adult)   Pulse 83   Temp 36.8 °C (98.2 °F) (Temporal)   Wt 76.4 kg (168 lb 8 oz)   SpO2 95%   BMI 24.88 kg/m²   Physical Exam  Constitutional:       Appearance: Normal appearance. He is normal weight.   HENT:      Head: Normocephalic and atraumatic.      Nose: Nose normal.      Mouth/Throat:      Mouth: Mucous membranes are dry.      Pharynx: Oropharynx is clear.   Eyes:      Extraocular Movements: Extraocular movements intact.      Conjunctiva/sclera: Conjunctivae normal.   Cardiovascular:      Rate and Rhythm: Normal rate and regular rhythm.      Pulses: Normal pulses.      Heart sounds: Normal heart sounds.   Pulmonary:      Effort: Pulmonary effort is normal.      Breath sounds: Normal breath sounds.   Abdominal:      General: Abdomen is flat. Bowel sounds are normal.      Palpations: Abdomen is soft.   Skin:     General: Skin is warm and dry.   Neurological:      General: No focal deficit present.      Mental Status: He is alert and oriented to person, place, and time. Mental status is at baseline.   Psychiatric:         Mood and Affect: Mood normal.         Behavior: Behavior normal.         Thought Content: Thought content normal.         Judgment: Judgment normal.         Lab Review  Glucose (mg/dL)   Date Value   03/25/2025 225 (H)   03/18/2025 213 (H)   03/11/2025 264 (H)     Hemoglobin A1C (%)   Date Value   07/08/2024 6.8 (H)   01/05/2024 8.0 (H)   06/13/2023 6.2 (A)   05/16/2023 6.7 (H)     Bicarbonate (mmol/L)   Date Value   03/25/2025 26   03/18/2025 27   03/11/2025 26     Urea Nitrogen (mg/dL)   Date Value   03/25/2025 28 (H)   03/18/2025 17   03/11/2025 36 (H)     Creatinine (mg/dL)   Date Value   03/25/2025 1.81 (H)   03/18/2025 1.63 (H)   03/11/2025 1.73 (H)       Health Maintenance:   Foot Exam: due for update  Eye Exam: due for update  Lipid Panel: up to date as of Jan 2025 with LDL above goal <70, ordered repeat for  "6 months after transplant  Urine Albumin: due for update, ordered for 6 months after transplant    Assessment/Plan   Diagnoses and all orders for this visit:  Type 2 diabetes mellitus with hyperglycemia, with long-term current use of insulin  -     pen needle, diabetic 32 gauge x 5/32\" needle; 1 each 4 times a day. Use to inject insulin 4 times daily  Kidney replaced by transplant ([facility])  -     insulin glargine (Lantus) 100 unit/mL (3 mL) pen; Inject 20 Units under the skin once daily in the morning. Take as directed per insulin instructions. Do not fill before January 11, 2025.  -     pen needle, diabetic 32 gauge x 5/32\" needle; 1 each 4 times a day. Use to inject insulin 4 times daily  Heart failure with preserved ejection fraction, unspecified HF chronicity        Type 2 diabetes mellitus, is not at goal. Pt is currently having concerns for labile glucose without glargine.    RX changes:  see updated insulin plan below    Education:  interpretation of lab results and blood sugar goals  Follow up: I recommend diabetes care be in 3 weeks over video chat.  We will plan to prioritize SGLT2i when you are 6 months post transplant (7/6/25) as this will benefit both your diabetes and heart failure      INSULIN INSTRUCTIONS    LANTUS = 20 units once every morning    HUMALOG        BREAKFAST = 6 units plus sliding scale (take 15 min before meal)       LUNCH = 6 units plus sliding scale (take 15 min before meal)       DINNER = 4 units plus sliding scale (take 15 min before meal)    SLIDING SCALE    = 0u  151-200 = 2u  201-250 = 4u  251-300 = 6u  301-350 = 8u  351-400 = 10u  Over 400 = repeat 10u every 4 hours         "

## 2025-04-15 ENCOUNTER — LAB (OUTPATIENT)
Dept: LAB | Facility: HOSPITAL | Age: 60
End: 2025-04-15
Payer: MEDICARE

## 2025-04-15 ENCOUNTER — APPOINTMENT (OUTPATIENT)
Dept: CARDIOLOGY | Facility: CLINIC | Age: 60
End: 2025-04-15
Payer: MEDICARE

## 2025-04-15 DIAGNOSIS — Z13.89 SCREENING FOR BLOOD OR PROTEIN IN URINE: ICD-10-CM

## 2025-04-15 DIAGNOSIS — Z94.0 KIDNEY REPLACED BY TRANSPLANT (HHS-HCC): ICD-10-CM

## 2025-04-15 DIAGNOSIS — Z94.0 KIDNEY TRANSPLANT STATUS: Primary | ICD-10-CM

## 2025-04-15 DIAGNOSIS — N18.6 ESRD (END STAGE RENAL DISEASE) (MULTI): ICD-10-CM

## 2025-04-15 LAB
25(OH)D3 SERPL-MCNC: 27 NG/ML (ref 30–100)
ALBUMIN SERPL BCP-MCNC: 4.5 G/DL (ref 3.4–5)
ANION GAP SERPL CALC-SCNC: 14 MMOL/L (ref 10–20)
BUN SERPL-MCNC: 29 MG/DL (ref 6–23)
CALCIUM SERPL-MCNC: 9.5 MG/DL (ref 8.6–10.6)
CHLORIDE SERPL-SCNC: 101 MMOL/L (ref 98–107)
CO2 SERPL-SCNC: 27 MMOL/L (ref 21–32)
CREAT SERPL-MCNC: 1.51 MG/DL (ref 0.5–1.3)
CREAT UR-MCNC: 97.8 MG/DL (ref 20–370)
EGFRCR SERPLBLD CKD-EPI 2021: 53 ML/MIN/1.73M*2
GLUCOSE SERPL-MCNC: 217 MG/DL (ref 74–99)
MAGNESIUM SERPL-MCNC: 2.45 MG/DL (ref 1.6–2.4)
PHOSPHATE SERPL-MCNC: 3.3 MG/DL (ref 2.5–4.9)
POTASSIUM SERPL-SCNC: 4.9 MMOL/L (ref 3.5–5.3)
PROT UR-ACNC: 27 MG/DL (ref 5–25)
PROT/CREAT UR: 0.28 MG/MG CREAT (ref 0–0.17)
PTH-INTACT SERPL-MCNC: 162.5 PG/ML (ref 18.5–88)
SODIUM SERPL-SCNC: 137 MMOL/L (ref 136–145)

## 2025-04-15 PROCEDURE — 80069 RENAL FUNCTION PANEL: CPT

## 2025-04-15 PROCEDURE — 83735 ASSAY OF MAGNESIUM: CPT

## 2025-04-15 PROCEDURE — 82570 ASSAY OF URINE CREATININE: CPT

## 2025-04-15 PROCEDURE — 36415 COLL VENOUS BLD VENIPUNCTURE: CPT

## 2025-04-15 PROCEDURE — 83970 ASSAY OF PARATHORMONE: CPT

## 2025-04-15 PROCEDURE — 84156 ASSAY OF PROTEIN URINE: CPT

## 2025-04-15 PROCEDURE — 82306 VITAMIN D 25 HYDROXY: CPT

## 2025-04-16 ENCOUNTER — OFFICE VISIT (OUTPATIENT)
Facility: HOSPITAL | Age: 60
End: 2025-04-16
Payer: MEDICARE

## 2025-04-16 VITALS
DIASTOLIC BLOOD PRESSURE: 84 MMHG | BODY MASS INDEX: 25.21 KG/M2 | TEMPERATURE: 98.3 F | WEIGHT: 170.7 LBS | SYSTOLIC BLOOD PRESSURE: 143 MMHG | OXYGEN SATURATION: 98 % | HEART RATE: 78 BPM

## 2025-04-16 DIAGNOSIS — D84.9 IMMUNOSUPPRESSION: ICD-10-CM

## 2025-04-16 DIAGNOSIS — Z92.25 PERSONAL HISTORY OF IMMUNOSUPRESSION THERAPY: ICD-10-CM

## 2025-04-16 DIAGNOSIS — N25.81 SECONDARY HYPERPARATHYROIDISM OF RENAL ORIGIN (MULTI): ICD-10-CM

## 2025-04-16 DIAGNOSIS — Z94.0 KIDNEY REPLACED BY TRANSPLANT (HHS-HCC): Primary | ICD-10-CM

## 2025-04-16 DIAGNOSIS — D72.819 LEUKOPENIA, UNSPECIFIED TYPE: ICD-10-CM

## 2025-04-16 PROCEDURE — G2211 COMPLEX E/M VISIT ADD ON: HCPCS

## 2025-04-16 PROCEDURE — 99215 OFFICE O/P EST HI 40 MIN: CPT

## 2025-04-16 PROCEDURE — 3061F NEG MICROALBUMINURIA REV: CPT | Performed by: STUDENT IN AN ORGANIZED HEALTH CARE EDUCATION/TRAINING PROGRAM

## 2025-04-16 PROCEDURE — 3079F DIAST BP 80-89 MM HG: CPT | Performed by: STUDENT IN AN ORGANIZED HEALTH CARE EDUCATION/TRAINING PROGRAM

## 2025-04-16 PROCEDURE — 3049F LDL-C 100-129 MG/DL: CPT | Performed by: STUDENT IN AN ORGANIZED HEALTH CARE EDUCATION/TRAINING PROGRAM

## 2025-04-16 PROCEDURE — 3077F SYST BP >= 140 MM HG: CPT | Performed by: STUDENT IN AN ORGANIZED HEALTH CARE EDUCATION/TRAINING PROGRAM

## 2025-04-16 RX ORDER — CHOLECALCIFEROL (VITAMIN D3) 25 MCG
25 TABLET ORAL DAILY
Qty: 30 TABLET | Refills: 11 | Status: SHIPPED | OUTPATIENT
Start: 2025-04-16 | End: 2026-04-16

## 2025-04-16 RX ORDER — PANTOPRAZOLE SODIUM 40 MG/1
40 TABLET, DELAYED RELEASE ORAL
Qty: 30 TABLET | Refills: 11 | Status: SHIPPED | OUTPATIENT
Start: 2025-04-16 | End: 2026-04-16

## 2025-04-16 ASSESSMENT — PAIN SCALES - GENERAL: PAINLEVEL_OUTOF10: 0-NO PAIN

## 2025-04-16 NOTE — PATIENT INSTRUCTIONS
Tac is 12.1 from 3/25 CBC and Tac are not available from yesterday labs. If no results by the end of tomorrow patient will repeat labs on Friday   Repeat CBC today and after weekly labs   Stopped K phos  Vit D 1000 daily  RTC in 1 month    Nerissa SAM endocrinology [phone]

## 2025-04-16 NOTE — PROGRESS NOTES
TRANSPLANT NEPHROLOGY :   OUTPATIENT CLINIC NOTE      SERVICE DATE : 2025    REASON FOR VISIT/CHIEF COMPLAINT:  S/P  TRANSPLANT SURGERY  IMMUNOSUPPRESSIVE MEDICATION MANAGEMENT  BLOOD PRESSURE MANAGEMENT    HPI:    Mr. Dow is a 60 y.o. male with past medical history significant for ESRD secondary to HTN and DM2 s/p  donor kidney transplant on 25 by Dr. Prince with a KDPI of 83% and PRA of 90%. HCV -/- and donor has not met risk factors. EBV +/+. Right donor kidney transplanted to patient right pelvis. Dry weight is 76 kg (discharge weight is 80.6 kg ). Pt received a total of 4.5 mg/kg total of thymoglobulin induction therapy in conjunction with 500mg IV solumedrol.     100 days out  Home -130  Compliant with all medications - his wife helps reminding him.  No N/V/D. No problems voiding.  No pain at the incision site.    Patient is doing well overall. No new complaints. Denied chest pain, SOB, NICHOLAS, Palpitation. Normal urination and bowel movement. Normal gait and no weakness of arms/legs. No cough, runny nose, sore throat, cold symptoms, or rash. No hearing loss. Normal vision.No problems with his sleep, mood and function. No recent infection, hospitalization, surgery or ER visits.      ROS:  Review of  14 systems was performed system by system. See HPI. Otherwise, the symptoms were negative.    PAST MEDICAL HISTORY:  Medical History[1]     PAST SURGICAL HISTORY:  Surgical History[2]     SOCIAL HISTORY:  Social History     Socioeconomic History    Marital status:      Spouse name: Not on file    Number of children: Not on file    Years of education: Not on file    Highest education level: Not on file   Occupational History    Not on file   Tobacco Use    Smoking status: Never    Smokeless tobacco: Never   Substance and Sexual Activity    Alcohol use: Not Currently    Drug use: Never    Sexual activity: Not on file   Other Topics Concern    Not on file   Social History  Narrative    Not on file     Social Drivers of Health     Financial Resource Strain: Low Risk  (1/7/2025)    Overall Financial Resource Strain (CARDIA)     Difficulty of Paying Living Expenses: Not hard at all   Food Insecurity: Food Insecurity Present (6/9/2024)    Received from [facility]    Hunger Vital Sign     Worried About Running Out of Food in the Last Year: Often true     Ran Out of Food in the Last Year: Often true   Transportation Needs: No Transportation Needs (1/7/2025)    PRAPARE - Transportation     Lack of Transportation (Medical): No     Lack of Transportation (Non-Medical): No   Physical Activity: Insufficiently Active (6/9/2024)    Received from [facility]    Exercise Vital Sign     Days of Exercise per Week: 4 days     Minutes of Exercise per Session: 20 min   Stress: Stress Concern Present (6/9/2024)    Received from [facility]    [employer] [place] - Occupational Stress Questionnaire     Feeling of Stress : To some extent   Social Connections: Moderately Integrated (6/9/2024)    Received from [facility]    Social Connection and Isolation Panel [NHANES]     Frequency of Communication with Friends and Family: Once a week     Frequency of Social Gatherings with Friends and Family: Once a week     Attends Synagogue Services: More than 4 times per year     Active Member of Clubs or Organizations: Yes     Attends Club or Organization Meetings: More than 4 times per year     Marital Status:    Intimate Partner Violence: Not At Risk (6/9/2024)    Received from [facility]    Humiliation, Afraid, Rape, and Kick questionnaire     Fear of Current or Ex-Partner: No     Emotionally Abused: No     Physically Abused: No     Sexually Abused: No   Housing Stability: Low Risk  (1/7/2025)    Housing Stability Vital Sign     Unable to Pay for Housing in the Last Year: No     Number of Times Moved in the Last Year: 0     Homeless in the Last Year: No       FAMILY HISTORY:  Family  "History[3]    MEDICATION LIST:  Current Outpatient Medications   Medication Instructions    atorvastatin (LIPITOR) 40 mg, oral, Daily    blood pressure test kit-medium kit 1 kit, miscellaneous, 2 times daily    blood-glucose sensor (FreeStyle Anisa 3 Plus Sensor) device 3 each, miscellaneous, Every 14 days    carvedilol (COREG) 12.5 mg, oral, 2 times daily    insulin glargine (Lantus) 100 unit/mL (3 mL) pen Inject 20 Units under the skin once daily in the morning. Take as directed per insulin instructions. Do not fill before January 11, 2025.    insulin lispro (HUMALOG) 0-10 Units, subcutaneous, 3 times daily before meals, Inject 6 units with breakfast/lunch, and 4 units with dinner - increase each dose per sliding scale: =0u, 151-200=2u, 201-250=4u, 251-300=6u, 301-350=8u, 351-400=10u. May need up to 50 units per day    mycophenolate (CELLCEPT) 750 mg, oral, 2 times daily    pantoprazole (PROTONIX) 40 mg, oral, Daily before breakfast, Do not crush, chew, or split.    pen needle, diabetic 32 gauge x 5/32\" needle 1 each, miscellaneous, 4 times daily, Use to inject insulin 4 times daily    predniSONE (DELTASONE) 5 mg, oral, Daily    sod phos di, mono-K phos mono (Phospha 250 Neutral) tablet 500 mg, oral, 2 times daily    sulfamethoxazole-trimethoprim (Bactrim) 400-80 mg tablet 1 tablet, oral, Daily    tacrolimus (PROGRAF) 0.5 mg, oral, Every 12 hours    ULTRA FINE LANCETS MISC Use to test blood sugar up to 3 times daily       ALLERGY  Allergies[4]    PHYSICAL EXAM:    Visit Vitals  /84   Pulse 78   Temp 36.8 °C (98.3 °F) (Temporal)   Wt 77.4 kg (170 lb 11.2 oz)   SpO2 98%   BMI 25.21 kg/m²   Smoking Status Never   BSA 1.94 m²        Vital signs - reviewed. Acceptable BP at this office visit.   General Appearance - NAD, Good speech, oriented and alert  HEENT - Supple. Not pale. No jaundice.   CVS - RRR. Normal S1/S2. No murmur, click , rub or gallop  Lungs- clear to auscultation bilaterally  Abdomen - soft " , not tender, no guarding, no rigidity.  S/P Kidney transplant .  Transplanted kidney is not tender.   Musculoskeletal /Extremities - no edema. Full ROM. No joint tenderness.   Neuro/Psych - appropriate mood and affect. Motor power V/V all extremities. CN I -XII were grossly intact.  Skin - No visible rash    LABS:    Lab Results   Component Value Date    WBC 2.5 (L) 03/25/2025    WBC 2.5 (L) 03/25/2025    HGB 13.3 (L) 03/25/2025    HGB 13.3 (L) 03/25/2025    HCT 44.1 03/25/2025    HCT 44.1 03/25/2025     03/25/2025     03/25/2025    CHOL 169 01/02/2025    TRIG 123 01/02/2025    HDL 40.0 01/02/2025    ALT 6 (L) 02/15/2025    AST 14 02/15/2025     04/15/2025    K 4.9 04/15/2025     04/15/2025    CREATININE 1.51 (H) 04/15/2025    BUN 29 (H) 04/15/2025    CO2 27 04/15/2025    PSA 0.6 06/27/2024    INR 1.1 03/11/2025    HGBA1C 6.8 (H) 07/08/2024       ASSESSMENT AND PLAN:    Mr. Dow is a 60 y.o. male  who is here for follow up s/p kidney transplant.    TRANSPLANT DATE: 1/6/2025 (Kidney)      1. ESRD S/P kidney transplant   - Creatinine last check was :  Lab Results   Component Value Date    CREATININE 1.51 (H) 04/15/2025       - Renal allograft function is good. Mikey Cr 1.3-1.5. Low level proteinuria  -Allosure last check was 0.15% 4/1/25  -Ensure adequate hydration  - Avoid nephrotoxic medications, NSAIDs, and IV contrast.    2. Immunosuppression  -Tacrolimus level last check was 12 - goal 6-8 - needs repeat level  -Continue current immunosuppression regimen.  TAC 0.5 mg BID   mg BID (due to leukopenia)  Pred 5 mg/day    PPI  PJP prophylaxis: TMP-SMX SS daily  Completed clotrimazole, Valcyte  CMV, EBV, BK neg 4/8/25    3. Electrolytes  Lab Results   Component Value Date    GLUCOSE 217 (H) 04/15/2025    CALCIUM 9.5 04/15/2025     04/15/2025    K 4.9 04/15/2025    CO2 27 04/15/2025     04/15/2025    BUN 29 (H) 04/15/2025    CREATININE 1.51 (H) 04/15/2025     -Acceptable  from last lab drawn    4. Hypertension  Blood Pressures         4/16/2025  1212             BP: 143/84          -Home  BP had been acceptable  -Encourage to monitor home BP  -Continue current anti hypertensive medication  Coreg 12.5 mg BID      5. Bone Mineral Disease/Osteoporosis  Lab Results   Component Value Date    .5 (H) 04/15/2025    CALCIUM 9.5 04/15/2025    PHOS 3.3 04/15/2025    VITD25 27 (L) 04/15/2025   Stop phosphorus supplement  Vitamin D 1000 units/day    6.Anemia  Lab Results   Component Value Date    WBC 2.5 (L) 03/25/2025    WBC 2.5 (L) 03/25/2025    HGB 13.3 (L) 03/25/2025    HGB 13.3 (L) 03/25/2025    HCT 44.1 03/25/2025    HCT 44.1 03/25/2025    MCV 92 03/25/2025    MCV 92 03/25/2025     03/25/2025     03/25/2025   Needs repeat CBC    7.Health maintenance and vaccination  - Flu shot during flu season annually  - Cancer screening is up to date per the patient  - CVD prevention: :Lipitor 40 mg/day  - DM: insulin    Lab : Routine transplant lab ( CBC, RFP, and anti-rejection trough level ) every weekly  Additional labs:  VIT D, PTH with next lab  Viral screening PCR, Allosure and UPC per protocol.    Additional Plan :  Stop phosphorus  Start vitamin D  Encourage high protein high phosphorus diet    RTC 1 month(s)    Deyanira Sheppard MD    Transplant Nephrology        [1]   Past Medical History:  Diagnosis Date    Diabetes mellitus (Multi)     ESRD (end stage renal disease) (Multi)     Hypertension     Personal history of other diseases of urinary system 05/21/2020    History of chronic kidney disease   [2]   Past Surgical History:  Procedure Laterality Date    CARDIAC CATHETERIZATION N/A 10/21/2024    Procedure: Right Heart Cath;  Surgeon: Maegan Anthony MD;  Location: [place] [address] Cardiac Cath Lab;  Service: Cardiovascular;  Laterality: N/A;    CT ANGIO CORONARY ART WITH HEARTFLOW IF SCORE >30%  01/20/2020    CT HEART CORONARY ANGIOGRAM 1/20/2020 [facility] ANCILLARY LEGACY     EYE SURGERY      OTHER SURGICAL HISTORY  05/21/2020    Toe amputation   [3] No family history on file.  [4] No Known Allergies

## 2025-04-16 NOTE — Clinical Note
Tac is 12.1 from 3/25  Repeat CBC today and after weekly labs  Stopped K phos Vit D 1000 daily RTC in 1 month

## 2025-04-18 ENCOUNTER — OFFICE VISIT (OUTPATIENT)
Dept: CARDIOLOGY | Facility: CLINIC | Age: 60
End: 2025-04-18
Payer: MEDICARE

## 2025-04-18 ENCOUNTER — LAB (OUTPATIENT)
Dept: LAB | Facility: HOSPITAL | Age: 60
End: 2025-04-18
Payer: MEDICARE

## 2025-04-18 VITALS
OXYGEN SATURATION: 97 % | HEIGHT: 69 IN | BODY MASS INDEX: 24.89 KG/M2 | HEART RATE: 83 BPM | SYSTOLIC BLOOD PRESSURE: 156 MMHG | DIASTOLIC BLOOD PRESSURE: 96 MMHG | WEIGHT: 168.06 LBS

## 2025-04-18 DIAGNOSIS — Z13.89 SCREENING FOR BLOOD OR PROTEIN IN URINE: ICD-10-CM

## 2025-04-18 DIAGNOSIS — Z94.0 KIDNEY TRANSPLANT STATUS: Primary | ICD-10-CM

## 2025-04-18 DIAGNOSIS — Z94.0 KIDNEY REPLACED BY TRANSPLANT (HHS-HCC): ICD-10-CM

## 2025-04-18 DIAGNOSIS — I42.9 CARDIOMYOPATHY, IDIOPATHIC (MULTI): Primary | ICD-10-CM

## 2025-04-18 DIAGNOSIS — R06.09 DYSPNEA ON EXERTION: ICD-10-CM

## 2025-04-18 LAB
ALBUMIN SERPL BCP-MCNC: 4.3 G/DL (ref 3.4–5)
ANION GAP SERPL CALC-SCNC: 14 MMOL/L (ref 10–20)
BUN SERPL-MCNC: 27 MG/DL (ref 6–23)
CALCIUM SERPL-MCNC: 9.5 MG/DL (ref 8.6–10.6)
CHLORIDE SERPL-SCNC: 101 MMOL/L (ref 98–107)
CO2 SERPL-SCNC: 26 MMOL/L (ref 21–32)
CREAT SERPL-MCNC: 1.55 MG/DL (ref 0.5–1.3)
CREAT UR-MCNC: 115.1 MG/DL (ref 20–370)
EGFRCR SERPLBLD CKD-EPI 2021: 51 ML/MIN/1.73M*2
ERYTHROCYTE [DISTWIDTH] IN BLOOD BY AUTOMATED COUNT: 13.2 % (ref 11.5–14.5)
GLUCOSE SERPL-MCNC: 196 MG/DL (ref 74–99)
HCT VFR BLD AUTO: 45.4 % (ref 41–52)
HGB BLD-MCNC: 13.1 G/DL (ref 13.5–17.5)
HOLD SPECIMEN: NORMAL
MAGNESIUM SERPL-MCNC: 2.28 MG/DL (ref 1.6–2.4)
MCH RBC QN AUTO: 26.3 PG (ref 26–34)
MCHC RBC AUTO-ENTMCNC: 28.9 G/DL (ref 32–36)
MCV RBC AUTO: 91 FL (ref 80–100)
NRBC BLD-RTO: 0 /100 WBCS (ref 0–0)
PHOSPHATE SERPL-MCNC: 2.9 MG/DL (ref 2.5–4.9)
PLATELET # BLD AUTO: 240 X10*3/UL (ref 150–450)
POTASSIUM SERPL-SCNC: 5 MMOL/L (ref 3.5–5.3)
PROT UR-ACNC: 28 MG/DL (ref 5–25)
PROT/CREAT UR: 0.24 MG/MG CREAT (ref 0–0.17)
RBC # BLD AUTO: 4.98 X10*6/UL (ref 4.5–5.9)
SODIUM SERPL-SCNC: 136 MMOL/L (ref 136–145)
TACROLIMUS BLD-MCNC: 4.2 NG/ML
WBC # BLD AUTO: 5.1 X10*3/UL (ref 4.4–11.3)

## 2025-04-18 PROCEDURE — 83735 ASSAY OF MAGNESIUM: CPT

## 2025-04-18 PROCEDURE — 82570 ASSAY OF URINE CREATININE: CPT

## 2025-04-18 PROCEDURE — 99214 OFFICE O/P EST MOD 30 MIN: CPT | Performed by: INTERNAL MEDICINE

## 2025-04-18 PROCEDURE — 84156 ASSAY OF PROTEIN URINE: CPT

## 2025-04-18 PROCEDURE — 85027 COMPLETE CBC AUTOMATED: CPT

## 2025-04-18 PROCEDURE — 80197 ASSAY OF TACROLIMUS: CPT

## 2025-04-18 PROCEDURE — 80069 RENAL FUNCTION PANEL: CPT

## 2025-04-18 ASSESSMENT — ENCOUNTER SYMPTOMS
DEPRESSION: 0
LOSS OF SENSATION IN FEET: 0
OCCASIONAL FEELINGS OF UNSTEADINESS: 0

## 2025-04-18 ASSESSMENT — PATIENT HEALTH QUESTIONNAIRE - PHQ9
2. FEELING DOWN, DEPRESSED OR HOPELESS: NOT AT ALL
1. LITTLE INTEREST OR PLEASURE IN DOING THINGS: NOT AT ALL
SUM OF ALL RESPONSES TO PHQ9 QUESTIONS 1 AND 2: 0

## 2025-04-18 ASSESSMENT — PAIN SCALES - GENERAL: PAINLEVEL_OUTOF10: 0-NO PAIN

## 2025-04-18 ASSESSMENT — COLUMBIA-SUICIDE SEVERITY RATING SCALE - C-SSRS
2. HAVE YOU ACTUALLY HAD ANY THOUGHTS OF KILLING YOURSELF?: NO
6. HAVE YOU EVER DONE ANYTHING, STARTED TO DO ANYTHING, OR PREPARED TO DO ANYTHING TO END YOUR LIFE?: NO
1. IN THE PAST MONTH, HAVE YOU WISHED YOU WERE DEAD OR WISHED YOU COULD GO TO SLEEP AND NOT WAKE UP?: NO

## 2025-04-18 NOTE — PROGRESS NOTES
Primary Care Physician: Desiree Quiros, APRN-CNP  Date of Visit: 04/18/2025  8:00 AM EDT  [place] [facility] [place] [address]     Chief Complaint:   Chief Complaint   Patient presents with    Follow-up    Heart Failure    Hypertension    Hyperlipidemia   Chief complaint preoperative risk evaluation for renal transplant.  Recent labs are reviewed.       HPI / Summary:   Anibal Dow is a 60 y.o. male presents for new cardiovascular evaluation. No ref. provider found   Last seen September 2024  ESRD secondary to hypertension.  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  History of reduced EF subsequently improved  Right heart catheterization done last October showed precapillary pulmonary hypertension PA of 55/22 with a mean of 38 wedge of 12PVR 3.25 Barrett units mildly elevated right-sided filling pressures RA 5 RV 9 high normal True output  With AV fistula occlusion PA sat went down from 79% to 75%.  Index slightly dropped normal SVC and PA sat    July 2024 transthoracic echo normal/low normal EF 55% grade 2 diastolic filling abnormality mild to moderate pulmonary hypertension with estimated RVSP of 47 moderate MR, mild TR, RV function was normal    Stress MRA last September elevated left ventricular end-diastolic volume mild, mildly reduced EF estimated 40% no evidence of stress-induced ischemia concentric LVH no ischemic damage or infiltrative process no comment was made with regard to excess scar formation no focal wall motion abnormalities  1/6/2025  eGFR 53  Bun 29, Cr 1.5    Prot/cr .28 normal   Chol 169,   Stable effort dyspnea.  No chest pain.  No signs of hypervolemia  Specialty Problems          Cardiology Problems    Essential hypertension    Dyslipidemia (high LDL; low HDL)    Chronic systolic heart failure    Congestive heart failure    Formatting of this note might be different from the original. Added automatically from request for surgery [id number]          "(HFpEF) heart failure with preserved ejection fraction    Abnormal findings on diagnostic imaging of heart and coronary circulation        Medical History[1]       Surgical History[2]       Social History:   reports that he has never smoked. He has never used smokeless tobacco. He reports that he does not currently use alcohol. He reports that he does not use drugs.      Allergies:  RX Allergies[3]    Outpatient Medications:  Current Outpatient Medications   Medication Instructions    atorvastatin (LIPITOR) 40 mg, oral, Daily    blood pressure test kit-medium kit 1 kit, miscellaneous, 2 times daily    blood-glucose sensor (FreeStyle Anisa 3 Plus Sensor) device 3 each, miscellaneous, Every 14 days    carvedilol (COREG) 12.5 mg, oral, 2 times daily    cholecalciferol (VITAMIN D-3) 25 mcg, oral, Daily    insulin glargine (Lantus) 100 unit/mL (3 mL) pen Inject 20 Units under the skin once daily in the morning. Take as directed per insulin instructions. Do not fill before January 11, 2025.    insulin lispro (HUMALOG) 0-10 Units, subcutaneous, 3 times daily before meals, Inject 6 units with breakfast/lunch, and 4 units with dinner - increase each dose per sliding scale: =0u, 151-200=2u, 201-250=4u, 251-300=6u, 301-350=8u, 351-400=10u. May need up to 50 units per day    mycophenolate (CELLCEPT) 750 mg, oral, 2 times daily    pantoprazole (PROTONIX) 40 mg, oral, Daily before breakfast, Do not crush, chew, or split.    pen needle, diabetic 32 gauge x 5/32\" needle 1 each, miscellaneous, 4 times daily, Use to inject insulin 4 times daily    predniSONE (DELTASONE) 5 mg, oral, Daily    sod phos di, mono-K phos mono (Phospha 250 Neutral) tablet 500 mg, oral, 2 times daily    sulfamethoxazole-trimethoprim (Bactrim) 400-80 mg tablet 1 tablet, oral, Daily    tacrolimus (PROGRAF) 0.5 mg, oral, Every 12 hours    ULTRA FINE LANCETS MISC Use to test blood sugar up to 3 times daily       ROS     Physical Exam:  Vitals:    04/18/25 " "0756   BP: (!) 156/96   BP Location: Right arm   Patient Position: Sitting   BP Cuff Size: Adult   Pulse: 83   SpO2: 97%   Weight: 76.2 kg (168 lb 1 oz)   Height: 1.753 m (5' 9\")     Wt Readings from Last 5 Encounters:   04/18/25 76.2 kg (168 lb 1 oz)   04/16/25 77.4 kg (170 lb 11.2 oz)   04/10/25 76.4 kg (168 lb 8 oz)   03/25/25 77 kg (169 lb 12.8 oz)   03/11/25 78.6 kg (173 lb 3.2 oz)     Body mass index is 24.82 kg/m².   Physical Exam   Well-appearing male no acute distress flat JVP.  Regular rhythm no gallop.  Clear lungs.  Soft abdomen.  No dependent edema.  Normal carotid upstrokes without bruits.  No flow audible over his left upper extremity vascular access  Last Labs:  CMP:  Recent Labs     04/15/25  0943 03/25/25  0818 03/18/25  0828 03/11/25  0843 02/25/25  0810    137 135* 136 138   K 4.9 5.1 4.9 5.1 5.5*    103 101 102 104   CO2 27 26 27 26 28   ANIONGAP 14 13 12 13 12   BUN 29* 28* 17 36* 28*   CREATININE 1.51* 1.81* 1.63* 1.73* 1.96*   EGFR 53* 42* 48* 45* 39*   GLUCOSE 217* 225* 213* 264* 226*     Recent Labs     04/15/25  0943 03/25/25  0818 03/18/25  0828 03/11/25  0843 02/25/25  0810 02/15/25  1234 02/04/25  1010 01/29/25  1506 01/06/25  1048 01/06/25  0241 06/27/24  0854 06/27/24  0854 08/05/23  1833   ALBUMIN 4.5 4.3 4.2 4.2 4.2 4.1   < > 3.9   < > 4.3   < > 3.9 4.2   ALKPHOS  --   --   --   --   --  82  --  80  --  71  --  105 85   ALT  --   --   --   --   --  6*  --  8*  --  9*  --  <3* 7*   AST  --   --   --   --   --  14  --  10  --  16  --  14 13   BILITOT  --   --   --   --   --  0.5  --  0.6  --  0.5  --  0.5 0.6   LIPASE  --   --   --   --   --   --   --  13  --   --   --   --  167*    < > = values in this interval not displayed.     CBC:  Recent Labs     03/25/25  0818 03/18/25  0828 03/11/25  0843 02/25/25  0810 02/15/25  1234   WBC 2.5*  2.5* 5.3 4.5 4.6 7.5   HGB 13.3*  13.3* 12.3* 11.8* 11.5* 10.7*   HCT 44.1  44.1 40.8* 39.7* 39.6* 33.6*     243 176 212 235 " "168   MCV 92  92 91 91 93 88     COAG:   Recent Labs     03/11/25  0843 01/06/25  0241 10/21/24  0856 06/27/24  0854 06/06/21  0129   INR 1.1 1.1 1.1 1.2* 1.1     HEME/ENDO:  Recent Labs     07/08/24  1539 01/05/24  1530 06/13/23  1118 05/16/23  1420   HGBA1C 6.8* 8.0* 6.2* 6.7*      CARDIAC: No results for input(s): \"LDH\", \"CKMB\", \"TROPHS\", \"BNP\" in the last 62646 hours.    No lab exists for component: \"CK\", \"CKMBP\"  Recent Labs     01/02/25  1212 06/27/24  0854   CHOL 169 203*   HDL 40.0 39.4   TRIG 123  --        Last Cardiology Tests:  ECG:      Echo:  Echo Results:  Transthoracic Echo (TTE) Complete 07/09/2024    [facility], [address] [place] [address]  Tel [phone] and Fax [phone]    TRANSTHORACIC ECHOCARDIOGRAM REPORT      Patient Name:      MATHEUS Mariscal Physician:    [id number] Gilmar Nogueira MD  Study Date:        7/9/2024             Ordering Provider:    [id number] CASTILLO  MRN/PID:           [id number]             Fellow:  Accession#:        [id number]         Nurse:  Date of Birth/Age: 1965 / 59 years Sonographer:          Laina Adler RDCS  Gender:            M                    Additional Staff:  Height:            175.26 cm            Admit Date:  Weight:            70.31 kg             Admission Status:     Outpatient  BSA / BMI:         1.85 m2 / 22.89      Encounter#:           [id number]  kg/m2  Blood Pressure:    154/98 mmHg          Department Location:  [facility]  Echo Lab    Study Type:    TRANSTHORACIC ECHO (TTE) COMPLETE  Diagnosis/ICD: Encounter for other preprocedural examination-Z01.818  Indication:    Pre kidney transplant eval  CPT Code:      Echo Complete w Full Doppler-03929    Patient History:  Pertinent History: CHF, HTN and CAD. PHTN, ESRD.    Study Detail: The following Echo studies were performed: 2D, M-Mode, Doppler and  color flow.      PHYSICIAN INTERPRETATION:  Left Ventricle: The left " ventricular systolic function is low normal, with a visually estimated ejection fraction of 50-55%. There is global hypokinesis of the left ventricle with minor regional variations. The left ventricular cavity size is normal. Spectral Doppler shows a pseudonormal pattern of left ventricular diastolic filling.  Left Atrium: The left atrium is normal in size.  Right Ventricle: The right ventricle is normal in size. There is normal right ventricular global systolic function.  Right Atrium: The right atrium is normal in size.  Aortic Valve: The aortic valve is trileaflet. There is no evidence of aortic valve regurgitation. The peak instantaneous gradient of the aortic valve is 6.6 mmHg.  Mitral Valve: The mitral valve is mildly thickened. There is tethering of the anterior mitral leaflet. There is mild to moderate mitral valve regurgitation.  Tricuspid Valve: The tricuspid valve is structurally normal. There is mild tricuspid regurgitation. The Doppler estimated RVSP is mild to moderately elevated at 47.2 mmHg.  Pulmonic Valve: The pulmonic valve is structurally normal. There is physiologic pulmonic valve regurgitation.  Pericardium: There is a trivial pericardial effusion.  Aorta: The aortic root is normal.  Systemic Veins: The inferior vena cava appears to be of normal size. There is IVC inspiratory collapse greater than 50%.  In comparison to the previous echocardiogram(s): Compared with study dated 6/13/2023, no significant change.      CONCLUSIONS:  1. The left ventricular systolic function is low normal, with a visually estimated ejection fraction of 50-55%.  2. There is global hypokinesis of the left ventricle with minor regional variations.  3. Spectral Doppler shows a pseudonormal pattern of left ventricular diastolic filling.  4. There is normal right ventricular global systolic function.  5. Mild to moderate mitral valve regurgitation.  6. Mild to moderately elevated right ventricular systolic  pressure.    QUANTITATIVE DATA SUMMARY:  2D MEASUREMENTS:  Normal Ranges:  Ao Root d:     2.80 cm   (2.0-3.7cm)  LAs:           4.57 cm   (2.7-4.0cm)  RVIDd:         2.27 cm   (0.9-3.6cm)  IVSd:          0.86 cm   (0.6-1.1cm)  LVPWd:         0.81 cm   (0.6-1.1cm)  LVIDd:         5.21 cm   (3.9-5.9cm)  LVIDs:         4.05 cm  LV Mass Index: 82.6 g/m2  LV % FS        22.2 %    LA VOLUME:  Normal Ranges:  LA Vol A4C:       43.2 ml    (22+/-6mL/m2)  LA Vol A2C:       29.3 ml  LA Vol BP:        35.8 ml  LA Vol Index A4C: 23.3ml/m2  LA Vol Index A2C: 15.8 ml/m2  LA Vol Index BP:  19.3 ml/m2  LA Volume Index:  19.2 ml/m2  LA Vol A4C:       41.6 ml  LA Vol A2C:       28.0 ml    RA VOLUME BY A/L METHOD:  Normal Ranges:  RA Area A4C: 11.7 cm2    AORTA MEASUREMENTS:  Normal Ranges:  Asc Ao, d: 3.20 cm (2.1-3.4cm)    LV SYSTOLIC FUNCTION BY 2D PLANIMETRY (MOD):  Normal Ranges:  EF-A4C View:    50 % (>=55%)  EF-A2C View:    52 %  EF-Biplane:     49 %  EF-Visual:      53 %  LV EF Reported: 53 %    LV DIASTOLIC FUNCTION:  Normal Ranges:  MV Peak E: 0.94 m/s    (0.7-1.2 m/s)  MV Peak A: 1.49 m/s    (0.42-0.7 m/s)  E/A Ratio: 0.63        (1.0-2.2)  MV A Dur:  101.50 msec  a'         0.09 m/s    MITRAL VALVE:  Normal Ranges:  MV DT: 209 msec (150-240msec)    MITRAL INSUFFICIENCY:  Normal Ranges:  MR VTI:     232.20 cm  MR Vmax:    648.83 cm/s  MR Volume:  11.32 ml  MR Flow Rt: 31.63 ml/s  MR EROA:    0.05 cm2    AORTIC VALVE:  Normal Ranges:  AoV Vmax:      1.28 m/s (<=1.7m/s)  AoV Peak P.6 mmHg (<20mmHg)  LVOT Max Pj:  1.12 m/s (<=1.1m/s)  LVOT VTI:      22.60 cm  LVOT Diameter: 2.03 cm  (1.8-2.4cm)  AoV Area,Vmax: 2.82 cm2 (2.5-4.5cm2)      RIGHT VENTRICLE:  RV Basal 3.50 cm  RV Mid   2.30 cm  RV Major 7.6 cm  TAPSE:   21.0 mm  RV s'    0.08 m/s    TRICUSPID VALVE/RVSP:  Normal Ranges:  Peak TR Velocity: 3.32 m/s  RV Syst Pressure: 47.2 mmHg (< 30mmHg)    PULMONIC VALVE:  Normal Ranges:  PV Max Pj: 0.7 m/s  (0.6-0.9m/s)  PV  Max P.9 mmHg    AORTA:  Asc Ao Diam 3.24 cm      [id number] Gilmar Nogueira MD  Electronically signed on 2024 at 10:19:38 AM        ** Final **       Cath:      Stress Test:  Stress Results:  No results found for this or any previous visit from the past 365 days.         Cardiac Imaging:  US kidney transplant  Narrative: Interpreted By:  Antonio Danielle and Ogievich Taessa   STUDY:  US KIDNEY TRANSPLANT;  2025 1:18 pm      INDICATION:  Signs/Symptoms:Post Kidney Transplant.          COMPARISON:  None.      ACCESSION NUMBER(S):  [id number]      ORDERING CLINICIAN:  KATHLEEN OBRIEN      TECHNIQUE:  Grayscale, color, and spectral Doppler of the kidney transplant were  performed.      FINDINGS:  TRANSPLANT KIDNEY:  Transplant kidney location:  The renal transplant is located in the  right iliac fossa. The transplant kidney comjktsf28.8 cm in  craniocaudal dimension. There is no hydronephrosis and hydroureter.  No perinephric fluid collections are seen.      DOPPLER EVALUATION:      RESISTIVE INDICES:  The resistive indices were as follows:  0.75, 0.79 in the superior pole.  0.73, 0.68 in the midpole.  0.68 in the inferior pole.  Wave forms are normal.      TRANSPLANT RENAL ARTERY AND VEIN:  Main renal artery velocity is 99.9 cm/s, 89.5 cm/s.  Arterial anastomosis velocity is 160.0 cm/s.  Adjacent iliac artery velocity is 155.5 cm/s      Transplant renal vein is patent.  Peak velocity in the main renal vein is 47.5 cm/s, 41.2 cm/s, in the  adjacent iliac vein 48.7 cm/s. Venous anastomosis velocity is 37.5  cm/s.      Impression: Unremarkable ultrasound and Doppler evaluation of the transplant  kidney as detailed above      I personally reviewed the images/study and I agree with the findings  as stated by Kamlesh Franco DO, PGY-3. This study was interpreted  at [facility], [place], [place].      MACRO:  None      Signed by: Antonio Fong 2025 3:41  PM  Dictation workstation:   [id number]  XR chest 1 view  Narrative: Interpreted By:  Reji Jenkins and Ohs Zachary   STUDY:  XR CHEST 1 VIEW;  1/6/2025 2:50 am      INDICATION:  Signs/Symptoms:preop.          COMPARISON:  Chest radiographs 06/27/2024.      ACCESSION NUMBER(S):  [id number]      ORDERING CLINICIAN:  KATHLEEN OBRIEN      FINDINGS:  AP radiograph of the chest was provided.      MEDICAL DEVICES:  Vascular stent overlying the left axilla.      CARDIOMEDIASTINAL SILHOUETTE:  Cardiomediastinal silhouette is normal in size and configuration.      LUNGS:  No pneumothorax, pleural effusion or focal consolidation.      ABDOMEN:  No remarkable upper abdominal findings.      BONES:  No acute osseous changes. Intra-articular free body within the right  glenohumeral joint.      Impression: 1.  No evidence of acute cardiopulmonary process.      I personally reviewed the images/study and I agree with the findings  as stated by Dr. Mark Marroquin. This study was interpreted at  [place], [place].      MACRO:  None      Signed by: Reji Jenkins 1/6/2025 9:00 AM  Dictation workstation:   [id number]        Assessment/Plan     Diabetic hypertensive male with a history of mild LV dysfunction, end-stage renal disease status post DD KT in January doing well clinically, creatinine around 1.5, LDL around 104 and atorvastatin 40.  Would like to survey his LV function now that he has been transplanted.  At his 6-month follow-up I will do an echo thank you for allowing me to participate in his care    Orders:  No orders of the defined types were placed in this encounter.     Followup Appts:  Future Appointments   Date Time Provider Department Center   4/23/2025  9:40 AM Mery Alcantara PharmD [id number] [facility]   5/13/2025  9:20 AM TXP KIDNEY PHYSICIAN [id number] [facility]   5/16/2025  1:00 PM Juli Harris, APRN-CNP [id number]            ____________________________________________________________  Ridge Noonan MD    Senior Attending Physician  [facility]  [facility]         [1]   Past Medical History:  Diagnosis Date    Diabetes mellitus (Multi)     ESRD (end stage renal disease) (Multi)     Hypertension     Personal history of other diseases of urinary system 05/21/2020    History of chronic kidney disease   [2]   Past Surgical History:  Procedure Laterality Date    CARDIAC CATHETERIZATION N/A 10/21/2024    Procedure: Right Heart Cath;  Surgeon: Maegan Anthony MD;  Location: [place] [address] Cardiac Cath Lab;  Service: Cardiovascular;  Laterality: N/A;    CT ANGIO CORONARY ART WITH HEARTFLOW IF SCORE >30%  01/20/2020    CT HEART CORONARY ANGIOGRAM 1/20/2020 [facility] ANCILLARY LEGACY    EYE SURGERY      OTHER SURGICAL HISTORY  05/21/2020    Toe amputation   [3] No Known Allergies

## 2025-04-21 ENCOUNTER — TELEPHONE (OUTPATIENT)
Facility: HOSPITAL | Age: 60
End: 2025-04-21
Payer: MEDICARE

## 2025-04-21 ENCOUNTER — PATIENT MESSAGE (OUTPATIENT)
Facility: HOSPITAL | Age: 60
End: 2025-04-21
Payer: MEDICARE

## 2025-04-21 DIAGNOSIS — Z94.0 KIDNEY REPLACED BY TRANSPLANT (HHS-HCC): ICD-10-CM

## 2025-04-21 RX ORDER — TACROLIMUS 0.5 MG/1
1 CAPSULE ORAL 2 TIMES DAILY
Qty: 120 CAPSULE | Refills: 11 | Status: SHIPPED | OUTPATIENT
Start: 2025-04-21 | End: 2026-04-21

## 2025-04-22 ENCOUNTER — LAB (OUTPATIENT)
Dept: LAB | Facility: HOSPITAL | Age: 60
End: 2025-04-22
Payer: MEDICARE

## 2025-04-22 DIAGNOSIS — Z13.89 SCREENING FOR BLOOD OR PROTEIN IN URINE: ICD-10-CM

## 2025-04-22 DIAGNOSIS — Z94.0 KIDNEY REPLACED BY TRANSPLANT (HHS-HCC): ICD-10-CM

## 2025-04-22 DIAGNOSIS — Z94.0 KIDNEY TRANSPLANT STATUS: Primary | ICD-10-CM

## 2025-04-22 LAB
ALBUMIN SERPL BCP-MCNC: 4.1 G/DL (ref 3.4–5)
ANION GAP SERPL CALC-SCNC: 13 MMOL/L (ref 10–20)
BUN SERPL-MCNC: 26 MG/DL (ref 6–23)
CALCIUM SERPL-MCNC: 9.2 MG/DL (ref 8.6–10.6)
CHLORIDE SERPL-SCNC: 98 MMOL/L (ref 98–107)
CO2 SERPL-SCNC: 28 MMOL/L (ref 21–32)
CREAT SERPL-MCNC: 1.54 MG/DL (ref 0.5–1.3)
CREAT UR-MCNC: 94.5 MG/DL (ref 20–370)
EGFRCR SERPLBLD CKD-EPI 2021: 51 ML/MIN/1.73M*2
ERYTHROCYTE [DISTWIDTH] IN BLOOD BY AUTOMATED COUNT: 13.2 % (ref 11.5–14.5)
GLUCOSE SERPL-MCNC: 283 MG/DL (ref 74–99)
HCT VFR BLD AUTO: 44 % (ref 41–52)
HGB BLD-MCNC: 12.9 G/DL (ref 13.5–17.5)
MAGNESIUM SERPL-MCNC: 2.19 MG/DL (ref 1.6–2.4)
MCH RBC QN AUTO: 26.1 PG (ref 26–34)
MCHC RBC AUTO-ENTMCNC: 29.3 G/DL (ref 32–36)
MCV RBC AUTO: 89 FL (ref 80–100)
NRBC BLD-RTO: 0 /100 WBCS (ref 0–0)
PHOSPHATE SERPL-MCNC: 2.5 MG/DL (ref 2.5–4.9)
PLATELET # BLD AUTO: 199 X10*3/UL (ref 150–450)
POTASSIUM SERPL-SCNC: 4.6 MMOL/L (ref 3.5–5.3)
PROT UR-ACNC: 22 MG/DL (ref 5–25)
PROT/CREAT UR: 0.23 MG/MG CREAT (ref 0–0.17)
RBC # BLD AUTO: 4.94 X10*6/UL (ref 4.5–5.9)
SODIUM SERPL-SCNC: 134 MMOL/L (ref 136–145)
WBC # BLD AUTO: 4.4 X10*3/UL (ref 4.4–11.3)

## 2025-04-22 PROCEDURE — 85027 COMPLETE CBC AUTOMATED: CPT

## 2025-04-22 PROCEDURE — 80069 RENAL FUNCTION PANEL: CPT

## 2025-04-22 PROCEDURE — 84156 ASSAY OF PROTEIN URINE: CPT

## 2025-04-22 PROCEDURE — 83735 ASSAY OF MAGNESIUM: CPT

## 2025-04-22 PROCEDURE — 82570 ASSAY OF URINE CREATININE: CPT

## 2025-04-22 PROCEDURE — 87799 DETECT AGENT NOS DNA QUANT: CPT

## 2025-04-22 PROCEDURE — 36415 COLL VENOUS BLD VENIPUNCTURE: CPT

## 2025-04-22 PROCEDURE — RXMED WILLOW AMBULATORY MEDICATION CHARGE

## 2025-04-23 ENCOUNTER — APPOINTMENT (OUTPATIENT)
Dept: PHARMACY | Facility: HOSPITAL | Age: 60
End: 2025-04-23
Payer: MEDICARE

## 2025-04-25 DIAGNOSIS — Z94.0 KIDNEY REPLACED BY TRANSPLANT (HHS-HCC): ICD-10-CM

## 2025-05-01 ENCOUNTER — LAB (OUTPATIENT)
Dept: LAB | Facility: HOSPITAL | Age: 60
End: 2025-05-01
Payer: MEDICARE

## 2025-05-01 DIAGNOSIS — Z94.0 KIDNEY TRANSPLANT STATUS: Primary | ICD-10-CM

## 2025-05-01 LAB
ALBUMIN SERPL BCP-MCNC: 4.3 G/DL (ref 3.4–5)
ANION GAP SERPL CALC-SCNC: 14 MMOL/L (ref 10–20)
BUN SERPL-MCNC: 27 MG/DL (ref 6–23)
CALCIUM SERPL-MCNC: 9.6 MG/DL (ref 8.6–10.6)
CHLORIDE SERPL-SCNC: 103 MMOL/L (ref 98–107)
CO2 SERPL-SCNC: 29 MMOL/L (ref 21–32)
CREAT SERPL-MCNC: 1.37 MG/DL (ref 0.5–1.3)
EGFRCR SERPLBLD CKD-EPI 2021: 59 ML/MIN/1.73M*2
ERYTHROCYTE [DISTWIDTH] IN BLOOD BY AUTOMATED COUNT: 13.2 % (ref 11.5–14.5)
GLUCOSE SERPL-MCNC: 237 MG/DL (ref 74–99)
HCT VFR BLD AUTO: 47.9 % (ref 41–52)
HGB BLD-MCNC: 14.2 G/DL (ref 13.5–17.5)
HOLD SPECIMEN: 293
MAGNESIUM SERPL-MCNC: 1.88 MG/DL (ref 1.6–2.4)
MCH RBC QN AUTO: 27.3 PG (ref 26–34)
MCHC RBC AUTO-ENTMCNC: 29.6 G/DL (ref 32–36)
MCV RBC AUTO: 92 FL (ref 80–100)
NRBC BLD-RTO: 0 /100 WBCS (ref 0–0)
PHOSPHATE SERPL-MCNC: 2.6 MG/DL (ref 2.5–4.9)
PLATELET # BLD AUTO: 164 X10*3/UL (ref 150–450)
POTASSIUM SERPL-SCNC: 4.8 MMOL/L (ref 3.5–5.3)
RBC # BLD AUTO: 5.2 X10*6/UL (ref 4.5–5.9)
SODIUM SERPL-SCNC: 141 MMOL/L (ref 136–145)
TACROLIMUS BLD-MCNC: 9.5 NG/ML
WBC # BLD AUTO: 5.3 X10*3/UL (ref 4.4–11.3)

## 2025-05-01 PROCEDURE — 80197 ASSAY OF TACROLIMUS: CPT

## 2025-05-01 PROCEDURE — 85027 COMPLETE CBC AUTOMATED: CPT

## 2025-05-01 PROCEDURE — 80069 RENAL FUNCTION PANEL: CPT

## 2025-05-01 PROCEDURE — 83735 ASSAY OF MAGNESIUM: CPT

## 2025-05-02 DIAGNOSIS — Z94.0 KIDNEY REPLACED BY TRANSPLANT (HHS-HCC): ICD-10-CM

## 2025-05-05 ENCOUNTER — TELEPHONE (OUTPATIENT)
Facility: HOSPITAL | Age: 60
End: 2025-05-05
Payer: MEDICARE

## 2025-05-05 PROCEDURE — RXMED WILLOW AMBULATORY MEDICATION CHARGE

## 2025-05-05 NOTE — TELEPHONE ENCOUNTER
Patient called requesting to speak with coordinator about  pantoprazole and mmf. I let pharmacy know we are aware and carl was supervising the all in case of additional question.  Patient call back number is [phone]

## 2025-05-06 ENCOUNTER — LAB (OUTPATIENT)
Dept: LAB | Facility: HOSPITAL | Age: 60
End: 2025-05-06
Payer: MEDICARE

## 2025-05-06 ENCOUNTER — APPOINTMENT (OUTPATIENT)
Dept: LAB | Facility: HOSPITAL | Age: 60
End: 2025-05-06
Payer: MEDICARE

## 2025-05-06 DIAGNOSIS — Z94.0 KIDNEY TRANSPLANT STATUS: Primary | ICD-10-CM

## 2025-05-06 LAB
ALBUMIN SERPL BCP-MCNC: 4.3 G/DL (ref 3.4–5)
ANION GAP SERPL CALC-SCNC: 12 MMOL/L (ref 10–20)
BUN SERPL-MCNC: 25 MG/DL (ref 6–23)
CALCIUM SERPL-MCNC: 9.7 MG/DL (ref 8.6–10.6)
CHLORIDE SERPL-SCNC: 99 MMOL/L (ref 98–107)
CO2 SERPL-SCNC: 30 MMOL/L (ref 21–32)
CREAT SERPL-MCNC: 1.26 MG/DL (ref 0.5–1.3)
CREAT UR-MCNC: 124.1 MG/DL (ref 20–370)
EGFRCR SERPLBLD CKD-EPI 2021: 65 ML/MIN/1.73M*2
ERYTHROCYTE [DISTWIDTH] IN BLOOD BY AUTOMATED COUNT: 13 % (ref 11.5–14.5)
GLUCOSE SERPL-MCNC: 180 MG/DL (ref 74–99)
HCT VFR BLD AUTO: 49.9 % (ref 41–52)
HGB BLD-MCNC: 14.5 G/DL (ref 13.5–17.5)
HOLD SPECIMEN: NORMAL
MAGNESIUM SERPL-MCNC: 1.83 MG/DL (ref 1.6–2.4)
MCH RBC QN AUTO: 26.5 PG (ref 26–34)
MCHC RBC AUTO-ENTMCNC: 29.1 G/DL (ref 32–36)
MCV RBC AUTO: 91 FL (ref 80–100)
NRBC BLD-RTO: 0.8 /100 WBCS (ref 0–0)
PHOSPHATE SERPL-MCNC: 2.6 MG/DL (ref 2.5–4.9)
PLATELET # BLD AUTO: 143 X10*3/UL (ref 150–450)
POTASSIUM SERPL-SCNC: 4.2 MMOL/L (ref 3.5–5.3)
RBC # BLD AUTO: 5.47 X10*6/UL (ref 4.5–5.9)
SODIUM SERPL-SCNC: 137 MMOL/L (ref 136–145)
WBC # BLD AUTO: 4 X10*3/UL (ref 4.4–11.3)

## 2025-05-06 PROCEDURE — 87799 DETECT AGENT NOS DNA QUANT: CPT

## 2025-05-06 PROCEDURE — 85027 COMPLETE CBC AUTOMATED: CPT

## 2025-05-06 PROCEDURE — 82570 ASSAY OF URINE CREATININE: CPT

## 2025-05-06 PROCEDURE — 80069 RENAL FUNCTION PANEL: CPT

## 2025-05-06 PROCEDURE — 80197 ASSAY OF TACROLIMUS: CPT

## 2025-05-06 PROCEDURE — 83735 ASSAY OF MAGNESIUM: CPT

## 2025-05-06 NOTE — TELEPHONE ENCOUNTER
Patient called to request refills of the below medication(s) and dose(s).  Please send prescription for the checked items below to       [facility] - [place], [place] - [address] [place] [address]  Phone: [phone] Fax: [phone]           mycophenolate (Cellcept) 250 mg capsule   pantoprazole (ProtoNix) 40 mg EC tablet

## 2025-05-07 ENCOUNTER — LAB (OUTPATIENT)
Dept: LAB | Facility: HOSPITAL | Age: 60
End: 2025-05-07
Payer: MEDICARE

## 2025-05-07 ENCOUNTER — PHARMACY VISIT (OUTPATIENT)
Dept: PHARMACY | Facility: CLINIC | Age: 60
End: 2025-05-07
Payer: COMMERCIAL

## 2025-05-07 DIAGNOSIS — Z94.0 KIDNEY REPLACED BY TRANSPLANT (HHS-HCC): ICD-10-CM

## 2025-05-07 LAB
BKV DNA SERPL NAA+PROBE-LOG#: NORMAL {LOG_COPIES}/ML
CMV DNA SERPL NAA+PROBE-LOG IU: NORMAL {LOG_IU}/ML
EBV DNA SPEC NAA+PROBE-LOG#: NORMAL {LOG_COPIES}/ML
HOLD SPECIMEN: NORMAL
LABORATORY COMMENT REPORT: NOT DETECTED
TACROLIMUS BLD-MCNC: 2.6 NG/ML

## 2025-05-08 NOTE — TELEPHONE ENCOUNTER
I called and let the patient know that the medication has been refilled at [facility]. Patient wife said she had pick them up yesterday

## 2025-05-08 NOTE — TELEPHONE ENCOUNTER
Tacrolimus level 2.6 from 9.5 on 1/1. Discussed with Dr Vogt who recommends pt take an extra 1 mg now and increase to 2/2 tonight.    Called pt wife, she reports patient was out of med x1 week, he started taking it again last night. Advised his wife not to make any changes right now and to repeat labs tomorrow to ensure good tac trough. She verbalized understanding.

## 2025-05-09 ENCOUNTER — PATIENT MESSAGE (OUTPATIENT)
Facility: HOSPITAL | Age: 60
End: 2025-05-09

## 2025-05-09 ENCOUNTER — LAB (OUTPATIENT)
Dept: LAB | Facility: HOSPITAL | Age: 60
End: 2025-05-09
Payer: MEDICARE

## 2025-05-09 DIAGNOSIS — Z94.0 KIDNEY REPLACED BY TRANSPLANT (HHS-HCC): ICD-10-CM

## 2025-05-09 DIAGNOSIS — Z94.0 KIDNEY TRANSPLANT STATUS: Primary | ICD-10-CM

## 2025-05-09 LAB
ALBUMIN SERPL BCP-MCNC: 4.2 G/DL (ref 3.4–5)
ALLOSURE SCORE - KIDNEY: 0.42 %
ANION GAP SERPL CALC-SCNC: 10 MMOL/L (ref 10–20)
BUN SERPL-MCNC: 30 MG/DL (ref 6–23)
CALCIUM SERPL-MCNC: 9.5 MG/DL (ref 8.6–10.6)
CAREDX_ORDER_ID: NORMAL
CENTER_ORDER_ID: NORMAL
CHLORIDE SERPL-SCNC: 101 MMOL/L (ref 98–107)
CLIENT SPECIMEN ID - ALLOSURE: NORMAL
CO2 SERPL-SCNC: 29 MMOL/L (ref 21–32)
CREAT SERPL-MCNC: 1.43 MG/DL (ref 0.5–1.3)
CREAT UR-MCNC: 109.6 MG/DL (ref 20–370)
DONOR RELATION - ALLOSURE: NORMAL
EGFRCR SERPLBLD CKD-EPI 2021: 56 ML/MIN/1.73M*2
ERYTHROCYTE [DISTWIDTH] IN BLOOD BY AUTOMATED COUNT: 12.7 % (ref 11.5–14.5)
GLUCOSE SERPL-MCNC: 180 MG/DL (ref 74–99)
HCT VFR BLD AUTO: 47.3 % (ref 41–52)
HGB BLD-MCNC: 13.8 G/DL (ref 13.5–17.5)
MAGNESIUM SERPL-MCNC: 2.05 MG/DL (ref 1.6–2.4)
MCH RBC QN AUTO: 26.5 PG (ref 26–34)
MCHC RBC AUTO-ENTMCNC: 29.2 G/DL (ref 32–36)
MCV RBC AUTO: 91 FL (ref 80–100)
NOTES - ALLOSURE: NORMAL
NRBC BLD-RTO: 0 /100 WBCS (ref 0–0)
PHOSPHATE SERPL-MCNC: 2.8 MG/DL (ref 2.5–4.9)
PLATELET # BLD AUTO: 158 X10*3/UL (ref 150–450)
POTASSIUM SERPL-SCNC: 5 MMOL/L (ref 3.5–5.3)
PROT UR-ACNC: 25 MG/DL (ref 5–25)
PROT/CREAT UR: 0.23 MG/MG CREAT (ref 0–0.17)
RBC # BLD AUTO: 5.21 X10*6/UL (ref 4.5–5.9)
RELATIVE CHANGE VALUE - KIDNEY: NORMAL
SODIUM SERPL-SCNC: 135 MMOL/L (ref 136–145)
TACROLIMUS BLD-MCNC: 3 NG/ML
TEST COMMENTS - ALLOSURE: NORMAL
TIME POST TX - ALLOSURE: NORMAL
TRANSPLANTED ORGAN - ALLOSURE: NORMAL
TX DATE - ALLOSURE/ALLOMAP: NORMAL
WBC # BLD AUTO: 4.1 X10*3/UL (ref 4.4–11.3)
WP_ORDER_ID: NORMAL

## 2025-05-09 PROCEDURE — 83735 ASSAY OF MAGNESIUM: CPT

## 2025-05-09 PROCEDURE — 80197 ASSAY OF TACROLIMUS: CPT

## 2025-05-09 PROCEDURE — 86833 HLA CLASS II HIGH DEFIN QUAL: CPT

## 2025-05-09 PROCEDURE — 87799 DETECT AGENT NOS DNA QUANT: CPT

## 2025-05-09 PROCEDURE — 84156 ASSAY OF PROTEIN URINE: CPT

## 2025-05-09 PROCEDURE — 36415 COLL VENOUS BLD VENIPUNCTURE: CPT

## 2025-05-09 PROCEDURE — 82570 ASSAY OF URINE CREATININE: CPT

## 2025-05-09 PROCEDURE — 85027 COMPLETE CBC AUTOMATED: CPT

## 2025-05-09 PROCEDURE — 86832 HLA CLASS I HIGH DEFIN QUAL: CPT

## 2025-05-09 PROCEDURE — 80069 RENAL FUNCTION PANEL: CPT

## 2025-05-09 NOTE — PATIENT COMMUNICATION
Call placed to patient's wife to confirm tac dose, pt is currently taking 0.5 mg BID. Instructed pt's wife to increase to 1 mg BID and to ignore the [email or web address] message I sent. She verbalized understanding and will increase his dose.

## 2025-05-13 ENCOUNTER — NURSE ONLY (OUTPATIENT)
Facility: HOSPITAL | Age: 60
End: 2025-05-13
Payer: MEDICARE

## 2025-05-13 ENCOUNTER — TELEPHONE (OUTPATIENT)
Dept: TRANSPLANT | Facility: HOSPITAL | Age: 60
End: 2025-05-13
Payer: MEDICARE

## 2025-05-13 ENCOUNTER — OFFICE VISIT (OUTPATIENT)
Facility: HOSPITAL | Age: 60
End: 2025-05-13
Payer: MEDICARE

## 2025-05-13 VITALS
BODY MASS INDEX: 24.54 KG/M2 | TEMPERATURE: 96.8 F | OXYGEN SATURATION: 99 % | SYSTOLIC BLOOD PRESSURE: 138 MMHG | HEART RATE: 81 BPM | DIASTOLIC BLOOD PRESSURE: 83 MMHG | WEIGHT: 166.2 LBS

## 2025-05-13 DIAGNOSIS — N18.31 STAGE 3A CHRONIC KIDNEY DISEASE (MULTI): ICD-10-CM

## 2025-05-13 DIAGNOSIS — I10 ESSENTIAL HYPERTENSION: ICD-10-CM

## 2025-05-13 DIAGNOSIS — Z94.0 IMMUNOSUPPRESSIVE MANAGEMENT ENCOUNTER FOLLOWING KIDNEY TRANSPLANT: ICD-10-CM

## 2025-05-13 DIAGNOSIS — Z94.0 KIDNEY REPLACED BY TRANSPLANT (HHS-HCC): Primary | ICD-10-CM

## 2025-05-13 DIAGNOSIS — R79.89 ELEVATED SERUM CREATININE: ICD-10-CM

## 2025-05-13 DIAGNOSIS — Z13.89 SCREENING FOR BLOOD OR PROTEIN IN URINE: ICD-10-CM

## 2025-05-13 DIAGNOSIS — Z79.899 IMMUNOSUPPRESSIVE MANAGEMENT ENCOUNTER FOLLOWING KIDNEY TRANSPLANT: ICD-10-CM

## 2025-05-13 DIAGNOSIS — Z94.0 KIDNEY REPLACED BY TRANSPLANT (HHS-HCC): ICD-10-CM

## 2025-05-13 LAB
ALBUMIN SERPL BCP-MCNC: 4.6 G/DL (ref 3.4–5)
ANION GAP SERPL CALC-SCNC: 13 MMOL/L (ref 10–20)
BUN SERPL-MCNC: 25 MG/DL (ref 6–23)
CALCIUM SERPL-MCNC: 9.9 MG/DL (ref 8.6–10.6)
CHLORIDE SERPL-SCNC: 102 MMOL/L (ref 98–107)
CO2 SERPL-SCNC: 29 MMOL/L (ref 21–32)
CREAT SERPL-MCNC: 1.53 MG/DL (ref 0.5–1.3)
CREAT UR-MCNC: 123.3 MG/DL (ref 20–370)
EGFRCR SERPLBLD CKD-EPI 2021: 52 ML/MIN/1.73M*2
ERYTHROCYTE [DISTWIDTH] IN BLOOD BY AUTOMATED COUNT: 12.9 % (ref 11.5–14.5)
GLUCOSE SERPL-MCNC: 245 MG/DL (ref 74–99)
HCT VFR BLD AUTO: 50.8 % (ref 41–52)
HGB BLD-MCNC: 14.5 G/DL (ref 13.5–17.5)
MAGNESIUM SERPL-MCNC: 1.97 MG/DL (ref 1.6–2.4)
MCH RBC QN AUTO: 26.3 PG (ref 26–34)
MCHC RBC AUTO-ENTMCNC: 28.5 G/DL (ref 32–36)
MCV RBC AUTO: 92 FL (ref 80–100)
NRBC BLD-RTO: 0 /100 WBCS (ref 0–0)
PHOSPHATE SERPL-MCNC: 2.5 MG/DL (ref 2.5–4.9)
PLATELET # BLD AUTO: 198 X10*3/UL (ref 150–450)
POTASSIUM SERPL-SCNC: 4.5 MMOL/L (ref 3.5–5.3)
PROT UR-ACNC: 33 MG/DL (ref 5–25)
PROT/CREAT UR: 0.27 MG/MG CREAT (ref 0–0.17)
RBC # BLD AUTO: 5.51 X10*6/UL (ref 4.5–5.9)
SODIUM SERPL-SCNC: 139 MMOL/L (ref 136–145)
TACROLIMUS BLD-MCNC: 10.4 NG/ML
WBC # BLD AUTO: 4.7 X10*3/UL (ref 4.4–11.3)

## 2025-05-13 PROCEDURE — 83735 ASSAY OF MAGNESIUM: CPT

## 2025-05-13 PROCEDURE — 80069 RENAL FUNCTION PANEL: CPT

## 2025-05-13 PROCEDURE — 36415 COLL VENOUS BLD VENIPUNCTURE: CPT

## 2025-05-13 PROCEDURE — 80197 ASSAY OF TACROLIMUS: CPT

## 2025-05-13 PROCEDURE — 84156 ASSAY OF PROTEIN URINE: CPT

## 2025-05-13 PROCEDURE — 85027 COMPLETE CBC AUTOMATED: CPT

## 2025-05-13 PROCEDURE — 82570 ASSAY OF URINE CREATININE: CPT

## 2025-05-13 PROCEDURE — 87799 DETECT AGENT NOS DNA QUANT: CPT

## 2025-05-13 PROCEDURE — 99214 OFFICE O/P EST MOD 30 MIN: CPT | Performed by: INTERNAL MEDICINE

## 2025-05-13 RX ORDER — CARVEDILOL 12.5 MG/1
12.5 TABLET ORAL 2 TIMES DAILY
Qty: 60 TABLET | Refills: 11 | Status: SHIPPED | OUTPATIENT
Start: 2025-05-13 | End: 2026-05-08

## 2025-05-13 RX ORDER — MYCOPHENOLATE MOFETIL 250 MG/1
1000 CAPSULE ORAL 2 TIMES DAILY
Qty: 240 CAPSULE | Refills: 11 | Status: SHIPPED | OUTPATIENT
Start: 2025-05-13 | End: 2026-05-13

## 2025-05-13 RX ORDER — CHOLECALCIFEROL (VITAMIN D3) 25 MCG
25 TABLET ORAL DAILY
Qty: 30 TABLET | Refills: 11 | Status: SHIPPED | OUTPATIENT
Start: 2025-05-13 | End: 2025-05-16 | Stop reason: SDUPTHER

## 2025-05-13 ASSESSMENT — PAIN SCALES - GENERAL: PAINLEVEL_OUTOF10: 0-NO PAIN

## 2025-05-13 NOTE — PATIENT INSTRUCTIONS
INCREASE mycophenolate to 1000 mg bid  Allosure is due for repeat in June  Low Potassium diet   DSA pending   Kidney BX will be scheduled   INR added for next week   Labs weekly  RTC in 3 weekly

## 2025-05-13 NOTE — TELEPHONE ENCOUNTER
Called patient and his wife no answer, LVM asking got call back with any question from today med changes.

## 2025-05-14 LAB
BKV DNA SERPL NAA+PROBE-LOG#: NORMAL {LOG_COPIES}/ML
CMV DNA SERPL NAA+PROBE-LOG IU: NORMAL {LOG_IU}/ML
HLA RESULTS: NORMAL
HLA-A+B+C AB NFR SER: NORMAL %
HLA-DP+DQ+DR AB NFR SER: NORMAL %
LABORATORY COMMENT REPORT: NOT DETECTED
LABORATORY COMMENT REPORT: NOT DETECTED

## 2025-05-14 RX ORDER — TACROLIMUS 0.5 MG/1
1 CAPSULE ORAL 2 TIMES DAILY
Qty: 120 CAPSULE | Refills: 11 | Status: SHIPPED | OUTPATIENT
Start: 2025-05-14 | End: 2026-05-14

## 2025-05-16 ENCOUNTER — APPOINTMENT (OUTPATIENT)
Dept: PRIMARY CARE | Facility: CLINIC | Age: 60
End: 2025-05-16
Payer: MEDICARE

## 2025-05-16 ENCOUNTER — LAB (OUTPATIENT)
Dept: LAB | Facility: HOSPITAL | Age: 60
End: 2025-05-16
Payer: MEDICARE

## 2025-05-16 VITALS
BODY MASS INDEX: 24.47 KG/M2 | HEART RATE: 86 BPM | SYSTOLIC BLOOD PRESSURE: 97 MMHG | OXYGEN SATURATION: 98 % | DIASTOLIC BLOOD PRESSURE: 64 MMHG | WEIGHT: 165.7 LBS

## 2025-05-16 DIAGNOSIS — N18.31 STAGE 3A CHRONIC KIDNEY DISEASE (MULTI): ICD-10-CM

## 2025-05-16 DIAGNOSIS — Z94.0 KIDNEY REPLACED BY TRANSPLANT (HHS-HCC): ICD-10-CM

## 2025-05-16 DIAGNOSIS — R73.9 HYPERGLYCEMIA: ICD-10-CM

## 2025-05-16 DIAGNOSIS — Z94.0 KIDNEY TRANSPLANT STATUS: Primary | ICD-10-CM

## 2025-05-16 DIAGNOSIS — E08.22 DIABETES MELLITUS DUE TO UNDERLYING CONDITION WITH DIABETIC CHRONIC KIDNEY DISEASE, UNSPECIFIED CKD STAGE, UNSPECIFIED WHETHER LONG TERM INSULIN USE: ICD-10-CM

## 2025-05-16 LAB
25(OH)D3 SERPL-MCNC: 39 NG/ML (ref 30–100)
CREAT UR-MCNC: 154.4 MG/DL (ref 20–370)
INR PPP: 1.1 (ref 0.9–1.1)
PROT UR-ACNC: 35 MG/DL (ref 5–25)
PROT/CREAT UR: 0.23 MG/MG CREAT (ref 0–0.17)
PROTHROMBIN TIME: 12.4 SECONDS (ref 9.8–12.4)
PTH-INTACT SERPL-MCNC: 87.9 PG/ML (ref 18.5–88)

## 2025-05-16 PROCEDURE — 85610 PROTHROMBIN TIME: CPT

## 2025-05-16 PROCEDURE — 84156 ASSAY OF PROTEIN URINE: CPT

## 2025-05-16 PROCEDURE — 83970 ASSAY OF PARATHORMONE: CPT

## 2025-05-16 PROCEDURE — 36415 COLL VENOUS BLD VENIPUNCTURE: CPT

## 2025-05-16 PROCEDURE — 82570 ASSAY OF URINE CREATININE: CPT

## 2025-05-16 PROCEDURE — 82306 VITAMIN D 25 HYDROXY: CPT

## 2025-05-16 RX ORDER — CHOLECALCIFEROL (VITAMIN D3) 25 MCG
25 TABLET ORAL DAILY
Qty: 30 TABLET | Refills: 11 | Status: SHIPPED | OUTPATIENT
Start: 2025-05-16 | End: 2026-05-16

## 2025-05-16 ASSESSMENT — COLUMBIA-SUICIDE SEVERITY RATING SCALE - C-SSRS
2. HAVE YOU ACTUALLY HAD ANY THOUGHTS OF KILLING YOURSELF?: NO
1. IN THE PAST MONTH, HAVE YOU WISHED YOU WERE DEAD OR WISHED YOU COULD GO TO SLEEP AND NOT WAKE UP?: NO
6. HAVE YOU EVER DONE ANYTHING, STARTED TO DO ANYTHING, OR PREPARED TO DO ANYTHING TO END YOUR LIFE?: NO

## 2025-05-16 ASSESSMENT — PATIENT HEALTH QUESTIONNAIRE - PHQ9
SUM OF ALL RESPONSES TO PHQ9 QUESTIONS 1 AND 2: 0
1. LITTLE INTEREST OR PLEASURE IN DOING THINGS: NOT AT ALL
2. FEELING DOWN, DEPRESSED OR HOPELESS: NOT AT ALL

## 2025-05-16 NOTE — PROGRESS NOTES
"Subjective   Reason for Visit: Anibal Dow is an 60 y.o. male here for a Medicare Wellness visit.     HPI  Patient here for Medicare wellness and to establish care.  Had right kidney transplant in January 2025.  Still following with the transplant clinic and is doing well with no complaints at this time.  Has continuous blood glucose monitor and states his sugars are in the 170s.    Review of Systems  All pertinent positive symptoms are included in the history of present illness.  All other systems have been reviewed and are negative and noncontributory to this patient's current ailments.    Current Outpatient Medications   Medication Instructions    atorvastatin (LIPITOR) 40 mg, oral, Daily    blood pressure test kit-medium kit 1 kit, miscellaneous, 2 times daily    blood-glucose sensor (FreeStyle Anisa 3 Plus Sensor) device 3 each, miscellaneous, Every 14 days    carvedilol (COREG) 12.5 mg, oral, 2 times daily    cholecalciferol (VITAMIN D-3) 25 mcg, oral, Daily    insulin glargine (Lantus) 100 unit/mL (3 mL) pen Inject 20 Units under the skin once daily in the morning. Take as directed per insulin instructions. Do not fill before January 11, 2025.    insulin lispro (HUMALOG) 0-10 Units, subcutaneous, 3 times daily before meals, Inject 6 units with breakfast/lunch, and 4 units with dinner - increase each dose per sliding scale: =0u, 151-200=2u, 201-250=4u, 251-300=6u, 301-350=8u, 351-400=10u. May need up to 50 units per day    mycophenolate (CELLCEPT) 1,000 mg, oral, 2 times daily    pantoprazole (PROTONIX) 40 mg, oral, Daily before breakfast, Do not crush, chew, or split.    pen needle, diabetic 32 gauge x 5/32\" needle 1 each, miscellaneous, 4 times daily, Use to inject insulin 4 times daily    predniSONE (DELTASONE) 5 mg, oral, Daily    sod phos di, mono-K phos mono (Phospha 250 Neutral) tablet 500 mg, oral, 2 times daily    sulfamethoxazole-trimethoprim (Bactrim) 400-80 mg tablet 1 tablet, oral, " Daily    tacrolimus (PROGRAF) 1 mg, oral, 2 times daily    ULTRA FINE LANCETS MISC Use to test blood sugar up to 3 times daily       Objective   Vitals:  BP 97/64   Pulse 86   Wt 75.2 kg (165 lb 11.2 oz)   SpO2 98%   BMI 24.47 kg/m²       Physical Exam  CONSTITUTIONAL - well nourished, well developed, looks like stated age, in no acute distress.  SKIN - normal skin color and pigmentation, normal skin turgor without rash, lesions, or nodules visualized  HEAD - no trauma, normocephalic  EYES - pupils are equal and reactive to light, and extraocular muscles are intact  ENT - TM's intact, no signs of infection, uvula midline, and throat normal, no exudate, nasal passage without discharge.  NECK - supple without rigidity, no neck mass was observed, no thyromegaly or thyroid nodules  CHEST - clear to auscultation, no wheezing, no crackles and no rales, good effort  CARDIAC - regular rate and regular rhythm, no skipped beats, no murmur  ABDOMEN - no organomegaly, soft, nontender, nondistended, normal bowel sounds, no guarding/rebound/rigidity.  EXTREMITIES - no obvious or evident edema, no obvious or evident deformities  NEUROLOGICAL - normal gait, normal balance, no ataxia, alert, oriented  PSYCHIATRIC - alert, pleasant and age-appropriate  IMMUNOLOGIC - no cervical lymphadenopathy       Assessment/Plan   Assessment & Plan  Kidney replaced by transplant ([facility])  - Follows with transplant clinic  Diabetes mellitus due to underlying condition with diabetic chronic kidney disease, unspecified CKD stage, unspecified whether long term insulin use  - Continue monitoring sugars at home  - Continue current diabetes regimen at home    Orders Placed This Encounter   Procedures    Hemoglobin A1C    TSH with reflex to Free T4 if abnormal     1. HM  -CBC, CMP, Lipid panel, Vit D, TSH with reflex T4  -Vaccines:       Flu: Defer      Shingrix: Check with transplant clinic prior to administration      Pneumococcal: 10/20/2020       Tdap: 02/17/2021  -Colonoscopy: 12/06/2019    Last MCR / CPE: 05/16/2025      Return to office in 6 months or sooner if needed.  Instructed to go to closest Emergency Room if you should experience concerning symptoms.

## 2025-05-17 LAB
EST. AVERAGE GLUCOSE BLD GHB EST-MCNC: 171 MG/DL
EST. AVERAGE GLUCOSE BLD GHB EST-SCNC: 9.5 MMOL/L
HBA1C MFR BLD: 7.6 %
TSH SERPL-ACNC: 1.03 MIU/L (ref 0.4–4.5)

## 2025-05-19 ENCOUNTER — TELEPHONE (OUTPATIENT)
Dept: PRIMARY CARE | Facility: CLINIC | Age: 60
End: 2025-05-19
Payer: MEDICARE

## 2025-05-19 ENCOUNTER — TELEPHONE (OUTPATIENT)
Facility: HOSPITAL | Age: 60
End: 2025-05-19

## 2025-05-19 NOTE — TELEPHONE ENCOUNTER
.Patient called requesting to speak with coordinator about his insulin and lab results .  Patient call back number is [phone] .

## 2025-05-19 NOTE — TELEPHONE ENCOUNTER
Patient called requesting to speak with coordinator about results .  A1c 7.6  Total protien urine ia 35    Patient call back number is [phone].

## 2025-05-19 NOTE — TELEPHONE ENCOUNTER
Spoke to pt wife  ----- Message from Juli Harris sent at 5/18/2025  4:52 PM EDT -----  Please let him know we got the blood work back and his hemoglobin A1c is 7.6.  He should follow-up with the transplant clinic to see if there is any adjustments that need to be made to his insulin.    All other blood work is stable  ----- Message -----  From: [employer] Data Results In  Sent: 5/17/2025   4:23 AM EDT  To: LIN Mora-CNP

## 2025-05-20 ENCOUNTER — LAB (OUTPATIENT)
Dept: LAB | Facility: HOSPITAL | Age: 60
End: 2025-05-20
Payer: MEDICARE

## 2025-05-20 DIAGNOSIS — Z94.0 KIDNEY REPLACED BY TRANSPLANT (HHS-HCC): ICD-10-CM

## 2025-05-20 DIAGNOSIS — Z13.89 SCREENING FOR BLOOD OR PROTEIN IN URINE: ICD-10-CM

## 2025-05-20 LAB
25(OH)D3 SERPL-MCNC: 37 NG/ML (ref 30–100)
ALBUMIN SERPL BCP-MCNC: 4.4 G/DL (ref 3.4–5)
ANION GAP SERPL CALC-SCNC: 12 MMOL/L (ref 10–20)
BUN SERPL-MCNC: 37 MG/DL (ref 6–23)
CALCIUM SERPL-MCNC: 9.9 MG/DL (ref 8.6–10.6)
CHLORIDE SERPL-SCNC: 98 MMOL/L (ref 98–107)
CO2 SERPL-SCNC: 26 MMOL/L (ref 21–32)
CREAT SERPL-MCNC: 1.46 MG/DL (ref 0.5–1.3)
CREAT UR-MCNC: 107.4 MG/DL (ref 20–370)
EGFRCR SERPLBLD CKD-EPI 2021: 55 ML/MIN/1.73M*2
ERYTHROCYTE [DISTWIDTH] IN BLOOD BY AUTOMATED COUNT: 12.9 % (ref 11.5–14.5)
GLUCOSE SERPL-MCNC: 270 MG/DL (ref 74–99)
HCT VFR BLD AUTO: 49.9 % (ref 41–52)
HGB BLD-MCNC: 14.4 G/DL (ref 13.5–17.5)
MAGNESIUM SERPL-MCNC: 1.89 MG/DL (ref 1.6–2.4)
MCH RBC QN AUTO: 26.3 PG (ref 26–34)
MCHC RBC AUTO-ENTMCNC: 28.9 G/DL (ref 32–36)
MCV RBC AUTO: 91 FL (ref 80–100)
NRBC BLD-RTO: 0 /100 WBCS (ref 0–0)
PHOSPHATE SERPL-MCNC: 2.4 MG/DL (ref 2.5–4.9)
PLATELET # BLD AUTO: 269 X10*3/UL (ref 150–450)
POTASSIUM SERPL-SCNC: 5.2 MMOL/L (ref 3.5–5.3)
PROT UR-ACNC: 28 MG/DL (ref 5–25)
PROT/CREAT UR: 0.26 MG/MG CREAT (ref 0–0.17)
PTH-INTACT SERPL-MCNC: 65.8 PG/ML (ref 18.5–88)
RBC # BLD AUTO: 5.48 X10*6/UL (ref 4.5–5.9)
SODIUM SERPL-SCNC: 131 MMOL/L (ref 136–145)
TACROLIMUS BLD-MCNC: 8.5 NG/ML
WBC # BLD AUTO: 5.8 X10*3/UL (ref 4.4–11.3)

## 2025-05-20 PROCEDURE — 82306 VITAMIN D 25 HYDROXY: CPT

## 2025-05-20 PROCEDURE — 87799 DETECT AGENT NOS DNA QUANT: CPT

## 2025-05-20 PROCEDURE — 80197 ASSAY OF TACROLIMUS: CPT

## 2025-05-20 PROCEDURE — 84156 ASSAY OF PROTEIN URINE: CPT

## 2025-05-20 PROCEDURE — 83970 ASSAY OF PARATHORMONE: CPT

## 2025-05-20 PROCEDURE — 80069 RENAL FUNCTION PANEL: CPT

## 2025-05-20 PROCEDURE — 82570 ASSAY OF URINE CREATININE: CPT

## 2025-05-20 PROCEDURE — 83735 ASSAY OF MAGNESIUM: CPT

## 2025-05-20 PROCEDURE — 85027 COMPLETE CBC AUTOMATED: CPT

## 2025-05-22 ENCOUNTER — HOSPITAL ENCOUNTER (OUTPATIENT)
Dept: RADIOLOGY | Facility: HOSPITAL | Age: 60
Discharge: HOME | End: 2025-05-22
Payer: MEDICARE

## 2025-05-22 VITALS
RESPIRATION RATE: 16 BRPM | HEART RATE: 76 BPM | SYSTOLIC BLOOD PRESSURE: 134 MMHG | TEMPERATURE: 97.2 F | DIASTOLIC BLOOD PRESSURE: 80 MMHG | OXYGEN SATURATION: 98 %

## 2025-05-22 DIAGNOSIS — R79.89 ELEVATED SERUM CREATININE: ICD-10-CM

## 2025-05-22 PROCEDURE — 50200 RENAL BIOPSY PERQ: CPT

## 2025-05-22 PROCEDURE — 2500000004 HC RX 250 GENERAL PHARMACY W/ HCPCS (ALT 636 FOR OP/ED): Mod: JZ | Performed by: RADIOLOGY

## 2025-05-22 PROCEDURE — 7100000009 HC PHASE TWO TIME - INITIAL BASE CHARGE

## 2025-05-22 PROCEDURE — 2720000007 HC OR 272 NO HCPCS

## 2025-05-22 PROCEDURE — 7100000010 HC PHASE TWO TIME - EACH INCREMENTAL 1 MINUTE

## 2025-05-22 RX ORDER — FENTANYL CITRATE 50 UG/ML
INJECTION, SOLUTION INTRAMUSCULAR; INTRAVENOUS
Status: COMPLETED | OUTPATIENT
Start: 2025-05-22 | End: 2025-05-22

## 2025-05-22 RX ORDER — MIDAZOLAM HYDROCHLORIDE 1 MG/ML
INJECTION INTRAMUSCULAR; INTRAVENOUS
Status: COMPLETED | OUTPATIENT
Start: 2025-05-22 | End: 2025-05-22

## 2025-05-22 RX ADMIN — FENTANYL CITRATE 50 MCG: 50 INJECTION, SOLUTION INTRAMUSCULAR; INTRAVENOUS at 09:00

## 2025-05-22 RX ADMIN — MIDAZOLAM HYDROCHLORIDE 1 MG: 2 INJECTION, SOLUTION INTRAMUSCULAR; INTRAVENOUS at 09:00

## 2025-05-22 ASSESSMENT — PAIN SCALES - GENERAL
PAINLEVEL_OUTOF10: 0 - NO PAIN

## 2025-05-22 ASSESSMENT — PAIN - FUNCTIONAL ASSESSMENT
PAIN_FUNCTIONAL_ASSESSMENT: 0-10

## 2025-05-22 NOTE — PRE-PROCEDURE NOTE
Interventional Radiology Preprocedure Note    Indication for procedure: The encounter diagnosis was Elevated serum creatinine.    Relevant review of systems: NA    Relevant Labs:   Lab Results   Component Value Date    CREATININE 1.46 (H) 05/20/2025    EGFR 55 (L) 05/20/2025    INR 1.1 05/16/2025    PROTIME 12.4 05/16/2025       Planned Sedation/Anesthesia: Moderate    Airway assessment: normal    Directed physical examination:    General: NAD  CV: normal rate, regular rhythm  Resp: breathing comfortably on room air    Mallampati: II (hard and soft palate, upper portion of tonsils and uvula visible)    ASA Score: ASA 2 - Patient with mild systemic disease with no functional limitations    Benefits, risks and alternatives of procedure and planned sedation have been discussed with the patient and/or their representative. All questions answered and they agree to proceed.     Ayan Mane MD  Diagnostic and Interventional Radiology

## 2025-05-22 NOTE — DISCHARGE INSTRUCTIONS
Follow instructions on Kidney Biopsy patient info sheet.    - Keep bandage clean and dry for 24 hrs, then may remove and shower. No soaking for 3 days until site is scabbed over. Monitor for signs of infection (redness, swelling, pus-like drainage). No heavy lifting more than 10 lbs for 10 days. Call for blood that persists in urine beyond 24 hrs.    You received moderate sedation:  - Do not drive a car, or operate any machinery or power tools of any kind.  - Do not drink any alcoholic drinks.  - Do not take any over the counter medications that may cause drowsiness.  - Do not make any important decisions or sign any legal documents.  - You need to have a responsible adult accompany you home.  - You may resume your normal diet.  - We strongly suggest that a responsible adult be with you for the rest of the day and also during the night. This is for your protection and safety.     For questions related to your procedure:  Please call [phone] between the hours of 7:00am-5:00pm Monday through Friday.  Please call [phone] after 5:00pm and on weekends and holidays.     In the event of an emergency call 911 or go to your nearest emergency room.

## 2025-05-22 NOTE — PROGRESS NOTES
TRANSPLANT NEPHROLOGY :   OUTPATIENT CLINIC NOTE      SERVICE DATE : 2025    REASON FOR VISIT/CHIEF COMPLAINT:  S/P  TRANSPLANT SURGERY  IMMUNOSUPPRESSIVE MEDICATION MANAGEMENT  BLOOD PRESSURE MANAGEMENT    HPI:    Mr. Dow is a 60 y.o. male with past medical history significant for ESRD secondary to HTN and DM2 s/p  donor kidney transplant on 25 by Dr. Prince with a KDPI of 83% and PRA of 90%. HCV -/- and donor has not met risk factors. EBV +/+. Right donor kidney transplanted to patient right pelvis. Dry weight is 76 kg (discharge weight is 80.6 kg ). Pt received a total of 4.5 mg/kg total of thymoglobulin induction therapy in conjunction with 500mg IV solumedrol.     Patient is here for follow up s/p kidney transplant.      Patient is doing well overall. No new complaints. Denied chest pain, SOB, NICHOLAS, Palpitation. Normal urination and bowel movement. Normal gait and no weakness of arms/legs. No cough, runny nose, sore throat, cold symptoms, or rash. No hearing loss. Normal vision.No problems with his sleep, mood and function. No recent infection, hospitalization, surgery or ER visits.      ROS:  Review of  14 systems was performed system by system. See HPI. Otherwise, the symptoms were negative.    PAST MEDICAL HISTORY:  Medical History[1]     PAST SURGICAL HISTORY:  Surgical History[2]     SOCIAL HISTORY:  Social History     Socioeconomic History    Marital status:      Spouse name: Not on file    Number of children: Not on file    Years of education: Not on file    Highest education level: Not on file   Occupational History    Not on file   Tobacco Use    Smoking status: Never    Smokeless tobacco: Never   Substance and Sexual Activity    Alcohol use: Not Currently    Drug use: Never    Sexual activity: Not on file   Other Topics Concern    Not on file   Social History Narrative    Not on file     Social Drivers of Health     Financial Resource Strain: Low Risk  (2025)     Overall Financial Resource Strain (CARDIA)     Difficulty of Paying Living Expenses: Not hard at all   Food Insecurity: Food Insecurity Present (6/9/2024)    Received from [facility]    Hunger Vital Sign     Worried About Running Out of Food in the Last Year: Often true     Ran Out of Food in the Last Year: Often true   Transportation Needs: No Transportation Needs (1/7/2025)    PRAPARE - Transportation     Lack of Transportation (Medical): No     Lack of Transportation (Non-Medical): No   Physical Activity: Insufficiently Active (6/9/2024)    Received from [facility]    Exercise Vital Sign     Days of Exercise per Week: 4 days     Minutes of Exercise per Session: 20 min   Stress: Stress Concern Present (6/9/2024)    Received from [facility]    [employer] - Occupational Stress Questionnaire     Feeling of Stress : To some extent   Social Connections: Moderately Integrated (6/9/2024)    Received from [facility]    Social Connection and Isolation Panel [NHANES]     Frequency of Communication with Friends and Family: Once a week     Frequency of Social Gatherings with Friends and Family: Once a week     Attends Taoist Services: More than 4 times per year     Active Member of Clubs or Organizations: Yes     Attends Club or Organization Meetings: More than 4 times per year     Marital Status:    Intimate Partner Violence: Not At Risk (6/9/2024)    Received from [facility]    Humiliation, Afraid, Rape, and Kick questionnaire     Fear of Current or Ex-Partner: No     Emotionally Abused: No     Physically Abused: No     Sexually Abused: No   Housing Stability: Low Risk  (1/7/2025)    Housing Stability Vital Sign     Unable to Pay for Housing in the Last Year: No     Number of Times Moved in the Last Year: 0     Homeless in the Last Year: No       FAMILY HISTORY:  Family History[3]    MEDICATION LIST:  Current Outpatient Medications   Medication Instructions    atorvastatin  "(LIPITOR) 40 mg, oral, Daily    blood pressure test kit-medium kit 1 kit, miscellaneous, 2 times daily    blood-glucose sensor (FreeStyle Anisa 3 Plus Sensor) device 3 each, miscellaneous, Every 14 days    carvedilol (COREG) 12.5 mg, oral, 2 times daily    cholecalciferol (VITAMIN D-3) 25 mcg, oral, Daily    insulin glargine (Lantus) 100 unit/mL (3 mL) pen Inject 20 Units under the skin once daily in the morning. Take as directed per insulin instructions. Do not fill before January 11, 2025.    insulin lispro (HUMALOG) 0-10 Units, subcutaneous, 3 times daily before meals, Inject 6 units with breakfast/lunch, and 4 units with dinner - increase each dose per sliding scale: =0u, 151-200=2u, 201-250=4u, 251-300=6u, 301-350=8u, 351-400=10u. May need up to 50 units per day    mycophenolate (CELLCEPT) 1,000 mg, oral, 2 times daily    pantoprazole (PROTONIX) 40 mg, oral, Daily before breakfast, Do not crush, chew, or split.    pen needle, diabetic 32 gauge x 5/32\" needle 1 each, miscellaneous, 4 times daily, Use to inject insulin 4 times daily    predniSONE (DELTASONE) 5 mg, oral, Daily    sod phos di, mono-K phos mono (Phospha 250 Neutral) tablet 500 mg, oral, 2 times daily    sulfamethoxazole-trimethoprim (Bactrim) 400-80 mg tablet 1 tablet, oral, Daily    tacrolimus (PROGRAF) 1 mg, oral, 2 times daily    ULTRA FINE LANCETS MISC Use to test blood sugar up to 3 times daily       ALLERGY  Allergies[4]    PHYSICAL EXAM:    Visit Vitals  /83   Pulse 81   Temp 36 °C (96.8 °F) (Temporal)   Wt 75.4 kg (166 lb 3.2 oz)   SpO2 99%   BMI 24.54 kg/m²   Smoking Status Never   BSA 1.92 m²          Vital signs - reviewed. Acceptable BP at this office visit.   General Appearance - NAD, Good speech, oriented and alert  HEENT - Supple. Not pale. No jaundice. No cervical lymphadenopathy. Pharynx and tonsils are not injected.  CVS - RRR. Normal S1/S2. No murmur, click , rub or gallop  Lungs- clear to auscultation " bilaterally  Abdomen - soft , not tender, no guarding, no rigidity. No hepatosplenomegaly. Normal bowel sounds. No masses and ascites. S/P Kidney transplant .  Transplanted kidney is not tender.   Musculoskeletal /Extremities - no edema. Full ROM. No joint tenderness.   Neuro/Psych - appropriate mood and affect. Motor power V/V all extremities. CN I -XII were grossly intact.  Skin - No visible rash      LABS:    Lab Results   Component Value Date    WBC 5.8 05/20/2025    HGB 14.4 05/20/2025    HCT 49.9 05/20/2025     05/20/2025    CHOL 169 01/02/2025    TRIG 123 01/02/2025    HDL 40.0 01/02/2025    ALT 6 (L) 02/15/2025    AST 14 02/15/2025     (L) 05/20/2025    K 5.2 05/20/2025    CL 98 05/20/2025    CREATININE 1.46 (H) 05/20/2025    BUN 37 (H) 05/20/2025    CO2 26 05/20/2025    TSH 1.03 05/16/2025    PSA 0.6 06/27/2024    INR 1.1 05/16/2025    HGBA1C 7.6 (H) 05/16/2025     par    ASSESSMENT AND PLAN:    Mr. Dow is a 60 y.o. male  who is here for follow up s/p kidney transplant.    TRANSPLANT DATE: 1/6/2025 (Kidney)      1. ESRD S/P kidney transplant   - Creatinine last check was :  Lab Results   Component Value Date    CREATININE 1.46 (H) 05/20/2025       -Plan for biopsy, elevated allosure 0.15 to 0.42 and creatinine has been higher than candy    -Ensure adequate hydration  - Avoid nephrotoxic medications, NSAIDs, and IV contrast.    2. Immunosuppression  -increase MMF to 1 gram bid  -Continue current immunosuppression regimen.    3. Electrolytes  Lab Results   Component Value Date    GLUCOSE 270 (H) 05/20/2025    CALCIUM 9.9 05/20/2025     (L) 05/20/2025    K 5.2 05/20/2025    CO2 26 05/20/2025    CL 98 05/20/2025    BUN 37 (H) 05/20/2025    CREATININE 1.46 (H) 05/20/2025     -Acceptable from last lab drawn    4. Hypertension  Blood Pressures         5/13/2025  1033             BP: 138/83          -Home  BP had been acceptable  -Encourage to monitor home BP  -Continue current anti  "hypertensive medication    5. Bone Mineral Disease/Osteoporosis  Lab Results   Component Value Date    PTH 65.8 05/20/2025    CALCIUM 9.9 05/20/2025    PHOS 2.4 (L) 05/20/2025    VITD25 37 05/20/2025     -check VIT D, PTH q 3 months  - Consider DEXA every 2-3 years , defer to PCP  - May consider initiation of Sensipar when PTH >300 AND Ca >8.4    6.Anemia  Lab Results   Component Value Date    WBC 5.8 05/20/2025    HGB 14.4 05/20/2025    HCT 49.9 05/20/2025    MCV 91 05/20/2025     05/20/2025     No results found for: \"IRON\", \"TIBC\", \"FERRITIN\"  -asymptomatic  - Continue to monitor  -check iron studies and ferritin. Will consider DESTINI as needed.  - No indications for blood transfusion     7.Health maintenance and vaccination  - Flu shot during flu season annually  - Cancer screening is up to date per the patient    Summary    INCREASE mycophenolate to 1000 mg bid  Allosure is due for repeat in June  Low Potassium diet   DSA pending   Kidney BX will be scheduled   INR added for next week   Labs weekly  RTC in 3 weekly     Herrera Vogt MD    Transplant Nephrology          [1]   Past Medical History:  Diagnosis Date    Diabetes mellitus (Multi)     ESRD (end stage renal disease) (Multi)     Hypertension     Personal history of other diseases of urinary system 05/21/2020    History of chronic kidney disease   [2]   Past Surgical History:  Procedure Laterality Date    CARDIAC CATHETERIZATION N/A 10/21/2024    Procedure: Right Heart Cath;  Surgeon: Maegan Anthony MD;  Location: [place] [address] Cardiac Cath Lab;  Service: Cardiovascular;  Laterality: N/A;    CT ANGIO CORONARY ART WITH HEARTFLOW IF SCORE >30%  01/20/2020    CT HEART CORONARY ANGIOGRAM 1/20/2020 [facility] ANCILLARY LEGACY    EYE SURGERY      OTHER SURGICAL HISTORY  05/21/2020    Toe amputation   [3] No family history on file.  [4] No Known Allergies    "

## 2025-05-22 NOTE — POST-PROCEDURE NOTE
Interventional Radiology Brief Postprocedure Note    Attending: Dr Ena Sorensen    Assistant: Dr Ayan Mane     Diagnosis: elevated serum creatinine    Description of procedure: Technically successful Us_guided right renal transplant biopsy     Anesthesia:  MAC Moderate    Complications: None    Estimated Blood Loss: none    Medications (Filter: Administrations occurring from 0848 to 0911 on 05/22/25) As of 05/22/25 0911      fentaNYL PF (Sublimaze) injection (mcg) Total dose:  50 mcg      Date/Time Rate/Dose/Volume Action       05/22/25  0900 50 mcg Given               midazolam (Versed) injection (mg) Total dose:  1 mg      Date/Time Rate/Dose/Volume Action       05/22/25  0900 1 mg Given                   2 cores collected      See detailed result report with images in PACS.    The patient tolerated the procedure well without incident or complication and is in stable condition.     Ayan Mane MD  Diagnostic and Interventional Radiology

## 2025-05-23 LAB
HOLD SPECIMEN: NORMAL

## 2025-05-28 ENCOUNTER — NURSE ONLY (OUTPATIENT)
Facility: HOSPITAL | Age: 60
End: 2025-05-28
Payer: MEDICARE

## 2025-05-28 LAB
ALBUMIN SERPL BCP-MCNC: 4.2 G/DL (ref 3.4–5)
ANION GAP SERPL CALC-SCNC: 13 MMOL/L (ref 10–20)
BUN SERPL-MCNC: 39 MG/DL (ref 6–23)
CALCIUM SERPL-MCNC: 9.4 MG/DL (ref 8.6–10.6)
CHLORIDE SERPL-SCNC: 100 MMOL/L (ref 98–107)
CO2 SERPL-SCNC: 25 MMOL/L (ref 21–32)
CREAT SERPL-MCNC: 1.65 MG/DL (ref 0.5–1.3)
CREAT UR-MCNC: 111 MG/DL (ref 20–370)
EGFRCR SERPLBLD CKD-EPI 2021: 47 ML/MIN/1.73M*2
ERYTHROCYTE [DISTWIDTH] IN BLOOD BY AUTOMATED COUNT: 12.9 % (ref 11.5–14.5)
GLUCOSE SERPL-MCNC: 213 MG/DL (ref 74–99)
HCT VFR BLD AUTO: 44.7 % (ref 41–52)
HGB BLD-MCNC: 13.4 G/DL (ref 13.5–17.5)
MAGNESIUM SERPL-MCNC: 1.74 MG/DL (ref 1.6–2.4)
MCH RBC QN AUTO: 26.3 PG (ref 26–34)
MCHC RBC AUTO-ENTMCNC: 30 G/DL (ref 32–36)
MCV RBC AUTO: 88 FL (ref 80–100)
NRBC BLD-RTO: 0 /100 WBCS (ref 0–0)
PHOSPHATE SERPL-MCNC: 2.6 MG/DL (ref 2.5–4.9)
PLATELET # BLD AUTO: 233 X10*3/UL (ref 150–450)
POTASSIUM SERPL-SCNC: 4.6 MMOL/L (ref 3.5–5.3)
PROT UR-ACNC: 22 MG/DL (ref 5–25)
PROT/CREAT UR: 0.2 MG/MG CREAT (ref 0–0.17)
RBC # BLD AUTO: 5.1 X10*6/UL (ref 4.5–5.9)
SODIUM SERPL-SCNC: 133 MMOL/L (ref 136–145)
TACROLIMUS BLD-MCNC: 18.2 NG/ML
WBC # BLD AUTO: 4.5 X10*3/UL (ref 4.4–11.3)

## 2025-05-28 PROCEDURE — 36415 COLL VENOUS BLD VENIPUNCTURE: CPT

## 2025-05-28 PROCEDURE — 82570 ASSAY OF URINE CREATININE: CPT | Performed by: INTERNAL MEDICINE

## 2025-05-28 PROCEDURE — 80069 RENAL FUNCTION PANEL: CPT | Performed by: STUDENT IN AN ORGANIZED HEALTH CARE EDUCATION/TRAINING PROGRAM

## 2025-05-28 PROCEDURE — 83735 ASSAY OF MAGNESIUM: CPT | Performed by: STUDENT IN AN ORGANIZED HEALTH CARE EDUCATION/TRAINING PROGRAM

## 2025-05-28 PROCEDURE — 80197 ASSAY OF TACROLIMUS: CPT | Performed by: STUDENT IN AN ORGANIZED HEALTH CARE EDUCATION/TRAINING PROGRAM

## 2025-05-28 PROCEDURE — 85027 COMPLETE CBC AUTOMATED: CPT | Performed by: STUDENT IN AN ORGANIZED HEALTH CARE EDUCATION/TRAINING PROGRAM

## 2025-05-30 DIAGNOSIS — Z94.0 KIDNEY REPLACED BY TRANSPLANT (HHS-HCC): ICD-10-CM

## 2025-06-02 ENCOUNTER — PATIENT MESSAGE (OUTPATIENT)
Facility: HOSPITAL | Age: 60
End: 2025-06-02
Payer: MEDICARE

## 2025-06-02 DIAGNOSIS — Z94.0 KIDNEY REPLACED BY TRANSPLANT (HHS-HCC): ICD-10-CM

## 2025-06-02 LAB
LAB AP ASR DISCLAIMER: NORMAL
LABORATORY COMMENT REPORT: NORMAL
PATH REPORT.COMMENTS IMP SPEC: NORMAL
PATH REPORT.FINAL DX SPEC: NORMAL
PATH REPORT.GROSS SPEC: NORMAL
PATH REPORT.MICROSCOPIC SPEC OTHER STN: NORMAL
PATH REPORT.RELEVANT HX SPEC: NORMAL
PATH REPORT.TOTAL CANCER: NORMAL

## 2025-06-03 ENCOUNTER — TELEPHONE (OUTPATIENT)
Facility: HOSPITAL | Age: 60
End: 2025-06-03
Payer: MEDICARE

## 2025-06-03 ENCOUNTER — NURSE ONLY (OUTPATIENT)
Facility: HOSPITAL | Age: 60
End: 2025-06-03
Payer: MEDICARE

## 2025-06-03 DIAGNOSIS — Z94.0 KIDNEY REPLACED BY TRANSPLANT (HHS-HCC): ICD-10-CM

## 2025-06-03 LAB
ALBUMIN SERPL BCP-MCNC: 4.4 G/DL (ref 3.4–5)
ANION GAP SERPL CALC-SCNC: 12 MMOL/L (ref 10–20)
BUN SERPL-MCNC: 39 MG/DL (ref 6–23)
CALCIUM SERPL-MCNC: 9.5 MG/DL (ref 8.6–10.6)
CHLORIDE SERPL-SCNC: 102 MMOL/L (ref 98–107)
CO2 SERPL-SCNC: 26 MMOL/L (ref 21–32)
CREAT SERPL-MCNC: 1.86 MG/DL (ref 0.5–1.3)
EGFRCR SERPLBLD CKD-EPI 2021: 41 ML/MIN/1.73M*2
ERYTHROCYTE [DISTWIDTH] IN BLOOD BY AUTOMATED COUNT: 13.2 % (ref 11.5–14.5)
GLUCOSE SERPL-MCNC: 284 MG/DL (ref 74–99)
HCT VFR BLD AUTO: 47.5 % (ref 41–52)
HGB BLD-MCNC: 13.9 G/DL (ref 13.5–17.5)
MAGNESIUM SERPL-MCNC: 2.11 MG/DL (ref 1.6–2.4)
MCH RBC QN AUTO: 26.2 PG (ref 26–34)
MCHC RBC AUTO-ENTMCNC: 29.3 G/DL (ref 32–36)
MCV RBC AUTO: 90 FL (ref 80–100)
NRBC BLD-RTO: 0 /100 WBCS (ref 0–0)
PHOSPHATE SERPL-MCNC: 2.9 MG/DL (ref 2.5–4.9)
PLATELET # BLD AUTO: 207 X10*3/UL (ref 150–450)
POTASSIUM SERPL-SCNC: 4.9 MMOL/L (ref 3.5–5.3)
RBC # BLD AUTO: 5.3 X10*6/UL (ref 4.5–5.9)
SODIUM SERPL-SCNC: 135 MMOL/L (ref 136–145)
TACROLIMUS BLD-MCNC: 9.9 NG/ML
WBC # BLD AUTO: 4.8 X10*3/UL (ref 4.4–11.3)

## 2025-06-03 PROCEDURE — 80069 RENAL FUNCTION PANEL: CPT

## 2025-06-03 PROCEDURE — 80197 ASSAY OF TACROLIMUS: CPT

## 2025-06-03 PROCEDURE — 85027 COMPLETE CBC AUTOMATED: CPT

## 2025-06-03 PROCEDURE — 36415 COLL VENOUS BLD VENIPUNCTURE: CPT

## 2025-06-03 PROCEDURE — 83735 ASSAY OF MAGNESIUM: CPT

## 2025-06-03 RX ORDER — TACROLIMUS 0.5 MG/1
0.5 CAPSULE ORAL 2 TIMES DAILY
Qty: 60 CAPSULE | Refills: 11 | Status: SHIPPED | OUTPATIENT
Start: 2025-06-03 | End: 2026-06-03

## 2025-06-04 ENCOUNTER — APPOINTMENT (OUTPATIENT)
Facility: HOSPITAL | Age: 60
End: 2025-06-04
Payer: MEDICARE

## 2025-06-05 ENCOUNTER — TELEPHONE (OUTPATIENT)
Facility: HOSPITAL | Age: 60
End: 2025-06-05
Payer: MEDICARE

## 2025-06-05 DIAGNOSIS — Z01.818 PRE-TRANSPLANT EVALUATION FOR KIDNEY TRANSPLANT: ICD-10-CM

## 2025-06-05 NOTE — TELEPHONE ENCOUNTER
Message sent to patient that Dr. Noonan manages the medication Atorvastatin and that there appears to be 3 refills at his [facility] - [place], [place] - [address]

## 2025-06-05 NOTE — TELEPHONE ENCOUNTER
Patient called requesting to speak with coordinator about needed help getting his atorvastatin (Lipitor) 40 mg tablet  .  Patient call back number is [phone]

## 2025-06-06 NOTE — TELEPHONE ENCOUNTER
I called patient back and spoke with his wife . I toold her we donot fill this medication and she should go see dr. Naresh ohara the cardiologist who prescribed this medication. I also gave her the number he can be reached at its [phone] Nothing else needed.

## 2025-06-06 NOTE — TELEPHONE ENCOUNTER
Patient called requesting to speak with coordinator about needed help getting his atorvastatin (Lipitor) 40 mg tablet  .  Patient call back number is [phone]

## 2025-06-10 ENCOUNTER — NURSE ONLY (OUTPATIENT)
Facility: HOSPITAL | Age: 60
End: 2025-06-10
Payer: MEDICARE

## 2025-06-10 DIAGNOSIS — N18.31 STAGE 3A CHRONIC KIDNEY DISEASE (MULTI): ICD-10-CM

## 2025-06-10 DIAGNOSIS — Z13.89 SCREENING FOR BLOOD OR PROTEIN IN URINE: ICD-10-CM

## 2025-06-10 DIAGNOSIS — Z94.0 KIDNEY REPLACED BY TRANSPLANT (HHS-HCC): ICD-10-CM

## 2025-06-10 LAB
25(OH)D3 SERPL-MCNC: 49 NG/ML (ref 30–100)
ALBUMIN SERPL BCP-MCNC: 4.2 G/DL (ref 3.4–5)
ANION GAP SERPL CALC-SCNC: 12 MMOL/L (ref 10–20)
BUN SERPL-MCNC: 21 MG/DL (ref 6–23)
CALCIUM SERPL-MCNC: 9.6 MG/DL (ref 8.6–10.6)
CHLORIDE SERPL-SCNC: 102 MMOL/L (ref 98–107)
CO2 SERPL-SCNC: 27 MMOL/L (ref 21–32)
CREAT SERPL-MCNC: 1.36 MG/DL (ref 0.5–1.3)
CREAT UR-MCNC: 111.3 MG/DL (ref 20–370)
EGFRCR SERPLBLD CKD-EPI 2021: 60 ML/MIN/1.73M*2
ERYTHROCYTE [DISTWIDTH] IN BLOOD BY AUTOMATED COUNT: 13.6 % (ref 11.5–14.5)
GLUCOSE SERPL-MCNC: 195 MG/DL (ref 74–99)
HCT VFR BLD AUTO: 48.4 % (ref 41–52)
HGB BLD-MCNC: 14.1 G/DL (ref 13.5–17.5)
MAGNESIUM SERPL-MCNC: 1.88 MG/DL (ref 1.6–2.4)
MCH RBC QN AUTO: 25.9 PG (ref 26–34)
MCHC RBC AUTO-ENTMCNC: 29.1 G/DL (ref 32–36)
MCV RBC AUTO: 89 FL (ref 80–100)
NRBC BLD-RTO: 0 /100 WBCS (ref 0–0)
PHOSPHATE SERPL-MCNC: 2.5 MG/DL (ref 2.5–4.9)
PLATELET # BLD AUTO: 195 X10*3/UL (ref 150–450)
POTASSIUM SERPL-SCNC: 4.8 MMOL/L (ref 3.5–5.3)
PROT UR-ACNC: 25 MG/DL (ref 5–25)
PROT/CREAT UR: 0.22 MG/MG CREAT (ref 0–0.17)
PTH-INTACT SERPL-MCNC: 52.6 PG/ML (ref 18.5–88)
RBC # BLD AUTO: 5.44 X10*6/UL (ref 4.5–5.9)
SODIUM SERPL-SCNC: 136 MMOL/L (ref 136–145)
TACROLIMUS BLD-MCNC: 8.5 NG/ML
WBC # BLD AUTO: 4.3 X10*3/UL (ref 4.4–11.3)

## 2025-06-10 PROCEDURE — 87799 DETECT AGENT NOS DNA QUANT: CPT

## 2025-06-10 PROCEDURE — 80069 RENAL FUNCTION PANEL: CPT

## 2025-06-10 PROCEDURE — 80197 ASSAY OF TACROLIMUS: CPT

## 2025-06-10 PROCEDURE — 83970 ASSAY OF PARATHORMONE: CPT

## 2025-06-10 PROCEDURE — 83735 ASSAY OF MAGNESIUM: CPT

## 2025-06-10 PROCEDURE — 82306 VITAMIN D 25 HYDROXY: CPT

## 2025-06-10 PROCEDURE — 82570 ASSAY OF URINE CREATININE: CPT

## 2025-06-10 PROCEDURE — 85027 COMPLETE CBC AUTOMATED: CPT

## 2025-06-10 PROCEDURE — 36415 COLL VENOUS BLD VENIPUNCTURE: CPT

## 2025-06-16 RX ORDER — ATORVASTATIN CALCIUM 40 MG/1
40 TABLET, FILM COATED ORAL DAILY
Qty: 90 TABLET | Refills: 3 | Status: SHIPPED | OUTPATIENT
Start: 2025-06-16 | End: 2026-06-16

## 2025-06-16 NOTE — TELEPHONE ENCOUNTER
Patient called to request refills of the below medication(s) and dose(s).  Please send prescription for the checked items below to       [facility] - [place], [place] - [address] [place] [address]  Phone: [phone] Fax: [phone]         atorvastatin (Lipitor) 40 mg tablet

## 2025-06-17 ENCOUNTER — NURSE ONLY (OUTPATIENT)
Facility: HOSPITAL | Age: 60
End: 2025-06-17
Payer: MEDICARE

## 2025-06-17 DIAGNOSIS — Z94.0 KIDNEY REPLACED BY TRANSPLANT (HHS-HCC): ICD-10-CM

## 2025-06-17 LAB
ALBUMIN SERPL BCP-MCNC: 4.3 G/DL (ref 3.4–5)
ANION GAP SERPL CALC-SCNC: 11 MMOL/L (ref 10–20)
BUN SERPL-MCNC: 28 MG/DL (ref 6–23)
CALCIUM SERPL-MCNC: 9.4 MG/DL (ref 8.6–10.6)
CHLORIDE SERPL-SCNC: 100 MMOL/L (ref 98–107)
CO2 SERPL-SCNC: 25 MMOL/L (ref 21–32)
CREAT SERPL-MCNC: 1.59 MG/DL (ref 0.5–1.3)
EGFRCR SERPLBLD CKD-EPI 2021: 49 ML/MIN/1.73M*2
ERYTHROCYTE [DISTWIDTH] IN BLOOD BY AUTOMATED COUNT: 13.7 % (ref 11.5–14.5)
GLUCOSE SERPL-MCNC: 272 MG/DL (ref 74–99)
HCT VFR BLD AUTO: 46.3 % (ref 41–52)
HGB BLD-MCNC: 13.8 G/DL (ref 13.5–17.5)
MAGNESIUM SERPL-MCNC: 2.08 MG/DL (ref 1.6–2.4)
MCH RBC QN AUTO: 26.3 PG (ref 26–34)
MCHC RBC AUTO-ENTMCNC: 29.8 G/DL (ref 32–36)
MCV RBC AUTO: 88 FL (ref 80–100)
NRBC BLD-RTO: 0 /100 WBCS (ref 0–0)
PHOSPHATE SERPL-MCNC: 2.3 MG/DL (ref 2.5–4.9)
PLATELET # BLD AUTO: 203 X10*3/UL (ref 150–450)
POTASSIUM SERPL-SCNC: 5 MMOL/L (ref 3.5–5.3)
RBC # BLD AUTO: 5.25 X10*6/UL (ref 4.5–5.9)
SODIUM SERPL-SCNC: 131 MMOL/L (ref 136–145)
TACROLIMUS BLD-MCNC: 12 NG/ML
WBC # BLD AUTO: 4.3 X10*3/UL (ref 4.4–11.3)

## 2025-06-17 PROCEDURE — 80069 RENAL FUNCTION PANEL: CPT

## 2025-06-17 PROCEDURE — 80197 ASSAY OF TACROLIMUS: CPT

## 2025-06-17 PROCEDURE — 85027 COMPLETE CBC AUTOMATED: CPT

## 2025-06-17 PROCEDURE — 83735 ASSAY OF MAGNESIUM: CPT

## 2025-06-17 PROCEDURE — 36415 COLL VENOUS BLD VENIPUNCTURE: CPT

## 2025-06-18 ENCOUNTER — APPOINTMENT (OUTPATIENT)
Age: 60
End: 2025-06-18
Payer: MEDICARE

## 2025-06-24 ENCOUNTER — NURSE ONLY (OUTPATIENT)
Facility: HOSPITAL | Age: 60
End: 2025-06-24
Payer: MEDICARE

## 2025-06-24 ENCOUNTER — OFFICE VISIT (OUTPATIENT)
Facility: HOSPITAL | Age: 60
End: 2025-06-24
Payer: MEDICARE

## 2025-06-24 VITALS
DIASTOLIC BLOOD PRESSURE: 86 MMHG | SYSTOLIC BLOOD PRESSURE: 150 MMHG | HEART RATE: 83 BPM | BODY MASS INDEX: 24.34 KG/M2 | TEMPERATURE: 97.5 F | OXYGEN SATURATION: 97 % | WEIGHT: 164.8 LBS

## 2025-06-24 DIAGNOSIS — Z94.0 KIDNEY REPLACED BY TRANSPLANT (HHS-HCC): ICD-10-CM

## 2025-06-24 DIAGNOSIS — Z92.25 PERSONAL HISTORY OF IMMUNOSUPRESSION THERAPY: Primary | ICD-10-CM

## 2025-06-24 LAB
ALBUMIN SERPL BCP-MCNC: 4.3 G/DL (ref 3.4–5)
ANION GAP SERPL CALC-SCNC: 14 MMOL/L (ref 10–20)
BUN SERPL-MCNC: 34 MG/DL (ref 6–23)
CALCIUM SERPL-MCNC: 9.4 MG/DL (ref 8.6–10.6)
CHLORIDE SERPL-SCNC: 100 MMOL/L (ref 98–107)
CO2 SERPL-SCNC: 26 MMOL/L (ref 21–32)
CREAT SERPL-MCNC: 1.69 MG/DL (ref 0.5–1.3)
EGFRCR SERPLBLD CKD-EPI 2021: 46 ML/MIN/1.73M*2
ERYTHROCYTE [DISTWIDTH] IN BLOOD BY AUTOMATED COUNT: 13.7 % (ref 11.5–14.5)
GLUCOSE SERPL-MCNC: 245 MG/DL (ref 74–99)
HCT VFR BLD AUTO: 45.9 % (ref 41–52)
HGB BLD-MCNC: 13.2 G/DL (ref 13.5–17.5)
MAGNESIUM SERPL-MCNC: 1.97 MG/DL (ref 1.6–2.4)
MCH RBC QN AUTO: 26.4 PG (ref 26–34)
MCHC RBC AUTO-ENTMCNC: 28.8 G/DL (ref 32–36)
MCV RBC AUTO: 92 FL (ref 80–100)
NRBC BLD-RTO: 0 /100 WBCS (ref 0–0)
PHOSPHATE SERPL-MCNC: 2.9 MG/DL (ref 2.5–4.9)
PLATELET # BLD AUTO: 196 X10*3/UL (ref 150–450)
POTASSIUM SERPL-SCNC: 4.6 MMOL/L (ref 3.5–5.3)
RBC # BLD AUTO: 5 X10*6/UL (ref 4.5–5.9)
SODIUM SERPL-SCNC: 135 MMOL/L (ref 136–145)
TACROLIMUS BLD-MCNC: 6.7 NG/ML
WBC # BLD AUTO: 5 X10*3/UL (ref 4.4–11.3)

## 2025-06-24 PROCEDURE — 99215 OFFICE O/P EST HI 40 MIN: CPT | Performed by: HOSPITALIST

## 2025-06-24 PROCEDURE — 83735 ASSAY OF MAGNESIUM: CPT

## 2025-06-24 PROCEDURE — 3079F DIAST BP 80-89 MM HG: CPT | Performed by: HOSPITALIST

## 2025-06-24 PROCEDURE — 36415 COLL VENOUS BLD VENIPUNCTURE: CPT

## 2025-06-24 PROCEDURE — 80069 RENAL FUNCTION PANEL: CPT

## 2025-06-24 PROCEDURE — 3061F NEG MICROALBUMINURIA REV: CPT | Performed by: HOSPITALIST

## 2025-06-24 PROCEDURE — 80197 ASSAY OF TACROLIMUS: CPT

## 2025-06-24 PROCEDURE — 85027 COMPLETE CBC AUTOMATED: CPT

## 2025-06-24 PROCEDURE — 3077F SYST BP >= 140 MM HG: CPT | Performed by: HOSPITALIST

## 2025-06-24 PROCEDURE — 3049F LDL-C 100-129 MG/DL: CPT | Performed by: HOSPITALIST

## 2025-06-24 RX ORDER — PREDNISONE 5 MG/1
5 TABLET ORAL DAILY
Qty: 30 TABLET | Refills: 11 | Status: SHIPPED | OUTPATIENT
Start: 2025-06-24 | End: 2026-06-24

## 2025-06-24 ASSESSMENT — ENCOUNTER SYMPTOMS
NAUSEA: 0
CHEST TIGHTNESS: 0
NERVOUS/ANXIOUS: 0
PALPITATIONS: 0
SHORTNESS OF BREATH: 0
ABDOMINAL DISTENTION: 0
COUGH: 0
HEADACHES: 0
OCCASIONAL FEELINGS OF UNSTEADINESS: 0
CONFUSION: 0
LOSS OF SENSATION IN FEET: 0
HEMATURIA: 0
DIARRHEA: 0
VOMITING: 0
DEPRESSION: 0
FREQUENCY: 0
BACK PAIN: 0

## 2025-06-24 ASSESSMENT — PAIN SCALES - GENERAL: PAINLEVEL_OUTOF10: 0-NO PAIN

## 2025-06-24 NOTE — Clinical Note
Refilled Prednisone Tac is pending from today last Tac is 12.0 Patient repeat labs today  Stopped Bactrim  Labs weekly  RTC in 1 month

## 2025-06-24 NOTE — PROGRESS NOTES
Anibal Dow  60 y.o. male with past medical history significant for ESRD secondary to HTN and DM2 s/p  donor kidney transplant on 25 by Dr. Prince with a KDPI of 83% and PRA of 90%. HCV -/- and donor has not met risk factors. EBV +/+. Right donor kidney transplanted to patient right pelvis. Dry weight is 76 kg (discharge weight is 80.6 kg ). Pt received a total of 4.5 mg/kg total of thymoglobulin induction therapy in conjunction with 500mg IV solumedrol.     Interim history: Denied any complaints on today's visit.  Patient's wife did not accompany him for today's visit.  Patient does not know his medications clearly we are going to print out the medication list to him and his wife will has to get back to us.      Review of Systems   Respiratory:  Negative for cough, chest tightness and shortness of breath.    Cardiovascular:  Negative for chest pain, palpitations and leg swelling.   Gastrointestinal:  Negative for abdominal distention, diarrhea, nausea and vomiting.   Genitourinary:  Negative for frequency, hematuria and urgency.   Musculoskeletal:  Negative for back pain.   Neurological:  Negative for headaches.   Psychiatric/Behavioral:  Negative for confusion. The patient is not nervous/anxious.         Objective:  Visit Vitals  /86   Pulse 83   Temp 36.4 °C (97.5 °F) (Temporal)   Wt 74.8 kg (164 lb 12.8 oz)   SpO2 97%   BMI 24.34 kg/m²   Smoking Status Never   BSA 1.91 m²      Physical Exam  HENT:      Head: Normocephalic and atraumatic.      Nose: Nose normal.      Mouth/Throat:      Mouth: Mucous membranes are moist.   Eyes:      Extraocular Movements: Extraocular movements intact.      Pupils: Pupils are equal, round, and reactive to light.   Cardiovascular:      Rate and Rhythm: Normal rate.      Pulses: Normal pulses.      Heart sounds: Normal heart sounds.   Pulmonary:      Effort: Pulmonary effort is normal.   Abdominal:      Palpations: Abdomen is soft.      Tenderness: There is no  abdominal tenderness. There is no guarding.   Musculoskeletal:         General: Normal range of motion.      Cervical back: Normal range of motion.   Skin:     General: Skin is warm.   Neurological:      General: No focal deficit present.      Mental Status: Mental status is at baseline.   Psychiatric:         Mood and Affect: Mood normal.          Current Medications[1]     [unfilled]     No images are attached to the encounter.     Assessment and Plan :Anibal Dow 60 y.o. male with past medical history significant for ESRD secondary to HTN and DM2 s/p  donor kidney transplant on 25 by Dr. Prince with a KDPI of 83% and PRA of 90%. HCV -/- and donor has not met risk factors. EBV +/+. Right donor kidney transplanted to patient right pelvis. Dry weight is 76 kg (discharge weight is 80.6 kg ). Pt received a total of 4.5 mg/kg total of thymoglobulin induction therapy in conjunction with 500mg IV solumedrol.     Interim history: Denied any complaints on today's visit.  Patient's wife did not accompany him for today's visit.  Patient does not know his medications clearly we are going to print out the medication list to him and his wife will has to get back to us.    Allograft function: Seems to be stable in the range of 1.5-1.3 last time have mild hyperkalemia will follow-up with repeat labs today.  Last UPC 0.22.  Patient have an uptrend in the AlloSure last time from 0.15-0.42 status post kidney biopsy as of 2025 which showed I FTA around 20% and no acute rejection.  -Last BK, CMV, EBV negative as of 6/10/2025 no DSA as of 2025.  - Can hold Bactrim    Immunosuppression: Last tacrolimus levels are around 12 patient currently is only on 0.5 twice daily of tacrolimus will follow-up with the levels today for further adjustment, prednisone 5 mg and mycophenolate thousand twice daily.    No anemia or leukopenia.    Hemodynamics: Blood pressures mildly elevated today morning but did not  take all his medications this morning.  Recommended to maintain a log and bring back to us for the next visit.  Patient continue on 12.5 twice daily of carvedilol.    Bone mineral disease: Calcium and phosphorus levels are optimal recent vitamin D levels are 49 continue with the current management.       General health care: Recommended age-appropriate screening, COVID shots annual dermatology visits,DEXA scan 2-3 yrs while on steroids .    Labs weekly once follow-up in clinic in a month    Jose Luis Sanchez MD    Notes created by Efra -Please excuse the Typos .         [1]   Current Outpatient Medications:     atorvastatin (Lipitor) 40 mg tablet, Take 1 tablet (40 mg) by mouth once daily., Disp: 90 tablet, Rfl: 3    blood pressure test kit-medium kit, 1 kit 2 times a day., Disp: 1 kit, Rfl: 0    blood-glucose sensor (FreeStyle Anisa 3 Plus Sensor) device, 3 each every 14 (fourteen) days., Disp: 3 each, Rfl: 0    carvedilol (Coreg) 12.5 mg tablet, Take 1 tablet (12.5 mg) by mouth 2 times a day., Disp: 60 tablet, Rfl: 11    cholecalciferol (Vitamin D-3) 25 mcg (1,000 units) tablet, Take 1 tablet (25 mcg) by mouth once daily., Disp: 30 tablet, Rfl: 11    insulin glargine (Lantus) 100 unit/mL (3 mL) pen, Inject 20 Units under the skin once daily in the morning. Take as directed per insulin instructions. Do not fill before January 11, 2025., Disp: 15 mL, Rfl: 1    predniSONE (Deltasone) 5 mg tablet, Take 1 tablet (5 mg) by mouth once daily., Disp: 30 tablet, Rfl: 11    sod phos di, mono-K phos mono (Phospha 250 Neutral) tablet, Take 2 tablets (500 mg) by mouth 2 times a day., Disp: 120 tablet, Rfl: 11    sulfamethoxazole-trimethoprim (Bactrim) 400-80 mg tablet, Take 1 tablet by mouth once daily., Disp: 30 tablet, Rfl: 5    tacrolimus (Prograf) 0.5 mg capsule, Take 1 capsule (0.5 mg) by mouth 2 times a day., Disp: 60 capsule, Rfl: 11    ULTRA FINE LANCETS MISC, Use to test blood sugar up to 3 times daily, Disp: , Rfl:  "    insulin lispro (HumaLOG) 100 unit/mL pen, Inject 0-10 Units under the skin 3 times a day before meals. Inject 6 units with breakfast/lunch, and 4 units with dinner - increase each dose per sliding scale: =0u, 151-200=2u, 201-250=4u, 251-300=6u, 301-350=8u, 351-400=10u. May need up to 50 units per day, Disp: 15 mL, Rfl: 2    mycophenolate (Cellcept) 250 mg capsule, Take 4 capsules (1,000 mg) by mouth 2 times a day., Disp: 240 capsule, Rfl: 11    pantoprazole (ProtoNix) 40 mg EC tablet, Take 1 tablet (40 mg) by mouth once daily in the morning. Take before meals. Do not crush, chew, or split., Disp: 30 tablet, Rfl: 11    pen needle, diabetic 32 gauge x 5/32\" needle, 1 each 4 times a day. Use to inject insulin 4 times daily, Disp: 400 each, Rfl: 3    "

## 2025-07-01 ENCOUNTER — TELEPHONE (OUTPATIENT)
Facility: HOSPITAL | Age: 60
End: 2025-07-01

## 2025-07-01 ENCOUNTER — NURSE ONLY (OUTPATIENT)
Facility: HOSPITAL | Age: 60
End: 2025-07-01
Payer: MEDICARE

## 2025-07-01 DIAGNOSIS — Z94.0 KIDNEY REPLACED BY TRANSPLANT (HHS-HCC): ICD-10-CM

## 2025-07-01 LAB
ALBUMIN SERPL BCP-MCNC: 4.1 G/DL (ref 3.4–5)
ANION GAP SERPL CALC-SCNC: 11 MMOL/L (ref 10–20)
BUN SERPL-MCNC: 31 MG/DL (ref 6–23)
CALCIUM SERPL-MCNC: 9.9 MG/DL (ref 8.6–10.6)
CHLORIDE SERPL-SCNC: 103 MMOL/L (ref 98–107)
CHOLEST SERPL-MCNC: 116 MG/DL (ref 0–199)
CHOLESTEROL/HDL RATIO: 3.3
CO2 SERPL-SCNC: 28 MMOL/L (ref 21–32)
CREAT SERPL-MCNC: 1.43 MG/DL (ref 0.5–1.3)
CREAT UR-MCNC: 126 MG/DL (ref 20–370)
EGFRCR SERPLBLD CKD-EPI 2021: 56 ML/MIN/1.73M*2
ERYTHROCYTE [DISTWIDTH] IN BLOOD BY AUTOMATED COUNT: 13.3 % (ref 11.5–14.5)
GLUCOSE SERPL-MCNC: 241 MG/DL (ref 74–99)
HCT VFR BLD AUTO: 48.2 % (ref 41–52)
HDLC SERPL-MCNC: 35.6 MG/DL
HGB BLD-MCNC: 13.6 G/DL (ref 13.5–17.5)
LDLC SERPL CALC-MCNC: 61 MG/DL
MAGNESIUM SERPL-MCNC: 1.99 MG/DL (ref 1.6–2.4)
MCH RBC QN AUTO: 25.9 PG (ref 26–34)
MCHC RBC AUTO-ENTMCNC: 28.2 G/DL (ref 32–36)
MCV RBC AUTO: 92 FL (ref 80–100)
MICROALBUMIN UR-MCNC: 33.2 MG/L
MICROALBUMIN/CREAT UR: 26.3 UG/MG CREAT
NON HDL CHOLESTEROL: 80 MG/DL (ref 0–149)
NRBC BLD-RTO: 0 /100 WBCS (ref 0–0)
PHOSPHATE SERPL-MCNC: 2.7 MG/DL (ref 2.5–4.9)
PLATELET # BLD AUTO: 197 X10*3/UL (ref 150–450)
POTASSIUM SERPL-SCNC: 4.3 MMOL/L (ref 3.5–5.3)
RBC # BLD AUTO: 5.26 X10*6/UL (ref 4.5–5.9)
SODIUM SERPL-SCNC: 138 MMOL/L (ref 136–145)
TRIGL SERPL-MCNC: 99 MG/DL (ref 0–149)
VLDL: 20 MG/DL (ref 0–40)
WBC # BLD AUTO: 4.1 X10*3/UL (ref 4.4–11.3)

## 2025-07-01 PROCEDURE — 80061 LIPID PANEL: CPT | Performed by: PHYSICIAN ASSISTANT

## 2025-07-01 PROCEDURE — 85027 COMPLETE CBC AUTOMATED: CPT

## 2025-07-01 PROCEDURE — 83735 ASSAY OF MAGNESIUM: CPT

## 2025-07-01 PROCEDURE — 36415 COLL VENOUS BLD VENIPUNCTURE: CPT

## 2025-07-01 PROCEDURE — 82043 UR ALBUMIN QUANTITATIVE: CPT | Performed by: PHYSICIAN ASSISTANT

## 2025-07-01 PROCEDURE — 80069 RENAL FUNCTION PANEL: CPT

## 2025-07-03 LAB
ALLOSURE SCORE - KIDNEY: 0.23 %
CAREDX_ORDER_ID: NORMAL
CENTER_ORDER_ID: NORMAL
CLIENT SPECIMEN ID - ALLOSURE: NORMAL
DONOR RELATION - ALLOSURE: NORMAL
NOTES - ALLOSURE: NORMAL
RELATIVE CHANGE VALUE - KIDNEY: -45 %
TEST COMMENTS - ALLOSURE: NORMAL
TIME POST TX - ALLOSURE: NORMAL
TRANSPLANTED ORGAN - ALLOSURE: NORMAL
TX DATE - ALLOSURE/ALLOMAP: NORMAL
WP_ORDER_ID: NORMAL

## 2025-07-04 DIAGNOSIS — Z94.0 KIDNEY REPLACED BY TRANSPLANT (HHS-HCC): ICD-10-CM

## 2025-07-07 DIAGNOSIS — Z94.0 KIDNEY REPLACED BY TRANSPLANT (HHS-HCC): ICD-10-CM

## 2025-07-07 DIAGNOSIS — E11.65 TYPE 2 DIABETES MELLITUS WITH HYPERGLYCEMIA, WITH LONG-TERM CURRENT USE OF INSULIN: ICD-10-CM

## 2025-07-07 DIAGNOSIS — Z79.4 TYPE 2 DIABETES MELLITUS WITH HYPERGLYCEMIA, WITH LONG-TERM CURRENT USE OF INSULIN: ICD-10-CM

## 2025-07-08 ENCOUNTER — NURSE ONLY (OUTPATIENT)
Facility: HOSPITAL | Age: 60
End: 2025-07-08
Payer: MEDICARE

## 2025-07-08 DIAGNOSIS — Z94.0 KIDNEY REPLACED BY TRANSPLANT (HHS-HCC): ICD-10-CM

## 2025-07-08 LAB
ALBUMIN SERPL BCP-MCNC: 4.3 G/DL (ref 3.4–5)
ANION GAP SERPL CALC-SCNC: 13 MMOL/L (ref 10–20)
BUN SERPL-MCNC: 18 MG/DL (ref 6–23)
CALCIUM SERPL-MCNC: 9.9 MG/DL (ref 8.6–10.6)
CHLORIDE SERPL-SCNC: 99 MMOL/L (ref 98–107)
CO2 SERPL-SCNC: 29 MMOL/L (ref 21–32)
CREAT SERPL-MCNC: 1.28 MG/DL (ref 0.5–1.3)
EGFRCR SERPLBLD CKD-EPI 2021: 64 ML/MIN/1.73M*2
ERYTHROCYTE [DISTWIDTH] IN BLOOD BY AUTOMATED COUNT: 13.4 % (ref 11.5–14.5)
GLUCOSE SERPL-MCNC: 238 MG/DL (ref 74–99)
HCT VFR BLD AUTO: 48.7 % (ref 41–52)
HGB BLD-MCNC: 14.5 G/DL (ref 13.5–17.5)
MAGNESIUM SERPL-MCNC: 2.19 MG/DL (ref 1.6–2.4)
MCH RBC QN AUTO: 26.8 PG (ref 26–34)
MCHC RBC AUTO-ENTMCNC: 29.8 G/DL (ref 32–36)
MCV RBC AUTO: 90 FL (ref 80–100)
NRBC BLD-RTO: 0 /100 WBCS (ref 0–0)
PHOSPHATE SERPL-MCNC: 2.6 MG/DL (ref 2.5–4.9)
PLATELET # BLD AUTO: 234 X10*3/UL (ref 150–450)
POTASSIUM SERPL-SCNC: 4 MMOL/L (ref 3.5–5.3)
RBC # BLD AUTO: 5.42 X10*6/UL (ref 4.5–5.9)
SODIUM SERPL-SCNC: 137 MMOL/L (ref 136–145)
TACROLIMUS BLD-MCNC: 4.1 NG/ML
WBC # BLD AUTO: 5.5 X10*3/UL (ref 4.4–11.3)

## 2025-07-08 PROCEDURE — 83735 ASSAY OF MAGNESIUM: CPT

## 2025-07-08 PROCEDURE — 36415 COLL VENOUS BLD VENIPUNCTURE: CPT

## 2025-07-08 PROCEDURE — 85027 COMPLETE CBC AUTOMATED: CPT

## 2025-07-08 PROCEDURE — 80197 ASSAY OF TACROLIMUS: CPT | Performed by: STUDENT IN AN ORGANIZED HEALTH CARE EDUCATION/TRAINING PROGRAM

## 2025-07-08 PROCEDURE — 80069 RENAL FUNCTION PANEL: CPT

## 2025-07-09 NOTE — TELEPHONE ENCOUNTER
Reviewed labs in 365 with Dr. Hawk  Tac 4.1, 6.7, 12.0 on tac 0.5 mg BID   6/2 was decreased to 0.5 mg BID (level of 18.2)  PLAN:   increase to 1 mg am and stay 0.5 mg pm  Patient getting weekly labs    Called patient wife to give updated plan, verbalized understanding will increase the dose to 1 mg in morning and stay 0.5 mg every evening.

## 2025-07-10 RX ORDER — TACROLIMUS 0.5 MG/1
CAPSULE ORAL
Qty: 90 CAPSULE | Refills: 11 | Status: SHIPPED | OUTPATIENT
Start: 2025-07-10 | End: 2026-07-10

## 2025-07-11 DIAGNOSIS — Z94.0 KIDNEY REPLACED BY TRANSPLANT (HHS-HCC): ICD-10-CM

## 2025-07-15 ENCOUNTER — NURSE ONLY (OUTPATIENT)
Facility: HOSPITAL | Age: 60
End: 2025-07-15
Payer: MEDICARE

## 2025-07-15 DIAGNOSIS — Z94.0 KIDNEY REPLACED BY TRANSPLANT (HHS-HCC): ICD-10-CM

## 2025-07-15 LAB
ALBUMIN SERPL BCP-MCNC: 4.1 G/DL (ref 3.4–5)
ANION GAP SERPL CALC-SCNC: 11 MMOL/L (ref 10–20)
BUN SERPL-MCNC: 28 MG/DL (ref 6–23)
CALCIUM SERPL-MCNC: 9.7 MG/DL (ref 8.6–10.6)
CHLORIDE SERPL-SCNC: 98 MMOL/L (ref 98–107)
CO2 SERPL-SCNC: 29 MMOL/L (ref 21–32)
CREAT SERPL-MCNC: 1.46 MG/DL (ref 0.5–1.3)
EGFRCR SERPLBLD CKD-EPI 2021: 55 ML/MIN/1.73M*2
ERYTHROCYTE [DISTWIDTH] IN BLOOD BY AUTOMATED COUNT: 13.4 % (ref 11.5–14.5)
GLUCOSE SERPL-MCNC: 301 MG/DL (ref 74–99)
HCT VFR BLD AUTO: 47.9 % (ref 41–52)
HGB BLD-MCNC: 13.5 G/DL (ref 13.5–17.5)
MAGNESIUM SERPL-MCNC: 1.77 MG/DL (ref 1.6–2.4)
MCH RBC QN AUTO: 25.4 PG (ref 26–34)
MCHC RBC AUTO-ENTMCNC: 28.2 G/DL (ref 32–36)
MCV RBC AUTO: 90 FL (ref 80–100)
NRBC BLD-RTO: 0 /100 WBCS (ref 0–0)
PHOSPHATE SERPL-MCNC: 2.7 MG/DL (ref 2.5–4.9)
PLATELET # BLD AUTO: 201 X10*3/UL (ref 150–450)
POTASSIUM SERPL-SCNC: 4.3 MMOL/L (ref 3.5–5.3)
RBC # BLD AUTO: 5.32 X10*6/UL (ref 4.5–5.9)
SODIUM SERPL-SCNC: 134 MMOL/L (ref 136–145)
TACROLIMUS BLD-MCNC: 4.8 NG/ML
WBC # BLD AUTO: 5 X10*3/UL (ref 4.4–11.3)

## 2025-07-15 PROCEDURE — 87799 DETECT AGENT NOS DNA QUANT: CPT

## 2025-07-15 PROCEDURE — 83735 ASSAY OF MAGNESIUM: CPT

## 2025-07-15 PROCEDURE — 85027 COMPLETE CBC AUTOMATED: CPT

## 2025-07-15 PROCEDURE — 80197 ASSAY OF TACROLIMUS: CPT

## 2025-07-15 PROCEDURE — 80069 RENAL FUNCTION PANEL: CPT

## 2025-07-15 PROCEDURE — 36415 COLL VENOUS BLD VENIPUNCTURE: CPT

## 2025-07-16 ENCOUNTER — TELEMEDICINE (OUTPATIENT)
Age: 60
End: 2025-07-16
Payer: MEDICARE

## 2025-07-16 VITALS — BODY MASS INDEX: 23.7 KG/M2 | WEIGHT: 160 LBS | HEIGHT: 69 IN

## 2025-07-16 DIAGNOSIS — Z94.0 KIDNEY REPLACED BY TRANSPLANT (HHS-HCC): ICD-10-CM

## 2025-07-16 DIAGNOSIS — Z79.4 TYPE 2 DIABETES MELLITUS WITH HYPERGLYCEMIA, WITH LONG-TERM CURRENT USE OF INSULIN: ICD-10-CM

## 2025-07-16 DIAGNOSIS — E11.65 TYPE 2 DIABETES MELLITUS WITH HYPERGLYCEMIA, WITH LONG-TERM CURRENT USE OF INSULIN: ICD-10-CM

## 2025-07-16 DIAGNOSIS — E16.2 HYPOGLYCEMIA: ICD-10-CM

## 2025-07-16 LAB
BKV DNA SERPL NAA+PROBE-LOG#: NORMAL {LOG_COPIES}/ML
CMV DNA SERPL NAA+PROBE-LOG IU: ABNORMAL {LOG_IU}/ML
EBV DNA SPEC NAA+PROBE-LOG#: NORMAL {LOG_COPIES}/ML
LABORATORY COMMENT REPORT: ABNORMAL
LABORATORY COMMENT REPORT: NOT DETECTED
LABORATORY COMMENT REPORT: NOT DETECTED

## 2025-07-16 PROCEDURE — 99214 OFFICE O/P EST MOD 30 MIN: CPT | Performed by: NURSE PRACTITIONER

## 2025-07-16 PROCEDURE — 1036F TOBACCO NON-USER: CPT | Performed by: NURSE PRACTITIONER

## 2025-07-16 PROCEDURE — 3008F BODY MASS INDEX DOCD: CPT | Performed by: NURSE PRACTITIONER

## 2025-07-16 PROCEDURE — 95251 CONT GLUC MNTR ANALYSIS I&R: CPT | Performed by: NURSE PRACTITIONER

## 2025-07-16 RX ORDER — INSULIN GLARGINE 100 [IU]/ML
INJECTION, SOLUTION SUBCUTANEOUS
Qty: 15 ML | Refills: 11 | Status: SHIPPED | OUTPATIENT
Start: 2025-07-16

## 2025-07-16 RX ORDER — INSULIN LISPRO 100 [IU]/ML
INJECTION, SOLUTION INTRAVENOUS; SUBCUTANEOUS
Qty: 15 ML | Refills: 11 | Status: SHIPPED | OUTPATIENT
Start: 2025-07-16

## 2025-07-16 RX ORDER — BLOOD-GLUCOSE SENSOR
EACH MISCELLANEOUS
Qty: 2 EACH | Refills: 11 | Status: SHIPPED | OUTPATIENT
Start: 2025-07-16

## 2025-07-16 RX ORDER — PEN NEEDLE, DIABETIC 30 GX3/16"
1 NEEDLE, DISPOSABLE MISCELLANEOUS 4 TIMES DAILY
Qty: 400 EACH | Refills: 3 | Status: SHIPPED | OUTPATIENT
Start: 2025-07-16 | End: 2026-07-16

## 2025-07-16 SDOH — ECONOMIC STABILITY: FOOD INSECURITY: WITHIN THE PAST 12 MONTHS, THE FOOD YOU BOUGHT JUST DIDN'T LAST AND YOU DIDN'T HAVE MONEY TO GET MORE.: SOMETIMES TRUE

## 2025-07-16 SDOH — ECONOMIC STABILITY: FOOD INSECURITY: WITHIN THE PAST 12 MONTHS, YOU WORRIED THAT YOUR FOOD WOULD RUN OUT BEFORE YOU GOT MONEY TO BUY MORE.: SOMETIMES TRUE

## 2025-07-16 ASSESSMENT — PAIN SCALES - GENERAL: PAINLEVEL_OUTOF10: 0-NO PAIN

## 2025-07-16 ASSESSMENT — ENCOUNTER SYMPTOMS
LOSS OF SENSATION IN FEET: 0
NAUSEA: 0
SHORTNESS OF BREATH: 0
AGITATION: 0
ABDOMINAL PAIN: 0
DIAPHORESIS: 0
SORE THROAT: 0
UNEXPECTED WEIGHT CHANGE: 0
JOINT SWELLING: 0
ACTIVITY CHANGE: 0
FREQUENCY: 0
HEADACHES: 0
DYSURIA: 0
OCCASIONAL FEELINGS OF UNSTEADINESS: 0
EYES NEGATIVE: 1
DIARRHEA: 0
DEPRESSION: 0
NUMBNESS: 0
COUGH: 0
POLYDIPSIA: 0
CONFUSION: 0
CHEST TIGHTNESS: 0
APPETITE CHANGE: 0
POLYPHAGIA: 0
SPEECH DIFFICULTY: 0

## 2025-07-16 ASSESSMENT — PATIENT HEALTH QUESTIONNAIRE - PHQ9
2. FEELING DOWN, DEPRESSED OR HOPELESS: NOT AT ALL
SUM OF ALL RESPONSES TO PHQ9 QUESTIONS 1 AND 2: 0
1. LITTLE INTEREST OR PLEASURE IN DOING THINGS: NOT AT ALL

## 2025-07-16 NOTE — PROGRESS NOTES
Subjective   Anibal Dow is a 60 y.o. male who presents for a virtual follow-up of Type 2 diabetes mellitus.     Virtual or Telephone Consent    An interactive audio and video telecommunication system which permits real time communications between the patient (at the originating site) and provider (at the distant site) was utilized to provide this telehealth service.   Verbal consent was requested and obtained from Anibal Dow on this date, 07/16/25 for a telehealth visit and the patient's location was confirmed at the time of the visit.    Last seen by Nerissa Rollins PA-C 4/10/25.    The initial diagnosis of diabetes was made at least 15 years ago, if not more. The patient does not have a known family history of diabetes.    Known complications due to diabetes included retinopathy, peripheral vascular disease, chronic kidney disease, and CHF    Cardiovascular risk factors include advanced age (older than 55 for men, 65 for women), diabetes mellitus, and male gender. The patient is not on an ACE inhibitor or angiotensin II receptor blocker.  The patient has not been previously hospitalized due to diabetic ketoacidosis.     Current symptoms/problems include hyperglycemia and hypoglycemia . His clinical course has fluctuated.   Recent ED visit (6/19/25) for hypoglycemia. Glargine dose was reduced.     Current diabetes regimen is as follows: glargine 15u qAM, lispro 6-6-4u ACTID, and ssi 2u:50>150mg/dL ACTID Taking 10-12 units with meals including correction scale    The patient is currently checking via Anisa 3  Patient is using: continuous glucose monitor - anisa 3+ with reader, unable to download. Data manually reviewed.     CGM INTERPRETATION per past 30 day report in CGM reader hx:  Average blood sugar 241 mg/dL    Glucose less than 70 mg/dL equals 1% of time worn  Glucose ranging between 70 to 180 mg/dL represented by 28% of time worn  Glucose ranging greater than 180 mg/dL represented by 71% of time  "worn    72 hours of data reviewed in order to inform diabetes treatment plan decision making, patient is not currently at risk for recurrent hypoglycemia safety concerns    CGM trend reviewed: lowest BG around lunch time, elevated with dinner and steady > 150 overnight.     Exercise: intermittently - limited by recent kidney transplantation, waiting to be cleared. active at work, renovating houses. Walking alot  Meal panning: He is using avoidance of concentrated sweets.  Breakfast: eats around 9am, sausage, eggs, grits ect  Lunch: meat, potato skins, salad  Dinner: chicken, vegetables, salad    Review of Systems   Constitutional:  Negative for activity change, appetite change, diaphoresis and unexpected weight change.   HENT: Negative.  Negative for sore throat.    Eyes: Negative.    Respiratory:  Negative for cough, chest tightness and shortness of breath.    Cardiovascular:  Negative for chest pain.   Gastrointestinal:  Negative for abdominal pain, diarrhea and nausea.   Endocrine: Negative for cold intolerance, heat intolerance, polydipsia, polyphagia and polyuria.   Genitourinary:  Negative for dysuria and frequency.   Musculoskeletal:  Negative for gait problem and joint swelling.   Neurological:  Negative for speech difficulty, numbness and headaches.   Psychiatric/Behavioral:  Negative for agitation, behavioral problems and confusion.    All other systems reviewed and are negative.    Objective   Ht 1.753 m (5' 9\")   Wt 72.6 kg (160 lb)   BMI 23.63 kg/m²   Physical Exam  Constitutional:       Appearance: Normal appearance. He is normal weight.   HENT:      Head: Normocephalic and atraumatic.      Nose: Nose normal.      Mouth/Throat:      Mouth: Mucous membranes are dry.      Pharynx: Oropharynx is clear.     Eyes:      Extraocular Movements: Extraocular movements intact.      Conjunctiva/sclera: Conjunctivae normal.     Pulmonary:      Effort: Pulmonary effort is normal.   Abdominal:      General: Bowel " sounds are normal.     Skin:     General: Skin is warm and dry.     Neurological:      General: No focal deficit present.      Mental Status: He is alert and oriented to person, place, and time. Mental status is at baseline.     Psychiatric:         Mood and Affect: Mood normal.         Behavior: Behavior normal.         Thought Content: Thought content normal.         Judgment: Judgment normal.       Lab Review  Glucose (mg/dL)   Date Value   07/15/2025 301 (H)   07/08/2025 238 (H)   07/01/2025 241 (H)     HEMOGLOBIN A1c (%)   Date Value   05/16/2025 7.6 (H)     Hemoglobin A1C (%)   Date Value   07/08/2024 6.8 (H)   01/05/2024 8.0 (H)   06/13/2023 6.2 (A)   05/16/2023 6.7 (H)     Bicarbonate (mmol/L)   Date Value   07/15/2025 29   07/08/2025 29   07/01/2025 28     Urea Nitrogen (mg/dL)   Date Value   07/15/2025 28 (H)   07/08/2025 18   07/01/2025 31 (H)     Creatinine (mg/dL)   Date Value   07/15/2025 1.46 (H)   07/08/2025 1.28   07/01/2025 1.43 (H)     Health Maintenance:   Foot Exam: due for update  Eye Exam: due for update  Lipid Panel: 7/1/25 - LDL 61  Urine Albumin: 33.2 7/1/25    Assessment/Plan   Diagnoses and all orders for this visit:  Type 2 diabetes mellitus with hyperglycemia, with long-term current use of insulin  Kidney replaced by transplant ([facility])    Type 2 diabetes mellitus, is not at goal. Mostly hyperglycemic recently, although has had some lows in June - only 1 hypo since glargine was reduced. Taking 10-12 units of lispro per meal, with total lispro per day ~30 units.  Will start Jardiance 10 mg daily for BG control as well as cardioprotective status. Will reduce lispro with start of Jardiance. Patient instructed to reach out through [facility] if persistently hyperglycemic or if he is having any hypoglycemia.    RX changes: see updated insulin plan below Start Jardiance 10 mg daily  Education:  interpretation of lab results and blood sugar goals. Educated on side effects of Jardiance, practice  good hygiene and hold dose if not eating well  Follow up: I recommend diabetes care be in 3 weeks via telemedicine      INSULIN INSTRUCTIONS    LANTUS = 15 units once every morning    HUMALOG        BREAKFAST = 4 units plus sliding scale (take 15 min before meal)       LUNCH = 4 units plus sliding scale (take 15 min before meal)       DINNER = 3 units plus sliding scale (take 15 min before meal)    SLIDING SCALE    = 0u  151-200 = 1u  201-250 = 2u  251-300 = 3u  301-350 = 4u  351-400 = 5u  Over 400 = repeat 10u every 4 hours     Please take Jardiance 10 mg once every morning. Please be sure to hydrate and maintain good personal hygiene. if you are 10 feeling unwell or experiencing vomiting/diarrhea, please stop jardiance until your appetite returns to normal.

## 2025-07-16 NOTE — PATIENT INSTRUCTIONS
RX changes: see updated insulin plan below Start Jardiance 10 mg daily  Education:  interpretation of lab results and blood sugar goals. Educated on side effects of Jardiance, practice good hygiene and hold dose if not eating well  Follow up: I recommend diabetes care be in 3 weeks via telemedicine      INSULIN INSTRUCTIONS    LANTUS = 15 units once every morning    HUMALOG        BREAKFAST = 4 units plus sliding scale (take 15 min before meal)       LUNCH = 4 units plus sliding scale (take 15 min before meal)       DINNER = 3 units plus sliding scale (take 15 min before meal)    SLIDING SCALE    = 0u  151-200 = 1u  201-250 = 2u  251-300 = 3u  301-350 = 4u  351-400 = 5u  Over 400 = repeat 10u every 4 hours     Please take Jardiance 10 mg once every morning. Please be sure to hydrate and maintain good personal hygiene. if you are 10 feeling unwell or experiencing vomiting/diarrhea, please stop jardiance until your appetite returns to normal.

## 2025-07-22 ENCOUNTER — NURSE ONLY (OUTPATIENT)
Facility: HOSPITAL | Age: 60
End: 2025-07-22
Payer: MEDICARE

## 2025-07-22 DIAGNOSIS — Z13.89 SCREENING FOR BLOOD OR PROTEIN IN URINE: ICD-10-CM

## 2025-07-22 DIAGNOSIS — Z94.0 KIDNEY REPLACED BY TRANSPLANT (HHS-HCC): ICD-10-CM

## 2025-07-22 LAB
ALBUMIN SERPL BCP-MCNC: 4.2 G/DL (ref 3.4–5)
ANION GAP SERPL CALC-SCNC: 11 MMOL/L (ref 10–20)
BUN SERPL-MCNC: 30 MG/DL (ref 6–23)
CALCIUM SERPL-MCNC: 9.6 MG/DL (ref 8.6–10.6)
CHLORIDE SERPL-SCNC: 103 MMOL/L (ref 98–107)
CO2 SERPL-SCNC: 28 MMOL/L (ref 21–32)
CREAT SERPL-MCNC: 1.53 MG/DL (ref 0.5–1.3)
CREAT UR-MCNC: 96.9 MG/DL (ref 20–370)
EGFRCR SERPLBLD CKD-EPI 2021: 52 ML/MIN/1.73M*2
ERYTHROCYTE [DISTWIDTH] IN BLOOD BY AUTOMATED COUNT: 13.6 % (ref 11.5–14.5)
GLUCOSE SERPL-MCNC: 228 MG/DL (ref 74–99)
HCT VFR BLD AUTO: 49.5 % (ref 41–52)
HGB BLD-MCNC: 14.2 G/DL (ref 13.5–17.5)
MAGNESIUM SERPL-MCNC: 2.11 MG/DL (ref 1.6–2.4)
MCH RBC QN AUTO: 25.8 PG (ref 26–34)
MCHC RBC AUTO-ENTMCNC: 28.7 G/DL (ref 32–36)
MCV RBC AUTO: 90 FL (ref 80–100)
NRBC BLD-RTO: 0 /100 WBCS (ref 0–0)
PHOSPHATE SERPL-MCNC: 2.9 MG/DL (ref 2.5–4.9)
PLATELET # BLD AUTO: 214 X10*3/UL (ref 150–450)
POTASSIUM SERPL-SCNC: 4.4 MMOL/L (ref 3.5–5.3)
PROT UR-ACNC: 29 MG/DL (ref 5–25)
PROT/CREAT UR: 0.3 MG/MG CREAT (ref 0–0.17)
RBC # BLD AUTO: 5.51 X10*6/UL (ref 4.5–5.9)
SODIUM SERPL-SCNC: 138 MMOL/L (ref 136–145)
TACROLIMUS BLD-MCNC: 14.1 NG/ML
WBC # BLD AUTO: 6.4 X10*3/UL (ref 4.4–11.3)

## 2025-07-22 PROCEDURE — 85027 COMPLETE CBC AUTOMATED: CPT

## 2025-07-22 PROCEDURE — 80069 RENAL FUNCTION PANEL: CPT

## 2025-07-22 PROCEDURE — 84156 ASSAY OF PROTEIN URINE: CPT

## 2025-07-22 PROCEDURE — 80197 ASSAY OF TACROLIMUS: CPT

## 2025-07-22 PROCEDURE — 83735 ASSAY OF MAGNESIUM: CPT

## 2025-07-22 PROCEDURE — 36415 COLL VENOUS BLD VENIPUNCTURE: CPT

## 2025-07-23 DIAGNOSIS — Z94.0 KIDNEY REPLACED BY TRANSPLANT (HHS-HCC): ICD-10-CM

## 2025-07-25 ENCOUNTER — DOCUMENTATION (OUTPATIENT)
Facility: HOSPITAL | Age: 60
End: 2025-07-25
Payer: MEDICARE

## 2025-07-25 DIAGNOSIS — Z94.0 KIDNEY REPLACED BY TRANSPLANT (HHS-HCC): ICD-10-CM

## 2025-07-25 NOTE — PROGRESS NOTES
Reviewed labs in 365 with Dr. Amaya   Tac 14.1 from 4.8, 4.1   We increased dose slightly on 7/9 from 0.5 mg BID   To 1 mg am and 0.5 mg pm.   PLAN :  Patient getting labs weekly, see next level.

## 2025-07-29 ENCOUNTER — NURSE ONLY (OUTPATIENT)
Facility: HOSPITAL | Age: 60
End: 2025-07-29
Payer: MEDICARE

## 2025-07-29 DIAGNOSIS — Z94.0 KIDNEY REPLACED BY TRANSPLANT (HHS-HCC): ICD-10-CM

## 2025-07-29 LAB
ALBUMIN SERPL BCP-MCNC: 4.1 G/DL (ref 3.4–5)
ANION GAP SERPL CALC-SCNC: 13 MMOL/L (ref 10–20)
BUN SERPL-MCNC: 28 MG/DL (ref 6–23)
CALCIUM SERPL-MCNC: 9.7 MG/DL (ref 8.6–10.6)
CHLORIDE SERPL-SCNC: 103 MMOL/L (ref 98–107)
CO2 SERPL-SCNC: 26 MMOL/L (ref 21–32)
CREAT SERPL-MCNC: 1.66 MG/DL (ref 0.5–1.3)
EGFRCR SERPLBLD CKD-EPI 2021: 47 ML/MIN/1.73M*2
ERYTHROCYTE [DISTWIDTH] IN BLOOD BY AUTOMATED COUNT: 13.8 % (ref 11.5–14.5)
GLUCOSE SERPL-MCNC: 172 MG/DL (ref 74–99)
HCT VFR BLD AUTO: 46.9 % (ref 41–52)
HGB BLD-MCNC: 13.8 G/DL (ref 13.5–17.5)
MAGNESIUM SERPL-MCNC: 2.07 MG/DL (ref 1.6–2.4)
MCH RBC QN AUTO: 25.9 PG (ref 26–34)
MCHC RBC AUTO-ENTMCNC: 29.4 G/DL (ref 32–36)
MCV RBC AUTO: 88 FL (ref 80–100)
NRBC BLD-RTO: 0 /100 WBCS (ref 0–0)
PHOSPHATE SERPL-MCNC: 2.8 MG/DL (ref 2.5–4.9)
PLATELET # BLD AUTO: 181 X10*3/UL (ref 150–450)
POTASSIUM SERPL-SCNC: 4.2 MMOL/L (ref 3.5–5.3)
RBC # BLD AUTO: 5.32 X10*6/UL (ref 4.5–5.9)
SODIUM SERPL-SCNC: 138 MMOL/L (ref 136–145)
TACROLIMUS BLD-MCNC: 12.5 NG/ML
WBC # BLD AUTO: 6.1 X10*3/UL (ref 4.4–11.3)

## 2025-07-29 PROCEDURE — 85027 COMPLETE CBC AUTOMATED: CPT

## 2025-07-29 PROCEDURE — 36415 COLL VENOUS BLD VENIPUNCTURE: CPT

## 2025-07-29 PROCEDURE — 83735 ASSAY OF MAGNESIUM: CPT

## 2025-07-29 PROCEDURE — 80197 ASSAY OF TACROLIMUS: CPT

## 2025-07-29 PROCEDURE — 80069 RENAL FUNCTION PANEL: CPT

## 2025-07-30 ENCOUNTER — TELEPHONE (OUTPATIENT)
Facility: HOSPITAL | Age: 60
End: 2025-07-30
Payer: MEDICARE

## 2025-07-30 DIAGNOSIS — Z94.0 KIDNEY REPLACED BY TRANSPLANT (HHS-HCC): ICD-10-CM

## 2025-07-30 LAB
CMV DNA SERPL NAA+PROBE-LOG IU: ABNORMAL {LOG_IU}/ML
LABORATORY COMMENT REPORT: ABNORMAL

## 2025-07-30 RX ORDER — TACROLIMUS 0.5 MG/1
0.5 CAPSULE ORAL 2 TIMES DAILY
Qty: 60 CAPSULE | Refills: 11 | Status: SHIPPED | OUTPATIENT
Start: 2025-07-30 | End: 2026-07-30

## 2025-07-30 NOTE — TELEPHONE ENCOUNTER
Reviewed lab results in 365 with Dr. Hawk and Dr. Martinez  tac 12.5, 14.1, 4.8   cr 1.6 stable  1mg and 0.5 mg   PLAN:  decrease 0.5 mg BID    LVM for wife and sent direct message with the changes needed.

## 2025-08-01 DIAGNOSIS — Z94.0 KIDNEY REPLACED BY TRANSPLANT (HHS-HCC): ICD-10-CM

## 2025-08-05 ENCOUNTER — NURSE ONLY (OUTPATIENT)
Facility: HOSPITAL | Age: 60
End: 2025-08-05
Payer: MEDICARE

## 2025-08-05 DIAGNOSIS — Z94.0 KIDNEY REPLACED BY TRANSPLANT (HHS-HCC): ICD-10-CM

## 2025-08-05 DIAGNOSIS — Z13.89 SCREENING FOR BLOOD OR PROTEIN IN URINE: ICD-10-CM

## 2025-08-05 LAB
ALBUMIN SERPL BCP-MCNC: 4.2 G/DL (ref 3.4–5)
ANION GAP SERPL CALC-SCNC: 11 MMOL/L (ref 10–20)
BUN SERPL-MCNC: 24 MG/DL (ref 6–23)
CALCIUM SERPL-MCNC: 9.7 MG/DL (ref 8.6–10.6)
CHLORIDE SERPL-SCNC: 100 MMOL/L (ref 98–107)
CO2 SERPL-SCNC: 30 MMOL/L (ref 21–32)
CREAT SERPL-MCNC: 1.43 MG/DL (ref 0.5–1.3)
CREAT UR-MCNC: 95.5 MG/DL (ref 20–370)
EGFRCR SERPLBLD CKD-EPI 2021: 56 ML/MIN/1.73M*2
ERYTHROCYTE [DISTWIDTH] IN BLOOD BY AUTOMATED COUNT: 13.5 % (ref 11.5–14.5)
GLUCOSE SERPL-MCNC: 135 MG/DL (ref 74–99)
HCT VFR BLD AUTO: 46.7 % (ref 41–52)
HGB BLD-MCNC: 13.3 G/DL (ref 13.5–17.5)
MAGNESIUM SERPL-MCNC: 2.17 MG/DL (ref 1.6–2.4)
MCH RBC QN AUTO: 25.6 PG (ref 26–34)
MCHC RBC AUTO-ENTMCNC: 28.5 G/DL (ref 32–36)
MCV RBC AUTO: 90 FL (ref 80–100)
NRBC BLD-RTO: 0 /100 WBCS (ref 0–0)
PHOSPHATE SERPL-MCNC: 2.6 MG/DL (ref 2.5–4.9)
PLATELET # BLD AUTO: 188 X10*3/UL (ref 150–450)
POTASSIUM SERPL-SCNC: 4.5 MMOL/L (ref 3.5–5.3)
PROT UR-ACNC: 31 MG/DL (ref 5–25)
PROT/CREAT UR: 0.32 MG/MG CREAT (ref 0–0.17)
RBC # BLD AUTO: 5.19 X10*6/UL (ref 4.5–5.9)
SODIUM SERPL-SCNC: 136 MMOL/L (ref 136–145)
TACROLIMUS BLD-MCNC: 6.5 NG/ML
WBC # BLD AUTO: 6 X10*3/UL (ref 4.4–11.3)

## 2025-08-05 PROCEDURE — 85027 COMPLETE CBC AUTOMATED: CPT

## 2025-08-05 PROCEDURE — 36415 COLL VENOUS BLD VENIPUNCTURE: CPT

## 2025-08-05 PROCEDURE — 84100 ASSAY OF PHOSPHORUS: CPT

## 2025-08-05 PROCEDURE — 80197 ASSAY OF TACROLIMUS: CPT

## 2025-08-05 PROCEDURE — 83735 ASSAY OF MAGNESIUM: CPT

## 2025-08-05 PROCEDURE — 84156 ASSAY OF PROTEIN URINE: CPT

## 2025-08-06 LAB
CMV DNA SERPL NAA+PROBE-LOG IU: ABNORMAL {LOG_IU}/ML
LABORATORY COMMENT REPORT: ABNORMAL

## 2025-08-08 DIAGNOSIS — Z94.0 KIDNEY REPLACED BY TRANSPLANT (HHS-HCC): ICD-10-CM

## 2025-08-12 ENCOUNTER — NURSE ONLY (OUTPATIENT)
Facility: HOSPITAL | Age: 60
End: 2025-08-12
Payer: MEDICARE

## 2025-08-12 DIAGNOSIS — Z94.0 KIDNEY REPLACED BY TRANSPLANT (HHS-HCC): ICD-10-CM

## 2025-08-12 DIAGNOSIS — Z13.89 SCREENING FOR BLOOD OR PROTEIN IN URINE: ICD-10-CM

## 2025-08-12 LAB
ALBUMIN SERPL BCP-MCNC: 4.3 G/DL (ref 3.4–5)
ANION GAP SERPL CALC-SCNC: 13 MMOL/L (ref 10–20)
BUN SERPL-MCNC: 30 MG/DL (ref 6–23)
CALCIUM SERPL-MCNC: 9.8 MG/DL (ref 8.6–10.6)
CHLORIDE SERPL-SCNC: 101 MMOL/L (ref 98–107)
CO2 SERPL-SCNC: 28 MMOL/L (ref 21–32)
CREAT SERPL-MCNC: 1.52 MG/DL (ref 0.5–1.3)
CREAT UR-MCNC: 135.9 MG/DL (ref 20–370)
EGFRCR SERPLBLD CKD-EPI 2021: 52 ML/MIN/1.73M*2
ERYTHROCYTE [DISTWIDTH] IN BLOOD BY AUTOMATED COUNT: 13.6 % (ref 11.5–14.5)
GLUCOSE SERPL-MCNC: 146 MG/DL (ref 74–99)
HCT VFR BLD AUTO: 46.6 % (ref 41–52)
HGB BLD-MCNC: 13.5 G/DL (ref 13.5–17.5)
MAGNESIUM SERPL-MCNC: 2.2 MG/DL (ref 1.6–2.4)
MCH RBC QN AUTO: 25.8 PG (ref 26–34)
MCHC RBC AUTO-ENTMCNC: 29 G/DL (ref 32–36)
MCV RBC AUTO: 89 FL (ref 80–100)
NRBC BLD-RTO: 0 /100 WBCS (ref 0–0)
PHOSPHATE SERPL-MCNC: 3 MG/DL (ref 2.5–4.9)
PLATELET # BLD AUTO: 228 X10*3/UL (ref 150–450)
POTASSIUM SERPL-SCNC: 3.8 MMOL/L (ref 3.5–5.3)
PROT UR-ACNC: 53 MG/DL (ref 5–25)
PROT/CREAT UR: 0.39 MG/MG CREAT (ref 0–0.17)
RBC # BLD AUTO: 5.23 X10*6/UL (ref 4.5–5.9)
SODIUM SERPL-SCNC: 138 MMOL/L (ref 136–145)
TACROLIMUS BLD-MCNC: 6.1 NG/ML
WBC # BLD AUTO: 6 X10*3/UL (ref 4.4–11.3)

## 2025-08-12 PROCEDURE — 36415 COLL VENOUS BLD VENIPUNCTURE: CPT

## 2025-08-12 PROCEDURE — 83735 ASSAY OF MAGNESIUM: CPT

## 2025-08-12 PROCEDURE — 84156 ASSAY OF PROTEIN URINE: CPT

## 2025-08-12 PROCEDURE — 80069 RENAL FUNCTION PANEL: CPT

## 2025-08-12 PROCEDURE — 80197 ASSAY OF TACROLIMUS: CPT

## 2025-08-12 PROCEDURE — 85027 COMPLETE CBC AUTOMATED: CPT

## 2025-08-13 DIAGNOSIS — Z94.0 KIDNEY REPLACED BY TRANSPLANT (HHS-HCC): ICD-10-CM

## 2025-08-13 LAB
CMV DNA SERPL NAA+PROBE-ACNC: 57 IU/ML (ref ?–35)
CMV DNA SERPL NAA+PROBE-LOG IU: 1.76 LOG IU/ML
LABORATORY COMMENT REPORT: DETECTED

## 2025-08-13 RX ORDER — MYCOPHENOLATE MOFETIL 250 MG/1
750 CAPSULE ORAL 2 TIMES DAILY
Qty: 180 CAPSULE | Refills: 11 | Status: SHIPPED | OUTPATIENT
Start: 2025-08-13 | End: 2026-08-13

## 2025-08-14 ENCOUNTER — TELEPHONE (OUTPATIENT)
Facility: HOSPITAL | Age: 60
End: 2025-08-14
Payer: MEDICARE

## 2025-08-15 DIAGNOSIS — Z94.0 KIDNEY REPLACED BY TRANSPLANT (HHS-HCC): ICD-10-CM

## 2025-08-19 ENCOUNTER — NURSE ONLY (OUTPATIENT)
Facility: HOSPITAL | Age: 60
End: 2025-08-19
Payer: MEDICARE

## 2025-08-19 DIAGNOSIS — N18.31 STAGE 3A CHRONIC KIDNEY DISEASE (MULTI): ICD-10-CM

## 2025-08-19 DIAGNOSIS — Z94.0 KIDNEY REPLACED BY TRANSPLANT (HHS-HCC): ICD-10-CM

## 2025-08-19 LAB
25(OH)D3 SERPL-MCNC: 48 NG/ML (ref 30–100)
ALBUMIN SERPL BCP-MCNC: 4.3 G/DL (ref 3.4–5)
ANION GAP SERPL CALC-SCNC: 11 MMOL/L (ref 10–20)
BUN SERPL-MCNC: 24 MG/DL (ref 6–23)
CALCIUM SERPL-MCNC: 9.7 MG/DL (ref 8.6–10.6)
CHLORIDE SERPL-SCNC: 101 MMOL/L (ref 98–107)
CO2 SERPL-SCNC: 29 MMOL/L (ref 21–32)
CREAT SERPL-MCNC: 1.3 MG/DL (ref 0.5–1.3)
EGFRCR SERPLBLD CKD-EPI 2021: 63 ML/MIN/1.73M*2
ERYTHROCYTE [DISTWIDTH] IN BLOOD BY AUTOMATED COUNT: 13.6 % (ref 11.5–14.5)
GLUCOSE SERPL-MCNC: 176 MG/DL (ref 74–99)
HCT VFR BLD AUTO: 45.6 % (ref 41–52)
HGB BLD-MCNC: 13.2 G/DL (ref 13.5–17.5)
MAGNESIUM SERPL-MCNC: 2.38 MG/DL (ref 1.6–2.4)
MCH RBC QN AUTO: 25.9 PG (ref 26–34)
MCHC RBC AUTO-ENTMCNC: 28.9 G/DL (ref 32–36)
MCV RBC AUTO: 89 FL (ref 80–100)
NRBC BLD-RTO: 0 /100 WBCS (ref 0–0)
PHOSPHATE SERPL-MCNC: 2.8 MG/DL (ref 2.5–4.9)
PLATELET # BLD AUTO: 244 X10*3/UL (ref 150–450)
POTASSIUM SERPL-SCNC: 4.4 MMOL/L (ref 3.5–5.3)
PTH-INTACT SERPL-MCNC: 44.7 PG/ML (ref 18.5–88)
RBC # BLD AUTO: 5.1 X10*6/UL (ref 4.5–5.9)
SODIUM SERPL-SCNC: 137 MMOL/L (ref 136–145)
TACROLIMUS BLD-MCNC: 6.2 NG/ML
WBC # BLD AUTO: 5.5 X10*3/UL (ref 4.4–11.3)

## 2025-08-19 PROCEDURE — 80197 ASSAY OF TACROLIMUS: CPT

## 2025-08-19 PROCEDURE — 80069 RENAL FUNCTION PANEL: CPT

## 2025-08-19 PROCEDURE — 83970 ASSAY OF PARATHORMONE: CPT

## 2025-08-19 PROCEDURE — 87799 DETECT AGENT NOS DNA QUANT: CPT

## 2025-08-19 PROCEDURE — 82306 VITAMIN D 25 HYDROXY: CPT

## 2025-08-19 PROCEDURE — 83735 ASSAY OF MAGNESIUM: CPT

## 2025-08-19 PROCEDURE — 85027 COMPLETE CBC AUTOMATED: CPT

## 2025-08-19 PROCEDURE — 36415 COLL VENOUS BLD VENIPUNCTURE: CPT

## 2025-08-22 DIAGNOSIS — Z94.0 KIDNEY REPLACED BY TRANSPLANT (HHS-HCC): ICD-10-CM

## 2025-08-26 ENCOUNTER — APPOINTMENT (OUTPATIENT)
Facility: HOSPITAL | Age: 60
End: 2025-08-26
Payer: MEDICARE

## 2025-08-26 ENCOUNTER — NURSE ONLY (OUTPATIENT)
Facility: HOSPITAL | Age: 60
End: 2025-08-26
Payer: MEDICARE

## 2025-08-26 ENCOUNTER — OFFICE VISIT (OUTPATIENT)
Facility: HOSPITAL | Age: 60
End: 2025-08-26
Payer: MEDICARE

## 2025-08-26 VITALS
BODY MASS INDEX: 23.66 KG/M2 | HEART RATE: 85 BPM | SYSTOLIC BLOOD PRESSURE: 143 MMHG | OXYGEN SATURATION: 97 % | WEIGHT: 160.2 LBS | DIASTOLIC BLOOD PRESSURE: 93 MMHG | TEMPERATURE: 96.5 F

## 2025-08-26 DIAGNOSIS — Z94.0 KIDNEY REPLACED BY TRANSPLANT (HHS-HCC): Primary | ICD-10-CM

## 2025-08-26 DIAGNOSIS — N25.81 SECONDARY HYPERPARATHYROIDISM OF RENAL ORIGIN (MULTI): ICD-10-CM

## 2025-08-26 DIAGNOSIS — Z79.899 IMMUNOSUPPRESSIVE MANAGEMENT ENCOUNTER FOLLOWING KIDNEY TRANSPLANT: ICD-10-CM

## 2025-08-26 DIAGNOSIS — Z94.0 IMMUNOSUPPRESSIVE MANAGEMENT ENCOUNTER FOLLOWING KIDNEY TRANSPLANT: ICD-10-CM

## 2025-08-26 DIAGNOSIS — D84.9 IMMUNOSUPPRESSION: ICD-10-CM

## 2025-08-26 DIAGNOSIS — Z48.298 AFTERCARE FOLLOWING ORGAN TRANSPLANT: ICD-10-CM

## 2025-08-26 DIAGNOSIS — Z94.0 KIDNEY REPLACED BY TRANSPLANT (HHS-HCC): ICD-10-CM

## 2025-08-26 DIAGNOSIS — Z92.25 PERSONAL HISTORY OF IMMUNOSUPRESSION THERAPY: ICD-10-CM

## 2025-08-26 LAB
ALBUMIN SERPL BCP-MCNC: 4.4 G/DL (ref 3.4–5)
ANION GAP SERPL CALC-SCNC: 11 MMOL/L (ref 10–20)
BUN SERPL-MCNC: 22 MG/DL (ref 6–23)
CALCIUM SERPL-MCNC: 9.8 MG/DL (ref 8.6–10.6)
CHLORIDE SERPL-SCNC: 101 MMOL/L (ref 98–107)
CO2 SERPL-SCNC: 31 MMOL/L (ref 21–32)
CREAT SERPL-MCNC: 1.31 MG/DL (ref 0.5–1.3)
EGFRCR SERPLBLD CKD-EPI 2021: 62 ML/MIN/1.73M*2
ERYTHROCYTE [DISTWIDTH] IN BLOOD BY AUTOMATED COUNT: 13.7 % (ref 11.5–14.5)
GLUCOSE SERPL-MCNC: 150 MG/DL (ref 74–99)
HCT VFR BLD AUTO: 48.5 % (ref 41–52)
HGB BLD-MCNC: 13.7 G/DL (ref 13.5–17.5)
MAGNESIUM SERPL-MCNC: 2.62 MG/DL (ref 1.6–2.4)
MCH RBC QN AUTO: 25.6 PG (ref 26–34)
MCHC RBC AUTO-ENTMCNC: 28.2 G/DL (ref 32–36)
MCV RBC AUTO: 91 FL (ref 80–100)
NRBC BLD-RTO: 0 /100 WBCS (ref 0–0)
PHOSPHATE SERPL-MCNC: 2.9 MG/DL (ref 2.5–4.9)
PLATELET # BLD AUTO: 259 X10*3/UL (ref 150–450)
POTASSIUM SERPL-SCNC: 4.6 MMOL/L (ref 3.5–5.3)
RBC # BLD AUTO: 5.35 X10*6/UL (ref 4.5–5.9)
SODIUM SERPL-SCNC: 138 MMOL/L (ref 136–145)
TACROLIMUS BLD-MCNC: 2.8 NG/ML
WBC # BLD AUTO: 5.9 X10*3/UL (ref 4.4–11.3)

## 2025-08-26 PROCEDURE — 3080F DIAST BP >= 90 MM HG: CPT | Performed by: STUDENT IN AN ORGANIZED HEALTH CARE EDUCATION/TRAINING PROGRAM

## 2025-08-26 PROCEDURE — 3077F SYST BP >= 140 MM HG: CPT | Performed by: STUDENT IN AN ORGANIZED HEALTH CARE EDUCATION/TRAINING PROGRAM

## 2025-08-26 PROCEDURE — 99215 OFFICE O/P EST HI 40 MIN: CPT

## 2025-08-26 PROCEDURE — 85027 COMPLETE CBC AUTOMATED: CPT

## 2025-08-26 PROCEDURE — 80197 ASSAY OF TACROLIMUS: CPT

## 2025-08-26 PROCEDURE — 36415 COLL VENOUS BLD VENIPUNCTURE: CPT

## 2025-08-26 PROCEDURE — 83735 ASSAY OF MAGNESIUM: CPT

## 2025-08-26 PROCEDURE — 80069 RENAL FUNCTION PANEL: CPT

## 2025-08-27 LAB
CMV DNA SERPL NAA+PROBE-ACNC: 35 IU/ML (ref ?–35)
CMV DNA SERPL NAA+PROBE-LOG IU: 1.54 LOG IU/ML
LABORATORY COMMENT REPORT: DETECTED

## 2025-08-28 ENCOUNTER — RESULTS FOLLOW-UP (OUTPATIENT)
Facility: HOSPITAL | Age: 60
End: 2025-08-28
Payer: MEDICARE

## 2025-08-28 ENCOUNTER — TELEPHONE (OUTPATIENT)
Facility: HOSPITAL | Age: 60
End: 2025-08-28
Payer: MEDICARE

## 2025-08-28 DIAGNOSIS — Z94.0 KIDNEY REPLACED BY TRANSPLANT (HHS-HCC): ICD-10-CM

## 2025-08-29 RX ORDER — TACROLIMUS 0.5 MG/1
CAPSULE ORAL
Qty: 90 CAPSULE | Refills: 11 | Status: SHIPPED | OUTPATIENT
Start: 2025-08-29 | End: 2026-08-29

## 2025-09-02 ENCOUNTER — NURSE ONLY (OUTPATIENT)
Facility: HOSPITAL | Age: 60
End: 2025-09-02
Payer: MEDICARE

## 2025-09-02 DIAGNOSIS — Z94.0 KIDNEY REPLACED BY TRANSPLANT (HHS-HCC): ICD-10-CM

## 2025-09-02 LAB
ALBUMIN SERPL BCP-MCNC: 4.2 G/DL (ref 3.4–5)
ANION GAP SERPL CALC-SCNC: 12 MMOL/L (ref 10–20)
BUN SERPL-MCNC: 29 MG/DL (ref 6–23)
CALCIUM SERPL-MCNC: 9.9 MG/DL (ref 8.6–10.6)
CHLORIDE SERPL-SCNC: 100 MMOL/L (ref 98–107)
CO2 SERPL-SCNC: 29 MMOL/L (ref 21–32)
CREAT SERPL-MCNC: 1.38 MG/DL (ref 0.5–1.3)
EGFRCR SERPLBLD CKD-EPI 2021: 59 ML/MIN/1.73M*2
ERYTHROCYTE [DISTWIDTH] IN BLOOD BY AUTOMATED COUNT: 13.6 % (ref 11.5–14.5)
GLUCOSE SERPL-MCNC: 206 MG/DL (ref 74–99)
HCT VFR BLD AUTO: 49.5 % (ref 41–52)
HGB BLD-MCNC: 14 G/DL (ref 13.5–17.5)
MAGNESIUM SERPL-MCNC: 2.04 MG/DL (ref 1.6–2.4)
MCH RBC QN AUTO: 25.4 PG (ref 26–34)
MCHC RBC AUTO-ENTMCNC: 28.3 G/DL (ref 32–36)
MCV RBC AUTO: 90 FL (ref 80–100)
NRBC BLD-RTO: 0 /100 WBCS (ref 0–0)
PHOSPHATE SERPL-MCNC: 2.9 MG/DL (ref 2.5–4.9)
PLATELET # BLD AUTO: 200 X10*3/UL (ref 150–450)
POTASSIUM SERPL-SCNC: 4.4 MMOL/L (ref 3.5–5.3)
RBC # BLD AUTO: 5.52 X10*6/UL (ref 4.5–5.9)
SODIUM SERPL-SCNC: 137 MMOL/L (ref 136–145)
TACROLIMUS BLD-MCNC: 6.7 NG/ML
WBC # BLD AUTO: 5.5 X10*3/UL (ref 4.4–11.3)

## 2025-09-02 PROCEDURE — 80197 ASSAY OF TACROLIMUS: CPT

## 2025-09-02 PROCEDURE — 80069 RENAL FUNCTION PANEL: CPT

## 2025-09-02 PROCEDURE — 85027 COMPLETE CBC AUTOMATED: CPT

## 2025-09-02 PROCEDURE — 83735 ASSAY OF MAGNESIUM: CPT

## 2025-09-02 PROCEDURE — 36415 COLL VENOUS BLD VENIPUNCTURE: CPT

## 2025-09-03 LAB
CMV DNA SERPL NAA+PROBE-LOG IU: NORMAL {LOG_IU}/ML
LABORATORY COMMENT REPORT: NOT DETECTED

## 2025-11-19 ENCOUNTER — APPOINTMENT (OUTPATIENT)
Dept: PRIMARY CARE | Facility: CLINIC | Age: 60
End: 2025-11-19
Payer: MEDICARE

## (undated) DEVICE — TUBE, FEEDING, INFANT, 8 FR, 20IN

## (undated) DEVICE — HANDPIECE, ABC, SINGLE FUNCTION, MALLEABLE, W/CORD, BEND-A-BEAM, 3 IN, LF

## (undated) DEVICE — SUTURE, SILK, 0, 24 IN, SUTUPAK, BLACK

## (undated) DEVICE — DRESSING, ADHESIVE, ISLAND, TELFA, 4 X 10 IN

## (undated) DEVICE — MANIFOLD, 4 PORT NEPTUNE STANDARD

## (undated) DEVICE — INSERT, CLAMP, SURGICAL, SOFT/TRACTION, STEALTH, 1 MM

## (undated) DEVICE — CATHETER TRAY, SURESTEP, 16FR, URINE METER W/STATLOCK

## (undated) DEVICE — SPONGE, DISSECTOR, PEANUT, 3/8, STERILE 5 FOAM HOLDER"

## (undated) DEVICE — SUTURE, PROLENE, 6-0, 30 IN, C1, DA, BLUE

## (undated) DEVICE — STAPLER,PROXIMATE TX RELOADABLE, 30MM WHITE, VASCULAR

## (undated) DEVICE — SUTURE, PROLENE, 5-0, 36 IN, C1, DA, BLUE

## (undated) DEVICE — STAPLER, SKIN, PLUS, REGULAR, 35

## (undated) DEVICE — BAG, DECANTER

## (undated) DEVICE — DRAPE, ISOLATION, BAG, DRAW STRING CLOSURE, STERI DRAPE, LARGE, 20 X 20 IN, DISPOSABLE, STERILE

## (undated) DEVICE — TOWEL, OR, XRAY DETECT 5 PK, WHITE, 17X26, W/DMT TAG, ST

## (undated) DEVICE — DRAPE, FLUID WARMER

## (undated) DEVICE — SYRINGE, 20 CC, LUER LOCK, MONOJECT, W/O CAP, LF

## (undated) DEVICE — SUTURE, PDS II, 4-0, 27 IN, RB-1 VIL MONO, LF

## (undated) DEVICE — STAPLER, PROXIMATE TX RELOADABLE, 30MM VASCULAR WHITE

## (undated) DEVICE — PUNCH, AORTIC 4MM

## (undated) DEVICE — INTRODUCER, GLIDESHEATH SLENDER A-KIT, 5FR 10CM

## (undated) DEVICE — DRAIN, WOUND, FLAT, HUBLESS, FULL LENGTH PERFORATION, 10 MM X 20 CM, SILICONE

## (undated) DEVICE — SUTURE, VICRYL, 3-0,18 IN, SH, UNDYED

## (undated) DEVICE — DRAPE, INCISE, ANTIMICROBIAL, IOBAN 2, LARGE, 17 X 23 IN, DISPOSABLE, STERILE

## (undated) DEVICE — Device

## (undated) DEVICE — DRAPE, INSTRUMENT, W/POUCH, STERI DRAPE, 7 X 11 IN, DISPOSABLE, STERILE

## (undated) DEVICE — COUNTER, NEEDLE, FOAM BLOCK, W/MAGNET, W/BLADE GUARD, 10 COUNT, RED, LF

## (undated) DEVICE — COVER, TABLE, 44 X 75 IN, DISPOSABLE, LF, STERILE

## (undated) DEVICE — CATHETER, WEDGE PRESSURE, BALLOON, DOUBLE LUMEN, 5 FR, 110 CM

## (undated) DEVICE — COVER, CART, 45 X 27 X 48 IN, CLEAR

## (undated) DEVICE — SUTURE, SILK, 3-0, 18 IN SH/CR, BLACK

## (undated) DEVICE — PAD, GROUNDING, ELECTROSURGICAL, DUAL

## (undated) DEVICE — SUTURE, PROLENE, 1 X 60IN TP-1, BLUE

## (undated) DEVICE — COVER, EQUIPMENT, SOLUTION, SLUSH, 112 X 168 CM, LF, STERILE

## (undated) DEVICE — TUBING, SUCTION, CONNECTING, STERILE 0.25 X 120 IN., LF

## (undated) DEVICE — SUTURE, PDS II, 6-0 C1 30IN VIL MONO, VIOLET

## (undated) DEVICE — GUIDEWIRE, .035 X 150 PTFE, FIXED CORE, 15CM BENTSON